# Patient Record
Sex: MALE | Race: WHITE | Employment: OTHER | URBAN - METROPOLITAN AREA
[De-identification: names, ages, dates, MRNs, and addresses within clinical notes are randomized per-mention and may not be internally consistent; named-entity substitution may affect disease eponyms.]

---

## 2017-03-21 ENCOUNTER — APPOINTMENT (OUTPATIENT)
Dept: GENERAL RADIOLOGY | Age: 72
End: 2017-03-21
Attending: EMERGENCY MEDICINE
Payer: MEDICARE

## 2017-03-21 ENCOUNTER — HOSPITAL ENCOUNTER (EMERGENCY)
Age: 72
Discharge: HOME | End: 2017-03-21
Attending: EMERGENCY MEDICINE
Payer: MEDICARE

## 2017-03-21 VITALS
WEIGHT: 248.9 LBS | TEMPERATURE: 98.6 F | RESPIRATION RATE: 18 BRPM | SYSTOLIC BLOOD PRESSURE: 120 MMHG | BODY MASS INDEX: 35.63 KG/M2 | OXYGEN SATURATION: 96 % | HEART RATE: 63 BPM | HEIGHT: 70 IN | DIASTOLIC BLOOD PRESSURE: 44 MMHG

## 2017-03-21 DIAGNOSIS — M79.602 ARM PAIN, DIFFUSE, LEFT: Primary | ICD-10-CM

## 2017-03-21 DIAGNOSIS — Z95.5 HISTORY OF CORONARY ARTERY STENT PLACEMENT: ICD-10-CM

## 2017-03-21 LAB
ALBUMIN SERPL BCP-MCNC: 3.6 G/DL (ref 3.5–5)
ALBUMIN/GLOB SERPL: 1.3 {RATIO} (ref 1.1–2.2)
ALP SERPL-CCNC: 64 U/L (ref 45–117)
ALT SERPL-CCNC: 37 U/L (ref 12–78)
ANION GAP BLD CALC-SCNC: 10 MMOL/L (ref 5–15)
AST SERPL W P-5'-P-CCNC: 26 U/L (ref 15–37)
ATRIAL RATE: 59 BPM
BASOPHILS # BLD AUTO: 0 K/UL (ref 0–0.1)
BASOPHILS # BLD: 0 % (ref 0–1)
BILIRUB SERPL-MCNC: 0.5 MG/DL (ref 0.2–1)
BUN SERPL-MCNC: 16 MG/DL (ref 6–20)
BUN/CREAT SERPL: 19 (ref 12–20)
CALCIUM SERPL-MCNC: 8.6 MG/DL (ref 8.5–10.1)
CALCULATED P AXIS, ECG09: 34 DEGREES
CALCULATED R AXIS, ECG10: -16 DEGREES
CALCULATED T AXIS, ECG11: 10 DEGREES
CHLORIDE SERPL-SCNC: 105 MMOL/L (ref 97–108)
CK MB CFR SERPL CALC: 1.9 % (ref 0–2.5)
CK MB CFR SERPL CALC: 2 % (ref 0–2.5)
CK MB SERPL-MCNC: 5.1 NG/ML (ref 5–25)
CK MB SERPL-MCNC: 6.3 NG/ML (ref 5–25)
CK SERPL-CCNC: 269 U/L (ref 39–308)
CK SERPL-CCNC: 316 U/L (ref 39–308)
CO2 SERPL-SCNC: 26 MMOL/L (ref 21–32)
CREAT SERPL-MCNC: 0.84 MG/DL (ref 0.7–1.3)
DIAGNOSIS, 93000: NORMAL
EOSINOPHIL # BLD: 0.4 K/UL (ref 0–0.4)
EOSINOPHIL NFR BLD: 5 % (ref 0–7)
ERYTHROCYTE [DISTWIDTH] IN BLOOD BY AUTOMATED COUNT: 12.7 % (ref 11.5–14.5)
GLOBULIN SER CALC-MCNC: 2.7 G/DL (ref 2–4)
GLUCOSE SERPL-MCNC: 105 MG/DL (ref 65–100)
HCT VFR BLD AUTO: 41 % (ref 36.6–50.3)
HGB BLD-MCNC: 14.2 G/DL (ref 12.1–17)
LYMPHOCYTES # BLD AUTO: 31 % (ref 12–49)
LYMPHOCYTES # BLD: 2.4 K/UL (ref 0.8–3.5)
MCH RBC QN AUTO: 29.2 PG (ref 26–34)
MCHC RBC AUTO-ENTMCNC: 34.6 G/DL (ref 30–36.5)
MCV RBC AUTO: 84.4 FL (ref 80–99)
MONOCYTES # BLD: 0.7 K/UL (ref 0–1)
MONOCYTES NFR BLD AUTO: 10 % (ref 5–13)
NEUTS SEG # BLD: 4.1 K/UL (ref 1.8–8)
NEUTS SEG NFR BLD AUTO: 54 % (ref 32–75)
P-R INTERVAL, ECG05: 168 MS
PLATELET # BLD AUTO: 240 K/UL (ref 150–400)
POTASSIUM SERPL-SCNC: 3.7 MMOL/L (ref 3.5–5.1)
PROT SERPL-MCNC: 6.3 G/DL (ref 6.4–8.2)
Q-T INTERVAL, ECG07: 420 MS
QRS DURATION, ECG06: 92 MS
QTC CALCULATION (BEZET), ECG08: 415 MS
RBC # BLD AUTO: 4.86 M/UL (ref 4.1–5.7)
SODIUM SERPL-SCNC: 141 MMOL/L (ref 136–145)
TROPONIN I SERPL-MCNC: <0.04 NG/ML
TROPONIN I SERPL-MCNC: <0.04 NG/ML
VENTRICULAR RATE, ECG03: 59 BPM
WBC # BLD AUTO: 7.5 K/UL (ref 4.1–11.1)

## 2017-03-21 PROCEDURE — 93005 ELECTROCARDIOGRAM TRACING: CPT

## 2017-03-21 PROCEDURE — 36415 COLL VENOUS BLD VENIPUNCTURE: CPT | Performed by: EMERGENCY MEDICINE

## 2017-03-21 PROCEDURE — 85025 COMPLETE CBC W/AUTO DIFF WBC: CPT | Performed by: EMERGENCY MEDICINE

## 2017-03-21 PROCEDURE — 80053 COMPREHEN METABOLIC PANEL: CPT | Performed by: EMERGENCY MEDICINE

## 2017-03-21 PROCEDURE — 82553 CREATINE MB FRACTION: CPT | Performed by: EMERGENCY MEDICINE

## 2017-03-21 PROCEDURE — 84484 ASSAY OF TROPONIN QUANT: CPT | Performed by: EMERGENCY MEDICINE

## 2017-03-21 PROCEDURE — 82550 ASSAY OF CK (CPK): CPT | Performed by: EMERGENCY MEDICINE

## 2017-03-21 PROCEDURE — 99283 EMERGENCY DEPT VISIT LOW MDM: CPT

## 2017-03-21 PROCEDURE — 71020 XR CHEST PA LAT: CPT

## 2017-03-21 RX ORDER — SODIUM CHLORIDE 0.9 % (FLUSH) 0.9 %
5-10 SYRINGE (ML) INJECTION AS NEEDED
Status: DISCONTINUED | OUTPATIENT
Start: 2017-03-21 | End: 2017-03-21 | Stop reason: HOSPADM

## 2017-03-21 RX ORDER — SODIUM CHLORIDE 0.9 % (FLUSH) 0.9 %
5-10 SYRINGE (ML) INJECTION EVERY 8 HOURS
Status: DISCONTINUED | OUTPATIENT
Start: 2017-03-21 | End: 2017-03-21 | Stop reason: HOSPADM

## 2017-03-21 RX ORDER — TRAMADOL HYDROCHLORIDE 50 MG/1
50 TABLET ORAL
Qty: 20 TAB | Refills: 0 | Status: SHIPPED | OUTPATIENT
Start: 2017-03-21 | End: 2017-03-21

## 2017-03-21 RX ORDER — TRAMADOL HYDROCHLORIDE 50 MG/1
50 TABLET ORAL
Qty: 20 TAB | Refills: 0 | Status: SHIPPED | OUTPATIENT
Start: 2017-03-21 | End: 2017-09-21

## 2017-03-21 NOTE — DISCHARGE INSTRUCTIONS
Learning About Coronary Artery Disease (CAD)  What is coronary artery disease? Coronary artery disease (CAD) occurs when plaque builds up in the arteries that bring oxygen-rich blood to your heart. Plaque is a fatty substance made of cholesterol, calcium, and other substances in the blood. This process is called hardening of the arteries, or atherosclerosis. What happens when you have coronary artery disease? · Plaque may narrow the coronary arteries. Narrowed arteries cause poor blood flow. This can lead to angina symptoms such as chest pain or discomfort. If blood flow is completely blocked, you could have a heart attack. · You can slow CAD and reduce the risk of future problems by making changes in your lifestyle. These include quitting smoking and eating heart-healthy foods. · Treatments for CAD, along with changes in your lifestyle, can help you live a longer and healthier life. How can you prevent coronary artery disease? · Do not smoke. It may be the best thing you can do to prevent heart disease. If you need help quitting, talk to your doctor about stop-smoking programs and medicines. These can increase your chances of quitting for good. · Be active. Get at least 30 minutes of exercise on most days of the week. Walking is a good choice. You also may want to do other activities, such as running, swimming, cycling, or playing tennis or team sports. · Eat heart-healthy foods. Eat more fruits and vegetables and less foods that contain saturated and trans fats. Limit alcohol, sodium, and sweets. · Stay at a healthy weight. Lose weight if you need to. · Manage other health problems such as diabetes, high blood pressure, and high cholesterol. · Manage stress. Stress can hurt your heart. To keep stress low, talk about your problems and feelings. Don't keep your feelings hidden. · If you have talked about it with your doctor, take a low-dose aspirin every day.  Aspirin can help certain people lower their risk of a heart attack or stroke. But taking aspirin isn't right for everyone, because it can cause serious bleeding. Do not start taking daily aspirin unless your doctor knows about it. How is coronary artery disease treated? · Your doctor will suggest that you make lifestyle changes. For example, your doctor may ask you to eat healthy foods, quit smoking, lose extra weight, and be more active. · You will have to take medicines. · Your doctor may suggest a procedure to open narrowed or blocked arteries. This is called angioplasty. Or your doctor may suggest using healthy blood vessels to create detours around narrowed or blocked arteries. This is called bypass surgery. Follow-up care is a key part of your treatment and safety. Be sure to make and go to all appointments, and call your doctor if you are having problems. It's also a good idea to know your test results and keep a list of the medicines you take. Where can you learn more? Go to http://rosauraMelody Managementjessica.info/. Enter [phone] in the search box to learn more about \"Learning About Coronary Artery Disease (CAD). \"  Current as of: January 27, 2016  Content Version: 11.1  © 9345-5776 [employer]. Care instructions adapted under license by [employer] (which disclaims liability or warranty for this information). If you have questions about a medical condition or this instruction, always ask your healthcare professional. [place] [address] any warranty or liability for your use of this information. Arm Pain: Care Instructions  Your Care Instructions  You can hurt your arm by using it too much or by injuring it. Biking, wrestling, and home repair projects are examples of activities that can lead to arm pain. Everyday wear and tear, especially as you get older, can cause arm pain. Your forearms, wrists, hands, and fingers are the parts of your arm that are most likely to become painful.   A minor arm injury usually will heal on its own with home treatment to relieve swelling and pain. If you have a more serious injury, you may need tests and treatment. Follow-up care is a key part of your treatment and safety. Be sure to make and go to all appointments, and call your doctor if you are having problems. Its also a good idea to know your test results and keep a list of the medicines you take. How can you care for yourself at home? · Take pain medicines exactly as directed. ¨ If the doctor gave you a prescription medicine for pain, take it as prescribed. ¨ If you are not taking a prescription pain medicine, ask your doctor if you can take an over-the-counter medicine. · Rest and protect your arm. Take a break from any activity that may cause pain. · Put ice or a cold pack on your arm for 10 to 20 minutes at a time. Put a thin cloth between the ice and your skin. · Prop up the sore arm on a pillow when you ice it or anytime you sit or lie down during the next 3 days. Try to keep it above the level of your heart. This will help reduce swelling. · If your doctor recommends a sling to support your arm, wear it as directed. When should you call for help? Call 911 anytime you think you may need emergency care. For example, call if:  · Your arm or hand is cool or pale or changes color. Call your doctor now or seek immediate medical care if:  · You cannot use your arm. · You have signs of infection, such as:  ¨ Increased pain, swelling, warmth, or redness. ¨ Red streaks running up or down your arm. ¨ Pus draining from an area of your arm. ¨ A fever. · You have tingling, weakness, or numbness in your arm. Watch closely for changes in your health, and be sure to contact your doctor if:  · You do not get better as expected. Where can you learn more? Go to http://rosaura-jessica.info/. Enter B641 in the search box to learn more about \"Arm Pain: Care Instructions. \"  Current as of:  May 27, 2016  Content Version: 11.1  © 0712-1254 [employer]. Care instructions adapted under license by [employer] (which disclaims liability or warranty for this information). If you have questions about a medical condition or this instruction, always ask your healthcare professional. [address] any warranty or liability for your use of this information.

## 2017-03-21 NOTE — ED NOTES
Assumed care of patient. Bedside shift change report received from Eve Martínez (offgoing nurse) by Pam Molina (oncoming nurse). Given using SBAR, ED summary, MAR and recent results.

## 2017-03-21 NOTE — ED NOTES
Discharge instructions reviewed with pt by MD. Pt able to return/verbalize discharge instructions. Copy of discharge instructions given. Patient condition stable, respiratory status within normal limits, neuro status intact.  Ambulatory out of er

## 2017-03-21 NOTE — ED NOTES
Assumed care of patient. Pt arrived to ED via triage with c/o L sided arm pain and \"some\" SOB. Pt has hx of stents and angina per pt. Pt reports his symptoms are similar to previous event. Patient tachypneic on arrival.      Patient placed on monitor x 3. Patient in no apparent distress at this time. Rails up x 2. Call bell within reach. Plan of care discussed. Wheels locked and bed low.

## 2017-03-21 NOTE — ED NOTES
Bedside and Verbal shift change report given to Treva (oncoming nurse) by Elmira Lai (offgoing nurse). Report included the following information SBAR, Kardex and ED Summary.

## 2017-03-21 NOTE — ED PROVIDER NOTES
HPI Comments: Nito Telles is a 70 y.o. male, pmhx arthritis / DM / cardiac disease, who presents ambulatory to the ED c/o sudden onset non-radiating LUE pain and SOB that woke him from his sleep x 0200 this morning. Pt denies any known modifying factors. He reports taking Ibuprofen and 325mg ASA with improvement in his sxs; pt denies taking his rx'd NTG. Pt denies any hx of MI, but notes a hx of cardiac disease and reports having 4 stents placed in 2012. He states he recently moved from [place], [place] and has yet to establish care with a cardiologist in the area. Pt specifically denies any recent CP, heart palpitations, diaphoresis, nausea, vomiting, lightheadedness, dizziness, abd pain, jaw pain, neck pain, BLE swelling, or long distance travel. Cardiologist: Dr. Jacob Michael [facility])     Allergies: NKDA  PMHx: Significant for fibromyalgia, arthritis, DM, hypercholesterolemia, sleep apnea, cardiac disease  PSHx: Significant for cardiac cath / stent (x4) 2012  Social Hx: -tobacco (former 1808), -EtOH, -Illicit Drugs    There are no other complaints, changes, or physical findings at this time. The history is provided by the patient. No past medical history on file. No past surgical history on file. No family history on file. Social History     Social History    Marital status: SINGLE     Spouse name: N/A    Number of children: N/A    Years of education: N/A     Occupational History    Not on file. Social History Main Topics    Smoking status: Not on file    Smokeless tobacco: Not on file    Alcohol use Not on file    Drug use: Not on file    Sexual activity: Not on file     Other Topics Concern    Not on file     Social History Narrative         ALLERGIES: Review of patient's allergies indicates no known allergies. Review of Systems   Constitutional: Negative for chills, diaphoresis and fever. HENT: Negative. Eyes: Negative. Respiratory: Positive for shortness of breath. Negative for cough and chest tightness. Cardiovascular: Negative for chest pain, palpitations and leg swelling. Gastrointestinal: Negative for abdominal pain, diarrhea, nausea and vomiting. Endocrine: Negative. Genitourinary: Negative for difficulty urinating and dysuria. Musculoskeletal: Positive for myalgias (LUE). Negative for neck pain. Negative jaw pain   Skin: Negative. Neurological: Negative. Negative for dizziness, weakness, light-headedness and headaches. Psychiatric/Behavioral: Negative. All other systems reviewed and are negative. Vitals:    03/21/17 0440 03/21/17 0500   BP: 133/64 133/62   Pulse: 65 (!) 58   Resp: 16 22   Temp: 98.6 °F (37 °C)    SpO2: 96% 95%   Weight: 112.9 kg (248 lb 14.4 oz)    Height: 5' 10\" (1.778 m)             Physical Exam   Nursing note and vitals reviewed.   General appearance - overweight , well appearing, and in no distress  Eyes - pupils equal and reactive, extraocular eye movements intact  ENT - mucous membranes moist, pharynx normal without lesions  Neck - supple, no significant adenopathy; non-tender to palpation  Chest - clear to auscultation, no wheezes, rales or rhonchi; non-tender to palpation  Heart - normal rate and regular rhythm, S1 and S2 normal, no murmurs noted  Abdomen - soft, nontender, nondistended, no masses or organomegaly  Musculoskeletal - no joint tenderness, deformity; normal ROM  Extremities - peripheral pulses normal, 1+ pitting edema to BLE  Skin - normal coloration and turgor, no rashes  Neurological - alert, oriented x3, normal speech, no focal findings or movement disorder noted  Written by KAROL Prado, as dictated by Danii Pacheco MD        MDM  Number of Diagnoses or Management Options  Diagnosis management comments: DDx: ACS, cervical radiculopathy, muscle strain, arthritis       Amount and/or Complexity of Data Reviewed  Clinical lab tests: reviewed and ordered  Tests in the radiology section of CPT®: reviewed and ordered  Tests in the medicine section of CPT®: ordered and reviewed  Review and summarize past medical records: yes  Independent visualization of images, tracings, or specimens: yes    Patient Progress  Patient progress: stable      Procedures      EKG interpretation: (Preliminary) 0435  Rhythm: sinus bradycardia. Rate (approx.): 59bpm; Axis: normal; Normal [place], QRS, QTc intervals; ST/T wave: non-ischemic changes; Other findings: possible ischemia. Written by Chano Nina, ED Scribe, as dictated by Lila Potts MD     PROGRESS NOTE:  5:30 AM  Pt reevaluated. Pt resting comfortably at this time. Pt's 1st set of cardiac enzymes resulted WNL. Pt agrees to wait for second set. Updated on all available lab and imaging findings including negative CXR. Will continue to monitor. Written by Chano Nina, ED Scribe, as dictated by Lila Potts MD    SIGN OUT:  7:00 AM  Patient's presentation, labs/imaging and plan of care was reviewed with Sebastien Oakes MD as part of sign out. They will await pt's second set of cardiac enzymes and dispo as part of the plan discussed with the patient. Sebastien Oakes MD's assistance in completion of this plan is greatly appreciated but it should be noted that I will be the provider of record for this patient. Marisa Hayes MD    This note is prepared by Chano Nina, acting as Scribe for MD Lila Cerrato MD : The scribe's documentation has been prepared under my direction and personally reviewed by me in its entirety. I confirm that the note above accurately reflects all work, treatment, procedures, and medical decision making performed by me.               LABORATORY TESTS:  Recent Results (from the past 12 hour(s))   EKG, 12 LEAD, INITIAL    Collection Time: 03/21/17  4:32 AM   Result Value Ref Range    Ventricular Rate 59 BPM    Atrial Rate 59 BPM    P-R Interval 168 ms    QRS Duration 92 ms    Q-T Interval 420 ms    QTC Calculation (Bezet) 415 ms    Calculated P Axis 34 degrees    Calculated R Axis -16 degrees    Calculated T Axis 10 degrees    Diagnosis       Sinus bradycardia with premature atrial complexes  Otherwise normal ECG  Confirmed by Emily Medrano ([id number]) on 3/21/2017 8:09:54 AM     CBC WITH AUTOMATED DIFF    Collection Time: 03/21/17  4:45 AM   Result Value Ref Range    WBC 7.5 4.1 - 11.1 K/uL    RBC 4.86 4.10 - 5.70 M/uL    HGB 14.2 12.1 - 17.0 g/dL    HCT 41.0 36.6 - 50.3 %    MCV 84.4 80.0 - 99.0 FL    MCH 29.2 26.0 - 34.0 PG    MCHC 34.6 30.0 - 36.5 g/dL    RDW 12.7 11.5 - 14.5 %    PLATELET 943 590 - 234 K/uL    NEUTROPHILS 54 32 - 75 %    LYMPHOCYTES 31 12 - 49 %    MONOCYTES 10 5 - 13 %    EOSINOPHILS 5 0 - 7 %    BASOPHILS 0 0 - 1 %    ABS. NEUTROPHILS 4.1 1.8 - 8.0 K/UL    ABS. LYMPHOCYTES 2.4 0.8 - 3.5 K/UL    ABS. MONOCYTES 0.7 0.0 - 1.0 K/UL    ABS. EOSINOPHILS 0.4 0.0 - 0.4 K/UL    ABS. BASOPHILS 0.0 0.0 - 0.1 K/UL   METABOLIC PANEL, COMPREHENSIVE    Collection Time: 03/21/17  4:45 AM   Result Value Ref Range    Sodium 141 136 - 145 mmol/L    Potassium 3.7 3.5 - 5.1 mmol/L    Chloride 105 97 - 108 mmol/L    CO2 26 21 - 32 mmol/L    Anion gap 10 5 - 15 mmol/L    Glucose 105 (H) 65 - 100 mg/dL    BUN 16 6 - 20 MG/DL    Creatinine 0.84 0.70 - 1.30 MG/DL    BUN/Creatinine ratio 19 12 - 20      GFR est AA >60 >60 ml/min/1.73m2    GFR est non-AA >60 >60 ml/min/1.73m2    Calcium 8.6 8.5 - 10.1 MG/DL    Bilirubin, total 0.5 0.2 - 1.0 MG/DL    ALT (SGPT) 37 12 - 78 U/L    AST (SGOT) 26 15 - 37 U/L    Alk. phosphatase 64 45 - 117 U/L    Protein, total 6.3 (L) 6.4 - 8.2 g/dL    Albumin 3.6 3.5 - 5.0 g/dL    Globulin 2.7 2.0 - 4.0 g/dL    A-G Ratio 1.3 1.1 - 2.2     TROPONIN I    Collection Time: 03/21/17  4:45 AM   Result Value Ref Range    Troponin-I, Qt. <0.04 <0.05 ng/mL   CK W/ REFLX CKMB    Collection Time: 03/21/17  4:45 AM   Result Value Ref Range     (H) 39 - 308 U/L   CK-MB,QUANT. Collection Time: 03/21/17  4:45 AM   Result Value Ref Range    CK - MB 6.3 (H) <3.6 NG/ML    CK-MB Index 2.0 0 - 2.5     CK W/ CKMB & INDEX    Collection Time: 03/21/17  7:21 AM   Result Value Ref Range     39 - 308 U/L    CK - MB 5.1 (H) <3.6 NG/ML    CK-MB Index 1.9 0 - 2.5     TROPONIN I    Collection Time: 03/21/17  7:21 AM   Result Value Ref Range    Troponin-I, Qt. <0.04 <0.05 ng/mL       IMAGING RESULTS:  CXR Results  (Last 48 hours)               03/21/17 0520  XR CHEST PA LAT Final result    Impression:  IMPRESSION: No acute process           Narrative:  INDICATION:  Chest Pain        COMPARISON: 10/2/2008       FINDINGS: PA and lateral views of the chest demonstrate a stable   cardiomediastinal silhouette and clear lungs bilaterally. The visualized osseous   structures are unremarkable. MEDICATIONS GIVEN:  Medications   sodium chloride (NS) flush 5-10 mL (not administered)   sodium chloride (NS) flush 5-10 mL (not administered)       IMPRESSION:  1. Arm pain, diffuse, left    2. History of coronary artery stent placement        PLAN:  Current Discharge Medication List      START taking these medications    Details   traMADol (ULTRAM) 50 mg tablet Take 1 Tab by mouth every six (6) hours as needed for Pain. Max Daily Amount: 200 mg. Qty: 20 Tab, Refills: 0           Follow-up Information     Follow up With Details Comments Contact Info    [facility] EMERGENCY DEPT  If symptoms worsen [address] [facility] [address] [facility] [address]   [address] [phone]    Chalo Garza MD Schedule an appointment as soon as possible for a visit You may need an outpatient cardiac stress. [address]  [id number]  [facility] [address]  [phone]          Return to ED if worse     DISCHARGE NOTE:  8:56 AM  Pt has been reexamined. Pt has no new complaints, changes, or physical findings. Care plan outlined and precautions discussed. All available results reviewed with pt. All medications reviewed with pt.  All of pts questions and concerns addressed. Pt agrees to f/u as instructed and agrees to return to ED upon further deterioration. Pt is ready to go home. ATTESTATION:  This note is prepared by Abraham Stallworth, acting as Scribe for Edwardo Locke MD.    Edwardo Locke MD and personally reviewed by me in its entirety. I confirm that the note above accurately reflects all work, treatment, procedures, and medical decision making performed by me. This note will not be viewable in [address]

## 2017-04-08 ENCOUNTER — HOSPITAL ENCOUNTER (OUTPATIENT)
Dept: MRI IMAGING | Age: 72
Discharge: HOME | End: 2017-04-08
Attending: FAMILY MEDICINE
Payer: MEDICARE

## 2017-04-08 DIAGNOSIS — M54.41 ACUTE BACK PAIN WITH SCIATICA, RIGHT: ICD-10-CM

## 2017-04-08 PROCEDURE — 72148 MRI LUMBAR SPINE W/O DYE: CPT

## 2017-05-10 ENCOUNTER — HOSPITAL ENCOUNTER (OUTPATIENT)
Dept: PHYSICAL THERAPY | Age: 72
Discharge: HOME | End: 2017-05-10
Payer: MEDICARE

## 2017-05-10 PROCEDURE — 97162 PT EVAL MOD COMPLEX 30 MIN: CPT

## 2017-05-10 PROCEDURE — G8978 MOBILITY CURRENT STATUS: HCPCS

## 2017-05-10 PROCEDURE — G8979 MOBILITY GOAL STATUS: HCPCS

## 2017-05-10 PROCEDURE — 97110 THERAPEUTIC EXERCISES: CPT

## 2017-05-10 NOTE — PROGRESS NOTES
[address] [place] [address] (MOB IV), [address] [facility]  [place],  [address]  Phone: [phone] Fax: [phone]     Plan of Care/Statement of Necessity for Physical Therapy Services  2-15    Patient name: Leodan Abdi  : 1945  Provider#: [id number]  Referral source: Ezra Rock MD      Medical/Treatment Diagnosis: Other intervertebral disc degeneration, lumbar region [M51.36]     Prior Hospitalization: see medical history     Comorbidities: Angina, arthritis, back pain, BMI over 30, diabetes type I or II, gastrointestinal disease, HTN, osteoporosis, sleep dysfunction, fibromyalgia, left JAY  and right JAY   Prior Level of Function: The patient completed 20 minutes of exercise at least 1-2 times a week. Medications: Verified on Patient Summary List  Start of Care: 5/10/17      Onset Date: Chronic   The Plan of Care and following information is based on the information from the initial evaluation. Assessment/ key information: The Pt is a pleasant 70year old male who presents to therapy with midline lower back pain with occasional right-sided radiculopathy. The Pt currently displays decreased hip abduction and extension strength and stability, decreased gluteal activation, decreased core strength and stability, decreased balance and neuromuscular control, severe limitations in his lower back and hip musculature flexibility, hypomobility in his thoracic and lumbar spinal mobility, and decreased activity tolerance and endurance. The patient would benefit from skilled physical therapy to help improve the above listed impairments to allow the patient to safely return to their prior level of function with less overall pain or risk of further injury. The patient has a fair prognosis with skilled physical therapy.     Evaluation Complexity History HIGH Complexity :3+ comorbidities / personal factors will impact the outcome/ POC ; Examination HIGH Complexity : 4+ Standardized tests and measures addressing body structure, function, activity limitation and / or participation in recreation  ;Presentation MEDIUM Complexity : Evolving with changing characteristics  ; Clinical Decision Making MEDIUM Complexity : FOTO score of 26-74  Overall Complexity Rating: MEDIUM    Problem List: pain affecting function, decrease ROM, decrease strength, impaired gait/ balance, decrease ADL/ functional abilitiies, decrease activity tolerance, decrease flexibility/ joint mobility and decrease transfer abilities   Treatment Plan may include any combination of the following: Therapeutic exercise, Therapeutic activities, Neuromuscular re-education, Physical agent/modality, Gait/balance training, Manual therapy, Patient education, Self Care training, Functional mobility training, Home safety training and Stair training  Patient / Family readiness to learn indicated by: asking questions and interest  Persons(s) to be included in education: patient (P)  Barriers to Learning/Limitations: None  Patient Goal (s): to be active, lose weight, and feel better  Patient Self Reported Health Status: fair  Rehabilitation Potential: fair    Short Term Goals: To be accomplished in 6 treatments:  1. The Pt will be independent and compliant with his HEP. Long Term Goals: To be accomplished in 12 treatments:  1. The Pt will score the MCII on his FOTO survey demonstrating improved overall function (35 to 44 points). 2. The Pt will be able to walk >/=30 minutes independently with 0-2/10 pain to allow the Pt to return to walking for recreation. 3. The Pt will be able sit >/=60 minutes with 0-2/10 pain to allow the Pt to be able to drive will less discomfort. 4. The Pt will not wake in the middle of the night secondary to pain. Frequency / Duration: Patient to be seen 2 times per week for 12 treatments.     Patient/ Caregiver education and instruction: self care, activity modification and exercises    []  Plan of care has been reviewed with PTA    G-Codes (GP)  Mobility   Current  CL= 60-79%   Goal  CK= 40-59%    The severity rating is based on clinical judgment and the FOTO Score score. Certification Period:  5/10/17-8/10/17    Daphne May, PT 5/10/2017 3:16 PM    ________________________________________________________________________    I certify that the above Therapy Services are being furnished while the patient is under my care. I agree with the treatment plan and certify that this therapy is necessary.     [de-identified] Signature:____________________  Date:____________Time: _________

## 2017-05-10 NOTE — PROGRESS NOTES
PT INITIAL EVALUATION NOTE - [facility] 2-15    Patient Name: Leodan Abdi  Date:5/10/2017  : 1945  [x]  Patient  Verified  Payor: Jose Mancuso / Plan: [place] / Product Type: Medicare /    In time: 2:09 PM  Out time: 3:06 PM  Total Treatment Time (min): 57 minutes  Total Timed Codes (min): 15 minutes  1:1 Treatment Time ( [address] only):  57 minutes   Visit #: 1     Treatment Area: Other intervertebral disc degeneration, lumbar region [M51.36]    SUBJECTIVE  Pain Level (0-10 scale): 2/10  Any medication changes, allergies to medications, adverse drug reactions, diagnosis change, or new procedure performed?: [] No    [x] Yes (see summary sheet for update)  Subjective: The pt reports chronic lower back pain that has gotten progressively worse over the last few years. He reports arthritis all throughout his spine and because he is so stiff it has thrown his balance \"off\". He states that he is also having pain in the right leg that is new in the last few years- he has pain primarily in the hip and knee, but will occasionally radiate from the hip down into the feet. He had a left JAY in  and a right JAY in  (both anterior approaches), but never received any therapy after the operations. He has tried PT in the past for his lower back pain, but only did 3-4 sessions with no relief. His walking tolerance is 10-15 minutes and then he has pain, standing for long periods, bending forward, transferring from sit to stand, in and out of the car, and sitting > 30 minutes all aggravate his back pain. When he drives for long periods he gets a pain in the right buttock that feels like he is sitting on a knife. He is not sleeping well at night secondary to pain and first thing in the morning he is incredibly stiff; he is a side sleeper. He is able to do his ADLs independently, but he is having more pain and discomfort.  He lives in a two story home with the bedroom on the 2nd floor- he reports discomfort going up and down the stairs. He is concerned with falling going up and down the stairs because his balance feels off. PMH: fibromyalgia, osteopenia, DDD, Cardiac stents, neuropathy, sleep apnea. He is taking ibuprofen PRN for pain. PLOF: The patient completed 20 minutes of exercise at least 1-2 times a week. Mechanism of Injury: Insidious  Previous Treatment/Compliance: PT for 3-4 sessions, not compliant  PMHx/Surgical Hx: Angina, arthritis, back pain, BMI over 30, diabetes type I or II, gastrointestinal disease, HTN, osteoporosis, sleep dysfunction, fibromyalgia, left JAY 2012 and right JAY 2015  Work Hx: Retired  Living Situation: Lives in 2 story home with bedroom on the 2nd floor  Pt Goals: \"to be active, lose weight, and feel better\"  Barriers: None  Motivation: WFL  Substance use: Alcohol use  FABQ Score: 47/100  Cognition: A & O x 4        OBJECTIVE/EXAMINATION  Posture:  Mild thoracic kyphosis in standing  Other Observations:  N/A  Gait and Functional Mobility:  Decreased transverse rotation, decreased right stance time    Lumbar ROM:   Flexion: Severe   Extension: Severe   Side Bending: Right: Moderate   Left: Moderate   Rotation: Right: Severe, p! Left: Moderate, p!     Balance Assessment: Moderate deficits in balance and neuromuscular control in single limb stance bilaterally (left > right)    Squat Assessment:   Double Leg Forward trunk lean, quadriceps dominant, heels up   Single Leg NT    Neurological: Sensations: Decreased light touch sensation along right anterior/lateral thigh (residual from right JAY)    Flexibility: Severe restrictions in hamstrings, hip internal and external rotators, quadriceps, iliopsoas, and gastrocnemius/soleus complex bilaterally     Right Knee:  AROM WFL   Left  Knee:  AROM WFL     LOWER QUARTER   MUSCLE STRENGTH  KEY       R  L  0 - No Contraction  Hip flex  4-  4+  1 - Trace          er    4-  4  2 - Poor          ir   4-  4  3 - Fair           abd  4-  4  4 - Good          ext  3+  3+  5 - Normal   Knee flex  4+  4+               Ext  4+  4+      Ankle DF  4+  4+                PF  4+  4+        Gluteal activation: The Pt has improper gluteal activation with hip extension bilaterally     Joint Mobility Assessment: Hypomobility of thoracic and lumbar spine  Palpation: TTP along right piriformis    Special Tests:Varus: NT     SLR: Neg    Valgus: NT     Slump: Neg    Trista's: NT    Silvano: Positive    Anterior Drawer: NT    [place]: Positive    Posterior Drawer: NT    Clonus: Neg    Lachman's: NT    15 min Therapeutic Exercise:  [x] See flow sheet :   Rationale: increase ROM, increase strength, improve coordination, improve balance and increase proprioception to improve the patients ability to perform ADLs with less pain or discomfort          With   [x] TE   [] TA   [] neuro   [x] other: Patient Education: [x] Review HEP    [] Progressed/Changed HEP based on:   [] positioning   [] body mechanics   [] transfers   [] heat/ice application    [x] other: Pt educated on diagnosis and prognosis with therapy     Other Objective/Functional Measures: Lumbar FOTO 35/100    Pain Level (0-10 scale) post treatment: 3/10    ASSESSMENT/Changes in Function:     [x]  See Plan of Courtney 139, PT 5/10/2017  2:09 PM

## 2017-05-23 ENCOUNTER — HOSPITAL ENCOUNTER (OUTPATIENT)
Dept: PHYSICAL THERAPY | Age: 72
End: 2017-05-23
Payer: MEDICARE

## 2017-05-26 ENCOUNTER — HOSPITAL ENCOUNTER (OUTPATIENT)
Dept: PHYSICAL THERAPY | Age: 72
Discharge: HOME | End: 2017-05-26
Payer: MEDICARE

## 2017-05-26 PROCEDURE — 97110 THERAPEUTIC EXERCISES: CPT

## 2017-05-26 PROCEDURE — 97140 MANUAL THERAPY 1/> REGIONS: CPT

## 2017-05-26 NOTE — PROGRESS NOTES
PT DAILY TREATMENT NOTE - [facility] 2-15    Patient Name: Kenn Pitt  Date:2017  : 1945  [x]  Patient  Verified  Payor: Waldemar Rader / Plan: [place] / Product Type: Medicare /    In time: 12:05 PM  Out time: 1:01 PM  Total Treatment Time (min): 56 minutes  Total Timed Codes (min): 46 minutes  1:1 Treatment Time ( [address] only): 46 minutes   Visit #: 2     Treatment Area: Other intervertebral disc degeneration, lumbar region [M51.36]    SUBJECTIVE  Pain Level (0-10 scale): 2/10  Any medication changes, allergies to medications, adverse drug reactions, diagnosis change, or new procedure performed?: [x] No    [] Yes (see summary sheet for update)  Subjective functional status/changes:   [] No changes reported  The Pt reports that he was sore after the initial evaluation, but there was no increased pain. He has been able to do his exercises as prescribed, but if he is feeling bad then he has only been doing them 1x a day. Over the weekend, he was doing extensive yard work and it caused a significant in his lower back pain, but today his back is feeling much better. He is having the majority of his pain in his right hip at this time. OBJECTIVE    Modality rationale: decrease edema, decrease inflammation and decrease pain to improve the patients ability to ambulate and perform ADLs with less pain or discomfort.    Min Type Additional Details    [] Estim: []Att   []Unatt        []TENS instruct                  []IFC  []Premod   []NMES                     []Other:  []w/US   []w/ice   []w/heat  Position:  Location:    []  Traction: [] Cervical       []Lumbar                       [] Prone          []Supine                       []Intermittent   []Continuous Lbs:  [] before manual  [] after manual  []w/heat    []  Ultrasound: []Continuous   [] Pulsed at:                           []1MHz   []3MHz Location:  W/cm2:    [] Paraffin         Location:   []w/heat   10 [x]  Ice     []  Heat  []  Ice massage Position: Left sidelying  Location: Right hip    []  Laser  []  Other: Position:  Location:      []  Vasopneumatic Device Pressure:       [] lo [] med [] hi   Temperature:      [x] Skin assessment post-treatment:  [x]intact []redness- no adverse reaction    []redness  adverse reaction:     36 min Therapeutic Exercise:  [x] See flow sheet :   Rationale: increase ROM, increase strength, improve coordination, improve balance and increase proprioception to improve the patients ability to ambulate and perform ADLs with less pain or discomfort. 10 min Manual Therapy: PA glides to thoracic and lumbar spine    Rationale: decrease pain, increase ROM and increase tissue extensibility to improve the patients ability to ambulate and perform ADLs with less pain or discomfort. With   [x] TE   [] TA   [] neuro   [] other: Patient Education: [x] Review HEP    [] Progressed/Changed HEP based on:   [] positioning   [] body mechanics   [] transfers   [] heat/ice application    [] other:      Other Objective/Functional Measures: None noted     Pain Level (0-10 scale) post treatment: 2/10    ASSESSMENT/Changes in Function:   The Pt tolerated the additions to his therapy program well without any increased pain or discomfort. With the PA glides, he was found to have severe hypomobility in his thoracic spine and there was very little accessory motion. Patient will continue to benefit from skilled PT services to modify and progress therapeutic interventions, address functional mobility deficits, address ROM deficits, address strength deficits, analyze and address soft tissue restrictions, analyze and cue movement patterns, analyze and modify body mechanics/ergonomics, assess and modify postural abnormalities and instruct in home and community integration to attain remaining goals.      []  See Plan of Care  []  See progress note/recertification  []  See Discharge Summary         Progress towards goals / Updated goals:  Short Term Goals: To be accomplished in 6 treatments:  1. The Pt will be independent and compliant with his HEP- progressing  Long Term Goals: To be accomplished in 12 treatments:  1. The Pt will score the MCII on his FOTO survey demonstrating improved overall function (35 to 44 points)- progressing  2. The Pt will be able to walk >/=30 minutes independently with 0-2/10 pain to allow the Pt to return to walking for recreation progressing  3. The Pt will be able sit >/=60 minutes with 0-2/10 pain to allow the Pt to be able to drive will less discomfort- progressing  4.  The Pt will not wake in the middle of the night secondary to pain- progressing    PLAN  [x]  Upgrade activities as tolerated     [x]  Continue plan of care  [x]  Update interventions per flow sheet       []  Discharge due to:_  []  Other:_      Tyler Torres, PT 5/26/2017  12:12 PM

## 2017-05-30 ENCOUNTER — APPOINTMENT (OUTPATIENT)
Dept: PHYSICAL THERAPY | Age: 72
End: 2017-05-30
Payer: MEDICARE

## 2017-06-01 ENCOUNTER — APPOINTMENT (OUTPATIENT)
Dept: PHYSICAL THERAPY | Age: 72
End: 2017-06-01

## 2017-07-14 NOTE — ANCILLARY DISCHARGE INSTRUCTIONS
[facility]  [address] (MOB IV), [address] [place]  Phone: [phone] Fax: [phone]    Discharge Summary 2-15    Patient name: Dhara Larose  : 1945  Provider#: [id number]  Referral source: Wily Caal MD      Medical/Treatment Diagnosis: Other intervertebral disc degeneration, lumbar region [M51.36]     Prior Hospitalization: see medical history     Comorbidities: See Plan of Care  Prior Level of Function: See Plan of Care  Medications: Verified on Patient Summary List    Start of Care: 5/10/17      Onset Date: Chronic   Visits from Start of Care: 2     Missed Visits: Cancelled 3  Reporting Period : 5/10/17 to 17    Assessment/Summary of care: Pt is discharged today, 2017, as they have stopped attending therapy. Final objective data and outcomes were unable to be obtained.     RECOMMENDATIONS:  [x]Discontinue therapy: []Patient has reached or is progressing toward set goals     [x]Patient is non-compliant or has abdicated     []Due to lack of appreciable progress towards set goals     []Other  Alem Maldonado PT 2017 8:41 AM

## 2017-09-21 ENCOUNTER — APPOINTMENT (OUTPATIENT)
Dept: CT IMAGING | Age: 72
End: 2017-09-21
Attending: PHYSICIAN ASSISTANT
Payer: MEDICARE

## 2017-09-21 ENCOUNTER — HOSPITAL ENCOUNTER (OUTPATIENT)
Age: 72
Setting detail: OBSERVATION
Discharge: HOME | End: 2017-09-23
Attending: EMERGENCY MEDICINE | Admitting: INTERNAL MEDICINE
Payer: MEDICARE

## 2017-09-21 DIAGNOSIS — R79.89 ELEVATED LACTIC ACID LEVEL: ICD-10-CM

## 2017-09-21 DIAGNOSIS — K59.00 CONSTIPATION, UNSPECIFIED CONSTIPATION TYPE: Primary | ICD-10-CM

## 2017-09-21 PROBLEM — E87.20 LACTIC ACIDOSIS: Status: ACTIVE | Noted: 2017-09-21

## 2017-09-21 LAB
ALBUMIN SERPL-MCNC: 3.9 G/DL (ref 3.5–5)
ALBUMIN/GLOB SERPL: 1.1 {RATIO} (ref 1.1–2.2)
ALP SERPL-CCNC: 50 U/L (ref 45–117)
ALT SERPL-CCNC: 61 U/L (ref 12–78)
ANION GAP SERPL CALC-SCNC: 7 MMOL/L (ref 5–15)
APPEARANCE UR: CLEAR
AST SERPL-CCNC: 32 U/L (ref 15–37)
BACTERIA URNS QL MICRO: NEGATIVE /HPF
BASOPHILS # BLD: 0 K/UL (ref 0–0.1)
BASOPHILS NFR BLD: 0 % (ref 0–1)
BILIRUB SERPL-MCNC: 0.5 MG/DL (ref 0.2–1)
BILIRUB UR QL: NEGATIVE
BUN SERPL-MCNC: 15 MG/DL (ref 6–20)
BUN/CREAT SERPL: 16 (ref 12–20)
CALCIUM SERPL-MCNC: 8.8 MG/DL (ref 8.5–10.1)
CHLORIDE SERPL-SCNC: 103 MMOL/L (ref 97–108)
CO2 SERPL-SCNC: 28 MMOL/L (ref 21–32)
COLOR UR: NORMAL
CREAT SERPL-MCNC: 0.96 MG/DL (ref 0.7–1.3)
EOSINOPHIL # BLD: 0.3 K/UL (ref 0–0.4)
EOSINOPHIL NFR BLD: 3 % (ref 0–7)
EPITH CASTS URNS QL MICRO: NORMAL /LPF
ERYTHROCYTE [DISTWIDTH] IN BLOOD BY AUTOMATED COUNT: 13.2 % (ref 11.5–14.5)
GLOBULIN SER CALC-MCNC: 3.4 G/DL (ref 2–4)
GLUCOSE SERPL-MCNC: 104 MG/DL (ref 65–100)
GLUCOSE UR STRIP.AUTO-MCNC: NEGATIVE MG/DL
HCT VFR BLD AUTO: 43.2 % (ref 36.6–50.3)
HGB BLD-MCNC: 14.6 G/DL (ref 12.1–17)
HGB UR QL STRIP: NEGATIVE
HYALINE CASTS URNS QL MICRO: NORMAL /LPF (ref 0–5)
KETONES UR QL STRIP.AUTO: NEGATIVE MG/DL
LACTATE SERPL-SCNC: 2.6 MMOL/L (ref 0.4–2)
LACTATE SERPL-SCNC: 3.4 MMOL/L (ref 0.4–2)
LEUKOCYTE ESTERASE UR QL STRIP.AUTO: NEGATIVE
LYMPHOCYTES # BLD: 1.9 K/UL (ref 0.8–3.5)
LYMPHOCYTES NFR BLD: 17 % (ref 12–49)
MCH RBC QN AUTO: 28.6 PG (ref 26–34)
MCHC RBC AUTO-ENTMCNC: 33.8 G/DL (ref 30–36.5)
MCV RBC AUTO: 84.5 FL (ref 80–99)
MONOCYTES # BLD: 1.1 K/UL (ref 0–1)
MONOCYTES NFR BLD: 10 % (ref 5–13)
NEUTS SEG # BLD: 7.8 K/UL (ref 1.8–8)
NEUTS SEG NFR BLD: 70 % (ref 32–75)
NITRITE UR QL STRIP.AUTO: NEGATIVE
PH UR STRIP: 5.5 [PH] (ref 5–8)
PLATELET # BLD AUTO: 242 K/UL (ref 150–400)
POTASSIUM SERPL-SCNC: 4.4 MMOL/L (ref 3.5–5.1)
PROT SERPL-MCNC: 7.3 G/DL (ref 6.4–8.2)
PROT UR STRIP-MCNC: NEGATIVE MG/DL
RBC # BLD AUTO: 5.11 M/UL (ref 4.1–5.7)
RBC #/AREA URNS HPF: NORMAL /HPF (ref 0–5)
SODIUM SERPL-SCNC: 138 MMOL/L (ref 136–145)
SP GR UR REFRACTOMETRY: 1.02 (ref 1–1.03)
UA: UC IF INDICATED,UAUC: NORMAL
UROBILINOGEN UR QL STRIP.AUTO: 0.2 EU/DL (ref 0.2–1)
WBC # BLD AUTO: 11.2 K/UL (ref 4.1–11.1)
WBC URNS QL MICRO: NORMAL /HPF (ref 0–4)

## 2017-09-21 PROCEDURE — 74011636320 HC RX REV CODE- 636/320: Performed by: EMERGENCY MEDICINE

## 2017-09-21 PROCEDURE — 81001 URINALYSIS AUTO W/SCOPE: CPT

## 2017-09-21 PROCEDURE — 99218 HC RM OBSERVATION: CPT

## 2017-09-21 PROCEDURE — 99283 EMERGENCY DEPT VISIT LOW MDM: CPT

## 2017-09-21 PROCEDURE — 74011250636 HC RX REV CODE- 250/636: Performed by: EMERGENCY MEDICINE

## 2017-09-21 PROCEDURE — 85025 COMPLETE CBC W/AUTO DIFF WBC: CPT

## 2017-09-21 PROCEDURE — 74011250636 HC RX REV CODE- 250/636: Performed by: PHYSICIAN ASSISTANT

## 2017-09-21 PROCEDURE — 80053 COMPREHEN METABOLIC PANEL: CPT

## 2017-09-21 PROCEDURE — 96374 THER/PROPH/DIAG INJ IV PUSH: CPT

## 2017-09-21 PROCEDURE — 74011000258 HC RX REV CODE- 258: Performed by: INTERNAL MEDICINE

## 2017-09-21 PROCEDURE — 74177 CT ABD & PELVIS W/CONTRAST: CPT

## 2017-09-21 PROCEDURE — 74011636320 HC RX REV CODE- 636/320: Performed by: PHYSICIAN ASSISTANT

## 2017-09-21 PROCEDURE — 83605 ASSAY OF LACTIC ACID: CPT

## 2017-09-21 PROCEDURE — 96361 HYDRATE IV INFUSION ADD-ON: CPT

## 2017-09-21 PROCEDURE — 74011250637 HC RX REV CODE- 250/637: Performed by: PHYSICIAN ASSISTANT

## 2017-09-21 PROCEDURE — 36415 COLL VENOUS BLD VENIPUNCTURE: CPT

## 2017-09-21 RX ORDER — AMLODIPINE BESYLATE 5 MG/1
5 TABLET ORAL DAILY
COMMUNITY
End: 2019-01-14

## 2017-09-21 RX ORDER — SODIUM CHLORIDE 9 MG/ML
50 INJECTION, SOLUTION INTRAVENOUS
Status: COMPLETED | OUTPATIENT
Start: 2017-09-21 | End: 2017-09-21

## 2017-09-21 RX ORDER — DOCUSATE SODIUM 100 MG/1
100 CAPSULE, LIQUID FILLED ORAL 2 TIMES DAILY
Status: DISCONTINUED | OUTPATIENT
Start: 2017-09-22 | End: 2017-09-23 | Stop reason: HOSPADM

## 2017-09-21 RX ORDER — GABAPENTIN 100 MG/1
CAPSULE ORAL 3 TIMES DAILY
COMMUNITY
End: 2017-09-21

## 2017-09-21 RX ORDER — SODIUM CHLORIDE 0.9 % (FLUSH) 0.9 %
10 SYRINGE (ML) INJECTION
Status: COMPLETED | OUTPATIENT
Start: 2017-09-21 | End: 2017-09-21

## 2017-09-21 RX ORDER — ONDANSETRON 2 MG/ML
4 INJECTION INTRAMUSCULAR; INTRAVENOUS
Status: DISCONTINUED | OUTPATIENT
Start: 2017-09-21 | End: 2017-09-23 | Stop reason: HOSPADM

## 2017-09-21 RX ORDER — SODIUM CHLORIDE 450 MG/100ML
75 INJECTION, SOLUTION INTRAVENOUS CONTINUOUS
Status: DISCONTINUED | OUTPATIENT
Start: 2017-09-21 | End: 2017-09-22

## 2017-09-21 RX ORDER — SORBITOL SOLUTION 70 %
30 SOLUTION, ORAL MISCELLANEOUS
Status: DISCONTINUED | OUTPATIENT
Start: 2017-09-21 | End: 2017-09-21

## 2017-09-21 RX ORDER — METFORMIN HYDROCHLORIDE 1000 MG/1
1000 TABLET ORAL 2 TIMES DAILY WITH MEALS
Status: ON HOLD | COMMUNITY
End: 2017-09-22

## 2017-09-21 RX ORDER — MORPHINE SULFATE 2 MG/ML
4 INJECTION, SOLUTION INTRAMUSCULAR; INTRAVENOUS ONCE
Status: COMPLETED | OUTPATIENT
Start: 2017-09-21 | End: 2017-09-21

## 2017-09-21 RX ORDER — SODIUM CHLORIDE 0.9 % (FLUSH) 0.9 %
5-10 SYRINGE (ML) INJECTION AS NEEDED
Status: DISCONTINUED | OUTPATIENT
Start: 2017-09-21 | End: 2017-09-23 | Stop reason: HOSPADM

## 2017-09-21 RX ORDER — AMLODIPINE BESYLATE 5 MG/1
5 TABLET ORAL DAILY
Status: DISCONTINUED | OUTPATIENT
Start: 2017-09-22 | End: 2017-09-23 | Stop reason: HOSPADM

## 2017-09-21 RX ORDER — LEVOTHYROXINE SODIUM 200 UG/1
200 TABLET ORAL
COMMUNITY

## 2017-09-21 RX ORDER — HYDROCHLOROTHIAZIDE 25 MG/1
25 TABLET ORAL DAILY
COMMUNITY

## 2017-09-21 RX ORDER — DULOXETIN HYDROCHLORIDE 30 MG/1
60 CAPSULE, DELAYED RELEASE ORAL DAILY
Status: DISCONTINUED | OUTPATIENT
Start: 2017-09-22 | End: 2017-09-23 | Stop reason: HOSPADM

## 2017-09-21 RX ORDER — ATORVASTATIN CALCIUM 80 MG/1
80 TABLET, FILM COATED ORAL
COMMUNITY

## 2017-09-21 RX ORDER — ATORVASTATIN CALCIUM 40 MG/1
80 TABLET, FILM COATED ORAL DAILY
Status: DISCONTINUED | OUTPATIENT
Start: 2017-09-22 | End: 2017-09-23 | Stop reason: HOSPADM

## 2017-09-21 RX ORDER — ASPIRIN 81 MG/1
81 TABLET ORAL DAILY
Status: DISCONTINUED | OUTPATIENT
Start: 2017-09-22 | End: 2017-09-23 | Stop reason: HOSPADM

## 2017-09-21 RX ORDER — ENOXAPARIN SODIUM 100 MG/ML
40 INJECTION SUBCUTANEOUS DAILY
Status: DISCONTINUED | OUTPATIENT
Start: 2017-09-22 | End: 2017-09-23 | Stop reason: HOSPADM

## 2017-09-21 RX ORDER — OMEGA-3S/DHA/EPA/FISH OIL/D3 300MG-1000
2000 CAPSULE ORAL DAILY
COMMUNITY

## 2017-09-21 RX ORDER — HYDROCHLOROTHIAZIDE 25 MG/1
25 TABLET ORAL DAILY
Status: DISCONTINUED | OUTPATIENT
Start: 2017-09-22 | End: 2017-09-23 | Stop reason: HOSPADM

## 2017-09-21 RX ORDER — LEVOTHYROXINE SODIUM 100 UG/1
200 TABLET ORAL
Status: DISCONTINUED | OUTPATIENT
Start: 2017-09-22 | End: 2017-09-23 | Stop reason: HOSPADM

## 2017-09-21 RX ORDER — ASPIRIN 81 MG/1
81 TABLET ORAL DAILY
Status: ON HOLD | COMMUNITY
End: 2020-11-30 | Stop reason: SDUPTHER

## 2017-09-21 RX ORDER — POLYETHYLENE GLYCOL 3350 17 G/17G
17 POWDER, FOR SOLUTION ORAL DAILY
Status: DISCONTINUED | OUTPATIENT
Start: 2017-09-22 | End: 2017-09-23 | Stop reason: HOSPADM

## 2017-09-21 RX ORDER — SODIUM CHLORIDE 0.9 % (FLUSH) 0.9 %
5-10 SYRINGE (ML) INJECTION EVERY 8 HOURS
Status: DISCONTINUED | OUTPATIENT
Start: 2017-09-21 | End: 2017-09-23 | Stop reason: HOSPADM

## 2017-09-21 RX ORDER — ACETAMINOPHEN 325 MG/1
650 TABLET ORAL
Status: DISCONTINUED | OUTPATIENT
Start: 2017-09-21 | End: 2017-09-23 | Stop reason: HOSPADM

## 2017-09-21 RX ORDER — DULOXETIN HYDROCHLORIDE 60 MG/1
60 CAPSULE, DELAYED RELEASE ORAL 2 TIMES DAILY
COMMUNITY
End: 2022-08-22

## 2017-09-21 RX ORDER — LACTULOSE 10 G/15ML
20 SOLUTION ORAL; RECTAL 2 TIMES DAILY
Qty: 420 ML | Refills: 0 | Status: SHIPPED | OUTPATIENT
Start: 2017-09-21 | End: 2017-09-22

## 2017-09-21 RX ADMIN — Medication 5 ML: at 23:50

## 2017-09-21 RX ADMIN — DIATRIZOATE MEGLUMINE AND DIATRIZOATE SODIUM 30 ML: 600; 100 SOLUTION ORAL; RECTAL at 16:41

## 2017-09-21 RX ADMIN — SODIUM CHLORIDE 50 ML/HR: 900 INJECTION, SOLUTION INTRAVENOUS at 18:33

## 2017-09-21 RX ADMIN — MORPHINE SULFATE 4 MG: 2 INJECTION, SOLUTION INTRAMUSCULAR; INTRAVENOUS at 17:11

## 2017-09-21 RX ADMIN — LACTULOSE 10 G: 20 SOLUTION ORAL at 19:12

## 2017-09-21 RX ADMIN — SODIUM CHLORIDE 1000 ML: 900 INJECTION, SOLUTION INTRAVENOUS at 17:12

## 2017-09-21 RX ADMIN — SODIUM CHLORIDE 75 ML/HR: 450 INJECTION, SOLUTION INTRAVENOUS at 23:50

## 2017-09-21 RX ADMIN — Medication 10 ML: at 18:33

## 2017-09-21 RX ADMIN — IOPAMIDOL 100 ML: 755 INJECTION, SOLUTION INTRAVENOUS at 18:33

## 2017-09-21 NOTE — IP AVS SNAPSHOT
[address] 
[phone] Patient: Ananda Bergeron MRN: [id number] XFW:6/5/1703 You are allergic to the following No active allergies Immunizations Administered for This Admission Name Date Influenza Vaccine (Quad) PF 9/23/2017 Recent Documentation Height Weight BMI Smoking Status 1.778 m 114.5 kg 36.22 kg/m2 Former Smoker Emergency Contacts Name Discharge Info Relation Home Work Mobile Tato Batista DISCHARGE CAREGIVER [3] Spouse [3] [phone] About your hospitalization You were admitted on:  September 21, 2017 You last received care in the:  [facility] 3 MED TELE You were discharged on:  September 23, 2017 Unit phone number:  [phone] Why you were hospitalized Your primary diagnosis was:  Not on File Your diagnoses also included:  Lactic Acidosis Providers Seen During Your Hospitalizations Provider Role Specialty Primary office phone Hayde Youssef MD Attending Provider Emergency Medicine [phone] Elder Espinoza MD Attending Provider Internal Medicine [phone] Your Primary Care Physician (PCP) Primary Care Physician Office Phone Office Fax Adria Nair [phone] Follow-up Information Follow up With Details Comments Contact Info Miriam Honeycutt NP On 9/26/2017 10;30 hospital follow-up [address] [place] [address] [address]   
[address] [address] [phone] Lyndon Beltran MD On 10/27/2017 1;00pm  Gastroenterology follow-up [facility] [address] 
[phone] MD Gary Becker [address] 
[phone] Current Discharge Medication List  
  
START taking these medications Dose & Instructions Dispensing Information Comments Morning Noon Evening Bedtime  
 docusate sodium 100 mg capsule Commonly known as:  Lokesh Sharp Your last dose was: Your next dose is:    
   
   
 Dose:  100 mg Take 1 Cap by mouth two (2) times a day for 90 days. Quantity:  60 Cap Refills:  0  
     
   
   
   
  
 polyethylene glycol 17 gram packet Commonly known as:  Flaco Hoskins Your last dose was: Your next dose is:    
   
   
 Dose:  17 g Take 1 Packet by mouth daily. Quantity:  30 Packet Refills:  0 CONTINUE these medications which have CHANGED Dose & Instructions Dispensing Information Comments Morning Noon Evening Bedtime  
 metFORMIN 1,000 mg tablet Commonly known as:  GLUCOPHAGE What changed:  additional instructions Your last dose was: Your next dose is:    
   
   
 Dose:  1000 mg Take 1 Tab by mouth two (2) times daily (with meals). START TAKING 9/23 WITH DINNER Quantity:  30 Tab Refills:  0 CONTINUE these medications which have NOT CHANGED Dose & Instructions Dispensing Information Comments Morning Noon Evening Bedtime  
 amLODIPine 5 mg tablet Commonly known as:  Papo Winters Your last dose was: Your next dose is:    
   
   
 Dose:  5 mg Take 5 mg by mouth daily. Refills:  0  
     
   
   
   
  
 aspirin delayed-release 81 mg tablet Your last dose was: Your next dose is: Take  by mouth daily. Refills:  0  
     
   
   
   
  
 atorvastatin 80 mg tablet Commonly known as:  LIPITOR Your last dose was: Your next dose is:    
   
   
 Dose:  80 mg Take 80 mg by mouth daily. Refills:  0 DULoxetine 60 mg capsule Commonly known as:  CYMBALTA Your last dose was: Your next dose is:    
   
   
 Dose:  60 mg Take 60 mg by mouth daily. Refills:  0  
     
   
   
   
  
 hydroCHLOROthiazide 25 mg tablet Commonly known as:  HYDRODIURIL Your last dose was: Your next dose is:    
   
   
 Dose:  25 mg Take 25 mg by mouth daily. Refills:  0  
     
   
   
   
  
 levothyroxine 200 mcg tablet Commonly known as:  SYNTHROID Your last dose was: Your next dose is: Take  by mouth Daily (before breakfast). Refills:  0  
     
   
   
   
  
 VITAMIN D3 2,000 unit Tab Generic drug:  cholecalciferol (vitamin D3) Your last dose was: Your next dose is: Take  by mouth. Refills:  0 ASK your doctor about these medications Dose & Instructions Dispensing Information Comments Morning Noon Evening Bedtime PEG 3350-Electrolytes Solr Commonly known as:  COLYTE;GOLYTELY Ask about: Should I take this medication? Your last dose was: Your next dose is:    
   
   
 Dose:  1 L Take 1,000 mL by mouth once for 1 dose. If no bowel movement within 1 hour, drink another liter of solution. Quantity:  1 Bottle Refills:  0 Where to Get Your Medications Information on where to get these meds will be given to you by the nurse or doctor. ! Ask your nurse or doctor about these medications  
  docusate sodium 100 mg capsule  
 metFORMIN 1,000 mg tablet PEG 3350-Electrolytes Solr  
 polyethylene glycol 17 gram packet Discharge Instructions Patient Discharge Instructions Sean Andres / [id number] : 1945 Admitted 2017 Discharged: 2017 DISCHARGE DIAGNOSIS:  
 
Constipation - follow with GI to address issues of chronic constipation and consider colonoscopy 
                     - eat high fiber diet and drink plenty of fluids  
                     - consider stopping contrave. It has 19% chance of causing constipation. You can discuss with GI its further use. Take Home Medications Hold Metformin for now due to IV dye used for your cat scan. Start taking it back on 9/23 with dinner. General drug facts If you have a very bad allergy, wear an allergy ID at all times. It is important that you take the medication exactly as they are prescribed. Keep your medication in the bottles provided by the pharmacist. 
Keep a list of all your drugs (prescription, natural products, vitamins, OTC) with you. Give this list to your doctor. Do not take other medications without consulting your doctor. Do not share your drugs with others and do not take anyone else's drugs. Keep all drugs out of the reach of children and pets. Most drugs may be thrown away in household trash after mixing with coffee grounds or gatito litter and sealing in a plastic bag. Keep a list Call your doctor for help with any side effects. If in the [place] you may also call the FDA at 5-093-VWZ-9311 Talk with the doctor before starting any new drug, including OTC, natural products, or vitamins. What to do at [facility] 1. Recommended diet:high fiber, plenty of fluids 2. Recommended activity: Activity as tolerated 3. If you experience any of the following symptoms then please call your primary care physician or return to the emergency room if you cannot get hold of your doctor: fever/chills/abdominal pain 4. .Bring these papers with you to your follow up appointments. The papers will help your doctors be sure to continue the care plan from the hospital. 
 
 
Follow-up with:  
PCP: Asher David MD 
Follow-up Information Follow up With Details Comments Contact Info Asher David MD Schedule an appointment as soon as possible for a visit in 3 days  [address] [place] [address] 83. [phone] Melanie Holland MD In 2 weeks 2-3 weeks ( GI)  [address] [facility] [address] [phone] Please call for your own appointment Information obtained by : 
I understand that if any problems occur once I am at home I am to contact my physician. I understand and acknowledge receipt of the instructions indicated above. Physician's or R.N.'s Signature                                                                  Date/Time Patient or Representative Signature                                                          Date/Time Constipation: Care Instructions Your Care Instructions Constipation means that you have a hard time passing stools (bowel movements). People pass stools from 3 times a day to once every 3 days. What is normal for you may be different. Constipation may occur with pain in the rectum and cramping. The pain may get worse when you try to pass stools. Sometimes there are small amounts of bright red blood on toilet paper or the surface of stools. This is because of enlarged veins near the rectum (hemorrhoids). A few changes in your diet and lifestyle may help you avoid ongoing constipation. Your doctor may also prescribe medicine to help loosen your stool. Some medicines can cause constipation. These include pain medicines and antidepressants. Tell your doctor about all the medicines you take. Your doctor may want to make a medicine change to ease your symptoms. Follow-up care is a key part of your treatment and safety. Be sure to make and go to all appointments, and call your doctor if you are having problems. It's also a good idea to know your test results and keep a list of the medicines you take. How can you care for yourself at home?  
· Drink plenty of fluids, enough so that your urine is light yellow or clear like water. If you have kidney, heart, or liver disease and have to limit fluids, talk with your doctor before you increase the amount of fluids you drink. · Include high-fiber foods in your diet each day. These include fruits, vegetables, beans, and whole grains. · Get at least 30 minutes of exercise on most days of the week. Walking is a good choice. You also may want to do other activities, such as running, swimming, cycling, or playing tennis or team sports. · Take a fiber supplement, such as Citrucel or Metamucil, every day. Read and follow all instructions on the label. · Schedule time each day for a bowel movement. A daily routine may help. Take your time having your bowel movement. · Support your feet with a small step stool when you sit on the toilet. This helps flex your hips and places your pelvis in a squatting position. · Your doctor may recommend an over-the-counter laxative to relieve your constipation. Examples are Milk of Magnesia and MiraLax. Read and follow all instructions on the label. Do not use laxatives on a long-term basis. When should you call for help? Call your doctor now or seek immediate medical care if: 
· You have new or worse belly pain. · You have new or worse nausea or vomiting. · You have blood in your stools. Watch closely for changes in your health, and be sure to contact your doctor if: 
· Your constipation is getting worse. · You do not get better as expected. Where can you learn more? Go to http://rosaura-jessica.info/. Enter 21 [phone] in the search box to learn more about \"Constipation: Care Instructions. \" Current as of: March 20, 2017 Content Version: 11.3 © 4194-4258 [employer]. Care instructions adapted under license by [employer] (which disclaims liability or warranty for this information).  If you have questions about a medical condition or this instruction, always ask your healthcare professional. [employer] disclaims any warranty or liability for your use of this information. Discharge Orders None Highlighter Announcement We are excited to announce that we are making your provider's discharge notes available to you in Highlighter. You will see these notes when they are completed and signed by the physician that discharged you from your recent hospital stay. If you have any questions or concerns about any information you see in Highlighter, please call the Health Information Department where you were seen or reach out to your Primary Care Provider for more information about your plan of care. Introducing [facility]! Milo Mejia introduces Highlighter patient portal. Now you can access parts of your medical record, email your doctor's office, and request medication refills online. 1. In your internet browser, go to [email or web address] 2. Click on the First Time User? Click Here link in the Sign In box. You will see the New Member Sign Up page. 3. Enter your Highlighter Access Code exactly as it appears below. You will not need to use this code after youve completed the sign-up process. If you do not sign up before the expiration date, you must request a new code. · Highlighter Access Code: [email or web address] Expires: 10/4/2017  2:16 PM 
 
4. Enter the last four digits of your Social Security Number (xxxx) and Date of Birth (mm/dd/yyyy) as indicated and click Submit. You will be taken to the next sign-up page. 5. Create a Highlighter ID. This will be your Highlighter login ID and cannot be changed, so think of one that is secure and easy to remember. 6. Create a Highlighter password. You can change your password at any time. 7. Enter your Password Reset Question and Answer. This can be used at a later time if you forget your password. 8. Enter your e-mail address. You will receive e-mail notification when new information is available in [address] 9. Click Sign Up. You can now view and download portions of your medical record. 10. Click the Download Summary menu link to download a portable copy of your medical information. If you have questions, please visit the Frequently Asked Questions section of the [employer]alo website. Remember, Webalo is NOT to be used for urgent needs. For medical emergencies, dial 911. Now available from your iPhone and Android! General Information Please provide this summary of care documentation to your next provider. Patient Signature:  ____________________________________________________________ Date:  ____________________________________________________________  
  
[facility] Provider Signature:  ____________________________________________________________ Date:  ____________________________________________________________

## 2017-09-21 NOTE — IP AVS SNAPSHOT
[address] [facility] 
[phone] Patient: Kelly Moore MRN: [id number] PYA:[birth year] Current Discharge Medication List  
  
START taking these medications Dose & Instructions Dispensing Information Comments Morning Noon Evening Bedtime  
 docusate sodium 100 mg capsule Commonly known as:  Hermina  Your last dose was: Your next dose is:    
   
   
 Dose:  100 mg Take 1 Cap by mouth two (2) times a day for 90 days. Quantity:  60 Cap Refills:  0  
     
   
   
   
  
 polyethylene glycol 17 gram packet Commonly known as:  Marcellina Dull Your last dose was: Your next dose is:    
   
   
 Dose:  17 g Take 1 Packet by mouth daily. Quantity:  30 Packet Refills:  0 CONTINUE these medications which have CHANGED Dose & Instructions Dispensing Information Comments Morning Noon Evening Bedtime  
 metFORMIN 1,000 mg tablet Commonly known as:  GLUCOPHAGE What changed:  additional instructions Your last dose was: Your next dose is:    
   
   
 Dose:  1000 mg Take 1 Tab by mouth two (2) times daily (with meals). START TAKING 9/23 WITH DINNER Quantity:  30 Tab Refills:  0 CONTINUE these medications which have NOT CHANGED Dose & Instructions Dispensing Information Comments Morning Noon Evening Bedtime  
 amLODIPine 5 mg tablet Commonly known as:  Blancokrystal Argueta Your last dose was: Your next dose is:    
   
   
 Dose:  5 mg Take 5 mg by mouth daily. Refills:  0  
     
   
   
   
  
 aspirin delayed-release 81 mg tablet Your last dose was: Your next dose is: Take  by mouth daily. Refills:  0  
     
   
   
   
  
 atorvastatin 80 mg tablet Commonly known as:  LIPITOR Your last dose was:     
   
Your next dose is:    
   
   
 Dose:  80 mg  
 Take 80 mg by mouth daily. Refills:  0 DULoxetine 60 mg capsule Commonly known as:  CYMBALTA Your last dose was: Your next dose is:    
   
   
 Dose:  60 mg Take 60 mg by mouth daily. Refills:  0  
     
   
   
   
  
 hydroCHLOROthiazide 25 mg tablet Commonly known as:  HYDRODIURIL Your last dose was: Your next dose is:    
   
   
 Dose:  25 mg Take 25 mg by mouth daily. Refills:  0  
     
   
   
   
  
 levothyroxine 200 mcg tablet Commonly known as:  SYNTHROID Your last dose was: Your next dose is: Take  by mouth Daily (before breakfast). Refills:  0  
     
   
   
   
  
 VITAMIN D3 2,000 unit Tab Generic drug:  cholecalciferol (vitamin D3) Your last dose was: Your next dose is: Take  by mouth. Refills:  0 ASK your doctor about these medications Dose & Instructions Dispensing Information Comments Morning Noon Evening Bedtime PEG 3350-Electrolytes Solr Commonly known as:  COLYTE;GOLYTELY Ask about: Should I take this medication? Your last dose was: Your next dose is:    
   
   
 Dose:  1 L Take 1,000 mL by mouth once for 1 dose. If no bowel movement within 1 hour, drink another liter of solution. Quantity:  1 Bottle Refills:  0 Where to Get Your Medications Information on where to get these meds will be given to you by the nurse or doctor. ! Ask your nurse or doctor about these medications  
  docusate sodium 100 mg capsule  
 metFORMIN 1,000 mg tablet PEG 3350-Electrolytes Solr  
 polyethylene glycol 17 gram packet

## 2017-09-21 NOTE — ED PROVIDER NOTES
HPI Comments: Vidya Jasso is a 67 y.o. male with a PMH of CAD, APPY, abd adhesions, cardiac stent x 3, neuropathy, DM and sleep apnea presenting ambulatory to the ED from home C/O 5 days of gradual onset of moderately severe constipation and one day of lower abd pain. Patient states that for the last month, he has gone as long as one week without moving his stools. Patient reports no relief this morning with use of glycerin suppositories or an enema. He also c/o dysuria and abd distension today. He specifically denies any nausea, vomiting, blood in stools, measured fevers, hematuria, or other sx. He also denies any recent changes in medication. Patient notes that he has been taking Contrave for the last month for weight loss. He does not see a GI specialist, and cannot recall the name of his local cardiologist.     Patient denies any other symptoms or complaints. PCP: Marygrace Goodpasture, MD    There are no other complaints, changes or physical findings at this time. The history is provided by the patient. No  was used. Past Medical History:   Diagnosis Date    Abdominal adhesions     CAD (coronary artery disease)     Diabetic neuropathy (HCC)     Fibromyalgia     HLD (hyperlipidemia)     Sleep apnea        Past Surgical History:   Procedure Laterality Date    HX APPENDECTOMY      HX CORONARY STENT PLACEMENT           History reviewed. No pertinent family history. Social History     Social History    Marital status:      Spouse name: N/A    Number of children: N/A    Years of education: N/A     Occupational History    Not on file.      Social History Main Topics    Smoking status: Former Smoker    Smokeless tobacco: Never Used    Alcohol use Yes    Drug use: No    Sexual activity: Not on file     Other Topics Concern    Not on file     Social History Narrative    No narrative on file         ALLERGIES: Review of patient's allergies indicates no known allergies. Review of Systems   Constitutional: Negative for chills, diaphoresis and fever. HENT: Negative for rhinorrhea and sore throat. Eyes: Negative for pain. Respiratory: Negative for shortness of breath, wheezing and stridor. Cardiovascular: Negative for chest pain and leg swelling. Gastrointestinal: Positive for abdominal distention, abdominal pain and constipation. Negative for blood in stool, diarrhea, nausea and vomiting. Genitourinary: Positive for dysuria. Negative for difficulty urinating, flank pain and hematuria. Musculoskeletal: Negative for back pain and neck stiffness. Skin: Negative for rash. Neurological: Negative for dizziness, seizures, syncope, weakness, light-headedness and headaches. Psychiatric/Behavioral: Negative for behavioral problems and confusion. Vitals:    09/21/17 1810 09/21/17 1815 09/21/17 2000 09/21/17 2100   BP:  153/82 155/73 155/69   Pulse:       Resp:       Temp:       SpO2: 95% 94% 96% 95%   Weight:       Height:                Physical Exam   Constitutional: He is oriented to person, place, and time. He appears well-developed and well-nourished. No distress. HENT:   Head: Normocephalic and atraumatic. Right Ear: External ear normal.   Left Ear: External ear normal.   Nose: Nose normal.   Eyes: Conjunctivae and EOM are normal. Right eye exhibits no discharge. Left eye exhibits no discharge. No scleral icterus. Neck: Normal range of motion. Neck supple. Cardiovascular: Normal rate, regular rhythm and normal heart sounds. No murmur heard. Pulmonary/Chest: Effort normal and breath sounds normal. No stridor. No respiratory distress. He has no decreased breath sounds. He has no wheezes. Abdominal: Soft. He exhibits distension (mildly). There is no tenderness. There is no rebound and no CVA tenderness (b/l). No focal tenderness. Slightly diminished bowel sounds throughout. Well healed midline and laparoscopic surgical scars. Musculoskeletal: Normal range of motion. He exhibits no edema or tenderness. Neurological: He is alert and oriented to person, place, and time. Skin: Skin is warm and dry. No rash noted. He is not diaphoretic. Psychiatric: He has a normal mood and affect. Judgment normal.   Nursing note and vitals reviewed. MDM  Number of Diagnoses or Management Options  Diagnosis management comments:   Patient presents with decreased BM. DDx: constipation, SBO, volvulus, hemorrhoids, ileus, UTI, cystitis. Will obtain AXR series to r/o surgical pathology. For the constipation, patient counseled to push fluids, use Dulcolax oral and/or suppository until bowel movement occurs, then Colace or Surfak bid-tid to maintain soft bowel movement until normal pattern ensues. Maintain a high fiber diet with plenty of roughage, and 6-8 large glasses of water daily to avoid constipation in the future. Timing elimination to occur after meals, or after a hot drink, can also improve the situation long term. 1 or 2 Fleet enemas may be necessary. Patient will call PCP symptoms persist or worsen.          Amount and/or Complexity of Data Reviewed  Clinical lab tests: ordered and reviewed  Tests in the radiology section of CPT®: ordered and reviewed  Review and summarize past medical records: yes  Independent visualization of images, tracings, or specimens: yes    Patient Progress  Patient progress: stable    ED Course       Procedures    Chief Complaint   Patient presents with    Constipation     pt reports constipation, last BM was 5 days ago, no relief from suppository or enema       MEDICATIONS GIVEN:  Medications   diatrizoate meglumine-d.sodium (MD-GASTROVIEW,GASTROGRAFIN) 66-10 % contrast solution 30 mL (30 mL Oral Given 9/21/17 1641)   sodium chloride 0.9 % bolus infusion 1,000 mL (1,000 mL IntraVENous New Bag 9/21/17 1712)   morphine injection 4 mg (4 mg IntraVENous Given 9/21/17 1711)   0.9% sodium chloride infusion (0 mL/hr IntraVENous IV Completed 9/21/17 1847)   iopamidol (ISOVUE-370) 76 % injection 100 mL (100 mL IntraVENous Given 9/21/17 1833)   sodium chloride (NS) flush 10 mL (10 mL IntraVENous Given 9/21/17 1833)   lactulose (CHRONULAC) solution 10 g (10 g Oral Given 9/21/17 1912)       LABS REVIEWED:  Recent Results (from the past 24 hour(s))   CBC WITH AUTOMATED DIFF    Collection Time: 09/21/17  5:08 PM   Result Value Ref Range    WBC 11.2 (H) 4.1 - 11.1 K/uL    RBC 5.11 4.10 - 5.70 M/uL    HGB 14.6 12.1 - 17.0 g/dL    HCT 43.2 36.6 - 50.3 %    MCV 84.5 80.0 - 99.0 FL    MCH 28.6 26.0 - 34.0 PG    MCHC 33.8 30.0 - 36.5 g/dL    RDW 13.2 11.5 - 14.5 %    PLATELET 458 121 - 098 K/uL    NEUTROPHILS 70 32 - 75 %    LYMPHOCYTES 17 12 - 49 %    MONOCYTES 10 5 - 13 %    EOSINOPHILS 3 0 - 7 %    BASOPHILS 0 0 - 1 %    ABS. NEUTROPHILS 7.8 1.8 - 8.0 K/UL    ABS. LYMPHOCYTES 1.9 0.8 - 3.5 K/UL    ABS. MONOCYTES 1.1 (H) 0.0 - 1.0 K/UL    ABS. EOSINOPHILS 0.3 0.0 - 0.4 K/UL    ABS. BASOPHILS 0.0 0.0 - 0.1 K/UL   METABOLIC PANEL, COMPREHENSIVE    Collection Time: 09/21/17  5:08 PM   Result Value Ref Range    Sodium 138 136 - 145 mmol/L    Potassium 4.4 3.5 - 5.1 mmol/L    Chloride 103 97 - 108 mmol/L    CO2 28 21 - 32 mmol/L    Anion gap 7 5 - 15 mmol/L    Glucose 104 (H) 65 - 100 mg/dL    BUN 15 6 - 20 MG/DL    Creatinine 0.96 0.70 - 1.30 MG/DL    BUN/Creatinine ratio 16 12 - 20      GFR est AA >60 >60 ml/min/1.73m2    GFR est non-AA >60 >60 ml/min/1.73m2    Calcium 8.8 8.5 - 10.1 MG/DL    Bilirubin, total 0.5 0.2 - 1.0 MG/DL    ALT (SGPT) 61 12 - 78 U/L    AST (SGOT) 32 15 - 37 U/L    Alk.  phosphatase 50 45 - 117 U/L    Protein, total 7.3 6.4 - 8.2 g/dL    Albumin 3.9 3.5 - 5.0 g/dL    Globulin 3.4 2.0 - 4.0 g/dL    A-G Ratio 1.1 1.1 - 2.2     LACTIC ACID    Collection Time: 09/21/17  5:08 PM   Result Value Ref Range    Lactic acid 2.6 (HH) 0.4 - 2.0 MMOL/L   URINALYSIS W/ REFLEX CULTURE    Collection Time: 09/21/17  5:37 PM   Result Value Ref Range    Color YELLOW/STRAW      Appearance CLEAR CLEAR      Specific gravity 1.019 1.003 - 1.030      pH (UA) 5.5 5.0 - 8.0      Protein NEGATIVE  NEG mg/dL    Glucose NEGATIVE  NEG mg/dL    Ketone NEGATIVE  NEG mg/dL    Bilirubin NEGATIVE  NEG      Blood NEGATIVE  NEG      Urobilinogen 0.2 0.2 - 1.0 EU/dL    Nitrites NEGATIVE  NEG      Leukocyte Esterase NEGATIVE  NEG      WBC 0-4 0 - 4 /hpf    RBC 0-5 0 - 5 /hpf    Epithelial cells FEW FEW /lpf    Bacteria NEGATIVE  NEG /hpf    UA:UC IF INDICATED CULTURE NOT INDICATED BY UA RESULT CNI      Hyaline cast 0-2 0 - 5 /lpf   LACTIC ACID    Collection Time: 09/21/17  8:50 PM   Result Value Ref Range    Lactic acid 3.4 (HH) 0.4 - 2.0 MMOL/L       VITAL SIGNS:  Patient Vitals for the past 12 hrs:   Temp Pulse Resp BP SpO2   09/21/17 2100 - - - 155/69 95 %   09/21/17 2000 - - - 155/73 96 %   09/21/17 1815 - - - 153/82 94 %   09/21/17 1810 - - - - 95 %   09/21/17 1809 - - - 148/88 -   09/21/17 1536 98.2 °F (36.8 °C) 73 16 156/77 98 %       RADIOLOGY RESULTS:  The following have been ordered and reviewed:  CT Results  (Last 48 hours)               09/21/17 1833  CT ABD PELV W CONT Final result    Impression:  IMPRESSION:       1. No acute abdominal or pelvic pathology. 2. Evidence of moderate constipation. Narrative:  EXAM:  CT ABD PELV W CONT       INDICATION: abdominal pain, h/o several abd surgeries, r/o SBO       COMPARISON: CT 10/18/2009       CONTRAST:  100 mL of Isovue-370. TECHNIQUE:    Following the uneventful intravenous administration of contrast, thin axial   images were obtained through the abdomen and pelvis. Coronal and sagittal   reconstructions were generated. Oral contrast was administered. CT dose   reduction was achieved through use of a standardized protocol tailored for this   examination and automatic exposure control for dose modulation.        FINDINGS:    LUNG BASES: There is mild bibasilar atelectasis   INCIDENTALLY IMAGED HEART AND MEDIASTINUM: Visualized heart is enlarged. LIVER: No mass or biliary dilatation. GALLBLADDER: Unremarkable. SPLEEN: No mass. PANCREAS: No mass or ductal dilatation. ADRENALS: Unremarkable. KIDNEYS: There is left renal cortical scarring. There is no evidence of   hydronephrosis or renal mass. STOMACH: Unremarkable. SMALL BOWEL: No dilatation or wall thickening. There is a small duodenal   diverticulum arising from the proximal third portion. There is a small bowel   anastomosis in the right mid abdomen. COLON: No dilatation or wall thickening. Moderate constipation is noted. APPENDIX: Not identified. PERITONEUM: No ascites or pneumoperitoneum. There is a fat-containing ventral   hernia. RETROPERITONEUM: No lymphadenopathy or aortic aneurysm. REPRODUCTIVE ORGANS: The prostate is obscured by metallic artifact from   bilateral hip replacements. URINARY BLADDER: No mass or calculus. BONES: There is moderate multilevel degenerative spondylosis throughout the   spine. ADDITIONAL COMMENTS: N/A               CONSULTATIONS:  CONSULT NOTE:   9:39 PM  Rosemarie Kaiser PA-C spoke with Dr. Tootie Yun,   Specialty: Hospitalist  Discussed pt's hx, disposition, and available diagnostic and imaging results. Reviewed care plans. Consultant agrees with plans as outlined. PROGRESS NOTES:  4:22 PM  Initial assessment of patient complete, and patient was given the opportunity to ask questions. Plan of care reviewed with patient and will update when results are available. 4:22 PM  Patient was offered a dose of pain medication and antiemetic. Patient declines medications at this time. Written by Gabbie Noble ED Scribe, as dictated by Larry Muro PA-C.      4:43 PM   Patient now requesting pain medication. 6:01 PM   RN reports critical lab value, lactate of 2.6. IVF infusing. Will repeat lactate. 6:13 PM  I have just reevaluated the patient.  I have reviewed his vital signs and determined there is currently no worsening in their condition or physical exam. Results have been reviewed with them and their questions have been answered. We will continue to review further results as they come available. 7:12 PM  I have just reevaluated the patient. I have reviewed his vital signs and determined there is currently no worsening in their condition or physical exam. Results have been reviewed with them and their questions have been answered. Patient reports feeling better. Lactulose ordered. 7:54 PM  Patient reports success with moving his bowels. Awaiting repeat lactate due at 21:00. If trending downwards, pt is safe to be discharged home. 8:07 PM   Patient tolerating PO intake. Awaiting repeat lactate. 9:34 PM  Alerted by nursing that patient's second lactic acid was 3.4, elevated from initial at 2.6 after 1 L bolus. Discussed with Dr. Josh Cheney regarding results. Dr. Josh Cheney advises to admit patient for observation due to rise lactic. ADMIT NOTE:  9:42 PM  The patient is being admitted to the hospital by Dr. Levon Orourke. The results of their tests and reasons for their admission have been discussed with the patient and/or available family. They convey agreement and understanding for the need to be admitted and for their admission diagnosis. DIAGNOSIS:    1. Constipation, unspecified constipation type    2. Elevated lactic acid level        PLAN:  1. Admit to Hospitalist    Follow-up Information     Follow up With Details Comments Contact Cecilia Nicholson MD Schedule an appointment as soon as possible for a visit in 3 days  [facility]  [phone]      hospitals EMERGENCY DEPT  If symptoms worsen, As needed [address] [place] [address]  [phone]        Current Discharge Medication List      START taking these medications    Details   PEG 3350-Electrolytes (COLYTE;GOLYTELY) solr Take 1,000 mL by mouth once for 1 dose.  If no bowel movement within 1 hour, drink another liter of solution. Qty: 1 Bottle, Refills: 0      lactulose (CHRONULAC) 10 gram/15 mL solution Take 30 mL by mouth two (2) times a day for 7 days. Qty: 420 mL, Refills: 0               ED COURSE: The patients hospital course has been uncomplicated. This note is prepared by Neeta Shin, acting as a Scribe for Anamaria Callahan PA-C. Anamaria Callahan PA-C: The scribe's documentation has been prepared under my direction and personally reviewed by me in its entirety. I confirm that the notes above accurately reflects all work, treatment, procedures, and medical decision making performed by me.          This note will not be viewable in [employer]

## 2017-09-21 NOTE — ED NOTES
Patient arrived via triage with reports of constipation. Patient reports last BM was 5 days ago with no relief from suppository or enema.

## 2017-09-22 LAB
ANION GAP SERPL CALC-SCNC: 9 MMOL/L (ref 5–15)
APPEARANCE UR: CLEAR
BACTERIA URNS QL MICRO: NEGATIVE /HPF
BILIRUB UR QL: NEGATIVE
BUN SERPL-MCNC: 11 MG/DL (ref 6–20)
BUN/CREAT SERPL: 13 (ref 12–20)
CALCIUM SERPL-MCNC: 8.6 MG/DL (ref 8.5–10.1)
CHLORIDE SERPL-SCNC: 104 MMOL/L (ref 97–108)
CO2 SERPL-SCNC: 25 MMOL/L (ref 21–32)
COLOR UR: NORMAL
CREAT SERPL-MCNC: 0.85 MG/DL (ref 0.7–1.3)
EPITH CASTS URNS QL MICRO: NORMAL /LPF
ERYTHROCYTE [DISTWIDTH] IN BLOOD BY AUTOMATED COUNT: 13.2 % (ref 11.5–14.5)
GLUCOSE SERPL-MCNC: 111 MG/DL (ref 65–100)
GLUCOSE UR STRIP.AUTO-MCNC: NEGATIVE MG/DL
HCT VFR BLD AUTO: 41.4 % (ref 36.6–50.3)
HGB BLD-MCNC: 14.3 G/DL (ref 12.1–17)
HGB UR QL STRIP: NEGATIVE
HYALINE CASTS URNS QL MICRO: NORMAL /LPF (ref 0–5)
KETONES UR QL STRIP.AUTO: NEGATIVE MG/DL
LACTATE SERPL-SCNC: 2.4 MMOL/L (ref 0.4–2)
LACTATE SERPL-SCNC: 2.7 MMOL/L (ref 0.4–2)
LEUKOCYTE ESTERASE UR QL STRIP.AUTO: NEGATIVE
MCH RBC QN AUTO: 29.1 PG (ref 26–34)
MCHC RBC AUTO-ENTMCNC: 34.5 G/DL (ref 30–36.5)
MCV RBC AUTO: 84.1 FL (ref 80–99)
NITRITE UR QL STRIP.AUTO: NEGATIVE
PH UR STRIP: 7 [PH] (ref 5–8)
PLATELET # BLD AUTO: 240 K/UL (ref 150–400)
POTASSIUM SERPL-SCNC: 3.8 MMOL/L (ref 3.5–5.1)
PROT UR STRIP-MCNC: NEGATIVE MG/DL
RBC # BLD AUTO: 4.92 M/UL (ref 4.1–5.7)
RBC #/AREA URNS HPF: NORMAL /HPF (ref 0–5)
SODIUM SERPL-SCNC: 138 MMOL/L (ref 136–145)
SP GR UR REFRACTOMETRY: 1.01 (ref 1–1.03)
UA: UC IF INDICATED,UAUC: NORMAL
UROBILINOGEN UR QL STRIP.AUTO: 1 EU/DL (ref 0.2–1)
WBC # BLD AUTO: 7.1 K/UL (ref 4.1–11.1)
WBC URNS QL MICRO: NORMAL /HPF (ref 0–4)

## 2017-09-22 PROCEDURE — 96361 HYDRATE IV INFUSION ADD-ON: CPT

## 2017-09-22 PROCEDURE — 81001 URINALYSIS AUTO W/SCOPE: CPT | Performed by: INTERNAL MEDICINE

## 2017-09-22 PROCEDURE — 99218 HC RM OBSERVATION: CPT

## 2017-09-22 PROCEDURE — 83605 ASSAY OF LACTIC ACID: CPT | Performed by: INTERNAL MEDICINE

## 2017-09-22 PROCEDURE — 96372 THER/PROPH/DIAG INJ SC/IM: CPT

## 2017-09-22 PROCEDURE — 74011250636 HC RX REV CODE- 250/636: Performed by: INTERNAL MEDICINE

## 2017-09-22 PROCEDURE — 74011250636 HC RX REV CODE- 250/636: Performed by: HOSPITALIST

## 2017-09-22 PROCEDURE — 85027 COMPLETE CBC AUTOMATED: CPT | Performed by: INTERNAL MEDICINE

## 2017-09-22 PROCEDURE — 74011250637 HC RX REV CODE- 250/637: Performed by: INTERNAL MEDICINE

## 2017-09-22 PROCEDURE — 83605 ASSAY OF LACTIC ACID: CPT | Performed by: HOSPITALIST

## 2017-09-22 PROCEDURE — 36415 COLL VENOUS BLD VENIPUNCTURE: CPT | Performed by: INTERNAL MEDICINE

## 2017-09-22 PROCEDURE — 80048 BASIC METABOLIC PNL TOTAL CA: CPT | Performed by: INTERNAL MEDICINE

## 2017-09-22 RX ORDER — SODIUM CHLORIDE 9 MG/ML
150 INJECTION, SOLUTION INTRAVENOUS CONTINUOUS
Status: DISCONTINUED | OUTPATIENT
Start: 2017-09-22 | End: 2017-09-23 | Stop reason: HOSPADM

## 2017-09-22 RX ORDER — METFORMIN HYDROCHLORIDE 1000 MG/1
1000 TABLET ORAL 2 TIMES DAILY WITH MEALS
Qty: 30 TAB | Refills: 0 | Status: SHIPPED
Start: 2017-09-22

## 2017-09-22 RX ORDER — DOCUSATE SODIUM 100 MG/1
100 CAPSULE, LIQUID FILLED ORAL 2 TIMES DAILY
Qty: 60 CAP | Refills: 0 | Status: SHIPPED | OUTPATIENT
Start: 2017-09-22 | End: 2017-12-12

## 2017-09-22 RX ORDER — SODIUM CHLORIDE 9 MG/ML
1000 INJECTION, SOLUTION INTRAVENOUS ONCE
Status: COMPLETED | OUTPATIENT
Start: 2017-09-22 | End: 2017-09-22

## 2017-09-22 RX ORDER — POLYETHYLENE GLYCOL 3350 17 G/17G
17 POWDER, FOR SOLUTION ORAL DAILY
Qty: 30 PACKET | Refills: 0 | Status: SHIPPED | OUTPATIENT
Start: 2017-09-22 | End: 2017-12-12

## 2017-09-22 RX ADMIN — SODIUM CHLORIDE 150 ML/HR: 900 INJECTION, SOLUTION INTRAVENOUS at 12:10

## 2017-09-22 RX ADMIN — DOCUSATE SODIUM 100 MG: 100 CAPSULE, LIQUID FILLED ORAL at 17:30

## 2017-09-22 RX ADMIN — Medication 5 ML: at 05:20

## 2017-09-22 RX ADMIN — ATORVASTATIN CALCIUM 80 MG: 40 TABLET, FILM COATED ORAL at 08:16

## 2017-09-22 RX ADMIN — HYDROCHLOROTHIAZIDE 25 MG: 25 TABLET ORAL at 08:15

## 2017-09-22 RX ADMIN — Medication 10 ML: at 23:35

## 2017-09-22 RX ADMIN — DULOXETINE 60 MG: 30 CAPSULE, DELAYED RELEASE ORAL at 08:16

## 2017-09-22 RX ADMIN — DOCUSATE SODIUM 100 MG: 100 CAPSULE, LIQUID FILLED ORAL at 08:15

## 2017-09-22 RX ADMIN — SODIUM CHLORIDE 1000 ML: 900 INJECTION, SOLUTION INTRAVENOUS at 11:04

## 2017-09-22 RX ADMIN — POLYETHYLENE GLYCOL 3350 17 G: 17 POWDER, FOR SOLUTION ORAL at 08:16

## 2017-09-22 RX ADMIN — ENOXAPARIN SODIUM 40 MG: 40 INJECTION SUBCUTANEOUS at 09:33

## 2017-09-22 RX ADMIN — LEVOTHYROXINE SODIUM 200 MCG: 100 TABLET ORAL at 08:15

## 2017-09-22 RX ADMIN — Medication 10 ML: at 15:38

## 2017-09-22 RX ADMIN — SODIUM CHLORIDE 150 ML/HR: 900 INJECTION, SOLUTION INTRAVENOUS at 19:26

## 2017-09-22 RX ADMIN — ASPIRIN 81 MG: 81 TABLET, COATED ORAL at 08:16

## 2017-09-22 RX ADMIN — AMLODIPINE BESYLATE 5 MG: 5 TABLET ORAL at 08:16

## 2017-09-22 RX ADMIN — ACETAMINOPHEN 650 MG: 325 TABLET ORAL at 08:16

## 2017-09-22 NOTE — PROGRESS NOTES
Problem: Falls - Risk of  Goal: *Absence of Falls  Document Sheldon Fall Risk and appropriate interventions in the flowsheet.    Outcome: Progressing Towards Goal  Fall Risk Interventions:

## 2017-09-22 NOTE — DISCHARGE INSTRUCTIONS
Patient Discharge Instructions    Kyle Cha / [id number] : 1945    Admitted 2017 Discharged: 2017         DISCHARGE DIAGNOSIS:     Constipation - follow with GI to address issues of chronic constipation and consider colonoscopy                       - eat high fiber diet and drink plenty of fluids                        - consider stopping contrave. It has 19% chance of causing constipation. You can discuss with GI its further use. Take Home Medications     Hold Metformin for now due to IV dye used for your cat scan. Start taking it back on  with dinner. General drug facts     If you have a very bad allergy, wear an allergy ID at all times. It is important that you take the medication exactly as they are prescribed. Keep your medication in the bottles provided by the pharmacist.  Keep a list of all your drugs (prescription, natural products, vitamins, OTC) with you. Give this list to your doctor. Do not take other medications without consulting your doctor. Do not share your drugs with others and do not take anyone else's drugs. Keep all drugs out of the reach of children and pets. Most drugs may be thrown away in household trash after mixing with coffee grounds or gatito litter and sealing in a plastic bag. Keep a list Call your doctor for help with any side effects. If in the [place] you may also call the FDA at 5-289-INC-2655    Talk with the doctor before starting any new drug, including OTC, natural products, or vitamins. What to do at Home    1. Recommended diet:high fiber, plenty of fluids     2. Recommended activity: Activity as tolerated    3. If you experience any of the following symptoms then please call your primary care physician or return to the emergency room if you cannot get hold of your doctor: fever/chills/abdominal pain     4. .Bring these papers with you to your follow up appointments.  The papers will help your doctors be sure to continue the care plan from the hospital.      Follow-up with:   PCP: Audie Bianchi MD  Follow-up Information     Follow up With Details Comments Contact Info    Audie Bianchi MD Schedule an appointment as soon as possible for a visit in 3 days  [address] [place] [address]      Sandip Rehman MD In 2 weeks 2-3 weeks ( GI)  [address] [phone]             Please call for your own appointment        Information obtained by :  I understand that if any problems occur once I am at home I am to contact my physician. I understand and acknowledge receipt of the instructions indicated above. Physician's or R.N.'s Signature                                                                  Date/Time                                                                                                                                              Patient or Representative Signature                                                          Date/Time               Constipation: Care Instructions  Your Care Instructions  Constipation means that you have a hard time passing stools (bowel movements). People pass stools from 3 times a day to once every 3 days. What is normal for you may be different. Constipation may occur with pain in the rectum and cramping. The pain may get worse when you try to pass stools. Sometimes there are small amounts of bright red blood on toilet paper or the surface of stools. This is because of enlarged veins near the rectum (hemorrhoids). A few changes in your diet and lifestyle may help you avoid ongoing constipation. Your doctor may also prescribe medicine to help loosen your stool. Some medicines can cause constipation. These include pain medicines and antidepressants.  Tell your doctor about all the medicines you take. Your doctor may want to make a medicine change to ease your symptoms. Follow-up care is a key part of your treatment and safety. Be sure to make and go to all appointments, and call your doctor if you are having problems. It's also a good idea to know your test results and keep a list of the medicines you take. How can you care for yourself at home? · Drink plenty of fluids, enough so that your urine is light yellow or clear like water. If you have kidney, heart, or liver disease and have to limit fluids, talk with your doctor before you increase the amount of fluids you drink. · Include high-fiber foods in your diet each day. These include fruits, vegetables, beans, and whole grains. · Get at least 30 minutes of exercise on most days of the week. Walking is a good choice. You also may want to do other activities, such as running, swimming, cycling, or playing tennis or team sports. · Take a fiber supplement, such as Citrucel or Metamucil, every day. Read and follow all instructions on the label. · Schedule time each day for a bowel movement. A daily routine may help. Take your time having your bowel movement. · Support your feet with a small step stool when you sit on the toilet. This helps flex your hips and places your pelvis in a squatting position. · Your doctor may recommend an over-the-counter laxative to relieve your constipation. Examples are Milk of Magnesia and MiraLax. Read and follow all instructions on the label. Do not use laxatives on a long-term basis. When should you call for help? Call your doctor now or seek immediate medical care if:  · You have new or worse belly pain. · You have new or worse nausea or vomiting. · You have blood in your stools. Watch closely for changes in your health, and be sure to contact your doctor if:  · Your constipation is getting worse. · You do not get better as expected. Where can you learn more?   Go to http://shanta.info/. Enter 21  in the search box to learn more about \"Constipation: Care Instructions. \"  Current as of: March 20, 2017  Content Version: 11.3  © 9786-1141 [employer]. Care instructions adapted under license by [employer] (which disclaims liability or warranty for this information). If you have questions about a medical condition or this instruction, always ask your healthcare professional. [address] any warranty or liability for your use of this information.

## 2017-09-22 NOTE — PROGRESS NOTES
Primary Nurse Misael Massey RN and MILADIS Milner performed a dual skin assessment on this patient No impairment noted  Aamir score is 23

## 2017-09-22 NOTE — PROGRESS NOTES
PCP f/u is scheduled with Susan Duenas NP for Sept 26 at 10;30am    [facility] f/u is scheduled with Dr. Molly Chan for Oct 27 at 1;00pm  This was the first new patient appointment

## 2017-09-22 NOTE — PROGRESS NOTES
Bedside shift change report given to Veor Covington RN (oncoming nurse) by Aaron Chaudhari RN (offgoing nurse). Report included the following information SBAR, Kardex and Cardiac Rhythm . [facility]   [phone]    Shift Summary: Patient had an uneventful shift. LDAs               Peripheral IV 09/21/17 Right Antecubital (Active)   Site Assessment Clean, dry, & intact 9/22/2017  3:00 PM   Phlebitis Assessment 0 9/22/2017  3:00 PM   Infiltration Assessment 0 9/22/2017  3:00 PM   Dressing Status Clean, dry, & intact 9/22/2017  3:00 PM   Dressing Type Transparent 9/22/2017  3:00 PM   Hub Color/Line Status Pink; Infusing 9/22/2017  3:00 PM   Action Taken Blood drawn 9/21/2017  6:47 PM   Alcohol Cap Used Yes 9/22/2017  2:22 AM                        Intake & Output   Date 09/21/17 1900 - 09/22/17 0659 09/22/17 0700 - 09/23/17 0659   Shift 2203-4003 24 Hour Total 5440-7191 3629-2725 24 Hour Total   I  N  T  A  K  E   I.V.  (mL/kg/hr) 485 485         Volume (0.45% sodium chloride infusion) 485 485       Shift Total  (mL/kg) 485  (4.2) 485  (4.2)      O  U  T  P  U  T   Shift Total  (mL/kg)         485      Weight (kg) 114.5 114.5 114.5 114.5 114.5      Last Bowel Movement Last Bowel Movement Date: 09/22/17   Glucose Checks [x] N/A  [] AC/HS  [] Q6  Concerns:   Nutrition Active Orders   Diet    DIET DIABETIC CONSISTENT CARB Regular       Consults [x]PT  [x]OT  []Speech  []Case Management   Cardiac Monitoring [x]N/A [x]Yes Expires:

## 2017-09-22 NOTE — ED NOTES
TRANSFER - OUT REPORT:    Verbal report given to [address] [place], [place] [address]ulevard RN (name) on Christian Head  being transferred to Room 3260 Renal (unit) for routine progression of care       Report consisted of patients Situation, Background, Assessment and   Recommendations(SBAR). Information from the following report(s) SBAR, ED Summary, [facility] and Recent Results was reviewed with the receiving nurse. Lines:   Peripheral IV 09/21/17 Right Antecubital (Active)   Site Assessment Clean, dry, & intact 9/21/2017  6:47 PM   Phlebitis Assessment 0 9/21/2017  6:47 PM   Infiltration Assessment 0 9/21/2017  6:47 PM   Dressing Status Clean, dry, & intact 9/21/2017  6:47 PM   Dressing Type Tape 9/21/2017  6:47 PM   Hub Color/Line Status Pink;Flushed 9/21/2017  6:47 PM   Action Taken Blood drawn 9/21/2017  6:47 PM        Opportunity for questions and clarification was provided.

## 2017-09-22 NOTE — PROGRESS NOTES
Hospitalist Progress Note    NAME: Mayank Morris   :  1945   MRN:  [id number]       Assessment / Plan:  Lactic acidosis:  No e/o infection or acute intraabdominal process, presumed due to severe constipation  - cont IVF pending lactic acid   - rechecked lactate is pending this am     Acute on chronic constipation in setting of multiple abdominal surgeries (h/o acute appendicitis, lysis of adhesions, intestinal resection due to adhesions to abdominal wall seen during hernia repair, hernia mesh removal):  -clinically: constipation resolved, he had multiple BMs  - pt was extensively educated on diet/hydration and meds for constipation   - DC on colace BID + miralax daily   -refer to GI OP   - CT A/P with IV contrast with No acute abdominal or pelvic pathology. Evidence of moderate constipation.  - Pt received lactulose in ER with multiple bowel movements  - start scheduled colace BID and miralax - recommend he be discharged with prescriptions for these    Hypertension, benign/essential:  -BP stable   - con't HCTZ, norvasc  - prn nitrobid    Non insulin dependent DM2 controlled:  - BS controlled   - hold metformin until dinner Saturday due to IV contrast   - diabetic diet     Hyperlipidemia: con't lipitor  Obesity:  On contrave outpt - would suggest that he hold and discuss this in f/u with GI as had 19% SE of constipation r/t this medication         Code Status: Full  Surrogate Decision Maker: long-term partner Lynn De Luna [phone]  DVT Prophylaxis: lovenox      Body mass index is 36.22 kg/(m^2). Recommended Disposition: Home w/Family     Subjective:     Chief Complaint / Reason for Physician Visit: following constipation   Pt had multiple BMs  Feeling well       Discussed with RN events overnight.      Review of Systems:  Symptom Y/N Comments  Symptom Y/N Comments   Fever/Chills n   Chest Pain n    Poor Appetite    Edema     Cough    Abdominal Pain n    Sputum    Joint Pain     SOB/LEROY n   Pruritis/Rash Nausea/vomit n   Tolerating PT/OT  Independent    Diarrhea n   Tolerating Diet y    Constipation n Resolved   Other       Could NOT obtain due to:      Objective:     VITALS:   Last 24hrs VS reviewed since prior progress note. Most recent are:  Patient Vitals for the past 24 hrs:   Temp Pulse Resp BP SpO2   09/22/17 0807 98.5 °F (36.9 °C) (!) 57 18 127/68 94 %   09/21/17 2338 98.4 °F (36.9 °C) 62 18 137/76 95 %   09/21/17 2300 - - - 142/70 95 %   09/21/17 2200 - - - 149/69 96 %   09/21/17 2100 - - - 155/69 95 %   09/21/17 2000 - - - 155/73 96 %   09/21/17 1815 - - - 153/82 94 %   09/21/17 1810 - - - - 95 %   09/21/17 1809 - - - 148/88 -   09/21/17 1536 98.2 °F (36.8 °C) 73 16 156/77 98 %       Intake/Output Summary (Last 24 hours) at 09/22/17 0852  Last data filed at 09/22/17 0618   Gross per 24 hour   Intake              485 ml   Output                0 ml   Net              485 ml        PHYSICAL EXAM:  General: WD, WN. Alert, cooperative, no acute distress    EENT:  EOMI. Anicteric sclerae. MMM  Resp:  CTA bilaterally, no wheezing or rales. No accessory muscle use  CV:  Regular  rhythm,  No edema  GI:  Soft, Non distended, Non tender.  +Bowel sounds  Neurologic:  Alert and oriented X 3, normal speech,   Psych:   Good insight. Not anxious nor agitated  Skin:  No rashes. No jaundice    Reviewed most current lab test results and cultures  YES  Reviewed most current radiology test results   YES  Review and summation of old records today    NO  Reviewed patient's current orders and MAR    YES  PMH/SH reviewed - no change compared to H&P  ________________________________________________________________________  Care Plan discussed with:    Comments   Patient y    Family      RN y    Care Manager     Consultant                        Multidiciplinary team rounds were held today with , nursing, pharmacist and clinical coordinator.   Patient's plan of care was discussed; medications were reviewed and discharge planning was addressed. ________________________________________________________________________  Total NON critical care TIME:  35  Minutes    Total CRITICAL CARE TIME Spent:   Minutes non procedure based      Comments   >50% of visit spent in counseling and coordination of care y Dc coordination and counseling    ________________________________________________________________________  Marc Patel MD     Procedures: see electronic medical records for all procedures/Xrays and details which were not copied into this note but were reviewed prior to creation of Plan. LABS:  I reviewed today's most current labs and imaging studies.   Pertinent labs include:  Recent Labs      09/22/17   0549  09/21/17   1708   WBC  7.1  11.2*   HGB  14.3  14.6   HCT  41.4  43.2   PLT  240  242     Recent Labs      09/22/17   0549  09/21/17   1708   NA  138  138   K  3.8  4.4   CL  104  103   CO2  25  28   GLU  111*  104*   BUN  11  15   CREA  0.85  0.96   CA  8.6  8.8   ALB   --   3.9   TBILI   --   0.5   SGOT   --   32   ALT   --   61       Signed: Marc Patel MD

## 2017-09-22 NOTE — H&P
Hospitalist Admission Note    NAME: Kyle Night   :  1945   MRN:  [id number]     Date/Time:  2017 9:48 PM    Patient PCP: Elena Stevenson MD  ________________________________________________________________________    My assessment of this patient's clinical condition and my plan of care is as follows. Assessment / Plan:  Lactic acidosis:  No e/o infection or acute intraabdominal process, presumed due to severe constipation  - IV fluids overnight  - recheck lactate in AM  Acute on chronic constipation in setting of multiple abdominal surgeries (h/o acute appendicitis, lysis of adhesions, intestinal resection due to adhesions to abdominal wall seen during hernia repair, hernia mesh removal):  - CT A/P with IV contrast with No acute abdominal or pelvic pathology. Evidence of moderate constipation.  - Pt received lactulose in ER with multiple bowel movements  - start scheduled colace BID and miralax - recommend he be discharged with prescriptions for these  - CM consulted to assist with GI f/u - has never seen GI for this chronic issue and would benefit from f/u and additional testing if he does not respond to bowel regimen  Hypertension, benign/essential:  - con't HCTZ, norvasc  - prn nitrobid  Non insulin dependent DM2 controlled:  - hold metformin until dinner Saturday due to IV contrast - discussed with Pt in ER  - diabetic diet  - do not feel he will need sliding scale  Hyperlipidemia: con't lipitor  Obesity:  On contrave outpt - would suggest that he hold and discuss this in f/u with GI as had 19% SE of constipation r/t this medication    Code Status: Full  Surrogate Decision Maker: long-term partner Rio Gonzalez [phone]  DVT Prophylaxis: lovenox        Subjective:   CHIEF COMPLAINT: constipation    HISTORY OF PRESENT ILLNESS:     Shay Virgen is a 67 y.o.  male who presents with above. Pt with baseline constipation for the last 10 years.   He has a h/o multiple abdominal surgeries and attributes constipation to this. He has to strain and sometimes manually attempts disimpaction for all bowel movements. Most recently, he has been constipated for the last 5 days. He has tried suppositories and then enema at home in addition to manual disimpaction prior to presentation. In the ER, he responded well to lactulose and has had multiple bowel movements but was incidentally found to have an elevated lactulose so we were asked to observe overnight. Pt complains of lower abdominal pain which has significantly improved since having the bowel movements. No CP or SOB. No fever. We were asked to admit for work up and evaluation of the above problems. Past Medical History:   Diagnosis Date    Abdominal adhesions     CAD (coronary artery disease)     Chronic constipation     Diabetic neuropathy (HCC)     Fibromyalgia     HLD (hyperlipidemia)     Sleep apnea         Past Surgical History:   Procedure Laterality Date    HX APPENDECTOMY      HX CORONARY STENT PLACEMENT         Social History   Substance Use Topics    Smoking status: Former Smoker    Smokeless tobacco: Never Used    Alcohol use Yes        Family History   Problem Relation Age of Onset    Hypertension Brother      No Known Allergies     Prior to Admission medications    Medication Sig Start Date End Date Taking? Authorizing Provider   atorvastatin (LIPITOR) 80 mg tablet Take 80 mg by mouth daily. Yes Deon aPrmar MD   hydroCHLOROthiazide (HYDRODIURIL) 25 mg tablet Take 25 mg by mouth daily. Yes Deon Parmar MD   DULoxetine (CYMBALTA) 60 mg capsule Take 60 mg by mouth daily. Yes Deon Parmar MD   aspirin delayed-release 81 mg tablet Take  by mouth daily. Yes Deon Parmar MD   levothyroxine (SYNTHROID) 200 mcg tablet Take  by mouth Daily (before breakfast). Yes Deon Parmar MD   metFORMIN (GLUCOPHAGE) 1,000 mg tablet Take 1,000 mg by mouth two (2) times daily (with meals).    Yes Deon Parmar MD   amLODIPine (NORVASC) 5 mg tablet Take 5 mg by mouth daily. Yes Deon Parmar MD   cholecalciferol, vitamin D3, (VITAMIN D3) 2,000 unit tab Take  by mouth. Yes Deon Parmar MD   PEG 3350-Electrolytes (COLYTE;GOLYTELY) solr Take 1,000 mL by mouth once for 1 dose. If no bowel movement within 1 hour, drink another liter of solution. 9/21/17 9/21/17 Yes Cathy Schwab PA-C   lactulose (CHRONULAC) 10 gram/15 mL solution Take 30 mL by mouth two (2) times a day for 7 days. 9/21/17 9/28/17 Yes Cathy Schwab PA-C       REVIEW OF SYSTEMS:     I am not able to complete the review of systems because:    The patient is intubated and sedated    The patient has altered mental status due to his acute medical problems    The patient has baseline aphasia from prior stroke(s)    The patient has baseline dementia and is not reliable historian    The patient is in acute medical distress and unable to provide information           Total of 12 systems reviewed as follows:       POSITIVE= underlined text  Negative = text not underlined  General:  fever, chills, sweats, generalized weakness, weight loss/gain,      loss of appetite   Eyes:    blurred vision, eye pain, loss of vision, double vision  ENT:    rhinorrhea, pharyngitis   Respiratory:   cough, sputum production, SOB, LEROY, wheezing, pleuritic pain   Cardiology:   chest pain, palpitations, orthopnea, PND, edema, syncope   Gastrointestinal:  abdominal pain , N/V, diarrhea, dysphagia, constipation, bleeding   Genitourinary:  frequency, urgency, dysuria, hematuria, incontinence   Muskuloskeletal :  arthralgia, myalgia, back pain  Hematology:  easy bruising, nose or gum bleeding, lymphadenopathy   Dermatological: rash, ulceration, pruritis, color change / jaundice  Endocrine:   hot flashes or polydipsia   Neurological:  headache, dizziness, confusion, focal weakness, paresthesia,     Speech difficulties, memory loss, gait difficulty  Psychological: Feelings of anxiety, depression, agitation    Objective:   VITALS:    Visit Vitals    /69    Pulse 73    Temp 98.2 °F (36.8 °C)    Resp 16    Ht 5' 10\" (1.778 m)    Wt 114.5 kg (252 lb 6.8 oz)    SpO2 95%    BMI 36.22 kg/m2       PHYSICAL EXAM:    General:    Obese, alert, cooperative, no distress, appears stated age. HEENT: Atraumatic, anicteric sclerae, pink conjunctivae     No oral ulcers, mucosa moist, throat clear, dentition fair  Neck:  Supple, symmetrical,  thyroid: non tender  Lungs:   Clear to auscultation bilaterally. No Wheezing or Rhonchi. No rales. Chest wall:  No tenderness  No accessory muscle use. Heart:   Regular  rhythm,  No  murmur   No edema  Abdomen:   Soft, mildl lower quadrant tenderness. Moderately distended. Bowel sounds normal.  Extremities: No cyanosis. No clubbing,      Skin turgor normal, Capillary refill normal, Radial dial pulse 2+  Skin:     Not pale. Not Jaundiced  No rashes   Psych:  Good insight. Not depressed. Not anxious or agitated. Neurologic: EOMs intact. No facial asymmetry. No aphasia or slurred speech. Symmetrical strength, Sensation grossly intact.  Alert and oriented X 4.     _______________________________________________________________________  Care Plan discussed with:    Comments   Patient x    Family      RN x    Care Manager                    Consultant:      _______________________________________________________________________  Expected  Disposition:   Home with Family x   HH/PT/OT/RN    SNF/LTC    VICKY    ________________________________________________________________________  TOTAL TIME:  39 Minutes    Critical Care Provided     Minutes non procedure based      Comments    x Reviewed previous records   >50% of visit spent in counseling and coordination of care x Discussion with patient and/or family and questions answered       ________________________________________________________________________  Signed: Subhash Escobar MD    Procedures: see electronic medical records for all procedures/Xrays and details which were not copied into this note but were reviewed prior to creation of Plan. LAB DATA REVIEWED:    Recent Results (from the past 24 hour(s))   CBC WITH AUTOMATED DIFF    Collection Time: 09/21/17  5:08 PM   Result Value Ref Range    WBC 11.2 (H) 4.1 - 11.1 K/uL    RBC 5.11 4.10 - 5.70 M/uL    HGB 14.6 12.1 - 17.0 g/dL    HCT 43.2 36.6 - 50.3 %    MCV 84.5 80.0 - 99.0 FL    MCH 28.6 26.0 - 34.0 PG    MCHC 33.8 30.0 - 36.5 g/dL    RDW 13.2 11.5 - 14.5 %    PLATELET 363 951 - 516 K/uL    NEUTROPHILS 70 32 - 75 %    LYMPHOCYTES 17 12 - 49 %    MONOCYTES 10 5 - 13 %    EOSINOPHILS 3 0 - 7 %    BASOPHILS 0 0 - 1 %    ABS. NEUTROPHILS 7.8 1.8 - 8.0 K/UL    ABS. LYMPHOCYTES 1.9 0.8 - 3.5 K/UL    ABS. MONOCYTES 1.1 (H) 0.0 - 1.0 K/UL    ABS. EOSINOPHILS 0.3 0.0 - 0.4 K/UL    ABS. BASOPHILS 0.0 0.0 - 0.1 K/UL   METABOLIC PANEL, COMPREHENSIVE    Collection Time: 09/21/17  5:08 PM   Result Value Ref Range    Sodium 138 136 - 145 mmol/L    Potassium 4.4 3.5 - 5.1 mmol/L    Chloride 103 97 - 108 mmol/L    CO2 28 21 - 32 mmol/L    Anion gap 7 5 - 15 mmol/L    Glucose 104 (H) 65 - 100 mg/dL    BUN 15 6 - 20 MG/DL    Creatinine 0.96 0.70 - 1.30 MG/DL    BUN/Creatinine ratio 16 12 - 20      GFR est AA >60 >60 ml/min/1.73m2    GFR est non-AA >60 >60 ml/min/1.73m2    Calcium 8.8 8.5 - 10.1 MG/DL    Bilirubin, total 0.5 0.2 - 1.0 MG/DL    ALT (SGPT) 61 12 - 78 U/L    AST (SGOT) 32 15 - 37 U/L    Alk.  phosphatase 50 45 - 117 U/L    Protein, total 7.3 6.4 - 8.2 g/dL    Albumin 3.9 3.5 - 5.0 g/dL    Globulin 3.4 2.0 - 4.0 g/dL    A-G Ratio 1.1 1.1 - 2.2     LACTIC ACID    Collection Time: 09/21/17  5:08 PM   Result Value Ref Range    Lactic acid 2.6 (HH) 0.4 - 2.0 MMOL/L   URINALYSIS W/ REFLEX CULTURE    Collection Time: 09/21/17  5:37 PM   Result Value Ref Range    Color YELLOW/STRAW      Appearance CLEAR CLEAR      Specific gravity 1.019 1.003 - 1.030      pH (UA) 5.5 5.0 - 8.0 Protein NEGATIVE  NEG mg/dL    Glucose NEGATIVE  NEG mg/dL    Ketone NEGATIVE  NEG mg/dL    Bilirubin NEGATIVE  NEG      Blood NEGATIVE  NEG      Urobilinogen 0.2 0.2 - 1.0 EU/dL    Nitrites NEGATIVE  NEG      Leukocyte Esterase NEGATIVE  NEG      WBC 0-4 0 - 4 /hpf    RBC 0-5 0 - 5 /hpf    Epithelial cells FEW FEW /lpf    Bacteria NEGATIVE  NEG /hpf    UA:UC IF INDICATED CULTURE NOT INDICATED BY UA RESULT CNI      Hyaline cast 0-2 0 - 5 /lpf   LACTIC ACID    Collection Time: 09/21/17  8:50 PM   Result Value Ref Range    Lactic acid 3.4 (HH) 0.4 - 2.0 MMOL/L

## 2017-09-22 NOTE — PROGRESS NOTES
Bedside and Verbal shift change report given to Maria Luisa Rivera (oncoming nurse) by Jovana Kearney RN (offgoing nurse). Report included the following information Kardex, MAR and Recent Results. Zone Phone for oncoming shift:   [phone]    Shift Summary: Resting quietly    LDAs               Peripheral IV 09/21/17 Right Antecubital (Active)   Site Assessment Clean, dry, & intact 9/21/2017 11:52 PM   Phlebitis Assessment 0 9/21/2017 11:52 PM   Infiltration Assessment 0 9/21/2017 11:52 PM   Dressing Status Clean, dry, & intact 9/21/2017 11:52 PM   Dressing Type Transparent 9/21/2017 11:52 PM   Hub Color/Line Status Pink; Infusing 9/21/2017 11:52 PM   Action Taken Blood drawn 9/21/2017  6:47 PM   Alcohol Cap Used Yes 9/21/2017 11:52 PM                        Intake & Output     Last Bowel Movement     Glucose Checks [] N/A  [] AC/HS  [] Q6  Concerns:   Nutrition Active Orders   Diet    DIET DIABETIC CONSISTENT CARB Regular       Consults []PT  []OT  []Speech  []Case Management   Cardiac Monitoring [x]N/A []Yes Expires:

## 2017-09-22 NOTE — DISCHARGE SUMMARY
Hospitalist Discharge Summary     Patient ID:  Hayden Thompson  [id number]  87 y.o.  1945    PCP on record: Betty Solo MD    Admit date: 9/21/2017  Discharge date and time: 9/23/2017      DISCHARGE DIAGNOSIS:    Lactic acidosis POA resolved   Acute on chronic constipation in setting of multiple abdominal surgeries   Hypertension, benign/essential:  Non insulin dependent DM2 controlled  Hyperlipidemia  Obesity   Code Status: Full      CONSULTATIONS:  None    Excerpted HPI from H&P of Montse Riddle MD:    Courtney Saravia is a 67 y.o.  male who presents with above. Pt with baseline constipation for the last 10 years. He has a h/o multiple abdominal surgeries and attributes constipation to this. He has to strain and sometimes manually attempts disimpaction for all bowel movements. Most recently, he has been constipated for the last 5 days. He has tried suppositories and then enema at home in addition to manual disimpaction prior to presentation. In the ER, he responded well to lactulose and has had multiple bowel movements but was incidentally found to have an elevated lactulose so we were asked to observe overnight. Pt complains of lower abdominal pain which has significantly improved since having the bowel movements. No CP or SOB. No fever.     We were asked to admit for work up and evaluation of the above problems. ______________________________________________________________________  DISCHARGE SUMMARY/HOSPITAL COURSE:  for full details see H&P, daily progress notes, labs, consult notes.        Lactic acidosis:  No e/o infection or acute intraabdominal process, presumed due to severe constipation  Resolved with aggressive constipation      Acute on chronic constipation in setting of multiple abdominal surgeries (h/o acute appendicitis, lysis of adhesions, intestinal resection due to adhesions to abdominal wall seen during hernia repair, hernia mesh removal):  -clinically: constipation resolved, he had multiple BMs  - pt was extensively educated on diet/hydration and meds for constipation   - DC on colace BID + miralax daily   -refer to GI OP,last colon with polyp 3 years ago with rec to repeat in 3 years   - CT A/P with IV contrast with No acute abdominal or pelvic pathology. Evidence of moderate constipation.     Hypertension, benign/essential:  -BP stable   - con't HCTZ, norvasc  - prn nitrobid     Non insulin dependent DM2 controlled:  - BS controlled   - hold metformin until dinner Saturday due to IV contrast   - diabetic diet      Hyperlipidemia: con't lipitor  Obesity:  On contrave outpt - would suggest that he hold and discuss this in f/u with GI as had 19% SE of constipation r/t this medication            Code Status: Full  Surrogate Decision Maker: long-term partner Milla Carlin [phone]  DVT Prophylaxis: lovenox        Body mass index is 36.22 kg/(m^2). Recommended Disposition: Home w/Family      _______________________________________________________________________  Patient seen and examined by me on discharge day. PHYSICAL EXAM:  General:                    WD, WN. Alert, cooperative, no acute distress    EENT:                                  EOMI. Anicteric sclerae. MMM  Resp:                                   CTA bilaterally, no wheezing or rales. No accessory muscle use  CV:                                      Regular  rhythm,  No edema  GI:                                       Soft, Non distended, Non tender.  +Bowel sounds  Neurologic:                Alert and oriented X 3, normal speech,   Psych:                       Good insight. Not anxious nor agitated  Skin:                                    No rashes.   No jaundice  _______________________________________________________________________  DISCHARGE MEDICATIONS:   Current Discharge Medication List      START taking these medications    Details   docusate sodium (COLACE) 100 mg capsule Take 1 Cap by mouth two (2) times a day for 90 days. Qty: 60 Cap, Refills: 0      polyethylene glycol (MIRALAX) 17 gram packet Take 1 Packet by mouth daily. Qty: 30 Packet, Refills: 0         CONTINUE these medications which have CHANGED    Details   metFORMIN (GLUCOPHAGE) 1,000 mg tablet Take 1 Tab by mouth two (2) times daily (with meals). START TAKING 9/23 WITH DINNER  Qty: 30 Tab, Refills: 0         CONTINUE these medications which have NOT CHANGED    Details   atorvastatin (LIPITOR) 80 mg tablet Take 80 mg by mouth daily. hydroCHLOROthiazide (HYDRODIURIL) 25 mg tablet Take 25 mg by mouth daily. DULoxetine (CYMBALTA) 60 mg capsule Take 60 mg by mouth daily. aspirin delayed-release 81 mg tablet Take  by mouth daily. levothyroxine (SYNTHROID) 200 mcg tablet Take  by mouth Daily (before breakfast). amLODIPine (NORVASC) 5 mg tablet Take 5 mg by mouth daily. cholecalciferol, vitamin D3, (VITAMIN D3) 2,000 unit tab Take  by mouth. STOP taking these medications       PEG 3350-Electrolytes (COLYTE;GOLYTELY) solr Comments:   Reason for Stopping:               My Recommended Diet, Activity, Wound Care, and follow-up labs are listed in the patient's Discharge Insturctions which I have personally completed and reviewed.     _______________________________________________________________________  DISPOSITION:    Home with Family: y   Home with HH/PT/OT/RN:    SNF/LTC:    VICKY:    OTHER:        Condition at Discharge:  Stable  _______________________________________________________________________  Follow up with:   PCP : Karina Suazo MD  Follow-up Information     Follow up With Details Comments Contact Info    Karina Suazo MD Schedule an appointment as soon as possible for a visit in 3 days  [facility]  [address] Khoa Schultz MD In 2 weeks 2-3 weeks ( GI)  [facility] [address]  [phone]                Total time in minutes spent coordinating this discharge (includes going over instructions, follow-up, prescriptions, and preparing report for sign off to her PCP) :  > 30 minutes    Signed:  Isaiah Paredes MD

## 2017-09-22 NOTE — PROGRESS NOTES
Problem: Falls - Risk of  Goal: *Absence of Falls  Document Sheldon Fall Risk and appropriate interventions in the flowsheet.    Outcome: Progressing Towards Goal  Fall Risk Interventions:              Medication Interventions: Patient to call before getting OOB

## 2017-09-22 NOTE — ED NOTES
Attempted to call report. Receiving nurse unable to take report at this time. Name and extension left for callback.

## 2017-09-22 NOTE — PROGRESS NOTES
TRANSFER - IN REPORT:    Verbal report received from Micaela(name) on Jeanette Zavala  being received from ED (unit) for routine progression of care      Report consisted of patients Situation, Background, Assessment and   Recommendations(SBAR). Information from the following report(s) ED Summary was reviewed with the receiving nurse. Opportunity for questions and clarification was provided. Assessment completed upon patients arrival to unit and care assumed.

## 2017-09-23 VITALS
WEIGHT: 252.43 LBS | SYSTOLIC BLOOD PRESSURE: 125 MMHG | DIASTOLIC BLOOD PRESSURE: 61 MMHG | TEMPERATURE: 97.8 F | HEIGHT: 70 IN | BODY MASS INDEX: 36.14 KG/M2 | OXYGEN SATURATION: 95 % | HEART RATE: 60 BPM | RESPIRATION RATE: 18 BRPM

## 2017-09-23 LAB
ANION GAP SERPL CALC-SCNC: 10 MMOL/L (ref 5–15)
BUN SERPL-MCNC: 10 MG/DL (ref 6–20)
BUN/CREAT SERPL: 14 (ref 12–20)
CALCIUM SERPL-MCNC: 8.1 MG/DL (ref 8.5–10.1)
CHLORIDE SERPL-SCNC: 107 MMOL/L (ref 97–108)
CO2 SERPL-SCNC: 23 MMOL/L (ref 21–32)
CREAT SERPL-MCNC: 0.72 MG/DL (ref 0.7–1.3)
GLUCOSE SERPL-MCNC: 119 MG/DL (ref 65–100)
LACTATE SERPL-SCNC: 2 MMOL/L (ref 0.4–2)
MAGNESIUM SERPL-MCNC: 2 MG/DL (ref 1.6–2.4)
PHOSPHATE SERPL-MCNC: 3.4 MG/DL (ref 2.6–4.7)
POTASSIUM SERPL-SCNC: 3.9 MMOL/L (ref 3.5–5.1)
SODIUM SERPL-SCNC: 140 MMOL/L (ref 136–145)

## 2017-09-23 PROCEDURE — 74011250636 HC RX REV CODE- 250/636: Performed by: HOSPITALIST

## 2017-09-23 PROCEDURE — 96361 HYDRATE IV INFUSION ADD-ON: CPT

## 2017-09-23 PROCEDURE — 84100 ASSAY OF PHOSPHORUS: CPT | Performed by: HOSPITALIST

## 2017-09-23 PROCEDURE — 83735 ASSAY OF MAGNESIUM: CPT | Performed by: HOSPITALIST

## 2017-09-23 PROCEDURE — 36415 COLL VENOUS BLD VENIPUNCTURE: CPT | Performed by: HOSPITALIST

## 2017-09-23 PROCEDURE — 90686 IIV4 VACC NO PRSV 0.5 ML IM: CPT | Performed by: HOSPITALIST

## 2017-09-23 PROCEDURE — 90471 IMMUNIZATION ADMIN: CPT

## 2017-09-23 PROCEDURE — 80048 BASIC METABOLIC PNL TOTAL CA: CPT | Performed by: HOSPITALIST

## 2017-09-23 PROCEDURE — 74011250637 HC RX REV CODE- 250/637: Performed by: INTERNAL MEDICINE

## 2017-09-23 PROCEDURE — 83605 ASSAY OF LACTIC ACID: CPT | Performed by: HOSPITALIST

## 2017-09-23 PROCEDURE — 99218 HC RM OBSERVATION: CPT

## 2017-09-23 RX ADMIN — DOCUSATE SODIUM 100 MG: 100 CAPSULE, LIQUID FILLED ORAL at 10:04

## 2017-09-23 RX ADMIN — ASPIRIN 81 MG: 81 TABLET, COATED ORAL at 10:05

## 2017-09-23 RX ADMIN — DULOXETINE 60 MG: 30 CAPSULE, DELAYED RELEASE ORAL at 10:04

## 2017-09-23 RX ADMIN — ATORVASTATIN CALCIUM 80 MG: 40 TABLET, FILM COATED ORAL at 10:04

## 2017-09-23 RX ADMIN — SODIUM CHLORIDE 150 ML/HR: 900 INJECTION, SOLUTION INTRAVENOUS at 01:57

## 2017-09-23 RX ADMIN — HYDROCHLOROTHIAZIDE 25 MG: 25 TABLET ORAL at 10:04

## 2017-09-23 RX ADMIN — INFLUENZA VIRUS VACCINE 0.5 ML: 15; 15; 15; 15 SUSPENSION INTRAMUSCULAR at 10:37

## 2017-09-23 RX ADMIN — LEVOTHYROXINE SODIUM 200 MCG: 100 TABLET ORAL at 10:04

## 2017-09-23 RX ADMIN — AMLODIPINE BESYLATE 5 MG: 5 TABLET ORAL at 10:05

## 2017-09-23 RX ADMIN — POLYETHYLENE GLYCOL 3350 17 G: 17 POWDER, FOR SOLUTION ORAL at 10:05

## 2017-09-23 NOTE — PROGRESS NOTES
During change of shift patient complained of burning sensation when urinating. Paged Dr. Jerris Sever and a UA was ordered.

## 2017-09-23 NOTE — PROGRESS NOTES
Bedside and Verbal shift change report given to Brian Rosas (oncoming nurse) by Isaac Ling RN (offgoing nurse). Report included the following information SBAR, Kardex, Intake/Output and MAR. Zone Phone for oncoming shift:       Shift Summary: Complaints of pain when urinating    LDAs               Peripheral IV 09/21/17 Right Antecubital (Active)   Site Assessment Clean, dry, & intact 9/23/2017  3:00 AM   Phlebitis Assessment 0 9/23/2017  3:00 AM   Infiltration Assessment 0 9/23/2017  3:00 AM   Dressing Status Clean, dry, & intact 9/23/2017  3:00 AM   Dressing Type Transparent 9/23/2017  3:00 AM   Hub Color/Line Status Pink; Infusing 9/23/2017  3:00 AM   Action Taken Blood drawn 9/21/2017  6:47 PM   Alcohol Cap Used Yes 9/22/2017  2:22 AM                        Intake & Output   Date 09/22/17 0700 - 09/23/17 0659 09/23/17 0700 - 09/24/17 0659   Shift 1941-7829 7959-3398 24 Hour Total 7746-5165 7204-4276 24 Hour Total   I  N  T  A  K  E   I.V.  (mL/kg/hr)  2225 2225         Volume (0.9% sodium chloride infusion)  2225 2225       Shift Total  (mL/kg)  2225  (19.4) 2225  (19.4)      O  U  T  P  U  T   Urine  (mL/kg/hr)  475 475         Urine Voided  475 475       Shift Total  (mL/kg)  475  (4.1) 475  (4.1)      NET  1750 1750      Weight (kg) 114.5 114.5 114.5 114.5 114.5 114.5      Last Bowel Movement Last Bowel Movement Date: 09/22/17   Glucose Checks [x] N/A  [] AC/HS  [] Q6  Concerns:   Nutrition Active Orders   Diet    DIET DIABETIC CONSISTENT CARB Regular       Consults []PT  []OT  []Speech  []Case Management   Cardiac Monitoring [x]N/A []Yes Expires:

## 2017-09-23 NOTE — PROGRESS NOTES
Hospitalist Progress Note    NAME: Kiera Deras   :  1945   MRN:  [id number]       Assessment / Plan:  Lactic acidosis:  No e/o infection or acute intraabdominal process, presumed due to severe constipation  - resolved now with aggressive IVF     Acute on chronic constipation in setting of multiple abdominal surgeries (h/o acute appendicitis, lysis of adhesions, intestinal resection due to adhesions to abdominal wall seen during hernia repair, hernia mesh removal):  -clinically: constipation resolved, he had multiple BMs  - pt was extensively educated on diet/hydration and meds for constipation   - DC on colace BID + miralax daily   -refer to GI OP, last colon with polyp 3 years ago with rec to repeat in 3 years   - CT A/P with IV contrast with No acute abdominal or pelvic pathology. Evidence of moderate constipation. Hypertension, benign/essential:  -BP stable   - con't HCTZ, norvasc  - prn nitrobid    Non insulin dependent DM2 controlled:  - BS controlled   - hold metformin until dinner Saturday due to IV contrast   - diabetic diet     Hyperlipidemia: con't lipitor  Obesity:  On contrave outpt - would suggest that he hold and discuss this in f/u with GI as had 19% SE of constipation r/t this medication         Code Status: Full  Surrogate Decision Maker: long-term partner Gladystine Or [phone]  DVT Prophylaxis: lovenox      Body mass index is 36.22 kg/(m^2). Recommended Disposition: Home w/Family     Subjective:     Chief Complaint / Reason for Physician Visit: following constipation   Feeling much better       Discussed with RN events overnight.      Review of Systems:  Symptom Y/N Comments  Symptom Y/N Comments   Fever/Chills n   Chest Pain n    Poor Appetite    Edema     Cough    Abdominal Pain n    Sputum    Joint Pain     SOB/LEROY n   Pruritis/Rash     Nausea/vomit n   Tolerating PT/OT  Independent    Diarrhea n   Tolerating Diet y    Constipation n Resolved   Other       Could NOT obtain due to: Objective:     VITALS:   Last 24hrs VS reviewed since prior progress note. Most recent are:  Patient Vitals for the past 24 hrs:   Temp Pulse Resp BP SpO2   09/23/17 0732 97.8 °F (36.6 °C) 60 18 125/61 95 %   09/22/17 2335 98.3 °F (36.8 °C) 67 18 168/78 95 %   09/22/17 1536 97.5 °F (36.4 °C) 63 18 142/84 95 %       Intake/Output Summary (Last 24 hours) at 09/23/17 1000  Last data filed at 09/23/17 0300   Gross per 24 hour   Intake             2225 ml   Output              475 ml   Net             1750 ml        PHYSICAL EXAM:  General: WD, WN. Alert, cooperative, no acute distress    EENT:  EOMI. Anicteric sclerae. MMM  Resp:  CTA bilaterally, no wheezing or rales. No accessory muscle use  CV:  Regular  rhythm,  No edema  GI:  Soft, Non distended, Non tender.  +Bowel sounds  Neurologic:  Alert and oriented X 3, normal speech,   Psych:   Good insight. Not anxious nor agitated  Skin:  No rashes. No jaundice    Reviewed most current lab test results and cultures  YES  Reviewed most current radiology test results   YES  Review and summation of old records today    NO  Reviewed patient's current orders and MAR    YES  PMH/SH reviewed - no change compared to H&P  ________________________________________________________________________  Care Plan discussed with:    Comments   Patient y    Family      RN y    Care Manager     Consultant                        Multidiciplinary team rounds were held today with , nursing, pharmacist and clinical coordinator. Patient's plan of care was discussed; medications were reviewed and discharge planning was addressed.      ________________________________________________________________________  Total NON critical care TIME:  35  Minutes    Total CRITICAL CARE TIME Spent:   Minutes non procedure based      Comments   >50% of visit spent in counseling and coordination of care y Dc coordination and counseling ________________________________________________________________________  Chiquis Watkins MD     Procedures: see electronic medical records for all procedures/Xrays and details which were not copied into this note but were reviewed prior to creation of Plan. LABS:  I reviewed today's most current labs and imaging studies.   Pertinent labs include:  Recent Labs      09/22/17   0549  09/21/17   1708   WBC  7.1  11.2*   HGB  14.3  14.6   HCT  41.4  43.2   PLT  240  242     Recent Labs      09/23/17   0448  09/22/17 0549  09/21/17   1708   NA  140  138  138   K  3.9  3.8  4.4   CL  107  104  103   CO2  23  25  28   GLU  119*  111*  104*   BUN  10  11  15   CREA  0.72  0.85  0.96   CA  8.1*  8.6  8.8   MG  2.0   --    --    PHOS  3.4   --    --    ALB   --    --   3.9   TBILI   --    --   0.5   SGOT   --    --   32   ALT   --    --   61       Signed: Chiquis Watkins MD

## 2017-12-12 RX ORDER — TRAMADOL HYDROCHLORIDE 50 MG/1
100 TABLET ORAL DAILY
COMMUNITY
End: 2019-01-14

## 2017-12-12 NOTE — PERIOP NOTES
Called Dr. Sullivan Second: Brenda Mayen, assistant, was told patient has undocumented history of MRSA

## 2017-12-12 NOTE — PERIOP NOTES
[facility]  Ambulatory Surgery Unit  Pre-operative Instructions for Endo Procedures    Procedure Date  12/18/17            Tentative Arrival Time 6:45am      1. On the day of your procedure, please report to the Ambulatory Surgery Unit Registration Desk and sign in at your designated time. The Ambulatory Surgery Unit is located in [facility] on the [facility] side of the [facility] across from the [address] [place] [address]. Please have all of your health insurance cards and a photo ID. 2. You must have someone with you to drive you home, as you should not drive a car for 24 hours following anesthesia. Please make arrangements for a responsible adult friend or family member to stay with you for at least the first 24 hours after your procedure. 3. Do not have anything to eat or drink (including water, gum, mints, coffee, juice) after midnight   12/17/17. This may not apply to medications prescribed by your physician. (Please note below the special instructions with medications to take the morning of your procedure.)    4. If applicable, follow the clear liquid diet and bowel prep instructions provided by your physician's office. If you do not have this information, or have any questions, please contact your physician's office. 5. We recommend you do not drink any alcoholic beverages for 24 hours before and after your procedure. 6. Contact your surgeons office for instructions on the following medications: non-steroidal anti-inflammatory drugs (i.e. Advil, Aleve), vitamins, and supplements. (Some surgeons will want you to stop these medications prior to surgery and others may allow you to take them)   **If you are currently taking Plavix, Coumadin, Aspirin and/or other blood-thinning agents, contact your surgeon for instructions. ** Your surgeon will partner with the physician prescribing these medications to determine if it is safe to stop or if you need to continue taking.  Please do not stop taking these medications without instructions from your surgeon. 7. In an effort to help prevent surgical site infection, we ask that you shower with an anti-bacterial soap (i.e. Dial or Safeguard) on the morning of your procedure. Do not apply any lotions, powders, or deodorants after showering. 8. Wear comfortable clothes. Wear glasses instead of contacts. Do not bring any jewelry or money (other than copays or fees as instructed). Do not wear make-up, particularly mascara, the morning of your procedure. Wear your hair loose or down, no ponytails, buns, kristi pins or clips. All body piercings must be removed. 9. You should understand that if you do not follow these instructions your procedure may be cancelled. If your physical condition changes (i.e. fever, cold or flu) please contact your surgeon as soon as possible. 10. It is important that you be on time. If a situation occurs where you may be late, or if you have any questions or problems, please call [phone]. 11. Your procedure time may be subject to change. You will receive a phone call the day prior to confirm your arrival time. Special Instructions:patient will call Dr. Pat Crocker for instructions concerning aspirin, vitamin D3    Take all medications and inhalers, as prescribed, on the morning of surgery with a sip of water EXCEPT: Metformin, aspirin      I understand a pre-operative phone call will be made to verify my procedure time. In the event that I am not available, I give permission for a message to be left on my answering service and/or with another person?       yes         ___________________      ___________________      ___________________review with patient at PAT appointment on 12/14/17  (Signature of Patient)          (Witness)                   (Date and Time)

## 2017-12-14 ENCOUNTER — HOSPITAL ENCOUNTER (OUTPATIENT)
Dept: SURGERY | Age: 72
Setting detail: OUTPATIENT SURGERY
Discharge: HOME | End: 2017-12-14
Payer: MEDICARE

## 2017-12-14 VITALS
RESPIRATION RATE: 16 BRPM | SYSTOLIC BLOOD PRESSURE: 148 MMHG | DIASTOLIC BLOOD PRESSURE: 70 MMHG | OXYGEN SATURATION: 94 % | HEART RATE: 63 BPM | TEMPERATURE: 98.7 F

## 2017-12-14 LAB
ANION GAP SERPL CALC-SCNC: 7 MMOL/L (ref 5–15)
BUN SERPL-MCNC: 17 MG/DL (ref 6–20)
BUN/CREAT SERPL: 18 (ref 12–20)
CALCIUM SERPL-MCNC: 9.2 MG/DL (ref 8.5–10.1)
CHLORIDE SERPL-SCNC: 102 MMOL/L (ref 97–108)
CO2 SERPL-SCNC: 28 MMOL/L (ref 21–32)
CREAT SERPL-MCNC: 0.94 MG/DL (ref 0.7–1.3)
GLUCOSE SERPL-MCNC: 139 MG/DL (ref 65–100)
POTASSIUM SERPL-SCNC: 4 MMOL/L (ref 3.5–5.1)
SODIUM SERPL-SCNC: 137 MMOL/L (ref 136–145)

## 2017-12-14 PROCEDURE — 36415 COLL VENOUS BLD VENIPUNCTURE: CPT | Performed by: ANESTHESIOLOGY

## 2017-12-14 PROCEDURE — 80048 BASIC METABOLIC PNL TOTAL CA: CPT | Performed by: ANESTHESIOLOGY

## 2017-12-15 ENCOUNTER — ANESTHESIA EVENT (OUTPATIENT)
Dept: SURGERY | Age: 72
End: 2017-12-15
Payer: MEDICARE

## 2017-12-18 ENCOUNTER — ANESTHESIA (OUTPATIENT)
Dept: SURGERY | Age: 72
End: 2017-12-18
Payer: MEDICARE

## 2017-12-18 ENCOUNTER — HOSPITAL ENCOUNTER (OUTPATIENT)
Age: 72
Setting detail: OUTPATIENT SURGERY
Discharge: HOME | End: 2017-12-18
Attending: INTERNAL MEDICINE | Admitting: INTERNAL MEDICINE
Payer: MEDICARE

## 2017-12-18 VITALS
TEMPERATURE: 98.5 F | SYSTOLIC BLOOD PRESSURE: 128 MMHG | HEART RATE: 66 BPM | HEIGHT: 70 IN | DIASTOLIC BLOOD PRESSURE: 77 MMHG | BODY MASS INDEX: 36.79 KG/M2 | OXYGEN SATURATION: 95 % | RESPIRATION RATE: 17 BRPM | WEIGHT: 257 LBS

## 2017-12-18 LAB
GLUCOSE BLD STRIP.AUTO-MCNC: 141 MG/DL (ref 65–100)
SERVICE CMNT-IMP: ABNORMAL

## 2017-12-18 PROCEDURE — 76210000040 HC AMBSU PH I REC FIRST 0.5 HR: Performed by: INTERNAL MEDICINE

## 2017-12-18 PROCEDURE — 74011250636 HC RX REV CODE- 250/636: Performed by: ANESTHESIOLOGY

## 2017-12-18 PROCEDURE — 74011250636 HC RX REV CODE- 250/636

## 2017-12-18 PROCEDURE — 74011250637 HC RX REV CODE- 250/637: Performed by: INTERNAL MEDICINE

## 2017-12-18 PROCEDURE — 82962 GLUCOSE BLOOD TEST: CPT

## 2017-12-18 PROCEDURE — 77030013992 HC SNR POLYP ENDOSC BSC -B: Performed by: INTERNAL MEDICINE

## 2017-12-18 PROCEDURE — 76060000061 HC AMB SURG ANES 0.5 TO 1 HR: Performed by: INTERNAL MEDICINE

## 2017-12-18 PROCEDURE — 76030000000 HC AMB SURG OR TIME 0.5 TO 1: Performed by: INTERNAL MEDICINE

## 2017-12-18 PROCEDURE — 76210000046 HC AMBSU PH II REC FIRST 0.5 HR: Performed by: INTERNAL MEDICINE

## 2017-12-18 PROCEDURE — 77030020255 HC SOL INJ LR 1000ML BG: Performed by: INTERNAL MEDICINE

## 2017-12-18 PROCEDURE — 88305 TISSUE EXAM BY PATHOLOGIST: CPT | Performed by: INTERNAL MEDICINE

## 2017-12-18 RX ORDER — ONDANSETRON 2 MG/ML
4 INJECTION INTRAMUSCULAR; INTRAVENOUS AS NEEDED
Status: DISCONTINUED | OUTPATIENT
Start: 2017-12-18 | End: 2017-12-18 | Stop reason: HOSPADM

## 2017-12-18 RX ORDER — SODIUM CHLORIDE 0.9 % (FLUSH) 0.9 %
5-10 SYRINGE (ML) INJECTION EVERY 8 HOURS
Status: DISCONTINUED | OUTPATIENT
Start: 2017-12-18 | End: 2017-12-18 | Stop reason: HOSPADM

## 2017-12-18 RX ORDER — LIDOCAINE HYDROCHLORIDE 10 MG/ML
0.1 INJECTION, SOLUTION EPIDURAL; INFILTRATION; INTRACAUDAL; PERINEURAL AS NEEDED
Status: DISCONTINUED | OUTPATIENT
Start: 2017-12-18 | End: 2017-12-18 | Stop reason: HOSPADM

## 2017-12-18 RX ORDER — DEXTROMETHORPHAN/PSEUDOEPHED 2.5-7.5/.8
DROPS ORAL AS NEEDED
Status: DISCONTINUED | OUTPATIENT
Start: 2017-12-18 | End: 2017-12-18 | Stop reason: HOSPADM

## 2017-12-18 RX ORDER — PROPOFOL 10 MG/ML
INJECTION, EMULSION INTRAVENOUS AS NEEDED
Status: DISCONTINUED | OUTPATIENT
Start: 2017-12-18 | End: 2017-12-18 | Stop reason: HOSPADM

## 2017-12-18 RX ORDER — SODIUM CHLORIDE 0.9 % (FLUSH) 0.9 %
5-10 SYRINGE (ML) INJECTION AS NEEDED
Status: DISCONTINUED | OUTPATIENT
Start: 2017-12-18 | End: 2017-12-18 | Stop reason: HOSPADM

## 2017-12-18 RX ORDER — SODIUM CHLORIDE, SODIUM LACTATE, POTASSIUM CHLORIDE, CALCIUM CHLORIDE 600; 310; 30; 20 MG/100ML; MG/100ML; MG/100ML; MG/100ML
25 INJECTION, SOLUTION INTRAVENOUS CONTINUOUS
Status: DISCONTINUED | OUTPATIENT
Start: 2017-12-18 | End: 2017-12-18 | Stop reason: HOSPADM

## 2017-12-18 RX ORDER — DEXTROMETHORPHAN/PSEUDOEPHED 2.5-7.5/.8
DROPS ORAL
Status: DISCONTINUED
Start: 2017-12-18 | End: 2017-12-18 | Stop reason: HOSPADM

## 2017-12-18 RX ORDER — FENTANYL CITRATE 50 UG/ML
25 INJECTION, SOLUTION INTRAMUSCULAR; INTRAVENOUS
Status: DISCONTINUED | OUTPATIENT
Start: 2017-12-18 | End: 2017-12-18 | Stop reason: HOSPADM

## 2017-12-18 RX ORDER — DIPHENHYDRAMINE HYDROCHLORIDE 50 MG/ML
12.5 INJECTION, SOLUTION INTRAMUSCULAR; INTRAVENOUS AS NEEDED
Status: DISCONTINUED | OUTPATIENT
Start: 2017-12-18 | End: 2017-12-18 | Stop reason: HOSPADM

## 2017-12-18 RX ADMIN — PROPOFOL 50 MG: 10 INJECTION, EMULSION INTRAVENOUS at 08:31

## 2017-12-18 RX ADMIN — PROPOFOL 70 MG: 10 INJECTION, EMULSION INTRAVENOUS at 08:02

## 2017-12-18 RX ADMIN — PROPOFOL 130 MG: 10 INJECTION, EMULSION INTRAVENOUS at 08:05

## 2017-12-18 RX ADMIN — PROPOFOL 100 MG: 10 INJECTION, EMULSION INTRAVENOUS at 08:16

## 2017-12-18 RX ADMIN — PROPOFOL 50 MG: 10 INJECTION, EMULSION INTRAVENOUS at 08:24

## 2017-12-18 RX ADMIN — SODIUM CHLORIDE, SODIUM LACTATE, POTASSIUM CHLORIDE, AND CALCIUM CHLORIDE 25 ML/HR: 600; 310; 30; 20 INJECTION, SOLUTION INTRAVENOUS at 07:03

## 2017-12-18 RX ADMIN — PROPOFOL 50 MG: 10 INJECTION, EMULSION INTRAVENOUS at 08:35

## 2017-12-18 RX ADMIN — PROPOFOL 100 MG: 10 INJECTION, EMULSION INTRAVENOUS at 08:13

## 2017-12-18 NOTE — DISCHARGE INSTRUCTIONS
[facility] Office: [phone]    Shirley Garcia  [id number]  1945    EGD/COLONOSCOPY DISCHARGE INSTRUCTIONS  Discomfort:  Sore throat- throat lozenges or warm salt water gargle  redness at IV site- apply warm compress to area; if redness or soreness persist- contact your physician  Gaseous discomfort- walking, belching will help relieve any discomfort  You may not operate a vehicle for 24 hours  You may not engage in an occupation involving machinery or appliances for rest of today. You may not drink alcoholic beverages for at least 24 hours  Avoid making any critical decisions for at least 24 hour  DIET  You may resume your regular diet - however -  remember your colon is empty and a heavy meal will produce gas. Avoid these foods:  fried / greasy foods, excessive carbonated drinks or too much caffeine  MEDICATIONS   Regarding Aspirin or Nonsteroidal medications specifically, please see below. ACTIVITY  You may resume your normal daily activities. Spend the remainder of the day resting -  avoid any strenuous activity. CALL M.D. ANY SIGN OF   Increasing pain, nausea, vomiting  Abdominal distension (swelling)  New increased bleeding (oral or rectal)  Fever (chills)  Pain in chest area  Bloody discharge from nose or mouth  Shortness of breath    You may not take any Advil, Aspirin, Ibuprofen, Motrin, Aleve, or Goodys for 7 days, ONLY  Tylenol as needed for pain. Follow-up Instructions:   Call  Joel Reeder MD for any questions or concerns  Results of procedure / biopsy in 7 days   Telephone # [phone]      Follow-up Information     None               DO NOT TAKE SLEEPING MEDICATIONS OR ANTIANXIETY MEDICATIONS WHILE TAKING NARCOTIC PAIN MEDICATIONS,  ESPECIALLY THE NIGHT OF ANESTHESIA. CPAP PATIENTS BE SURE TO WEAR MACHINE WHENEVER NAPPING OR SLEEPING.     DISCHARGE SUMMARY from Nurse    The following personal items collected during your admission are returned to you:   Dental Appliance: Dental Appliances: None  Vision: Visual Aid: Glasses  Hearing Aid:    Jewelry:    Clothing:    Other Valuables:    Valuables sent to safe:        PATIENT INSTRUCTIONS:    After General Anesthesia or Intravenous Sedation, for 24 hours or while taking prescription Narcotics:        Someone should be with you for the next 24 hours. For your own safety, a responsible adult must drive you home. · Limit your activities  · Recommended activity: Rest today, up with assistance today. Do not climb stairs or shower unattended for the next 24 hours. · Please start with a soft bland diet and advance as tolerated (no nausea) to regular diet. · If you have a sore throat you should try the following: fluids, warm salt water gargles, or throat lozenges. If it does not improve after several days please follow up with your primary physician. · Do not drive and operate hazardous machinery  · Do not make important personal or business decisions  · Do  not drink alcoholic beverages  · If you have not urinated within 8 hours after discharge, please contact your surgeon on call. Report the following to your surgeon:  · Excessive pain, swelling, redness or odor of or around the surgical area  · Temperature over 100.5  · Nausea and vomiting lasting longer than 4 hours or if unable to take medications  · Any signs of decreased circulation or nerve impairment to extremity: change in color, persistent  numbness, tingling, coldness or increase pain      · You will receive a Post Operative Call from one of the Recovery Room Nurses on the day after your surgery to check on you. It is very important for us to know how you are recovering after your surgery. If you have an issue or need to speak with someone, please call your surgeon, do not wait for the post operative call. · You may receive an e-mail or letter in the mail from [employer] regarding your experience with us in the Ambulatory Surgery Unit.  Your feedback is valuable to us and we appreciate your participation in the survey. · If the above instructions are not adequate, please contact Miguelina Klein RN, [facility] anesthesia Nurse Manager or our Anesthesiologist, at [phone]. If you are having problems after your surgery, call the physician at his office number. · We wish you a speedy recovery ? What to do at Home:      *  Please give a list of your current medications to your Primary Care Provider. *  Please update this list whenever your medications are discontinued, doses are      changed, or new medications (including over-the-counter products) are added. *  Please carry medication information at all times in case of emergency situations. These are general instructions for a healthy lifestyle:    No smoking/ No tobacco products/ Avoid exposure to second hand smoke    Surgeon General's Warning:  Quitting smoking now greatly reduces serious risk to your health. Obesity, smoking, and sedentary lifestyle greatly increases your risk for illness    A healthy diet, regular physical exercise & weight monitoring are important for maintaining a healthy lifestyle    You may be retaining fluid if you have a history of heart failure or if you experience any of the following symptoms:  Weight gain of 3 pounds or more overnight or 5 pounds in a week, increased swelling in our hands or feet or shortness of breath while lying flat in bed. Please call your doctor as soon as you notice any of these symptoms; do not wait until your next office visit. Recognize signs and symptoms of STROKE:    B - Balance  E - Eyes    F-  Face looks uneven    A-  Arms unable to move or move even    S-  Speech slurred or non-existent    T-  Time-call 911 as soon as signs and symptoms begin-DO NOT go       Back to bed or wait to see if you get better-TIME IS BRAIN.       If you have not received your influenza and/or pneumococcal vaccine, please follow up with your primary care physician. The discharge information has been reviewed with the patient and caregiver. The patient and caregiver verbalized understanding.

## 2017-12-18 NOTE — PERIOP NOTES
Carol Ann Tajik  1945  [id number]    Situation:  Verbal report given from: Moises Garcia CRNA, Alfredo Rosen RN  Procedure: Procedure(s):  COLONOSCOPY  ENDOSCOPIC POLYPECTOMY    Background:    Preoperative diagnosis: PERSONAL HX OF COLONIC POLYPS    Postoperative diagnosis: Redundant colon, recto sigmoid polyps    :  Dr. Benoit Melissa    Assistant(s): Circ-1: Walker Arenas RN  Scrub Tech-1: Lina Corporal    Specimens:   ID Type Source Tests Collected by Time Destination   1 : recto sigmoid polyps Preservative Colon, Recto-sigmoid  Tomasa Mora MD 12/18/2017 0803 Pathology       Assessment:  Intra-procedure medications         Anesthesia gave intra-procedure sedation and medications, see anesthesia flow sheet     Intravenous fluids: LR@ KVO     Vital signs stable       Recommendation:    Permission to share finding with family or friend yes

## 2017-12-18 NOTE — ANESTHESIA PREPROCEDURE EVALUATION
Anesthetic History   No history of anesthetic complications            Review of Systems / Medical History  Patient summary reviewed, nursing notes reviewed and pertinent labs reviewed    Pulmonary        Sleep apnea: CPAP           Neuro/Psych             Comments: neuropathy Cardiovascular    Hypertension          CAD (s/p stents x5) and hyperlipidemia         GI/Hepatic/Renal     GERD: well controlled           Endo/Other    Diabetes    Arthritis     Other Findings   Comments: Fibromyalgia         Physical Exam    Airway  Mallampati: IV  TM Distance: 4 - 6 cm  Neck ROM: normal range of motion   Mouth opening: Normal     Cardiovascular    Rhythm: regular  Rate: normal         Dental    Dentition: Caps/crowns and Bridges     Pulmonary  Breath sounds clear to auscultation               Abdominal  GI exam deferred       Other Findings            Anesthetic Plan    ASA: 3  Anesthesia type: general and total IV anesthesia          Induction: Intravenous  Anesthetic plan and risks discussed with: Patient      preop glucose 141

## 2017-12-18 NOTE — PROCEDURES
Colonoscopy Procedure Note    Raj Parsons  1945  [id number]      Pre-operative Diagnosis: PERSONAL HX OF COLONIC POLYPS    Post-operative Diagnosis: Redundant colon, recto sigmoid polyps,internal hemorrhoids      : Joel Ramos MD    Referring Provider: Brea Hicks MD    Sedation:  MAC anesthesia Propofol        Procedure Details:    After detailed informed consent was obtained with all risks and benefits of procedure explained and preoperative exam completed, the patient was taken to the endoscopy suite and placed in the left lateral decubitus position. Upon sequential sedation as per above, a digital rectal exam was performed  And was normal.  The Olympus videocolonoscope  was inserted in the rectum and carefully advanced to the cecum, which was identified by the ileocecal valve. The quality of preparation was good. The colonoscope was slowly withdrawn with careful evaluation between folds. Retroflexion in the rectum was performed. Findings:     · Over 25 sessile, sub centimeter polyps are seen in the sigmoid and rectosigmoid colon. NBI was used. · All were removed with a cold snare. · The procedure was challenging (redundant colon,heavy abdominal breather) needing counter pressure to reach the cecum. .  · There are internal hemorhoids        Therapies:  >25  complete polypectomy were performed using cold snare  and the polyps were  retrieved    Specimen:   Specimens were collected as described above and sent to pathology. Complications: None were encountered during the procedure. EBL:  None. Recommendations:     -Await pathology. -Repeat colonoscopy in 1 year    -Naturally, for new bleeding, unexplained weight loss,change in bowel habits and anemia, an earlier colonoscopy should be considered. Joel Ramos MD  12/18/2017  8:39 AM

## 2017-12-18 NOTE — IP AVS SNAPSHOT
[address] [facility] 
[phone] Patient: Quita Aguirre MRN: [id number] PTI:6/2/3029 About your hospitalization You were admitted on:  December 18, 2017 You last received care in the:  [facility] PACU You were discharged on:  December 18, 2017 Why you were hospitalized Your primary diagnosis was:  Not on File Discharge Orders None A check kisha indicates which time of day the medication should be taken. My Medications TAKE these medications as instructed Instructions Each Dose to Equal  
 Morning Noon Evening Bedtime  
 amLODIPine 5 mg tablet Commonly known as:  Azael Alstrom Your last dose was: Your next dose is: Take 5 mg by mouth daily. 5 mg  
    
   
   
   
  
 aspirin delayed-release 81 mg tablet Your last dose was: Your next dose is: Take  by mouth daily. atorvastatin 80 mg tablet Commonly known as:  LIPITOR Your last dose was: Your next dose is: Take 80 mg by mouth daily. 80 mg DULoxetine 60 mg capsule Commonly known as:  CYMBALTA Your last dose was: Your next dose is: Take 120 mg by mouth daily. 120 mg  
    
   
   
   
  
 hydroCHLOROthiazide 25 mg tablet Commonly known as:  HYDRODIURIL Your last dose was: Your next dose is: Take 25 mg by mouth daily. 25 mg  
    
   
   
   
  
 levothyroxine 200 mcg tablet Commonly known as:  SYNTHROID Your last dose was: Your next dose is: Take  by mouth Daily (before breakfast). LINZESS 145 mcg Cap capsule Generic drug:  linaclotide Your last dose was: Your next dose is: Take  by mouth daily. metFORMIN 1,000 mg tablet Commonly known as:  GLUCOPHAGE Your last dose was: Your next dose is: Take 1 Tab by mouth two (2) times daily (with meals). START TAKING 9/23 WITH DINNER  
 1000 mg  
    
   
   
   
  
 traMADol 50 mg tablet Commonly known as:  ULTRAM  
   
Your last dose was: Your next dose is: Take 100 mg by mouth daily. 100 mg  
    
   
   
   
  
 VITAMIN D3 2,000 unit Tab Generic drug:  cholecalciferol (vitamin D3) Your last dose was: Your next dose is: Take  by mouth daily. Discharge Instructions [place] Office: [phone] Kathy [place] [phone] 
1945 EGD/COLONOSCOPY DISCHARGE INSTRUCTIONS Discomfort: 
Sore throat- throat lozenges or warm salt water gargle 
redness at IV site- apply warm compress to area; if redness or soreness persist- contact your physician Gaseous discomfort- walking, belching will help relieve any discomfort You may not operate a vehicle for 24 hours You may not engage in an occupation involving machinery or appliances for rest of today. You may not drink alcoholic beverages for at least 24 hours Avoid making any critical decisions for at least 24 hour DIET You may resume your regular diet  however -  remember your colon is empty and a heavy meal will produce gas. Avoid these foods:  fried / greasy foods, excessive carbonated drinks or too much caffeine MEDICATIONS Regarding Aspirin or Nonsteroidal medications specifically, please see below. ACTIVITY You may resume your normal daily activities. Spend the remainder of the day resting -  avoid any strenuous activity. CALL M.D. ANY SIGN OF Increasing pain, nausea, vomiting Abdominal distension (swelling) New increased bleeding (oral or rectal) Fever (chills) Pain in chest area Bloody discharge from nose or mouth Shortness of breath You may not take any Advil, Aspirin, Ibuprofen, Motrin, Aleve, or Goodys for 7 days, ONLY  Tylenol as needed for pain. Follow-up Instructions: 
 Call  Joel Reeder MD for any questions or concerns Results of procedure / biopsy in 7 days Telephone # [phone] Follow-up Information None DO NOT TAKE SLEEPING MEDICATIONS OR ANTIANXIETY MEDICATIONS WHILE TAKING NARCOTIC PAIN MEDICATIONS,  ESPECIALLY THE NIGHT OF ANESTHESIA. CPAP PATIENTS BE SURE TO WEAR MACHINE WHENEVER NAPPING OR SLEEPING. DISCHARGE SUMMARY from Nurse The following personal items collected during your admission are returned to you:  
Dental Appliance: Dental Appliances: None Vision: Visual Aid: Glasses Hearing Aid:   
Jewelry:   
Clothing:   
Other Valuables:   
Valuables sent to safe:   
 
 
PATIENT INSTRUCTIONS: 
 
 
B - Balance E - Eyes F-  Face looks uneven A-  Arms unable to move or move even S-  Speech slurred or non-existent T-  Time-call 911 as soon as signs and symptoms begin-DO NOT go Back to bed or wait to see if you get better-TIME IS BRAIN. If you have not received your influenza and/or pneumococcal vaccine, please follow up with your primary care physician. The discharge information has been reviewed with the patient and caregiver. The patient and caregiver verbalized understanding. Introducing [facility]! Jose L Larose introduces [employer] patient portal. Now you can access parts of your medical record, email your doctor's office, and request medication refills online. 1. In your internet browser, go to [email or web address] 2. Click on the First Time User? Click Here link in the Sign In box. You will see the New Member Sign Up page. 3. Enter your [employer] Access Code exactly as it appears below. You will not need to use this code after youve completed the sign-up process. If you do not sign up before the expiration date, you must request a new code. · [employer] Access Code: [email or web address] Expires: 2/28/2018  7:07 AM 
 
4. Enter the last four digits of your Social Security Number (xxxx) and Date of Birth (mm/dd/yyyy) as indicated and click Submit. You will be taken to the next sign-up page. 5. Create a [employer] ID. This will be your [employer] login ID and cannot be changed, so think of one that is secure and easy to remember. 6. Create a Brand[employer] password. You can change your password at any time. 7. Enter your Password Reset Question and Answer. This can be used at a later time if you forget your password. 8. Enter your e-mail address. You will receive e-mail notification when new information is available in [address] 9. Click Sign Up. You can now view and download portions of your medical record. 10. Click the Download Summary menu link to download a portable copy of your medical information. If you have questions, please visit the Frequently Asked Questions section of the Brand[employer] website. Remember, [employer] is NOT to be used for urgent needs. For medical emergencies, dial 911. Now available from your iPhone and Android! Providers Seen During Your Hospitalization Provider Specialty Primary office phone Berto Cartagena MD Gastroenterology [phone] Your Primary Care Physician (PCP) Primary Care Physician Office Phone Office Fax Jailene Rashmi [phone] You are allergic to the following No active allergies Recent Documentation Height Weight BMI Smoking Status 1.778 m 116.6 kg 36.88 kg/m2 Former Smoker Emergency Contacts Name Discharge Info Relation Home Work Mobile Tato Batista DISCHARGE CAREGIVER [3] Spouse [3] [phone] Patient Belongings The following personal items are in your possession at time of discharge: 
  Dental Appliances: None  Visual Aid: Glasses Please provide this summary of care documentation to your next provider. Signatures-by signing, you are acknowledging that this After Visit Summary has been reviewed with you and you have received a copy. Patient Signature:  ____________________________________________________________ Date:  ____________________________________________________________  
  
Tom Sam  Provider Signature: ____________________________________________________________ Date:  ____________________________________________________________

## 2017-12-18 NOTE — ANESTHESIA POSTPROCEDURE EVALUATION
Post-Anesthesia Evaluation and Assessment    Patient: Diamond Lainez MRN: [id number]  SSN: [id number]    YOB: 1945  Age: 67 y.o. Sex: male       Cardiovascular Function/Vital Signs  Visit Vitals    /77 (BP 1 Location: Left arm, BP Patient Position: At rest)    Pulse 66    Temp 36.9 °C (98.5 °F)    Resp 17    Ht 5' 10\" (1.778 m)    Wt 116.6 kg (257 lb)    SpO2 95%    BMI 36.88 kg/m2       Patient is status post general, total IV anesthesia anesthesia for Procedure(s):  COLONOSCOPY  ENDOSCOPIC POLYPECTOMY. Nausea/Vomiting: None    Postoperative hydration reviewed and adequate. Pain:  Pain Scale 1: Numeric (0 - 10) (12/18/17 0857)  Pain Intensity 1: 0 (12/18/17 0857)   Managed    Neurological Status:   Neuro (WDL): Exceptions to [facility] (12/18/17 0857)  Neuro  Neurologic State: Drowsy; Eyes open spontaneously (12/18/17 0857)   At baseline    Mental Status and Level of Consciousness: Arousable    Pulmonary Status:   O2 Device: Room air (12/18/17 0857)   Adequate oxygenation and airway patent    Complications related to anesthesia: None    Post-anesthesia assessment completed.  No concerns    Signed By: Radha Min MD     December 18, 2017

## 2017-12-18 NOTE — PERIOP NOTES
Reviewed discharge instructions w/pt. And caregiver. They verbalized understanding. Vss. Denies pain. Abdomen soft nontender. Pt discharged via wheelchair, accompanied by RN. Pt discharged awake and alert, respirations equal and unlabored, skin warm, dry, and intact. Pt and family members' questions and concerns addressed prior to discharge.

## 2017-12-18 NOTE — H&P
Pre-endoscopy H and P    The patient was seen and examined in the room/pre-op holding area. The airway was assessed and documented. The problem list, past medical history, and medications were reviewed. Patient Active Problem List   Diagnosis Code    Lactic acidosis E87.2     Social History     Social History    Marital status:      Spouse name: N/A    Number of children: N/A    Years of education: N/A     Occupational History    Not on file. Social History Main Topics    Smoking status: Former Smoker     Years: 40.00    Smokeless tobacco: Never Used    Alcohol use 1.2 oz/week     2 Shots of liquor per week      Comment: 2-3 times weekly    Drug use: No    Sexual activity: Not on file     Other Topics Concern    Not on file     Social History Narrative     Past Medical History:   Diagnosis Date    Abdominal adhesions 2000    Arthritis     all joints    CAD (coronary artery disease)     Heart: Dr. Caro Snyder Chronic constipation     Chronic pain     all my joints    Diabetic neuropathy (HCC)     Type II    Fibromyalgia     GERD (gastroesophageal reflux disease)     HLD (hyperlipidemia)     Hypertension     Neuropathy     hands/feet    Sleep apnea     uses Cpap         Prior to Admission Medications   Prescriptions Last Dose Informant Patient Reported? Taking? DULoxetine (CYMBALTA) 60 mg capsule 12/17/2017 at Unknown time  Yes Yes   Sig: Take 120 mg by mouth daily. amLODIPine (NORVASC) 5 mg tablet 12/17/2017 at Unknown time  Yes Yes   Sig: Take 5 mg by mouth daily. aspirin delayed-release 81 mg tablet 12/17/2017 at Unknown time  Yes Yes   Sig: Take  by mouth daily. atorvastatin (LIPITOR) 80 mg tablet 12/17/2017 at Unknown time  Yes Yes   Sig: Take 80 mg by mouth daily. cholecalciferol, vitamin D3, (VITAMIN D3) 2,000 unit tab 12/11/2017 at Unknown time  Yes Yes   Sig: Take  by mouth daily.    hydroCHLOROthiazide (HYDRODIURIL) 25 mg tablet 12/17/2017 at Unknown time  Yes Yes   Sig: Take 25 mg by mouth daily. levothyroxine (SYNTHROID) 200 mcg tablet 12/17/2017 at Unknown time  Yes Yes   Sig: Take  by mouth Daily (before breakfast). linaclotide (LINZESS) 145 mcg cap capsule 12/17/2017 at Unknown time  Yes Yes   Sig: Take  by mouth daily. metFORMIN (GLUCOPHAGE) 1,000 mg tablet 12/17/2017 at Unknown time  No Yes   Sig: Take 1 Tab by mouth two (2) times daily (with meals). START TAKING 9/23 WITH DINNER   traMADol (ULTRAM) 50 mg tablet 12/17/2017 at Unknown time  Yes Yes   Sig: Take 100 mg by mouth daily. Facility-Administered Medications: None           The review of systems is:  Negative  for shortness of breath or chest pain      The heart, lungs, and mental status were satisfactory for the administration of deep sedation and for the procedure. I discussed with the patient the objectives, risks, consequences and alternatives to the procedure.       Rajan Elliott MD  12/18/2017  7:32 AM

## 2018-01-11 ENCOUNTER — HOSPITAL ENCOUNTER (EMERGENCY)
Age: 73
Discharge: HOME | End: 2018-01-11
Attending: EMERGENCY MEDICINE
Payer: MEDICARE

## 2018-01-11 ENCOUNTER — APPOINTMENT (OUTPATIENT)
Dept: GENERAL RADIOLOGY | Age: 73
End: 2018-01-11
Attending: PHYSICIAN ASSISTANT
Payer: MEDICARE

## 2018-01-11 ENCOUNTER — APPOINTMENT (OUTPATIENT)
Dept: CT IMAGING | Age: 73
End: 2018-01-11
Attending: EMERGENCY MEDICINE
Payer: MEDICARE

## 2018-01-11 VITALS
DIASTOLIC BLOOD PRESSURE: 74 MMHG | BODY MASS INDEX: 35.79 KG/M2 | SYSTOLIC BLOOD PRESSURE: 133 MMHG | HEART RATE: 63 BPM | RESPIRATION RATE: 18 BRPM | TEMPERATURE: 98.9 F | WEIGHT: 250 LBS | HEIGHT: 70 IN | OXYGEN SATURATION: 97 %

## 2018-01-11 DIAGNOSIS — M51.35 DDD (DEGENERATIVE DISC DISEASE), THORACOLUMBAR: ICD-10-CM

## 2018-01-11 DIAGNOSIS — S39.92XA INJURY OF BACK, INITIAL ENCOUNTER: Primary | ICD-10-CM

## 2018-01-11 PROCEDURE — 72100 X-RAY EXAM L-S SPINE 2/3 VWS: CPT

## 2018-01-11 PROCEDURE — 99283 EMERGENCY DEPT VISIT LOW MDM: CPT

## 2018-01-11 PROCEDURE — 99284 EMERGENCY DEPT VISIT MOD MDM: CPT

## 2018-01-11 PROCEDURE — 72128 CT CHEST SPINE W/O DYE: CPT

## 2018-01-11 PROCEDURE — 74011250637 HC RX REV CODE- 250/637: Performed by: EMERGENCY MEDICINE

## 2018-01-11 PROCEDURE — 77030029684 HC NEB SM VOL KT MONA -A

## 2018-01-11 PROCEDURE — 72131 CT LUMBAR SPINE W/O DYE: CPT

## 2018-01-11 RX ORDER — CYCLOBENZAPRINE HCL 10 MG
10 TABLET ORAL
Qty: 15 TAB | Refills: 0 | Status: SHIPPED | OUTPATIENT
Start: 2018-01-11 | End: 2019-01-14

## 2018-01-11 RX ORDER — OXYCODONE HYDROCHLORIDE 5 MG/1
5 TABLET ORAL
Status: COMPLETED | OUTPATIENT
Start: 2018-01-11 | End: 2018-01-11

## 2018-01-11 RX ORDER — OXYCODONE HYDROCHLORIDE 5 MG/1
5 TABLET ORAL
Qty: 20 TAB | Refills: 0 | Status: SHIPPED | OUTPATIENT
Start: 2018-01-11 | End: 2018-03-06

## 2018-01-11 RX ORDER — CYCLOBENZAPRINE HCL 10 MG
10 TABLET ORAL
Status: COMPLETED | OUTPATIENT
Start: 2018-01-11 | End: 2018-01-11

## 2018-01-11 RX ADMIN — OXYCODONE HYDROCHLORIDE 5 MG: 5 TABLET ORAL at 16:36

## 2018-01-11 RX ADMIN — OXYCODONE HYDROCHLORIDE 5 MG: 5 TABLET ORAL at 18:13

## 2018-01-11 RX ADMIN — CYCLOBENZAPRINE HYDROCHLORIDE 10 MG: 10 TABLET, FILM COATED ORAL at 16:36

## 2018-01-11 NOTE — ED PROVIDER NOTES
EMERGENCY DEPARTMENT HISTORY AND PHYSICAL EXAM      Date: 1/11/2018  Patient Name: Victor Manuel Merritt    History of Presenting Illness     Chief Complaint   Patient presents with    Fall     pt reports GLF PTA, pt state he slipped on a muddy bank and fell on his back, pt denies hitting his head and denies LOC, pt c/o lower back pain       History Provided By: Patient    HPI: Victor Manuel Merritt, 67 y.o. male with PMHx significant for osteopenia, cardiac stent x 4, DM, fibromyalgia, and chronic back pain, presents to the ED with cc of acute on chronic mid to low back pain s/p GLF today PTA. Pt states he walked outside, slipped on a muddy bank, and fell on his back. Pt denies hitting his head or LOC. Pt states he takes tramadol for his chronic back pain. He denies any pain in his hips or shooting pain down his legs. Pt denies any weakness or numbness in his lower extremities. PCP: Zohra Mccall MD    There are no other complaints, changes, or physical findings at this time. Current Outpatient Prescriptions   Medication Sig Dispense Refill    cyclobenzaprine (FLEXERIL) 10 mg tablet Take 1 Tab by mouth three (3) times daily as needed for Muscle Spasm(s). 15 Tab 0    oxyCODONE IR (ROXICODONE) 5 mg immediate release tablet Take 1 Tab by mouth every four (4) hours as needed for Pain. Max Daily Amount: 30 mg. 20 Tab 0    linaclotide (LINZESS) 145 mcg cap capsule Take  by mouth daily.  traMADol (ULTRAM) 50 mg tablet Take 100 mg by mouth daily.  metFORMIN (GLUCOPHAGE) 1,000 mg tablet Take 1 Tab by mouth two (2) times daily (with meals). START TAKING 9/23 WITH DINNER 30 Tab 0    atorvastatin (LIPITOR) 80 mg tablet Take 80 mg by mouth daily.  hydroCHLOROthiazide (HYDRODIURIL) 25 mg tablet Take 25 mg by mouth daily.  DULoxetine (CYMBALTA) 60 mg capsule Take 120 mg by mouth daily.  aspirin delayed-release 81 mg tablet Take  by mouth daily.       levothyroxine (SYNTHROID) 200 mcg tablet Take  by mouth Daily (before breakfast).  amLODIPine (NORVASC) 5 mg tablet Take 5 mg by mouth daily.  cholecalciferol, vitamin D3, (VITAMIN D3) 2,000 unit tab Take  by mouth daily. Past History     Past Medical History:  Past Medical History:   Diagnosis Date    Abdominal adhesions 2000    Arthritis     all joints    CAD (coronary artery disease)     Heart: Dr. Bart Kirkpatrick    Chronic constipation     Chronic pain     all my joints    Diabetic neuropathy (HCC)     Type II    Fibromyalgia     GERD (gastroesophageal reflux disease)     HLD (hyperlipidemia)     Hypertension     Neuropathy     hands/feet    Sleep apnea     uses Cpap       Past Surgical History:  Past Surgical History:   Procedure Laterality Date    ABDOMEN SURGERY PROC UNLISTED  early 2000's    abdominal hernia repair    ABDOMEN SURGERY PROC UNLISTED  2000's    adhesions from appendix sx.  ABDOMEN SURGERY PROC UNLISTED  2000's    colectomy: when removing adhesions, nicked intestines, removed 8\" of intestine    COLONOSCOPY N/A 12/18/2017    COLONOSCOPY performed by Maria Isabel Mcmullen MD at [facility]  2014    HX ORTHOPAEDIC  2000,2007    bialteral hip Replacement       Family History:  Family History   Problem Relation Age of Onset    Hypertension Brother        Social History:  Social History   Substance Use Topics    Smoking status: Former Smoker     Years: 40.00    Smokeless tobacco: Never Used    Alcohol use 1.2 oz/week     2 Shots of liquor per week      Comment: 2-3 times weekly       Allergies:  No Known Allergies      Review of Systems   Review of Systems   Constitutional: Negative. Negative for chills and fever. HENT: Negative. Negative for congestion, rhinorrhea and sinus pressure. Eyes: Negative. Negative for discharge and redness. Respiratory: Negative. Negative for chest tightness and shortness of breath. Cardiovascular: Negative. Negative for chest pain. Gastrointestinal: Negative. Negative for abdominal pain and blood in stool. Endocrine: Negative. Genitourinary: Negative. Negative for flank pain and hematuria. Musculoskeletal: Positive for back pain. Skin: Negative. Negative for rash. Neurological: Negative. Negative for dizziness, seizures, weakness, numbness and headaches. Hematological: Negative. All other systems reviewed and are negative. Physical Exam   Physical Exam   Constitutional: He is oriented to person, place, and time. He appears well-developed and well-nourished. No distress. HENT:   Head: Normocephalic and atraumatic. Head is without raccoon's eyes, without Garcia's sign and without contusion. Right Ear: No hemotympanum. Left Ear: No hemotympanum. Nose: Nose normal.   Mouth/Throat: No oropharyngeal exudate. Eyes: Conjunctivae and EOM are normal. Pupils are equal, round, and reactive to light. No scleral icterus. Neck: Normal range of motion. Neck supple. No JVD present. No spinous process tenderness and no muscular tenderness present. Normal range of motion present. No thyromegaly present. Cardiovascular: Normal rate, regular rhythm, normal heart sounds, intact distal pulses and normal pulses. PMI is not displaced. Exam reveals no gallop and no friction rub. No murmur heard. Pulmonary/Chest: Effort normal and breath sounds normal. No stridor. No respiratory distress. He has no decreased breath sounds. He has no wheezes. He has no rhonchi. He has no rales. He exhibits no tenderness. Abdominal: Soft. Normal aorta and bowel sounds are normal. He exhibits no distension, no abdominal bruit and no mass. There is no hepatosplenomegaly. There is no tenderness. There is no rebound, no guarding and no CVA tenderness. No hernia. Musculoskeletal: He exhibits tenderness. He exhibits no deformity.         Right hip: Normal.        Left hip: Normal.        Thoracic back: He exhibits decreased range of motion, tenderness and pain. He exhibits no bony tenderness, no deformity and no laceration. Lumbar back: He exhibits decreased range of motion, tenderness, pain and spasm. Neurological: He is alert and oriented to person, place, and time. He has normal reflexes. He displays no atrophy and no tremor. No cranial nerve deficit or sensory deficit. He exhibits normal muscle tone. He displays no seizure activity. Coordination normal. GCS eye subscore is 4. GCS verbal subscore is 5. GCS motor subscore is 6. Reflex Scores:       Patellar reflexes are 2+ on the right side and 2+ on the left side. Skin: Skin is warm. No rash noted. He is not diaphoretic. No erythema. Nursing note and vitals reviewed. Diagnostic Study Results     Labs -   No results found for this or any previous visit (from the past 12 hour(s)). Radiologic Studies -   CT Results  (Last 48 hours)               01/11/18 1911  CT SPINE LUMB WO CONT Final result    Impression:  IMPRESSION: Degenerative findings. No acute fracture or dislocation   demonstrated. Narrative:  INDICATION: Status post fall. Back pain. COMPARISON: Earlier radiographs; MRI 4/8/2017       EXAM: Axial CT imaging of the lumbar spine with thin section axial   reconstructions as well as coronal and sagittal reformations. CT dose reduction was achieved through use of a standardized protocol tailored   for this examination and automatic exposure control for dose modulation. FINDINGS: Bone mineralization is normal. Vertebral body heights are normally   maintained. A prominent left posterolateral superior endplate Schmorl's node is   noted at L4, unchanged. There is minimal retrolisthesis measuring 1 to 2 mm at   L2-3. Vacuum disc phenomenon is present at L2-3 and L3-4. Moderate disc space   narrowing is present at T12-L1, L1-2, L2-3 and L3-4 and mild disc space   narrowing at L4-5.  Endplate marginal osteophytes are shown diffusely. The   posterior processes are intact. Mild bilateral facet osteoarthrosis is present   at L2-3, L3-4 and L4-5. Moderate bilateral facet osteoarthrosis is present at   L5-S1. There is moderate canal stenosis present at L1-2 and mild canal stenosis at L4-5   and L5-S1. Substantial bilateral foraminal narrowing is present at L2-3 through   L5-S1. No paraspinal soft tissue mass or swelling is shown. Mild bilateral SI joint   osteoarthrosis is noted. 01/11/18 1911  CT SPINE THORAC WO CONT Final result    Impression:  IMPRESSION: Nondisplaced age indeterminate fracture of anterior bridging   osteophyte at T10-T11. No acute findings otherwise. Incidental findings as above   including coronary artery disease. Narrative:  EXAM:  CT SPINE THORAC WO CONT    INDICATION: Ground-level fall prior to arrival. Patient slipped on a Monday   benign and fell on his back with subsequent lower back pain. COMPARISON: None. TECHNIQUE:    Multislice helical CT of the thoracic spine was performed. Sagittal and coronal   reformations were generated. CT dose reduction was achieved through use of a   standardized protocol tailored for this examination and automatic exposure   control for dose modulation. FINDINGS:   There are anterior bridging osteophytes compatible with DISH. There is a lucency   through the bridging osteophyte at the T10-11 level anteriorly with no evident   associated soft tissue edema or hemorrhage. There is normal alignment and   preserved vertebral body height. There is no other evidence of fracture. There   is no spinal canal stenosis. There is a small to moderate sized sliding hiatal   hernia. There is minimal apical scarring calcination the aorta. Coronary artery   calcifications are noted. The surrounding soft tissue structures otherwise   appear unremarkable.  There is left lateral renal cortical scarring with   associated coarse calcification with an otherwise unremarkable appearance to the   visualized abdominal structures. Study Result      EXAM:  XR SPINE LUMB 2 OR 3 V     INDICATION:   low back pain following GLF     COMPARISON: None.     FINDINGS: AP, lateral and spot lateral views of the lumbar spine demonstrate  intact vertebral body height and alignment. There is multilevel degenerative  disc change with diffuse marginal osteophytes and disc space narrowing. Facet  arthropathy is seen most severely at the L4-5 and L5-S1 levels. Bilateral hip  prostheses are partially visualized and appear as expected. There is no  fracture, subluxation or other abnormality.      IMPRESSION  IMPRESSION:  Diffuse degenerative disc disease with minimally lower lumbar facet  arthropathy. No identified fracture or acute finding otherwise. Medical Decision Making   I am the first provider for this patient. I reviewed the vital signs, available nursing notes, past medical history, past surgical history, family history and social history. Vital Signs-Reviewed the patient's vital signs. Patient Vitals for the past 12 hrs:   Temp Pulse Resp BP SpO2   01/11/18 1400 98.9 °F (37.2 °C) 63 18 133/74 97 %       Records Reviewed: Old Medical Records    Provider Notes (Medical Decision Making):   DDx: muscle strain, spinal fx, spinal cord injury, closed head injury, chronic back pain exacerbated. Impression/plan: hx of fibromyalgia, chronic back pain to ER after mechanical fall landing on his lower back. Denies any head injury or LOC. Denies any spinal cord sx including any bowel or bladder sx. Initial plain film of lumbar spine revealed degenerative changes but due to severity of pain, will get CT of pts thoracic and lumbar spine. ED Course:   Initial assessment performed. The patients presenting problems have been discussed, and they are in agreement with the care plan formulated and outlined with them.   I have encouraged them to ask questions as they arise throughout their visit. 8:31 PM  Pt improved but still reporting pain with weight bearing. Nurse states he needs help getting up but is able to walk without assistance. Pt has no neurological deficits so there is no admission criteria at this time. Pt states he would prefer to go home at this time. Disposition:  Discharge Note:  8:40 PM  The pt is ready for discharge. The pt's signs, symptoms, diagnosis, and discharge instructions have been discussed and pt has conveyed their understanding. The pt is to follow up as recommended or return to ER should their symptoms worsen. Plan has been discussed and pt is in agreement. PLAN:  1. Current Discharge Medication List      START taking these medications    Details   cyclobenzaprine (FLEXERIL) 10 mg tablet Take 1 Tab by mouth three (3) times daily as needed for Muscle Spasm(s). Qty: 15 Tab, Refills: 0    Associated Diagnoses: Injury of back, initial encounter      oxyCODONE IR (ROXICODONE) 5 mg immediate release tablet Take 1 Tab by mouth every four (4) hours as needed for Pain. Max Daily Amount: 30 mg.  Qty: 20 Tab, Refills: 0    Associated Diagnoses: Injury of back, initial encounter           2. Follow-up Information     Follow up With Details Comments Contact Info    Dawood Mcfadden MD Schedule an appointment as soon as possible for a visit in 1 day  [address] [place] [address]      Kj Yates MD Schedule an appointment as soon as possible for a visit in 3 days If symptoms worsen [address] [place] [address]  [phone]      [facility] EMERGENCY DEPT  If symptoms worsen [address] [place] [address]  [phone]        Return to ED if worse     Diagnosis     Clinical Impression:   1. Injury of back, initial encounter    2. DDD (degenerative disc disease), thoracolumbar        Attestations:     This note is prepared by [employer], acting as a Scribe for [employer] MD Maribell Multani MD: The scribe's documentation has been prepared under my direction and personally reviewed by me in its entirety. I confirm that the notes above accurately reflects all work, treatment, procedures, and medical decision making performed by me.

## 2018-01-11 NOTE — ED NOTES
Bedside and Verbal shift change report received from Jeanne Knowles RN (offgoing nurse) given to Homero Cook Rn (oncoming nurse). Report included the following information SBAR, Kardex, ED Summary, MAR, Recent Results and Med Rec Status.

## 2018-01-12 ENCOUNTER — HOSPITAL ENCOUNTER (EMERGENCY)
Age: 73
Discharge: HOME | End: 2018-01-12
Attending: EMERGENCY MEDICINE
Payer: MEDICARE

## 2018-01-12 VITALS
DIASTOLIC BLOOD PRESSURE: 85 MMHG | SYSTOLIC BLOOD PRESSURE: 135 MMHG | BODY MASS INDEX: 36.51 KG/M2 | RESPIRATION RATE: 16 BRPM | TEMPERATURE: 98.6 F | HEIGHT: 70 IN | OXYGEN SATURATION: 94 % | WEIGHT: 255 LBS | HEART RATE: 72 BPM

## 2018-01-12 DIAGNOSIS — M54.50 CHRONIC LOW BACK PAIN WITHOUT SCIATICA, UNSPECIFIED BACK PAIN LATERALITY: Primary | ICD-10-CM

## 2018-01-12 DIAGNOSIS — M51.36 DEGENERATIVE DISC DISEASE, LUMBAR: ICD-10-CM

## 2018-01-12 DIAGNOSIS — G89.29 CHRONIC LOW BACK PAIN WITHOUT SCIATICA, UNSPECIFIED BACK PAIN LATERALITY: Primary | ICD-10-CM

## 2018-01-12 PROCEDURE — 99283 EMERGENCY DEPT VISIT LOW MDM: CPT

## 2018-01-12 NOTE — ED PROVIDER NOTES
EMERGENCY DEPARTMENT HISTORY AND PHYSICAL EXAM      Date: (Not on file)  Patient Name: eLw Flores    History of Presenting Illness     Chief Complaint   Patient presents with    Back Pain     Brought in by EMS. Reporting having slipped and had a GLF yesterday. Was in at [facility] ER was precribed medications which are providing no relief. History Provided By: Patient    HPI: Lew Flores, 67 y.o. male with PMHx significant for HTN, DM, and arthritis, presents via EMS to the ED with cc of gradually worsening back pain. Per pt, he has a history of chronic back pain and DDD with recent exacerbation of the pain following a mechanical fall yesterday. Pt reports he slipped on some mud outside and fell to the ground resulting in the pain. Pt reports his pain is a moderate-high 10/10 intensity and an associated sharp, aching sensation increased towards the bilateral lower back with decreased pain towards the mid-upper back. Pt reports his pain is increased with movements and ambulance. Pt reports he was evaluated in the ED yesterday with a CT negative for acute changes with prescriptions for Flexeril and Roxicodone. Pt informs he has visualized minimal relief with the medications and was unable to be seen by his PCP immediately for an MRI. Pt expresses concern for symptomatic alleviation. Pt specifically denies any nausea, vomiting, fevers, chills, abdominal pain, urinary complications, urinary or bowel incontinence, saddle paresthesia, chest pain, or SOB. PMHx: GERD, CAD  PSHx: appendectomy  Social Hx: + EtOH; Former Smoker; - Illicit Drugs    PCP: Dionte Rajput MD    There are no other complaints, changes, or physical findings at this time. Current Outpatient Prescriptions   Medication Sig Dispense Refill    cyclobenzaprine (FLEXERIL) 10 mg tablet Take 1 Tab by mouth three (3) times daily as needed for Muscle Spasm(s).  15 Tab 0    oxyCODONE IR (ROXICODONE) 5 mg immediate release tablet Take 1 Tab by mouth every four (4) hours as needed for Pain. Max Daily Amount: 30 mg. 20 Tab 0    linaclotide (LINZESS) 145 mcg cap capsule Take  by mouth daily.  traMADol (ULTRAM) 50 mg tablet Take 100 mg by mouth daily.  metFORMIN (GLUCOPHAGE) 1,000 mg tablet Take 1 Tab by mouth two (2) times daily (with meals). START TAKING 9/23 WITH DINNER 30 Tab 0    atorvastatin (LIPITOR) 80 mg tablet Take 80 mg by mouth daily.  hydroCHLOROthiazide (HYDRODIURIL) 25 mg tablet Take 25 mg by mouth daily.  DULoxetine (CYMBALTA) 60 mg capsule Take 120 mg by mouth daily.  aspirin delayed-release 81 mg tablet Take  by mouth daily.  levothyroxine (SYNTHROID) 200 mcg tablet Take  by mouth Daily (before breakfast).  amLODIPine (NORVASC) 5 mg tablet Take 5 mg by mouth daily.  cholecalciferol, vitamin D3, (VITAMIN D3) 2,000 unit tab Take  by mouth daily. Past History     Past Medical History:  Past Medical History:   Diagnosis Date    Abdominal adhesions 2000    Arthritis     all joints    CAD (coronary artery disease)     Heart: Dr. Sanford Avalos    Chronic constipation     Chronic pain     all my joints    Diabetic neuropathy (HCC)     Type II    Fibromyalgia     GERD (gastroesophageal reflux disease)     HLD (hyperlipidemia)     Hypertension     Neuropathy     hands/feet    Sleep apnea     uses Cpap     Past Surgical History:  Past Surgical History:   Procedure Laterality Date    ABDOMEN SURGERY PROC UNLISTED  early 2000's    abdominal hernia repair    ABDOMEN SURGERY PROC UNLISTED  2000's    adhesions from appendix sx.     ABDOMEN SURGERY PROC UNLISTED  2000's    colectomy: when removing adhesions, nicked intestines, removed 8\" of intestine    COLONOSCOPY N/A 12/18/2017    [facility]  2014    HX ORTHOPAEDIC  2000,2007    bialteral hip Replacement     Family History:  Family History   Problem Relation Age of Onset    Hypertension Brother      Social History:  Social History   Substance Use Topics    Smoking status: Former Smoker     Years: 40.00    Smokeless tobacco: Never Used    Alcohol use 1.2 oz/week     2 Shots of liquor per week      Comment: 2-3 times weekly     Allergies:  No Known Allergies    Review of Systems   Review of Systems   Constitutional: Negative. Negative for chills and fever. HENT: Negative. Negative for rhinorrhea and sore throat. Eyes: Negative. Negative for visual disturbance. Respiratory: Negative. Negative for cough and shortness of breath. Cardiovascular: Negative. Negative for chest pain. Gastrointestinal: Negative. Negative for abdominal pain, constipation, diarrhea, nausea and vomiting.        - bowel incontinene   Endocrine: Negative. Genitourinary: Negative. Negative for dysuria, frequency, hematuria and urgency. - urinary incontinence    Musculoskeletal: Positive for back pain. - saddle paresthesia    Skin: Negative. Negative for wound. Allergic/Immunologic: Negative. Neurological: Negative. Negative for syncope, numbness and headaches. Hematological: Negative. Psychiatric/Behavioral: Negative. All other systems reviewed and are negative. Physical Exam   Physical Exam   Constitutional: He is oriented to person, place, and time. He appears well-developed and well-nourished. No distress. HENT:   Head: Normocephalic and atraumatic. Eyes: EOM are normal. Pupils are equal, round, and reactive to light. Neck: Normal range of motion. No muscular tenderness present. Normal range of motion present. Cardiovascular: Normal rate, regular rhythm and normal heart sounds. Exam reveals no gallop and no friction rub. No murmur heard. Pulmonary/Chest: Effort normal and breath sounds normal. No respiratory distress. He has no wheezes. He has no rales. He exhibits no tenderness. Abdominal: Soft.  Bowel sounds are normal. He exhibits no distension and no mass. There is no tenderness. There is no rebound and no guarding. Musculoskeletal: Normal range of motion. He exhibits no edema, tenderness or deformity. No cervical, thoracic, or lumbar tenderness. Limited ROM secondary to pain   Neurological: He is alert and oriented to person, place, and time. No cranial nerve deficit. Skin: Skin is warm. No rash noted. He is not diaphoretic. No erythema. Psychiatric: His behavior is normal.   Nursing note and vitals reviewed. Diagnostic Study Results     Radiologic Studies -   No orders to display     CT Results  (Last 48 hours)               01/11/18 1911  CT SPINE LUMB WO CONT Final result    Impression:  IMPRESSION: Degenerative findings. No acute fracture or dislocation   demonstrated. Narrative:  INDICATION: Status post fall. Back pain. COMPARISON: Earlier radiographs; MRI 4/8/2017       EXAM: Axial CT imaging of the lumbar spine with thin section axial   reconstructions as well as coronal and sagittal reformations. CT dose reduction was achieved through use of a standardized protocol tailored   for this examination and automatic exposure control for dose modulation. FINDINGS: Bone mineralization is normal. Vertebral body heights are normally   maintained. A prominent left posterolateral superior endplate Schmorl's node is   noted at L4, unchanged. There is minimal retrolisthesis measuring 1 to 2 mm at   L2-3. Vacuum disc phenomenon is present at L2-3 and L3-4. Moderate disc space   narrowing is present at T12-L1, L1-2, L2-3 and L3-4 and mild disc space   narrowing at L4-5. Endplate marginal osteophytes are shown diffusely. The   posterior processes are intact. Mild bilateral facet osteoarthrosis is present   at L2-3, L3-4 and L4-5. Moderate bilateral facet osteoarthrosis is present at   L5-S1.        There is moderate canal stenosis present at L1-2 and mild canal stenosis at L4-5 and L5-S1. Substantial bilateral foraminal narrowing is present at L2-3 through   L5-S1. No paraspinal soft tissue mass or swelling is shown. Mild bilateral SI joint   osteoarthrosis is noted. 01/11/18 1911  CT SPINE THORAC WO CONT Final result    Impression:  IMPRESSION: Nondisplaced age indeterminate fracture of anterior bridging   osteophyte at T10-T11. No acute findings otherwise. Incidental findings as above   including coronary artery disease. Narrative:  EXAM:  CT SPINE THORAC WO CONT    INDICATION: Ground-level fall prior to arrival. Patient slipped on a Monday   benign and fell on his back with subsequent lower back pain. COMPARISON: None. TECHNIQUE:    Multislice helical CT of the thoracic spine was performed. Sagittal and coronal   reformations were generated. CT dose reduction was achieved through use of a   standardized protocol tailored for this examination and automatic exposure   control for dose modulation. FINDINGS:   There are anterior bridging osteophytes compatible with DISH. There is a lucency   through the bridging osteophyte at the T10-11 level anteriorly with no evident   associated soft tissue edema or hemorrhage. There is normal alignment and   preserved vertebral body height. There is no other evidence of fracture. There   is no spinal canal stenosis. There is a small to moderate sized sliding hiatal   hernia. There is minimal apical scarring calcination the aorta. Coronary artery   calcifications are noted. The surrounding soft tissue structures otherwise   appear unremarkable. There is left lateral renal cortical scarring with   associated coarse calcification with an otherwise unremarkable appearance to the   visualized abdominal structures. CXR Results  (Last 48 hours)    None        Medical Decision Making   I am the first provider for this patient.     I reviewed the vital signs, available nursing notes, past medical history, past surgical history, family history and social history. Vital Signs-Reviewed the patient's vital signs. Patient Vitals for the past 12 hrs:   Temp Pulse Resp BP SpO2   01/12/18 1658 98.6 °F (37 °C) 72 16 135/85 94 %     Pulse Oximetry Analysis - 94% on RA    Cardiac Monitor:   Rate: 72 bpm  Rhythm: Normal Sinus Rhythm      Records Reviewed: Nursing Notes, Old Medical Records and Previous Laboratory Studies    Provider Notes (Medical Decision Making):   DDx: sprain, strain, DDD, DJD, sciatica, herniated disc     Pt denying any new symptoms concerning for acute process. Continued pain. No CT/xr imaging or labs indicated at this time. ED Course:   Initial assessment performed. The patients presenting problems have been discussed, and they are in agreement with the care plan formulated and outlined with them. I have encouraged them to ask questions as they arise throughout their visit. Progress Note:   5:47 PM  Provider spoke extensively to pt and partner about the importance of following up with orthopedics for pending MRI. Advised no stronger narcotic pain medications would be prescribed in the ED today. Written by Britany Sifuentes ED Scribe, as dictated by Romario Kwan PA-C. Disposition:  DISCHARGE NOTE:    5:49 PM  The patient is ready for discharge. The patient signs, symptoms, diagnosis, and discharge instructions have been discussed and the patient has conveyed their understanding. The patient is to follow-up as reccommended or returned to the ER should their symptoms worsen. Plan has been discussed and patient is in agreement. PLAN:  1. Current Discharge Medication List      CONTINUE these medications which have NOT CHANGED    Details   cyclobenzaprine (FLEXERIL) 10 mg tablet Take 1 Tab by mouth three (3) times daily as needed for Muscle Spasm(s).   Qty: 15 Tab, Refills: 0    Associated Diagnoses: Injury of back, initial encounter      oxyCODONE IR (ROXICODONE) 5 mg immediate release tablet Take 1 Tab by mouth every four (4) hours as needed for Pain. Max Daily Amount: 30 mg.  Qty: 20 Tab, Refills: 0    Associated Diagnoses: Injury of back, initial encounter      linaclotide (LINZESS) 145 mcg cap capsule Take  by mouth daily. traMADol (ULTRAM) 50 mg tablet Take 100 mg by mouth daily. metFORMIN (GLUCOPHAGE) 1,000 mg tablet Take 1 Tab by mouth two (2) times daily (with meals). START TAKING 9/23 WITH DINNER  Qty: 30 Tab, Refills: 0      atorvastatin (LIPITOR) 80 mg tablet Take 80 mg by mouth daily. hydroCHLOROthiazide (HYDRODIURIL) 25 mg tablet Take 25 mg by mouth daily. DULoxetine (CYMBALTA) 60 mg capsule Take 120 mg by mouth daily. aspirin delayed-release 81 mg tablet Take  by mouth daily. levothyroxine (SYNTHROID) 200 mcg tablet Take  by mouth Daily (before breakfast). amLODIPine (NORVASC) 5 mg tablet Take 5 mg by mouth daily. cholecalciferol, vitamin D3, (VITAMIN D3) 2,000 unit tab Take  by mouth daily. 2.   Follow-up Information     Follow up With Details Comments Contact Info    Jyoti Horton MD Schedule an appointment as soon as possible for a visit in 1 week As needed, If symptoms worsen [address] [facility] Schedule an appointment as soon as possible for a visit in 1 day As needed [address]   [address] [facility] Schedule an appointment as soon as possible for a visit in 1 day As needed [address] [place] [address]  [phone]        Return to ED if worse     Diagnosis     Clinical Impression:   1. Chronic low back pain without sciatica, unspecified back pain laterality    2. Degenerative disc disease, lumbar      Attestations: This note is prepared by Abhi Madison, acting as Scribe for Gerardo Arora PA-C.     Gerardo Arora PA-C: The scribe's documentation has been prepared under my direction and personally reviewed by me in its entirety. I confirm that the note above accurately reflects all work, treatment, procedures, and medical decision making performed by me.

## 2018-01-12 NOTE — ED NOTES
Discharge instructions given to patient by MD Idris Perez. Verbalized understanding of instructions. Patient discharged without difficulty. Patient discharged in stable condition via wheelchair accompanied by staff.

## 2018-01-12 NOTE — DISCHARGE INSTRUCTIONS
Learning About Degenerative Disc Disease  What is degenerative disc disease? Degenerative disc disease is not really a disease. It's a term used to describe the normal changes in your spinal discs as you age. Spinal discs are small, spongy discs that separate the bones (vertebrae) that make up the spine. The discs act as shock absorbers for the spine, so it can flex, bend, and twist.  Degenerative disc disease can take place in one or more places along the spine. It most often occurs in the discs in the lower back and the neck. What causes it? As we age, our spinal discs break down, or degenerate. This breakdown causes the symptoms of degenerative disc disease in some people. When the discs break down, they can lose fluid and dry out, and their outer layers can have tiny cracks or tears. This leads to less padding and less space between the bones in the spine. The body reacts to this by making bony growths on the spine called bone spurs. These spurs can press on the spinal nerve roots or spinal cord. This can cause pain and can affect how well the nerves work. What are the symptoms? Many people with degenerative disc disease have no pain. But others have severe pain or other symptoms that limit their activities. Some of the most common symptoms are:  · Pain in the back or neck. Where the pain occurs depends on which discs are affected. · Pain that gets worse when you move, such as bending over, reaching up, or twisting. · Pain that may occur in the rear end (buttocks), arm, or leg if a nerve is pinched. · Numbness or tingling in your arm or leg. How is it diagnosed? A doctor can often diagnose degenerative disc disease while doing a physical exam. If your exam shows no signs of a serious condition, imaging tests (such as an X-ray) aren't likely to help your doctor find the cause of your symptoms.   Sometimes degenerative disc disease is found when an X-ray is taken for another reason, such as an injury or other health problem. But even if the doctor finds degenerative disc disease, that doesn't always mean that you will have symptoms. How is it treated? There are several things you can do at home to manage pain from this problem. · To relieve pain, use ice or heat (whichever feels better) on the affected area. ¨ Put ice or a cold pack on the area for 10 to 20 minutes at a time. Put a thin cloth between the ice and your skin. ¨ Put a warm water bottle, a heating pad set on low, or a warm cloth on your back. Put a thin cloth between the heating pad and your skin. Do not go to sleep with a heating pad on your skin. · Ask your doctor if you can take acetaminophen (such as Tylenol) or nonsteroidal anti-inflammatory drugs, such as ibuprofen or naproxen. Your doctor can prescribe stronger medicines if needed. Be safe with medicines. Read and follow all instructions on the label. · Get some exercise every day. Exercise is one of the best ways to help your back feel better and stay better. It's best to start each exercise slowly. You may notice a little soreness, and that's okay. But if an exercise makes your pain worse, stop doing it. Here are things you can try:  ¨ Walking. It's the simplest and maybe the best activity for your back. It gets your blood moving and helps your muscles stay strong. ¨ Exercises that gently stretch and strengthen your stomach, back, and leg muscles. The stronger those muscles are, the better they're able to protect your back. If you have constant or severe pain in your back or spine, you may need other treatments, such as physical therapy. In some cases, your doctor may suggest surgery. Follow-up care is a key part of your treatment and safety. Be sure to make and go to all appointments, and call your doctor if you are having problems. It's also a good idea to know your test results and keep a list of the medicines you take. Where can you learn more?   Go to http://rosaura-jessica.info/. Enter E556 in the search box to learn more about \"Learning About Degenerative Disc Disease. \"  Current as of: March 21, 2017  Content Version: 11.4  © 1749-3332 [employer]. Care instructions adapted under license by [employer] (which disclaims liability or warranty for this information). If you have questions about a medical condition or this instruction, always ask your healthcare professional. [place] [address] any warranty or liability for your use of this information.

## 2018-01-17 ENCOUNTER — APPOINTMENT (OUTPATIENT)
Dept: GENERAL RADIOLOGY | Age: 73
End: 2018-01-17
Attending: EMERGENCY MEDICINE
Payer: MEDICARE

## 2018-01-17 ENCOUNTER — HOSPITAL ENCOUNTER (EMERGENCY)
Age: 73
Discharge: HOME | End: 2018-01-18
Attending: EMERGENCY MEDICINE
Payer: MEDICARE

## 2018-01-17 DIAGNOSIS — M54.50 CHRONIC LOW BACK PAIN WITHOUT SCIATICA, UNSPECIFIED BACK PAIN LATERALITY: ICD-10-CM

## 2018-01-17 DIAGNOSIS — G89.29 CHRONIC LOW BACK PAIN WITHOUT SCIATICA, UNSPECIFIED BACK PAIN LATERALITY: ICD-10-CM

## 2018-01-17 DIAGNOSIS — K59.09 OTHER CONSTIPATION: Primary | ICD-10-CM

## 2018-01-17 DIAGNOSIS — R10.84 ABDOMINAL PAIN, GENERALIZED: ICD-10-CM

## 2018-01-17 DIAGNOSIS — I10 ELEVATED BLOOD PRESSURE READING WITH DIAGNOSIS OF HYPERTENSION: ICD-10-CM

## 2018-01-17 LAB
BASOPHILS # BLD: 0 K/UL (ref 0–0.1)
BASOPHILS NFR BLD: 0 % (ref 0–1)
EOSINOPHIL # BLD: 0.2 K/UL (ref 0–0.4)
EOSINOPHIL NFR BLD: 2 % (ref 0–7)
ERYTHROCYTE [DISTWIDTH] IN BLOOD BY AUTOMATED COUNT: 12.7 % (ref 11.5–14.5)
HCT VFR BLD AUTO: 40.6 % (ref 36.6–50.3)
HGB BLD-MCNC: 13.7 G/DL (ref 12.1–17)
LYMPHOCYTES # BLD: 1.2 K/UL (ref 0.8–3.5)
LYMPHOCYTES NFR BLD: 10 % (ref 12–49)
MCH RBC QN AUTO: 28 PG (ref 26–34)
MCHC RBC AUTO-ENTMCNC: 33.7 G/DL (ref 30–36.5)
MCV RBC AUTO: 82.9 FL (ref 80–99)
MONOCYTES # BLD: 0.6 K/UL (ref 0–1)
MONOCYTES NFR BLD: 5 % (ref 5–13)
NEUTS SEG # BLD: 9.4 K/UL (ref 1.8–8)
NEUTS SEG NFR BLD: 83 % (ref 32–75)
PLATELET # BLD AUTO: 291 K/UL (ref 150–400)
RBC # BLD AUTO: 4.9 M/UL (ref 4.1–5.7)
WBC # BLD AUTO: 11.4 K/UL (ref 4.1–11.1)

## 2018-01-17 PROCEDURE — 36415 COLL VENOUS BLD VENIPUNCTURE: CPT | Performed by: EMERGENCY MEDICINE

## 2018-01-17 PROCEDURE — 80053 COMPREHEN METABOLIC PANEL: CPT | Performed by: EMERGENCY MEDICINE

## 2018-01-17 PROCEDURE — 74011250636 HC RX REV CODE- 250/636: Performed by: EMERGENCY MEDICINE

## 2018-01-17 PROCEDURE — 74022 RADEX COMPL AQT ABD SERIES: CPT

## 2018-01-17 PROCEDURE — 85025 COMPLETE CBC W/AUTO DIFF WBC: CPT | Performed by: EMERGENCY MEDICINE

## 2018-01-17 PROCEDURE — 99283 EMERGENCY DEPT VISIT LOW MDM: CPT

## 2018-01-17 PROCEDURE — 96361 HYDRATE IV INFUSION ADD-ON: CPT

## 2018-01-17 PROCEDURE — 99282 EMERGENCY DEPT VISIT SF MDM: CPT

## 2018-01-17 PROCEDURE — 83605 ASSAY OF LACTIC ACID: CPT | Performed by: EMERGENCY MEDICINE

## 2018-01-17 RX ADMIN — SODIUM CHLORIDE 1000 ML: 900 INJECTION, SOLUTION INTRAVENOUS at 23:38

## 2018-01-18 ENCOUNTER — APPOINTMENT (OUTPATIENT)
Dept: CT IMAGING | Age: 73
End: 2018-01-18
Attending: EMERGENCY MEDICINE
Payer: MEDICARE

## 2018-01-18 VITALS
HEART RATE: 84 BPM | DIASTOLIC BLOOD PRESSURE: 74 MMHG | WEIGHT: 263.23 LBS | OXYGEN SATURATION: 95 % | HEIGHT: 70 IN | TEMPERATURE: 97.5 F | SYSTOLIC BLOOD PRESSURE: 133 MMHG | RESPIRATION RATE: 12 BRPM | BODY MASS INDEX: 37.68 KG/M2

## 2018-01-18 LAB
ALBUMIN SERPL-MCNC: 3.8 G/DL (ref 3.5–5)
ALBUMIN/GLOB SERPL: 1 {RATIO} (ref 1.1–2.2)
ALP SERPL-CCNC: 52 U/L (ref 45–117)
ALT SERPL-CCNC: 55 U/L (ref 12–78)
ANION GAP SERPL CALC-SCNC: 11 MMOL/L (ref 5–15)
APPEARANCE UR: CLEAR
AST SERPL-CCNC: 41 U/L (ref 15–37)
BACTERIA URNS QL MICRO: NEGATIVE /HPF
BILIRUB SERPL-MCNC: 0.5 MG/DL (ref 0.2–1)
BILIRUB UR QL: NEGATIVE
BUN SERPL-MCNC: 15 MG/DL (ref 6–20)
BUN/CREAT SERPL: 16 (ref 12–20)
CALCIUM SERPL-MCNC: 9.1 MG/DL (ref 8.5–10.1)
CHLORIDE SERPL-SCNC: 103 MMOL/L (ref 97–108)
CO2 SERPL-SCNC: 23 MMOL/L (ref 21–32)
COLOR UR: NORMAL
CREAT SERPL-MCNC: 0.96 MG/DL (ref 0.7–1.3)
EPITH CASTS URNS QL MICRO: NORMAL /LPF
GLOBULIN SER CALC-MCNC: 4 G/DL (ref 2–4)
GLUCOSE SERPL-MCNC: 207 MG/DL (ref 65–100)
GLUCOSE UR STRIP.AUTO-MCNC: NEGATIVE MG/DL
HGB UR QL STRIP: NEGATIVE
HYALINE CASTS URNS QL MICRO: NORMAL /LPF (ref 0–5)
KETONES UR QL STRIP.AUTO: NEGATIVE MG/DL
LACTATE BLD-SCNC: 2.6 MMOL/L (ref 0.4–2)
LACTATE SERPL-SCNC: 2.6 MMOL/L (ref 0.4–2)
LEUKOCYTE ESTERASE UR QL STRIP.AUTO: NEGATIVE
NITRITE UR QL STRIP.AUTO: NEGATIVE
PH UR STRIP: 5 [PH] (ref 5–8)
POTASSIUM SERPL-SCNC: 3.7 MMOL/L (ref 3.5–5.1)
PROT SERPL-MCNC: 7.8 G/DL (ref 6.4–8.2)
PROT UR STRIP-MCNC: NEGATIVE MG/DL
RBC #/AREA URNS HPF: NORMAL /HPF (ref 0–5)
SODIUM SERPL-SCNC: 137 MMOL/L (ref 136–145)
SP GR UR REFRACTOMETRY: 1.01 (ref 1–1.03)
UA: UC IF INDICATED,UAUC: NORMAL
UROBILINOGEN UR QL STRIP.AUTO: 1 EU/DL (ref 0.2–1)
WBC URNS QL MICRO: NORMAL /HPF (ref 0–4)

## 2018-01-18 PROCEDURE — 96374 THER/PROPH/DIAG INJ IV PUSH: CPT

## 2018-01-18 PROCEDURE — 81001 URINALYSIS AUTO W/SCOPE: CPT | Performed by: EMERGENCY MEDICINE

## 2018-01-18 PROCEDURE — 74011636320 HC RX REV CODE- 636/320: Performed by: EMERGENCY MEDICINE

## 2018-01-18 PROCEDURE — 83605 ASSAY OF LACTIC ACID: CPT

## 2018-01-18 PROCEDURE — 74011250636 HC RX REV CODE- 250/636: Performed by: EMERGENCY MEDICINE

## 2018-01-18 PROCEDURE — 74011250637 HC RX REV CODE- 250/637: Performed by: EMERGENCY MEDICINE

## 2018-01-18 PROCEDURE — 74177 CT ABD & PELVIS W/CONTRAST: CPT

## 2018-01-18 RX ORDER — SODIUM CHLORIDE 9 MG/ML
50 INJECTION, SOLUTION INTRAVENOUS
Status: COMPLETED | OUTPATIENT
Start: 2018-01-18 | End: 2018-01-18

## 2018-01-18 RX ORDER — MORPHINE SULFATE 4 MG/ML
4 INJECTION, SOLUTION INTRAMUSCULAR; INTRAVENOUS
Status: COMPLETED | OUTPATIENT
Start: 2018-01-18 | End: 2018-01-18

## 2018-01-18 RX ORDER — MAGNESIUM CITRATE
296 SOLUTION, ORAL ORAL
Status: COMPLETED | OUTPATIENT
Start: 2018-01-18 | End: 2018-01-18

## 2018-01-18 RX ORDER — SODIUM CHLORIDE 0.9 % (FLUSH) 0.9 %
10 SYRINGE (ML) INJECTION
Status: COMPLETED | OUTPATIENT
Start: 2018-01-18 | End: 2018-01-18

## 2018-01-18 RX ADMIN — Medication 10 ML: at 01:19

## 2018-01-18 RX ADMIN — IOPAMIDOL 100 ML: 755 INJECTION, SOLUTION INTRAVENOUS at 01:19

## 2018-01-18 RX ADMIN — MAGESIUM CITRATE 296 ML: 1.75 LIQUID ORAL at 02:34

## 2018-01-18 RX ADMIN — SODIUM CHLORIDE 50 ML/HR: 900 INJECTION, SOLUTION INTRAVENOUS at 01:19

## 2018-01-18 RX ADMIN — MORPHINE SULFATE 4 MG: 4 INJECTION, SOLUTION INTRAMUSCULAR; INTRAVENOUS at 00:34

## 2018-01-18 NOTE — ED NOTES
Patient discharged by Dr. Armando Churchill. Given Mag Citrate po to take when arrives home. Pain 2/10 on discharge.

## 2018-01-18 NOTE — DISCHARGE INSTRUCTIONS
Back Pain: Care Instructions  Your Care Instructions    Back pain has many possible causes. It is often related to problems with muscles and ligaments of the back. It may also be related to problems with the nerves, discs, or bones of the back. Moving, lifting, standing, sitting, or sleeping in an awkward way can strain the back. Sometimes you don't notice the injury until later. Arthritis is another common cause of back pain. Although it may hurt a lot, back pain usually improves on its own within several weeks. Most people recover in 12 weeks or less. Using good home treatment and being careful not to stress your back can help you feel better sooner. Follow-up care is a key part of your treatment and safety. Be sure to make and go to all appointments, and call your doctor if you are having problems. It's also a good idea to know your test results and keep a list of the medicines you take. How can you care for yourself at home? · Sit or lie in positions that are most comfortable and reduce your pain. Try one of these positions when you lie down:  ¨ Lie on your back with your knees bent and supported by large pillows. ¨ Lie on the floor with your legs on the seat of a sofa or chair. Donnel Seller on your side with your knees and hips bent and a pillow between your legs. ¨ Lie on your stomach if it does not make pain worse. · Do not sit up in bed, and avoid soft couches and twisted positions. Bed rest can help relieve pain at first, but it delays healing. Avoid bed rest after the first day of back pain. · Change positions every 30 minutes. If you must sit for long periods of time, take breaks from sitting. Get up and walk around, or lie in a comfortable position. · Try using a heating pad on a low or medium setting for 15 to 20 minutes every 2 or 3 hours. Try a warm shower in place of one session with the heating pad. · You can also try an ice pack for 10 to 15 minutes every 2 to 3 hours.  Put a thin cloth between the ice pack and your skin. · Take pain medicines exactly as directed. ¨ If the doctor gave you a prescription medicine for pain, take it as prescribed. ¨ If you are not taking a prescription pain medicine, ask your doctor if you can take an over-the-counter medicine. · Take short walks several times a day. You can start with 5 to 10 minutes, 3 or 4 times a day, and work up to longer walks. Walk on level surfaces and avoid hills and stairs until your back is better. · Return to work and other activities as soon as you can. Continued rest without activity is usually not good for your back. · To prevent future back pain, do exercises to stretch and strengthen your back and stomach. Learn how to use good posture, safe lifting techniques, and proper body mechanics. When should you call for help? Call your doctor now or seek immediate medical care if:  ? · You have new or worsening numbness in your legs. ? · You have new or worsening weakness in your legs. (This could make it hard to stand up.)   ? · You lose control of your bladder or bowels. ? Watch closely for changes in your health, and be sure to contact your doctor if:  ? · Your pain gets worse. ? · You are not getting better after 2 weeks. Where can you learn more? Go to http://rosaura-jessica.info/. Enter O238 in the search box to learn more about \"Back Pain: Care Instructions. \"  Current as of: March 21, 2017  Content Version: 11.4  © 9803-5758 [employer]. Care instructions adapted under license by [employer] (which disclaims liability or warranty for this information). If you have questions about a medical condition or this instruction, always ask your healthcare professional. [place] [address] any warranty or liability for your use of this information.        Constipation: Care Instructions  Your Care Instructions    Constipation means that you have a hard time passing stools (bowel movements). People pass stools from 3 times a day to once every 3 days. What is normal for you may be different. Constipation may occur with pain in the rectum and cramping. The pain may get worse when you try to pass stools. Sometimes there are small amounts of bright red blood on toilet paper or the surface of stools. This is because of enlarged veins near the rectum (hemorrhoids). A few changes in your diet and lifestyle may help you avoid ongoing constipation. Your doctor may also prescribe medicine to help loosen your stool. Some medicines can cause constipation. These include pain medicines and antidepressants. Tell your doctor about all the medicines you take. Your doctor may want to make a medicine change to ease your symptoms. Follow-up care is a key part of your treatment and safety. Be sure to make and go to all appointments, and call your doctor if you are having problems. It's also a good idea to know your test results and keep a list of the medicines you take. How can you care for yourself at home? · Drink plenty of fluids, enough so that your urine is light yellow or clear like water. If you have kidney, heart, or liver disease and have to limit fluids, talk with your doctor before you increase the amount of fluids you drink. · Include high-fiber foods in your diet each day. These include fruits, vegetables, beans, and whole grains. · Get at least 30 minutes of exercise on most days of the week. Walking is a good choice. You also may want to do other activities, such as running, swimming, cycling, or playing tennis or team sports. · Take a fiber supplement, such as Citrucel or Metamucil, every day. Read and follow all instructions on the label. · Schedule time each day for a bowel movement. A daily routine may help. Take your time having your bowel movement. · Support your feet with a small step stool when you sit on the toilet.  This helps flex your hips and places your pelvis in a squatting position. · Your doctor may recommend an over-the-counter laxative to relieve your constipation. Examples are Milk of Magnesia and MiraLax. Read and follow all instructions on the label. Do not use laxatives on a long-term basis. When should you call for help? Call your doctor now or seek immediate medical care if:  ? · You have new or worse belly pain. ? · You have new or worse nausea or vomiting. ? · You have blood in your stools. ? Watch closely for changes in your health, and be sure to contact your doctor if:  ? · Your constipation is getting worse. ? · You do not get better as expected. Where can you learn more? Go to http://rosaura-jessica.info/. Enter 21 [phone] in the search box to learn more about \"Constipation: Care Instructions. \"  Current as of: March 20, 2017  Content Version: 11.4  © 5128-6553 [employer]. Care instructions adapted under license by [employer] (which disclaims liability or warranty for this information). If you have questions about a medical condition or this instruction, always ask your healthcare professional. [place] [address] any warranty or liability for your use of this information.

## 2018-01-18 NOTE — ED NOTES
Was constipated from being on oxycodone so he gave himself an enema this evening and 3 laxatives and now is having diarrhea.

## 2018-01-18 NOTE — ED PROVIDER NOTES
EMERGENCY DEPARTMENT HISTORY AND PHYSICAL EXAM      Date: 1/17/2018  Patient Name: Izaiah Jorgensen    History of Presenting Illness     Chief Complaint   Patient presents with    Abdominal Pain     Mid abdominal pain onset this afternoon    Diarrhea     onset PTA, pt reports he has been taking oxycodone for back pain and pt states he took a laxative today, pt last reported BM before that was 4 days ago       History Provided By: Patient    HPI: Izaiah Jorgensen, 67 y.o. male with PMHx significant for HTN, HLD, CAD, GERD, hernia & repair, adhesions removed from appendix, colectomy, and colonoscopy presents ambulatory to the ED with cc of a persistent severe mid abdominal cramping pain, abdominal distension/tightness, and constipation x four days. Pt reports he has been taking oxycodone for five days prescribed for acute back pain s/p fall. He states he has chronic constipation and was instructed to take linzess; pt notes he has one BM daily at baseline. He reports he has had acute constipation since taking the oxycodone, has been distended and unable to pass flatus, and has been unable to find relief with medications. Pt notes taking an enema at approx. 16:00 and after several hours he took 3 stool softeners and 2 laxatives. He notes having an episode of diarrhea PTA. He denies having any nausea or vomiting. Pt reports having a colonoscopy several months ago. He denies any hx of obstruction. Pt specifically denies fever, chills, sore throat, rhinorrhea, cough, SOB, CP, HA, dizziness, and lightheadedness. Social hx: -(former) Tobacco, +EtOH (1.2oz/EtOH), -Drugs    PCP: Le Najera MD   GI: Lauren Martinez MD    There are no other complaints, changes, or physical findings at this time. Current Outpatient Prescriptions   Medication Sig Dispense Refill    cyclobenzaprine (FLEXERIL) 10 mg tablet Take 1 Tab by mouth three (3) times daily as needed for Muscle Spasm(s).  15 Tab 0    oxyCODONE IR (ROXICODONE) 5 mg immediate release tablet Take 1 Tab by mouth every four (4) hours as needed for Pain. Max Daily Amount: 30 mg. 20 Tab 0    linaclotide (LINZESS) 145 mcg cap capsule Take  by mouth daily.  traMADol (ULTRAM) 50 mg tablet Take 100 mg by mouth daily.  metFORMIN (GLUCOPHAGE) 1,000 mg tablet Take 1 Tab by mouth two (2) times daily (with meals). START TAKING 9/23 WITH DINNER 30 Tab 0    atorvastatin (LIPITOR) 80 mg tablet Take 80 mg by mouth daily.  hydroCHLOROthiazide (HYDRODIURIL) 25 mg tablet Take 25 mg by mouth daily.  DULoxetine (CYMBALTA) 60 mg capsule Take 120 mg by mouth daily.  aspirin delayed-release 81 mg tablet Take  by mouth daily.  levothyroxine (SYNTHROID) 200 mcg tablet Take  by mouth Daily (before breakfast).  amLODIPine (NORVASC) 5 mg tablet Take 5 mg by mouth daily.  cholecalciferol, vitamin D3, (VITAMIN D3) 2,000 unit tab Take  by mouth daily. Past History     Past Medical History:  Past Medical History:   Diagnosis Date    Abdominal adhesions 2000    Arthritis     all joints    CAD (coronary artery disease)     Heart: Dr. King Melena    Chronic constipation     Chronic pain     all my joints    Diabetic neuropathy (HCC)     Type II    Fibromyalgia     GERD (gastroesophageal reflux disease)     HLD (hyperlipidemia)     Hypertension     Neuropathy     hands/feet    Sleep apnea     uses Cpap       Past Surgical History:  Past Surgical History:   Procedure Laterality Date    ABDOMEN SURGERY PROC UNLISTED  early 2000's    abdominal hernia repair    ABDOMEN SURGERY PROC UNLISTED  2000's    adhesions from appendix sx.     ABDOMEN SURGERY PROC UNLISTED  2000's    colectomy: when removing adhesions, nicked intestines, removed 8\" of intestine    COLONOSCOPY N/A 12/18/2017    COL[facility] performed by [facility] M [facility]  2014    HX ORTHOPAEDIC  2000,2007    bialteral hip Replacement       Family History:  Family History   Problem Relation Age of Onset    Hypertension Brother        Social History:  Social History   Substance Use Topics    Smoking status: Former Smoker     Years: 40.00    Smokeless tobacco: Never Used    Alcohol use 1.2 oz/week     2 Shots of liquor per week      Comment: 2-3 times weekly       Allergies:  No Known Allergies      Review of Systems   Review of Systems   Constitutional: Negative for chills and fever. HENT: Positive for dental problem. Negative for congestion, rhinorrhea, sneezing and sore throat. Eyes: Negative. Negative for redness and visual disturbance. Respiratory: Negative. Negative for cough, shortness of breath and wheezing. Cardiovascular: Negative. Negative for chest pain and leg swelling. Gastrointestinal: Positive for abdominal distention, abdominal pain (mid), constipation and diarrhea. Negative for nausea and vomiting. Genitourinary: Negative. Negative for difficulty urinating, discharge and frequency. Musculoskeletal: Negative. Negative for arthralgias, back pain, myalgias and neck stiffness. Skin: Negative. Negative for color change and rash. Neurological: Negative. Negative for dizziness, syncope, weakness, numbness and headaches. Hematological: Negative for adenopathy. Psychiatric/Behavioral: Negative. All other systems reviewed and are negative. Physical Exam   Physical Exam   Constitutional: He is oriented to person, place, and time. Uncomfortably appearing. Bordering on tremulous. HENT:   Head: Atraumatic. Eyes: EOM are normal.   Cardiovascular: Normal rate, regular rhythm, normal heart sounds and intact distal pulses. Exam reveals no gallop and no friction rub. No murmur heard. Pulmonary/Chest: Effort normal and breath sounds normal. No respiratory distress. He has no wheezes. He has no rales. He exhibits no tenderness. Abdominal: Soft.  Bowel sounds are normal. He exhibits no mass. There is no rebound and no guarding. Abdomen moderately distended. Firm. Diffuse TTP w/o focality. Non peritoneal but bordering on tympanic. Musculoskeletal: Normal range of motion. He exhibits no edema or tenderness. Neurological: He is alert and oriented to person, place, and time. Psychiatric: He has a normal mood and affect. Nursing note and vitals reviewed. Diagnostic Study Results     Labs -     Recent Results (from the past 12 hour(s))   CBC WITH AUTOMATED DIFF    Collection Time: 01/17/18 11:34 PM   Result Value Ref Range    WBC 11.4 (H) 4.1 - 11.1 K/uL    RBC 4.90 4. 10 - 5.70 M/uL    HGB 13.7 12.1 - 17.0 g/dL    HCT 40.6 36.6 - 50.3 %    MCV 82.9 80.0 - 99.0 FL    MCH 28.0 26.0 - 34.0 PG    MCHC 33.7 30.0 - 36.5 g/dL    RDW 12.7 11.5 - 14.5 %    PLATELET 657 860 - 440 K/uL    NEUTROPHILS 83 (H) 32 - 75 %    LYMPHOCYTES 10 (L) 12 - 49 %    MONOCYTES 5 5 - 13 %    EOSINOPHILS 2 0 - 7 %    BASOPHILS 0 0 - 1 %    ABS. NEUTROPHILS 9.4 (H) 1.8 - 8.0 K/UL    ABS. LYMPHOCYTES 1.2 0.8 - 3.5 K/UL    ABS. MONOCYTES 0.6 0.0 - 1.0 K/UL    ABS. EOSINOPHILS 0.2 0.0 - 0.4 K/UL    ABS. BASOPHILS 0.0 0.0 - 0.1 K/UL   METABOLIC PANEL, COMPREHENSIVE    Collection Time: 01/17/18 11:34 PM   Result Value Ref Range    Sodium 137 136 - 145 mmol/L    Potassium 3.7 3.5 - 5.1 mmol/L    Chloride 103 97 - 108 mmol/L    CO2 23 21 - 32 mmol/L    Anion gap 11 5 - 15 mmol/L    Glucose 207 (H) 65 - 100 mg/dL    BUN 15 6 - 20 MG/DL    Creatinine 0.96 0.70 - 1.30 MG/DL    BUN/Creatinine ratio 16 12 - 20      GFR est AA >60 >60 ml/min/1.73m2    GFR est non-AA >60 >60 ml/min/1.73m2    Calcium 9.1 8.5 - 10.1 MG/DL    Bilirubin, total 0.5 0.2 - 1.0 MG/DL    ALT (SGPT) 55 12 - 78 U/L    AST (SGOT) 41 (H) 15 - 37 U/L    Alk.  phosphatase 52 45 - 117 U/L    Protein, total 7.8 6.4 - 8.2 g/dL    Albumin 3.8 3.5 - 5.0 g/dL    Globulin 4.0 2.0 - 4.0 g/dL    A-G Ratio 1.0 (L) 1.1 - 2.2     LACTIC ACID    Collection Time: 01/17/18 11:34 PM   Result Value Ref Range    Lactic acid 2.6 (HH) 0.4 - 2.0 MMOL/L   URINALYSIS W/ REFLEX CULTURE    Collection Time: 01/18/18  2:15 AM   Result Value Ref Range    Color YELLOW/STRAW      Appearance CLEAR CLEAR      Specific gravity 1.010 1.003 - 1.030      pH (UA) 5.0 5.0 - 8.0      Protein NEGATIVE  NEG mg/dL    Glucose NEGATIVE  NEG mg/dL    Ketone NEGATIVE  NEG mg/dL    Bilirubin NEGATIVE  NEG      Blood NEGATIVE  NEG      Urobilinogen 1.0 0.2 - 1.0 EU/dL    Nitrites NEGATIVE  NEG      Leukocyte Esterase NEGATIVE  NEG      UA:UC IF INDICATED CULTURE NOT INDICATED BY UA RESULT CNI      WBC 0-4 0 - 4 /hpf    RBC 0-5 0 - 5 /hpf    Epithelial cells FEW FEW /lpf    Bacteria NEGATIVE  NEG /hpf    Hyaline cast 2-5 0 - 5 /lpf   POC LACTIC ACID    Collection Time: 01/18/18  2:16 AM   Result Value Ref Range    Lactic Acid (POC) 2.6 (HH) 0.4 - 2.0 mmol/L       Radiologic Studies -   CT ABD PELV W CONT   Final Result      XR ABD ACUTE W 1 V CHEST    (Results Pending)     CT Results  (Last 48 hours)               01/18/18 0119  CT ABD PELV W CONT Final result    Impression:  IMPRESSION:   Moderate amount of colonic stool with no bowel obstruction or no acute process   or significant interval change. Narrative:  INDICATION: SBO high-grade suspect       COMPARISON: September 21, 2017       TECHNIQUE:    Following the uneventful intravenous administration of 100 cc Isovue-370, thin   axial images were obtained through the abdomen and pelvis. Coronal and sagittal   reconstructions were generated. Oral contrast was not administered. CT dose   reduction was achieved through use of a standardized protocol tailored for this   examination and automatic exposure control for dose modulation. FINDINGS:    LUNG BASES: Trace pleural effusions. LIVER: Hepatic steatosis. GALLBLADDER: Contracted. SPLEEN: No enlargement or lesion. PANCREAS: No mass or ductal dilatation. ADRENALS: No mass.    KIDNEYS: No mass, calculus, or hydronephrosis. GI TRACT:  Moderate amount of stool in the colon. No bowel obstruction   PERITONEUM: No free air or free fluid. APPENDIX: Surgically absent. RETROPERITONEUM: No lymphadenopathy or aortic aneurysm. ADDITIONAL COMMENTS: N/A.       URINARY BLADDER: Unremarkable. REPRODUCTIVE ORGANS: Unremarkable. LYMPH NODES:  None enlarged. FREE FLUID:  None. BONES: Bilateral hip arthroplasties with associated streak artifact. ADDITIONAL COMMENTS: N/A. Medical Decision Making   I am the first provider for this patient. I reviewed the vital signs, available nursing notes, past medical history, past surgical history, family history and social history. Vital Signs-Reviewed the patient's vital signs. Patient Vitals for the past 12 hrs:   Temp Pulse Resp BP SpO2   01/18/18 0234 - 84 12 133/74 95 %   01/18/18 0130 - - - (!) 196/93 97 %   01/18/18 0117 - - - - 97 %   01/18/18 0115 - - - (!) 182/97 -   01/18/18 0030 - - - 166/89 96 %   01/18/18 0001 - - - 150/75 96 %   01/17/18 2319 - - - - 94 %   01/17/18 2317 - - - (!) 178/98 -   01/17/18 2240 97.5 °F (36.4 °C) 100 18 (!) 190/91 97 %       Records Reviewed: Old Medical Records    Provider Notes (Medical Decision Making):   DDx: Pt's exam is concerning for ileus vs. SBO given hx of multiple significant abdominal surgeries, with the hope that sxs are due to an acute on chronic constipation secondary to oxycodone. ED Course:   Initial assessment performed. The patients presenting problems have been discussed, and they are in agreement with the care plan formulated and outlined with them. I have encouraged them to ask questions as they arise throughout their visit. 1:30 AM  Reassessed pt. Reviewed labs with pt. He reports his lower abdomen is still moderately painful, but states it has improved some. Informed pt he will be updated after CT results. 2:05 AM  Reevaluated pt.  He reports having a large BM and reports feeling significantly better. Pt declines any further treatment. He is no longer bordering on tympanic, is distensible and less tender. Pt able to void. Critical Care Time:   0    Disposition:  DISCHARGE NOTE  2:49 AM  The patient has been re-evaluated and is ready for discharge. Reviewed available results with patient. Counseled pt on diagnosis and care plan. Pt has expressed understanding, and all questions have been answered. Pt agrees with plan and agrees to F/U as recommended, or return to the ED if their sxs worsen. Discharge instructions have been provided and explained to the pt, along with reasons to return to the ED. PLAN:  1. Discharge Medication List as of 1/18/2018  2:23 AM        2. Follow-up Information     Follow up With Details Comments Contact Info    Daisha Villasenor MD In 2 days  [address] [facility]  [phone]      Teola Apley, MD In 1 week to address chronic constipation especially if you have an ongoing need for narcotics to control your back pain [address] [facility] [address]  [facility]  [phone]      [facility] EMERGENCY DEPT  As needed, If symptoms worsen [address] [place] [address]  [phone]        Return to ED if worse     Diagnosis     Clinical Impression:   1. Other constipation    2. Abdominal pain, generalized    3. Chronic low back pain without sciatica, unspecified back pain laterality    4. Elevated blood pressure reading with diagnosis of hypertension        Attestations: This note is prepared by Silvestre Goode, acting as Scribe for Leana Rodas MD.    The scribe's documentation has been prepared under my direction and personally reviewed by me in its entirety. I confirm that the note above accurately reflects all work, treatment, procedures, and medical decision making performed by me.   Leana Rodas MD

## 2018-03-05 PROCEDURE — 96374 THER/PROPH/DIAG INJ IV PUSH: CPT

## 2018-03-05 PROCEDURE — 96376 TX/PRO/DX INJ SAME DRUG ADON: CPT

## 2018-03-05 PROCEDURE — 99283 EMERGENCY DEPT VISIT LOW MDM: CPT

## 2018-03-06 ENCOUNTER — APPOINTMENT (OUTPATIENT)
Dept: CT IMAGING | Age: 73
End: 2018-03-06
Attending: EMERGENCY MEDICINE
Payer: MEDICARE

## 2018-03-06 ENCOUNTER — APPOINTMENT (OUTPATIENT)
Dept: GENERAL RADIOLOGY | Age: 73
End: 2018-03-06
Attending: EMERGENCY MEDICINE
Payer: MEDICARE

## 2018-03-06 ENCOUNTER — HOSPITAL ENCOUNTER (EMERGENCY)
Age: 73
Discharge: HOME | End: 2018-03-06
Attending: EMERGENCY MEDICINE
Payer: MEDICARE

## 2018-03-06 VITALS
OXYGEN SATURATION: 93 % | DIASTOLIC BLOOD PRESSURE: 64 MMHG | HEIGHT: 70 IN | SYSTOLIC BLOOD PRESSURE: 164 MMHG | TEMPERATURE: 98 F | HEART RATE: 64 BPM | WEIGHT: 260.36 LBS | RESPIRATION RATE: 16 BRPM | BODY MASS INDEX: 37.27 KG/M2

## 2018-03-06 DIAGNOSIS — K59.00 CONSTIPATION, UNSPECIFIED CONSTIPATION TYPE: ICD-10-CM

## 2018-03-06 DIAGNOSIS — R10.84 ABDOMINAL PAIN, GENERALIZED: Primary | ICD-10-CM

## 2018-03-06 DIAGNOSIS — E87.20 LACTIC ACID ACIDOSIS: ICD-10-CM

## 2018-03-06 LAB
ALBUMIN SERPL-MCNC: 3.9 G/DL (ref 3.5–5)
ALBUMIN/GLOB SERPL: 1.1 {RATIO} (ref 1.1–2.2)
ALP SERPL-CCNC: 54 U/L (ref 45–117)
ALT SERPL-CCNC: 67 U/L (ref 12–78)
ANION GAP SERPL CALC-SCNC: 11 MMOL/L (ref 5–15)
APPEARANCE UR: CLEAR
AST SERPL-CCNC: 50 U/L (ref 15–37)
BACTERIA URNS QL MICRO: NEGATIVE /HPF
BASOPHILS # BLD: 0 K/UL (ref 0–0.1)
BASOPHILS NFR BLD: 0 % (ref 0–1)
BILIRUB SERPL-MCNC: 0.5 MG/DL (ref 0.2–1)
BILIRUB UR QL: NEGATIVE
BUN SERPL-MCNC: 15 MG/DL (ref 6–20)
BUN/CREAT SERPL: 15 (ref 12–20)
CALCIUM SERPL-MCNC: 9.3 MG/DL (ref 8.5–10.1)
CHLORIDE SERPL-SCNC: 104 MMOL/L (ref 97–108)
CO2 SERPL-SCNC: 25 MMOL/L (ref 21–32)
COLOR UR: ABNORMAL
CREAT SERPL-MCNC: 0.98 MG/DL (ref 0.7–1.3)
DIFFERENTIAL METHOD BLD: ABNORMAL
EOSINOPHIL # BLD: 0.1 K/UL (ref 0–0.4)
EOSINOPHIL NFR BLD: 1 % (ref 0–7)
EPITH CASTS URNS QL MICRO: ABNORMAL /LPF
ERYTHROCYTE [DISTWIDTH] IN BLOOD BY AUTOMATED COUNT: 13.4 % (ref 11.5–14.5)
ETHANOL SERPL-MCNC: <10 MG/DL
GLOBULIN SER CALC-MCNC: 3.6 G/DL (ref 2–4)
GLUCOSE SERPL-MCNC: 182 MG/DL (ref 65–100)
GLUCOSE UR STRIP.AUTO-MCNC: NEGATIVE MG/DL
HCT VFR BLD AUTO: 42.5 % (ref 36.6–50.3)
HGB BLD-MCNC: 14.4 G/DL (ref 12.1–17)
HGB UR QL STRIP: NEGATIVE
HYALINE CASTS URNS QL MICRO: ABNORMAL /LPF (ref 0–5)
IMM GRANULOCYTES # BLD: 0.1 K/UL (ref 0–0.04)
IMM GRANULOCYTES NFR BLD AUTO: 1 % (ref 0–0.5)
KETONES UR QL STRIP.AUTO: NEGATIVE MG/DL
LACTATE SERPL-SCNC: 3 MMOL/L (ref 0.4–2)
LACTATE SERPL-SCNC: 3.4 MMOL/L (ref 0.4–2)
LEUKOCYTE ESTERASE UR QL STRIP.AUTO: NEGATIVE
LIPASE SERPL-CCNC: 122 U/L (ref 73–393)
LYMPHOCYTES # BLD: 1.3 K/UL (ref 0.8–3.5)
LYMPHOCYTES NFR BLD: 15 % (ref 12–49)
MCH RBC QN AUTO: 28.5 PG (ref 26–34)
MCHC RBC AUTO-ENTMCNC: 33.9 G/DL (ref 30–36.5)
MCV RBC AUTO: 84.2 FL (ref 80–99)
MONOCYTES # BLD: 0.8 K/UL (ref 0–1)
MONOCYTES NFR BLD: 8 % (ref 5–13)
NEUTS SEG # BLD: 6.9 K/UL (ref 1.8–8)
NEUTS SEG NFR BLD: 76 % (ref 32–75)
NITRITE UR QL STRIP.AUTO: NEGATIVE
NRBC # BLD: 0 K/UL (ref 0–0.01)
NRBC BLD-RTO: 0 PER 100 WBC
PH UR STRIP: 6.5 [PH] (ref 5–8)
PLATELET # BLD AUTO: 250 K/UL (ref 150–400)
PMV BLD AUTO: 10.2 FL (ref 8.9–12.9)
POTASSIUM SERPL-SCNC: 4 MMOL/L (ref 3.5–5.1)
PROT SERPL-MCNC: 7.5 G/DL (ref 6.4–8.2)
PROT UR STRIP-MCNC: ABNORMAL MG/DL
RBC # BLD AUTO: 5.05 M/UL (ref 4.1–5.7)
RBC #/AREA URNS HPF: ABNORMAL /HPF (ref 0–5)
SODIUM SERPL-SCNC: 140 MMOL/L (ref 136–145)
SP GR UR REFRACTOMETRY: 1.02 (ref 1–1.03)
UA: UC IF INDICATED,UAUC: ABNORMAL
UROBILINOGEN UR QL STRIP.AUTO: 0.2 EU/DL (ref 0.2–1)
WBC # BLD AUTO: 9.2 K/UL (ref 4.1–11.1)
WBC URNS QL MICRO: ABNORMAL /HPF (ref 0–4)

## 2018-03-06 PROCEDURE — 36415 COLL VENOUS BLD VENIPUNCTURE: CPT | Performed by: EMERGENCY MEDICINE

## 2018-03-06 PROCEDURE — 85025 COMPLETE CBC W/AUTO DIFF WBC: CPT | Performed by: EMERGENCY MEDICINE

## 2018-03-06 PROCEDURE — 74011250636 HC RX REV CODE- 250/636: Performed by: EMERGENCY MEDICINE

## 2018-03-06 PROCEDURE — 96361 HYDRATE IV INFUSION ADD-ON: CPT

## 2018-03-06 PROCEDURE — 74022 RADEX COMPL AQT ABD SERIES: CPT

## 2018-03-06 PROCEDURE — 83605 ASSAY OF LACTIC ACID: CPT | Performed by: EMERGENCY MEDICINE

## 2018-03-06 PROCEDURE — 80053 COMPREHEN METABOLIC PANEL: CPT | Performed by: EMERGENCY MEDICINE

## 2018-03-06 PROCEDURE — 74177 CT ABD & PELVIS W/CONTRAST: CPT

## 2018-03-06 PROCEDURE — 74011636320 HC RX REV CODE- 636/320: Performed by: EMERGENCY MEDICINE

## 2018-03-06 PROCEDURE — 74011000250 HC RX REV CODE- 250: Performed by: EMERGENCY MEDICINE

## 2018-03-06 PROCEDURE — 80307 DRUG TEST PRSMV CHEM ANLYZR: CPT | Performed by: EMERGENCY MEDICINE

## 2018-03-06 PROCEDURE — 81001 URINALYSIS AUTO W/SCOPE: CPT | Performed by: EMERGENCY MEDICINE

## 2018-03-06 PROCEDURE — 83690 ASSAY OF LIPASE: CPT | Performed by: EMERGENCY MEDICINE

## 2018-03-06 PROCEDURE — 74011250637 HC RX REV CODE- 250/637: Performed by: EMERGENCY MEDICINE

## 2018-03-06 PROCEDURE — 96375 TX/PRO/DX INJ NEW DRUG ADDON: CPT

## 2018-03-06 RX ORDER — FENTANYL CITRATE 50 UG/ML
50 INJECTION, SOLUTION INTRAMUSCULAR; INTRAVENOUS
Status: COMPLETED | OUTPATIENT
Start: 2018-03-06 | End: 2018-03-06

## 2018-03-06 RX ORDER — KETOROLAC TROMETHAMINE 30 MG/ML
15 INJECTION, SOLUTION INTRAMUSCULAR; INTRAVENOUS
Status: COMPLETED | OUTPATIENT
Start: 2018-03-06 | End: 2018-03-06

## 2018-03-06 RX ORDER — ONDANSETRON 2 MG/ML
4 INJECTION INTRAMUSCULAR; INTRAVENOUS
Status: COMPLETED | OUTPATIENT
Start: 2018-03-06 | End: 2018-03-06

## 2018-03-06 RX ORDER — FENTANYL CITRATE 50 UG/ML
25 INJECTION, SOLUTION INTRAMUSCULAR; INTRAVENOUS
Status: COMPLETED | OUTPATIENT
Start: 2018-03-06 | End: 2018-03-06

## 2018-03-06 RX ORDER — SODIUM CHLORIDE 9 MG/ML
50 INJECTION, SOLUTION INTRAVENOUS
Status: COMPLETED | OUTPATIENT
Start: 2018-03-06 | End: 2018-03-06

## 2018-03-06 RX ORDER — SODIUM CHLORIDE 0.9 % (FLUSH) 0.9 %
5-10 SYRINGE (ML) INJECTION AS NEEDED
Status: DISCONTINUED | OUTPATIENT
Start: 2018-03-06 | End: 2018-03-06 | Stop reason: HOSPADM

## 2018-03-06 RX ORDER — SODIUM CHLORIDE 0.9 % (FLUSH) 0.9 %
10 SYRINGE (ML) INJECTION
Status: COMPLETED | OUTPATIENT
Start: 2018-03-06 | End: 2018-03-06

## 2018-03-06 RX ORDER — MAGNESIUM CITRATE
296 SOLUTION, ORAL ORAL
Status: COMPLETED | OUTPATIENT
Start: 2018-03-06 | End: 2018-03-06

## 2018-03-06 RX ORDER — FAMOTIDINE 10 MG/ML
20 INJECTION INTRAVENOUS
Status: COMPLETED | OUTPATIENT
Start: 2018-03-06 | End: 2018-03-06

## 2018-03-06 RX ORDER — SODIUM CHLORIDE 0.9 % (FLUSH) 0.9 %
5-10 SYRINGE (ML) INJECTION EVERY 8 HOURS
Status: DISCONTINUED | OUTPATIENT
Start: 2018-03-06 | End: 2018-03-06 | Stop reason: HOSPADM

## 2018-03-06 RX ORDER — POLYETHYLENE GLYCOL 3350 17 G/17G
17 POWDER, FOR SOLUTION ORAL DAILY
Qty: 289 G | Refills: 0 | Status: SHIPPED | OUTPATIENT
Start: 2018-03-06 | End: 2020-11-23

## 2018-03-06 RX ADMIN — FENTANYL CITRATE 25 MCG: 50 INJECTION, SOLUTION INTRAMUSCULAR; INTRAVENOUS at 03:23

## 2018-03-06 RX ADMIN — ONDANSETRON 4 MG: 2 INJECTION INTRAMUSCULAR; INTRAVENOUS at 02:28

## 2018-03-06 RX ADMIN — IOPAMIDOL 100 ML: 755 INJECTION, SOLUTION INTRAVENOUS at 03:10

## 2018-03-06 RX ADMIN — KETOROLAC TROMETHAMINE 15 MG: 30 INJECTION, SOLUTION INTRAMUSCULAR; INTRAVENOUS at 04:38

## 2018-03-06 RX ADMIN — SODIUM CHLORIDE 1000 ML: 900 INJECTION, SOLUTION INTRAVENOUS at 03:31

## 2018-03-06 RX ADMIN — FENTANYL CITRATE 50 MCG: 50 INJECTION, SOLUTION INTRAMUSCULAR; INTRAVENOUS at 02:28

## 2018-03-06 RX ADMIN — FAMOTIDINE 20 MG: 10 INJECTION INTRAVENOUS at 02:28

## 2018-03-06 RX ADMIN — SODIUM CHLORIDE 50 ML/HR: 900 INJECTION, SOLUTION INTRAVENOUS at 03:09

## 2018-03-06 RX ADMIN — CITROMA MAGNESIUM CITRATE 296 ML: 1.75 LIQUID ORAL at 05:05

## 2018-03-06 RX ADMIN — SODIUM CHLORIDE 1000 ML: 900 INJECTION, SOLUTION INTRAVENOUS at 02:28

## 2018-03-06 RX ADMIN — Medication 10 ML: at 03:09

## 2018-03-06 NOTE — ED NOTES
Fannie Silvestre MD   has done a bedside review of the discharge instructions. The patient is in understanding. The patients line(s) are removed. The patient is dressed, and belongings together for discharge.

## 2018-03-06 NOTE — ED PROVIDER NOTES
EMERGENCY DEPARTMENT HISTORY AND PHYSICAL EXAM          Date: 3/6/2018  Patient Name: Mitchell Morales    History of Presenting Illness     Chief Complaint   Patient presents with    Vomiting     Pt ambulatory to triage, states vomiting x 2100 this evening after dinner, \"at first I was throwin up food, now it's this thin looking bile stuff\"    Abdominal Pain     Pt with mid to L abdominal pain x vomiting this evening; has hernia to this area       History Provided By: Patient    HPI: Mitchell Morales is a 67 y.o. male, pmhx chronic constipation / abd adhesions / HTN, who presents ambulatory to the ED c/o gradual onset diffuse sharp and cramping abd pain with associated nausea and vomiting (x10) that began after dinner x 2100 this evening. Pt states his last BM was yesterday and reportedly normal per his baseline. He notes a hx of appendectomy and hernia repair. Pt states he was subsequently admitted for MRSA and infection of the hernia graft, but notes \"that was years ago\". He denies any hx of diverticulitis. Pt denies any modifying factors. He denies any recent mediations for his current sxs. He otherwise specifically denies any recent fever, chills, diarrhea, CP, SOB, lightheadedness, dizziness, numbness, weakness, tingling, BLE swelling, HA, heart palpitations, urinary sxs, changes in BM, changes in PO intake, melena, hematochezia, cough, or congestion. PCP: Deandre Lomax MD   General Surgery: Denzel Neal    Allergies: NKDA  PMHx: Significant for constipation, appendicitis, abdominal adhesions, fibromyalgia, HLD, diabetic neuropathy, HTN, arthritis, GERD  PSHx: Significant for colonoscopy, appendectomy, orthopedic, cardiac cath / stent, colectomy  Social Hx: -tobacco (former), +EtOH (daily), -Illicit Drugs    There are no other complaints, changes, or physical findings at this time.      Current Facility-Administered Medications   Medication Dose Route Frequency Provider Last Rate Last Dose    sodium chloride (NS) flush 5-10 mL  5-10 mL IntraVENous Q8H Lila Potts MD        sodium chloride (NS) flush 5-10 mL  5-10 mL IntraVENous PRN Lila oPtts MD         Current Outpatient Prescriptions   Medication Sig Dispense Refill    polyethylene glycol (MIRALAX) 17 gram/dose powder Take 17 g by mouth daily. 289 g 0    cyclobenzaprine (FLEXERIL) 10 mg tablet Take 1 Tab by mouth three (3) times daily as needed for Muscle Spasm(s). 15 Tab 0    linaclotide (LINZESS) 145 mcg cap capsule Take  by mouth daily.  traMADol (ULTRAM) 50 mg tablet Take 100 mg by mouth daily.  metFORMIN (GLUCOPHAGE) 1,000 mg tablet Take 1 Tab by mouth two (2) times daily (with meals). START TAKING 9/23 WITH DINNER 30 Tab 0    atorvastatin (LIPITOR) 80 mg tablet Take 80 mg by mouth daily.  hydroCHLOROthiazide (HYDRODIURIL) 25 mg tablet Take 25 mg by mouth daily.  DULoxetine (CYMBALTA) 60 mg capsule Take 120 mg by mouth daily.  aspirin delayed-release 81 mg tablet Take  by mouth daily.  levothyroxine (SYNTHROID) 200 mcg tablet Take  by mouth Daily (before breakfast).  amLODIPine (NORVASC) 5 mg tablet Take 5 mg by mouth daily.  cholecalciferol, vitamin D3, (VITAMIN D3) 2,000 unit tab Take  by mouth daily. Past History     Past Medical History:  Past Medical History:   Diagnosis Date    Abdominal adhesions 2000    Arthritis     all joints    CAD (coronary artery disease)     Heart: Dr. Yared Garrido    Chronic constipation     Chronic pain     all my joints    Diabetic neuropathy (HCC)     Type II    Fibromyalgia     GERD (gastroesophageal reflux disease)     HLD (hyperlipidemia)     Hypertension     Neuropathy     hands/feet    Sleep apnea     uses Cpap       Past Surgical History:  Past Surgical History:   Procedure Laterality Date    ABDOMEN SURGERY PROC UNLISTED  early 2000's    abdominal hernia repair    ABDOMEN SURGERY PROC UNLISTED  2000's    adhesions from appendix sx.     ABDOMEN SURGERY PROC UNLISTED  2000's    colectomy: when removing adhesions, nicked intestines, removed 8\" of intestine    COLONOSCOPY N/A 12/18/2017    COLONOSCOPY performed by Miguelangel Jarrell MD at [facility]  2014    HX ORTHOPAEDIC  2000,2007    bialteral hip Replacement       Family History:  Family History   Problem Relation Age of Onset    Hypertension Brother        Social History:  Social History   Substance Use Topics    Smoking status: Former Smoker     Years: 40.00    Smokeless tobacco: Never Used    Alcohol use 1.2 oz/week     2 Shots of liquor per week      Comment: 2-3 times weekly       Allergies:  No Known Allergies      Review of Systems   Review of Systems   Constitutional: Negative for chills and fever. HENT: Negative for congestion, ear pain, rhinorrhea and sore throat. Respiratory: Negative for cough and shortness of breath. Cardiovascular: Negative for chest pain, palpitations and leg swelling. Gastrointestinal: Positive for abdominal pain, nausea and vomiting. Negative for constipation and diarrhea. No melena  No hematochezia   Endocrine: Negative for polyuria. Genitourinary: Negative for dysuria, frequency and hematuria. Neurological: Negative for dizziness, weakness, light-headedness, numbness and headaches. No tingling   All other systems reviewed and are negative. Physical Exam   Physical Exam   Nursing note and vitals reviewed.     General appearance - obese, and in no distress  Eyes - pupils equal and reactive, extraocular eye movements intact  ENT - mucous membranes moist, pharynx normal without lesions  Neck - supple, no significant adenopathy; non-tender to palpation  Chest - no wheezes, rales or rhonchi; non-tender to palpation, decreased breath sounds throughout  Heart - normal rate and regular rhythm, S1 and S2 normal, no murmurs noted  Abdomen - soft, generalized abd tenderness to palpation, nondistended, no masses or organomegaly  Musculoskeletal - no joint tenderness, deformity or swelling; normal ROM  Extremities - peripheral pulses normal, no pedal edema  Skin - normal coloration and turgor, no rashes  Neurological - alert, oriented x3, normal speech, no focal findings or movement disorder noted  Written by Leanna Spear ED Scribbakari, as dictated by Deirdre Lilly MD      Diagnostic Study Results     Labs -     Recent Results (from the past 12 hour(s))   CBC WITH AUTOMATED DIFF    Collection Time: 03/06/18  2:03 AM   Result Value Ref Range    WBC 9.2 4.1 - 11.1 K/uL    RBC 5.05 4. 10 - 5.70 M/uL    HGB 14.4 12.1 - 17.0 g/dL    HCT 42.5 36.6 - 50.3 %    MCV 84.2 80.0 - 99.0 FL    MCH 28.5 26.0 - 34.0 PG    MCHC 33.9 30.0 - 36.5 g/dL    RDW 13.4 11.5 - 14.5 %    PLATELET 016 248 - 946 K/uL    MPV 10.2 8.9 - 12.9 FL    NRBC 0.0 0  WBC    ABSOLUTE NRBC 0.00 0.00 - 0.01 K/uL    NEUTROPHILS 76 (H) 32 - 75 %    LYMPHOCYTES 15 12 - 49 %    MONOCYTES 8 5 - 13 %    EOSINOPHILS 1 0 - 7 %    BASOPHILS 0 0 - 1 %    IMMATURE GRANULOCYTES 1 (H) 0.0 - 0.5 %    ABS. NEUTROPHILS 6.9 1.8 - 8.0 K/UL    ABS. LYMPHOCYTES 1.3 0.8 - 3.5 K/UL    ABS. MONOCYTES 0.8 0.0 - 1.0 K/UL    ABS. EOSINOPHILS 0.1 0.0 - 0.4 K/UL    ABS. BASOPHILS 0.0 0.0 - 0.1 K/UL    ABS. IMM. GRANS. 0.1 (H) 0.00 - 0.04 K/UL    DF AUTOMATED     METABOLIC PANEL, COMPREHENSIVE    Collection Time: 03/06/18  2:03 AM   Result Value Ref Range    Sodium 140 136 - 145 mmol/L    Potassium 4.0 3.5 - 5.1 mmol/L    Chloride 104 97 - 108 mmol/L    CO2 25 21 - 32 mmol/L    Anion gap 11 5 - 15 mmol/L    Glucose 182 (H) 65 - 100 mg/dL    BUN 15 6 - 20 MG/DL    Creatinine 0.98 0.70 - 1.30 MG/DL    BUN/Creatinine ratio 15 12 - 20      GFR est AA >60 >60 ml/min/1.73m2    GFR est non-AA >60 >60 ml/min/1.73m2    Calcium 9.3 8.5 - 10.1 MG/DL    Bilirubin, total 0.5 0.2 - 1.0 MG/DL    ALT (SGPT) 67 12 - 78 U/L    AST (SGOT) 50 (H) 15 - 37 U/L    Alk. phosphatase 54 45 - 117 U/L    Protein, total 7.5 6.4 - 8.2 g/dL    Albumin 3.9 3.5 - 5.0 g/dL    Globulin 3.6 2.0 - 4.0 g/dL    A-G Ratio 1.1 1.1 - 2.2     LIPASE    Collection Time: 03/06/18  2:03 AM   Result Value Ref Range    Lipase 122 73 - 393 U/L   LACTIC ACID    Collection Time: 03/06/18  2:03 AM   Result Value Ref Range    Lactic acid 3.4 (HH) 0.4 - 2.0 MMOL/L   ETHYL ALCOHOL    Collection Time: 03/06/18  2:03 AM   Result Value Ref Range    ALCOHOL(ETHYL),SERUM <10 <10 MG/DL   URINALYSIS W/ REFLEX CULTURE    Collection Time: 03/06/18  2:07 AM   Result Value Ref Range    Color YELLOW/STRAW      Appearance CLEAR CLEAR      Specific gravity 1.021 1.003 - 1.030      pH (UA) 6.5 5.0 - 8.0      Protein TRACE (A) NEG mg/dL    Glucose NEGATIVE  NEG mg/dL    Ketone NEGATIVE  NEG mg/dL    Bilirubin NEGATIVE  NEG      Blood NEGATIVE  NEG      Urobilinogen 0.2 0.2 - 1.0 EU/dL    Nitrites NEGATIVE  NEG      Leukocyte Esterase NEGATIVE  NEG      WBC 0-4 0 - 4 /hpf    RBC 0-5 0 - 5 /hpf    Epithelial cells FEW FEW /lpf    Bacteria NEGATIVE  NEG /hpf    UA:UC IF INDICATED CULTURE NOT INDICATED BY UA RESULT CNI      Hyaline cast 0-2 0 - 5 /lpf   LACTIC ACID    Collection Time: 03/06/18  5:28 AM   Result Value Ref Range    Lactic acid 3.0 (HH) 0.4 - 2.0 MMOL/L       Radiologic Studies -       CT Results  (Last 48 hours)               03/06/18 0319  CT ABD PELV W CONT Final result    Impression:  IMPRESSION:    There is no acute abnormality in the abdomen or pelvis. Narrative:  EXAM:  CT abdomen pelvis with contrast       INDICATION: Abdominal pain and vomiting. COMPARISON: Radiographs 3/6/2018. CT 1/18/2018. TECHNIQUE: Helical CT of the abdomen  and pelvis  following the uneventful   intravenous administration of nonionic contrast.  Coronal and sagittal reformats   are performed.  CT dose reduction was achieved through use of a standardized   protocol tailored for this examination and automatic exposure control for dose   modulation. FINDINGS:    The visualized lung bases demonstrate no mass or consolidation. The heart size   is normal. There is no pericardial or pleural effusion. Is a small hiatal   hernia. The liver, spleen, pancreas, and adrenal glands are normal. There is stable   focal cortical thinning in the mid left kidney. Kidneys otherwise enhance   symmetrically without hydronephrosis. There is a small left kidney cyst. The   gall bladder is present  without intra- or extra-hepatic biliary dilatation. Small bowel surgical changes are noted. There are no dilated bowel loops. The   appendix is surgically absent. There are no enlarged lymph nodes. There is no   free fluid or free air. The aorta tapers without aneurysm. The urinary bladder is grossly normal.  There is no pelvic mass. There is a stable fat-containing midline abdominal wall hernia. There is streak   artifact in the pelvis from bilateral hip prostheses. There are multilevel   degenerative changes. There is no aggressive bony lesion. CXR Results  (Last 48 hours)               03/06/18 0216  XR ABD ACUTE W 1 V CHEST Final result    Impression:  IMPRESSION: No acute abnormality in the chest or abdomen. Narrative:  EXAM:  XR ABD ACUTE W 1 V CHEST       INDICATION:  Abdominal pain and vomiting. COMPARISON: Chest radiographs 3/21/2017. CT abdomen/pelvis 1/18/2018. TECHNIQUE: Frontal upright chest view and frontal supine and upright views of   the abdomen. FINDINGS: The cardiomediastinal contours are stable. The lungs and pleural   spaces are clear. There is no pneumothorax. There are no dilated bowel loops, air-fluid levels, or intraperitoneal free air. Bilateral hip prostheses are partially visualized. There are stable degenerative   changes in the spine. Medical Decision Making   I am the first provider for this patient.     I reviewed the vital signs, available nursing notes, past medical history, past surgical history, family history and social history. Vital Signs-Reviewed the patient's vital signs. Patient Vitals for the past 12 hrs:   Temp Pulse Resp BP SpO2   03/06/18 0400 - - - 164/64 93 %   03/06/18 0345 - - - 167/75 93 %   03/06/18 0008 98 °F (36.7 °C) 64 16 180/81 98 %     Records Reviewed: Nursing Notes and Old Medical Records    Provider Notes (Medical Decision Making):     DDx: bowel obstruction, pancreatitis, diverticulitis, UTI    ED Course:   Initial assessment performed. The patients presenting problems have been discussed, and they are in agreement with the care plan formulated and outlined with them. I have encouraged them to ask questions as they arise throughout their visit. EKG interpretation: (Preliminary)   Rhythm: sinus bradycardia. Rate (approx.): 59,   bpm; Axis: normal; Normal VT, QRS, QTc intervals; ST/T wave: normal; in    Written by KAROL Kinsey, as dictated by Reina Regalado MD    PROGRESS NOTE:  5:14 AM  Pt reevaluated. Pt resting comfortably at this time; he states his pain is improved, but not resolved. Updated on all final lab and imaging findings. Pt would like Mag Citrate in the ED. Will treat and continue to monitor. Written by KAROL Kinsey, as dictated by Reina Regalado MD    Progress note:  6:20 AM  Pt noted to be feeling better following large BM, ready for discharge. Updated pt and/or family on all final lab and imaging findings. Will follow up as instructed. All questions have been answered, pt voiced understanding and agreement with plan. Specific return precautions provided as well as instructions to return to the ED should sx worsen at any time. Vital signs stable for discharge. Written by KAROL Kinsey, as dictated by Lila Potts MD      Diagnosis     Clinical Impression:   1. Abdominal pain, generalized    2.  Constipation, unspecified constipation type    3. Lactic acid acidosis        PLAN:  1. Discharge Medication List as of 3/6/2018  6:22 AM      START taking these medications    Details   polyethylene glycol (MIRALAX) 17 gram/dose powder Take 17 g by mouth daily. , Normal, Disp-289 g, R-0         CONTINUE these medications which have NOT CHANGED    Details   cyclobenzaprine (FLEXERIL) 10 mg tablet Take 1 Tab by mouth three (3) times daily as needed for Muscle Spasm(s). , Print, Disp-15 Tab, R-0      linaclotide (LINZESS) 145 mcg cap capsule Take  by mouth daily. , Historical Med      traMADol (ULTRAM) 50 mg tablet Take 100 mg by mouth daily. , Historical Med      metFORMIN (GLUCOPHAGE) 1,000 mg tablet Take 1 Tab by mouth two (2) times daily (with meals). START TAKING  WITH DINNER, No Print, Disp-30 Tab, R-0      atorvastatin (LIPITOR) 80 mg tablet Take 80 mg by mouth daily. , Historical Med      hydroCHLOROthiazide (HYDRODIURIL) 25 mg tablet Take 25 mg by mouth daily. , Historical Med      DULoxetine (CYMBALTA) 60 mg capsule Take 120 mg by mouth daily. , Historical Med      aspirin delayed-release 81 mg tablet Take  by mouth daily. , Historical Med      levothyroxine (SYNTHROID) 200 mcg tablet Take  by mouth Daily (before breakfast). , Historical Med      amLODIPine (NORVASC) 5 mg tablet Take 5 mg by mouth daily. , Historical Med      cholecalciferol, vitamin D3, (VITAMIN D3) 2,000 unit tab Take  by mouth daily. , Historical Med         STOP taking these medications       oxyCODONE IR (ROXICODONE) 5 mg immediate release tablet Comments:   Reason for Stoppin.   Follow-up Information     Follow up With Details Comments Contact Info    Daisha Villasenor MD Schedule an appointment as soon as possible for a visit  [facility]  [address] [facility]      Teola Apley, MD Schedule an appointment as soon as possible for a visit  [place] [address]  [address] [facility]  [phone]      [facility] EMERGENCY DEPT If symptoms worsen [address] [place] [address]  [phone]        Return to ED if worse     Disposition:    DISCHARGE NOTE:  6:22 AM  The patient's results have been reviewed with family and/or caregiver. They verbally convey their understanding and agreement of the patient's signs, symptoms, diagnosis, treatment, and prognosis. They additionally agree to follow up as recommended in the discharge instructions or to return to the Emergency Room should the patient's condition change prior to their follow-up appointment. The family and/or caregiver verbally agrees with the care-plan and all of their questions have been answered. The discharge instructions have also been provided to the them along with educational information regarding the patient's diagnosis and a list of reasons why the patient would want to return to the ER prior to their follow-up appointment should their condition change. Written by Mayco Palacios, ED Scribe, as dictated by Fannie Silvestre MD.            Attestations: This note is prepared by Mayco Palacios, acting as Scribe for MD Lila Chan MD : The scribe's documentation has been prepared under my direction and personally reviewed by me in its entirety. I confirm that the note above accurately reflects all work, treatment, procedures, and medical decision making performed by me. This note will not be viewable in [address]

## 2018-03-06 NOTE — DISCHARGE INSTRUCTIONS
Constipation: Care Instructions  Your Care Instructions    Constipation means that you have a hard time passing stools (bowel movements). People pass stools from 3 times a day to once every 3 days. What is normal for you may be different. Constipation may occur with pain in the rectum and cramping. The pain may get worse when you try to pass stools. Sometimes there are small amounts of bright red blood on toilet paper or the surface of stools. This is because of enlarged veins near the rectum (hemorrhoids). A few changes in your diet and lifestyle may help you avoid ongoing constipation. Your doctor may also prescribe medicine to help loosen your stool. Some medicines can cause constipation. These include pain medicines and antidepressants. Tell your doctor about all the medicines you take. Your doctor may want to make a medicine change to ease your symptoms. Follow-up care is a key part of your treatment and safety. Be sure to make and go to all appointments, and call your doctor if you are having problems. It's also a good idea to know your test results and keep a list of the medicines you take. How can you care for yourself at home? · Drink plenty of fluids, enough so that your urine is light yellow or clear like water. If you have kidney, heart, or liver disease and have to limit fluids, talk with your doctor before you increase the amount of fluids you drink. · Include high-fiber foods in your diet each day. These include fruits, vegetables, beans, and whole grains. · Get at least 30 minutes of exercise on most days of the week. Walking is a good choice. You also may want to do other activities, such as running, swimming, cycling, or playing tennis or team sports. · Take a fiber supplement, such as Citrucel or Metamucil, every day. Read and follow all instructions on the label. · Schedule time each day for a bowel movement. A daily routine may help.  Take your time having your bowel movement. · Support your feet with a small step stool when you sit on the toilet. This helps flex your hips and places your pelvis in a squatting position. · Your doctor may recommend an over-the-counter laxative to relieve your constipation. Examples are Milk of Magnesia and MiraLax. Read and follow all instructions on the label. Do not use laxatives on a long-term basis. When should you call for help? Call your doctor now or seek immediate medical care if:  ? · You have new or worse belly pain. ? · You have new or worse nausea or vomiting. ? · You have blood in your stools. ? Watch closely for changes in your health, and be sure to contact your doctor if:  ? · Your constipation is getting worse. ? · You do not get better as expected. Where can you learn more? Go to http://rosaura-jessica.info/. Enter 21 [phone] in the search box to learn more about \"Constipation: Care Instructions. \"  Current as of: March 20, 2017  Content Version: 11.4  © 3336-1076 [employer]. Care instructions adapted under license by [employer] (which disclaims liability or warranty for this information). If you have questions about a medical condition or this instruction, always ask your healthcare professional. [address] any warranty or liability for your use of this information. Abdominal Pain: Care Instructions  Your Care Instructions    Abdominal pain has many possible causes. Some aren't serious and get better on their own in a few days. Others need more testing and treatment. If your pain continues or gets worse, you need to be rechecked and may need more tests to find out what is wrong. You may need surgery to correct the problem. Don't ignore new symptoms, such as fever, nausea and vomiting, urination problems, pain that gets worse, and dizziness. These may be signs of a more serious problem.   Your doctor may have recommended a follow-up visit in the next 8 to 12 hours. If you are not getting better, you may need more tests or treatment. The doctor has checked you carefully, but problems can develop later. If you notice any problems or new symptoms, get medical treatment right away. Follow-up care is a key part of your treatment and safety. Be sure to make and go to all appointments, and call your doctor if you are having problems. It's also a good idea to know your test results and keep a list of the medicines you take. How can you care for yourself at home? · Rest until you feel better. · To prevent dehydration, drink plenty of fluids, enough so that your urine is light yellow or clear like water. Choose water and other caffeine-free clear liquids until you feel better. If you have kidney, heart, or liver disease and have to limit fluids, talk with your doctor before you increase the amount of fluids you drink. · If your stomach is upset, eat mild foods, such as rice, dry toast or crackers, bananas, and applesauce. Try eating several small meals instead of two or three large ones. · Wait until 48 hours after all symptoms have gone away before you have spicy foods, alcohol, and drinks that contain caffeine. · Do not eat foods that are high in fat. · Avoid anti-inflammatory medicines such as aspirin, ibuprofen (Advil, Motrin), and naproxen (Aleve). These can cause stomach upset. Talk to your doctor if you take daily aspirin for another health problem. When should you call for help? Call 911 anytime you think you may need emergency care. For example, call if:  ? · You passed out (lost consciousness). ? · You pass maroon or very bloody stools. ? · You vomit blood or what looks like coffee grounds. ? · You have new, severe belly pain. ?Call your doctor now or seek immediate medical care if:  ? · Your pain gets worse, especially if it becomes focused in one area of your belly. ? · You have a new or higher fever.    ? · Your stools are black and look like tar, or they have streaks of blood. ? · You have unexpected vaginal bleeding. ? · You have symptoms of a urinary tract infection. These may include:  ¨ Pain when you urinate. ¨ Urinating more often than usual.  ¨ Blood in your urine. ? · You are dizzy or lightheaded, or you feel like you may faint. ? Watch closely for changes in your health, and be sure to contact your doctor if:  ? · You are not getting better after 1 day (24 hours). Where can you learn more? Go to http://rosaura-jessica.info/. Enter Q027 in the search box to learn more about \"Abdominal Pain: Care Instructions. \"  Current as of: March 20, 2017  Content Version: 11.4  © 6269-1522 [email or web address]. Care instructions adapted under license by [employer] (which disclaims liability or warranty for this information). If you have questions about a medical condition or this instruction, always ask your healthcare professional. [place] [address] any warranty or liability for your use of this information.

## 2019-01-14 RX ORDER — LOSARTAN POTASSIUM 25 MG/1
25 TABLET ORAL DAILY
COMMUNITY

## 2019-01-14 RX ORDER — PRIMIDONE 50 MG/1
50 TABLET ORAL EVERY EVENING
COMMUNITY

## 2019-01-14 RX ORDER — HYDROCODONE BITARTRATE AND ACETAMINOPHEN 7.5; 325 MG/1; MG/1
1 TABLET ORAL
COMMUNITY
End: 2020-11-23

## 2019-01-15 ENCOUNTER — HOSPITAL ENCOUNTER (INPATIENT)
Age: 74
LOS: 2 days | Discharge: HOME | DRG: 872 | End: 2019-01-17
Attending: EMERGENCY MEDICINE | Admitting: INTERNAL MEDICINE
Payer: MEDICARE

## 2019-01-15 ENCOUNTER — APPOINTMENT (OUTPATIENT)
Dept: CT IMAGING | Age: 74
DRG: 872 | End: 2019-01-15
Attending: EMERGENCY MEDICINE
Payer: MEDICARE

## 2019-01-15 ENCOUNTER — HOSPITAL ENCOUNTER (OUTPATIENT)
Age: 74
Setting detail: OUTPATIENT SURGERY
Discharge: HOME | DRG: 872 | End: 2019-01-15
Attending: INTERNAL MEDICINE | Admitting: INTERNAL MEDICINE
Payer: MEDICARE

## 2019-01-15 ENCOUNTER — ANESTHESIA EVENT (OUTPATIENT)
Dept: ENDOSCOPY | Age: 74
DRG: 872 | End: 2019-01-15
Payer: MEDICARE

## 2019-01-15 ENCOUNTER — ANESTHESIA (OUTPATIENT)
Dept: ENDOSCOPY | Age: 74
DRG: 872 | End: 2019-01-15
Payer: MEDICARE

## 2019-01-15 ENCOUNTER — APPOINTMENT (OUTPATIENT)
Dept: GENERAL RADIOLOGY | Age: 74
DRG: 872 | End: 2019-01-15
Attending: EMERGENCY MEDICINE
Payer: MEDICARE

## 2019-01-15 VITALS
RESPIRATION RATE: 11 BRPM | OXYGEN SATURATION: 98 % | SYSTOLIC BLOOD PRESSURE: 129 MMHG | HEART RATE: 62 BPM | HEIGHT: 70 IN | DIASTOLIC BLOOD PRESSURE: 74 MMHG | TEMPERATURE: 97.8 F | BODY MASS INDEX: 37.28 KG/M2 | WEIGHT: 260.38 LBS

## 2019-01-15 DIAGNOSIS — R65.10 SIRS (SYSTEMIC INFLAMMATORY RESPONSE SYNDROME) (HCC): ICD-10-CM

## 2019-01-15 DIAGNOSIS — E87.20 LACTIC ACIDOSIS: ICD-10-CM

## 2019-01-15 DIAGNOSIS — R10.84 ABDOMINAL PAIN, GENERALIZED: Primary | ICD-10-CM

## 2019-01-15 DIAGNOSIS — G44.209 TENSION HEADACHE: ICD-10-CM

## 2019-01-15 DIAGNOSIS — J18.9 COMMUNITY ACQUIRED PNEUMONIA OF LEFT LOWER LOBE OF LUNG: ICD-10-CM

## 2019-01-15 LAB
ALBUMIN SERPL-MCNC: 3.7 G/DL (ref 3.5–5)
ALBUMIN/GLOB SERPL: 1 {RATIO} (ref 1.1–2.2)
ALP SERPL-CCNC: 46 U/L (ref 45–117)
ALT SERPL-CCNC: 75 U/L (ref 12–78)
ANION GAP SERPL CALC-SCNC: 9 MMOL/L (ref 5–15)
APPEARANCE UR: CLEAR
AST SERPL-CCNC: 59 U/L (ref 15–37)
BASOPHILS # BLD: 0 K/UL (ref 0–0.1)
BASOPHILS NFR BLD: 0 % (ref 0–1)
BILIRUB SERPL-MCNC: 0.6 MG/DL (ref 0.2–1)
BILIRUB UR QL: NEGATIVE
BUN SERPL-MCNC: 12 MG/DL (ref 6–20)
BUN/CREAT SERPL: 13 (ref 12–20)
CALCIUM SERPL-MCNC: 8.6 MG/DL (ref 8.5–10.1)
CHLORIDE SERPL-SCNC: 103 MMOL/L (ref 97–108)
CO2 SERPL-SCNC: 26 MMOL/L (ref 21–32)
COLOR UR: NORMAL
COMMENT, HOLDF: NORMAL
CREAT SERPL-MCNC: 0.96 MG/DL (ref 0.7–1.3)
DIFFERENTIAL METHOD BLD: ABNORMAL
EOSINOPHIL # BLD: 0.1 K/UL (ref 0–0.4)
EOSINOPHIL NFR BLD: 1 % (ref 0–7)
ERYTHROCYTE [DISTWIDTH] IN BLOOD BY AUTOMATED COUNT: 13.2 % (ref 11.5–14.5)
GLOBULIN SER CALC-MCNC: 3.6 G/DL (ref 2–4)
GLUCOSE SERPL-MCNC: 170 MG/DL (ref 65–100)
GLUCOSE UR STRIP.AUTO-MCNC: NEGATIVE MG/DL
HCT VFR BLD AUTO: 40.3 % (ref 36.6–50.3)
HGB BLD-MCNC: 13.3 G/DL (ref 12.1–17)
HGB UR QL STRIP: NEGATIVE
IMM GRANULOCYTES # BLD AUTO: 0 K/UL (ref 0–0.04)
IMM GRANULOCYTES NFR BLD AUTO: 0 % (ref 0–0.5)
INR PPP: 1 (ref 0.9–1.1)
KETONES UR QL STRIP.AUTO: NEGATIVE MG/DL
LACTATE BLD-SCNC: 2.91 MMOL/L (ref 0.4–2)
LACTATE SERPL-SCNC: 2.9 MMOL/L (ref 0.4–2)
LEUKOCYTE ESTERASE UR QL STRIP.AUTO: NEGATIVE
LIPASE SERPL-CCNC: 113 U/L (ref 73–393)
LYMPHOCYTES # BLD: 0.8 K/UL (ref 0.8–3.5)
LYMPHOCYTES NFR BLD: 7 % (ref 12–49)
MCH RBC QN AUTO: 27.9 PG (ref 26–34)
MCHC RBC AUTO-ENTMCNC: 33 G/DL (ref 30–36.5)
MCV RBC AUTO: 84.7 FL (ref 80–99)
MONOCYTES # BLD: 0.6 K/UL (ref 0–1)
MONOCYTES NFR BLD: 5 % (ref 5–13)
NEUTS SEG # BLD: 9.6 K/UL (ref 1.8–8)
NEUTS SEG NFR BLD: 87 % (ref 32–75)
NITRITE UR QL STRIP.AUTO: NEGATIVE
NRBC # BLD: 0 K/UL (ref 0–0.01)
NRBC BLD-RTO: 0 PER 100 WBC
PH UR STRIP: 7.5 [PH] (ref 5–8)
PLATELET # BLD AUTO: 238 K/UL (ref 150–400)
PMV BLD AUTO: 10 FL (ref 8.9–12.9)
POTASSIUM SERPL-SCNC: 3.7 MMOL/L (ref 3.5–5.1)
PROT SERPL-MCNC: 7.3 G/DL (ref 6.4–8.2)
PROT UR STRIP-MCNC: NEGATIVE MG/DL
PROTHROMBIN TIME: 10.7 SEC (ref 9–11.1)
RBC # BLD AUTO: 4.76 M/UL (ref 4.1–5.7)
SAMPLES BEING HELD,HOLD: NORMAL
SODIUM SERPL-SCNC: 138 MMOL/L (ref 136–145)
SP GR UR REFRACTOMETRY: 1.02 (ref 1–1.03)
UROBILINOGEN UR QL STRIP.AUTO: 1 EU/DL (ref 0.2–1)
WBC # BLD AUTO: 11.1 K/UL (ref 4.1–11.1)

## 2019-01-15 PROCEDURE — 83690 ASSAY OF LIPASE: CPT

## 2019-01-15 PROCEDURE — 74011250637 HC RX REV CODE- 250/637: Performed by: EMERGENCY MEDICINE

## 2019-01-15 PROCEDURE — 94761 N-INVAS EAR/PLS OXIMETRY MLT: CPT

## 2019-01-15 PROCEDURE — 74011250636 HC RX REV CODE- 250/636

## 2019-01-15 PROCEDURE — 85610 PROTHROMBIN TIME: CPT

## 2019-01-15 PROCEDURE — 99285 EMERGENCY DEPT VISIT HI MDM: CPT

## 2019-01-15 PROCEDURE — 74011250636 HC RX REV CODE- 250/636: Performed by: INTERNAL MEDICINE

## 2019-01-15 PROCEDURE — 74011000258 HC RX REV CODE- 258: Performed by: EMERGENCY MEDICINE

## 2019-01-15 PROCEDURE — 88305 TISSUE EXAM BY PATHOLOGIST: CPT

## 2019-01-15 PROCEDURE — 65660000000 HC RM CCU STEPDOWN

## 2019-01-15 PROCEDURE — 77030013992 HC SNR POLYP ENDOSC BSC -B: Performed by: INTERNAL MEDICINE

## 2019-01-15 PROCEDURE — 83605 ASSAY OF LACTIC ACID: CPT

## 2019-01-15 PROCEDURE — 96361 HYDRATE IV INFUSION ADD-ON: CPT

## 2019-01-15 PROCEDURE — 76060000032 HC ANESTHESIA 0.5 TO 1 HR: Performed by: INTERNAL MEDICINE

## 2019-01-15 PROCEDURE — 87040 BLOOD CULTURE FOR BACTERIA: CPT

## 2019-01-15 PROCEDURE — 0DBP8ZX EXCISION OF RECTUM, VIA NATURAL OR ARTIFICIAL OPENING ENDOSCOPIC, DIAGNOSTIC: ICD-10-PCS | Performed by: INTERNAL MEDICINE

## 2019-01-15 PROCEDURE — 0DBN8ZX EXCISION OF SIGMOID COLON, VIA NATURAL OR ARTIFICIAL OPENING ENDOSCOPIC, DIAGNOSTIC: ICD-10-PCS | Performed by: INTERNAL MEDICINE

## 2019-01-15 PROCEDURE — 80053 COMPREHEN METABOLIC PANEL: CPT

## 2019-01-15 PROCEDURE — 96375 TX/PRO/DX INJ NEW DRUG ADDON: CPT

## 2019-01-15 PROCEDURE — 85025 COMPLETE CBC W/AUTO DIFF WBC: CPT

## 2019-01-15 PROCEDURE — 96367 TX/PROPH/DG ADDL SEQ IV INF: CPT

## 2019-01-15 PROCEDURE — 76040000007: Performed by: INTERNAL MEDICINE

## 2019-01-15 PROCEDURE — 36415 COLL VENOUS BLD VENIPUNCTURE: CPT

## 2019-01-15 PROCEDURE — 81003 URINALYSIS AUTO W/O SCOPE: CPT

## 2019-01-15 PROCEDURE — 74011636320 HC RX REV CODE- 636/320: Performed by: EMERGENCY MEDICINE

## 2019-01-15 PROCEDURE — 74011250636 HC RX REV CODE- 250/636: Performed by: EMERGENCY MEDICINE

## 2019-01-15 PROCEDURE — 74177 CT ABD & PELVIS W/CONTRAST: CPT

## 2019-01-15 PROCEDURE — 96365 THER/PROPH/DIAG IV INF INIT: CPT

## 2019-01-15 PROCEDURE — 74011250637 HC RX REV CODE- 250/637: Performed by: INTERNAL MEDICINE

## 2019-01-15 RX ORDER — NALOXONE HYDROCHLORIDE 0.4 MG/ML
0.4 INJECTION, SOLUTION INTRAMUSCULAR; INTRAVENOUS; SUBCUTANEOUS
Status: DISCONTINUED | OUTPATIENT
Start: 2019-01-15 | End: 2019-01-15 | Stop reason: HOSPADM

## 2019-01-15 RX ORDER — FENTANYL CITRATE 50 UG/ML
50 INJECTION, SOLUTION INTRAMUSCULAR; INTRAVENOUS
Status: COMPLETED | OUTPATIENT
Start: 2019-01-15 | End: 2019-01-15

## 2019-01-15 RX ORDER — ACETAMINOPHEN 500 MG
1000 TABLET ORAL
Status: COMPLETED | OUTPATIENT
Start: 2019-01-15 | End: 2019-01-15

## 2019-01-15 RX ORDER — LOSARTAN POTASSIUM 25 MG/1
25 TABLET ORAL DAILY
Status: DISCONTINUED | OUTPATIENT
Start: 2019-01-16 | End: 2019-01-17 | Stop reason: HOSPADM

## 2019-01-15 RX ORDER — MAGNESIUM SULFATE 100 %
4 CRYSTALS MISCELLANEOUS AS NEEDED
Status: DISCONTINUED | OUTPATIENT
Start: 2019-01-15 | End: 2019-01-17 | Stop reason: HOSPADM

## 2019-01-15 RX ORDER — SODIUM CHLORIDE 9 MG/ML
75 INJECTION, SOLUTION INTRAVENOUS CONTINUOUS
Status: DISCONTINUED | OUTPATIENT
Start: 2019-01-15 | End: 2019-01-16

## 2019-01-15 RX ORDER — FENTANYL CITRATE 50 UG/ML
50 INJECTION, SOLUTION INTRAMUSCULAR; INTRAVENOUS
Status: DISCONTINUED | OUTPATIENT
Start: 2019-01-15 | End: 2019-01-15 | Stop reason: HOSPADM

## 2019-01-15 RX ORDER — MIDAZOLAM HYDROCHLORIDE 1 MG/ML
.25-5 INJECTION, SOLUTION INTRAMUSCULAR; INTRAVENOUS
Status: DISCONTINUED | OUTPATIENT
Start: 2019-01-15 | End: 2019-01-15 | Stop reason: HOSPADM

## 2019-01-15 RX ORDER — ONDANSETRON 2 MG/ML
4 INJECTION INTRAMUSCULAR; INTRAVENOUS
Status: COMPLETED | OUTPATIENT
Start: 2019-01-15 | End: 2019-01-15

## 2019-01-15 RX ORDER — ENOXAPARIN SODIUM 100 MG/ML
40 INJECTION SUBCUTANEOUS EVERY 24 HOURS
Status: DISCONTINUED | OUTPATIENT
Start: 2019-01-16 | End: 2019-01-17 | Stop reason: HOSPADM

## 2019-01-15 RX ORDER — SODIUM CHLORIDE 9 MG/ML
75 INJECTION, SOLUTION INTRAVENOUS CONTINUOUS
Status: DISCONTINUED | OUTPATIENT
Start: 2019-01-15 | End: 2019-01-15 | Stop reason: HOSPADM

## 2019-01-15 RX ORDER — PRIMIDONE 50 MG/1
25 TABLET ORAL DAILY
Status: DISCONTINUED | OUTPATIENT
Start: 2019-01-16 | End: 2019-01-17 | Stop reason: HOSPADM

## 2019-01-15 RX ORDER — SODIUM CHLORIDE 0.9 % (FLUSH) 0.9 %
5-40 SYRINGE (ML) INJECTION AS NEEDED
Status: DISCONTINUED | OUTPATIENT
Start: 2019-01-15 | End: 2019-01-15 | Stop reason: HOSPADM

## 2019-01-15 RX ORDER — PROPOFOL 10 MG/ML
INJECTION, EMULSION INTRAVENOUS AS NEEDED
Status: DISCONTINUED | OUTPATIENT
Start: 2019-01-15 | End: 2019-01-15 | Stop reason: HOSPADM

## 2019-01-15 RX ORDER — ATORVASTATIN CALCIUM 40 MG/1
80 TABLET, FILM COATED ORAL
Status: DISCONTINUED | OUTPATIENT
Start: 2019-01-15 | End: 2019-01-17 | Stop reason: HOSPADM

## 2019-01-15 RX ORDER — HYDROCODONE BITARTRATE AND ACETAMINOPHEN 5; 325 MG/1; MG/1
1 TABLET ORAL
Status: COMPLETED | OUTPATIENT
Start: 2019-01-15 | End: 2019-01-15

## 2019-01-15 RX ORDER — DEXTROSE 50 % IN WATER (D50W) INTRAVENOUS SYRINGE
12.5-25 AS NEEDED
Status: DISCONTINUED | OUTPATIENT
Start: 2019-01-15 | End: 2019-01-17 | Stop reason: HOSPADM

## 2019-01-15 RX ORDER — DEXTROMETHORPHAN/PSEUDOEPHED 2.5-7.5/.8
1.2 DROPS ORAL
Status: DISCONTINUED | OUTPATIENT
Start: 2019-01-15 | End: 2019-01-15 | Stop reason: HOSPADM

## 2019-01-15 RX ORDER — ASPIRIN 81 MG/1
81 TABLET ORAL DAILY
Status: DISCONTINUED | OUTPATIENT
Start: 2019-01-16 | End: 2019-01-17 | Stop reason: HOSPADM

## 2019-01-15 RX ORDER — SODIUM CHLORIDE 0.9 % (FLUSH) 0.9 %
5-40 SYRINGE (ML) INJECTION AS NEEDED
Status: DISCONTINUED | OUTPATIENT
Start: 2019-01-15 | End: 2019-01-17 | Stop reason: HOSPADM

## 2019-01-15 RX ORDER — SODIUM CHLORIDE 0.9 % (FLUSH) 0.9 %
10 SYRINGE (ML) INJECTION
Status: COMPLETED | OUTPATIENT
Start: 2019-01-15 | End: 2019-01-15

## 2019-01-15 RX ORDER — SODIUM CHLORIDE 0.9 % (FLUSH) 0.9 %
5-40 SYRINGE (ML) INJECTION EVERY 8 HOURS
Status: DISCONTINUED | OUTPATIENT
Start: 2019-01-15 | End: 2019-01-15 | Stop reason: HOSPADM

## 2019-01-15 RX ORDER — SODIUM CHLORIDE 0.9 % (FLUSH) 0.9 %
5-40 SYRINGE (ML) INJECTION EVERY 8 HOURS
Status: DISCONTINUED | OUTPATIENT
Start: 2019-01-15 | End: 2019-01-17 | Stop reason: HOSPADM

## 2019-01-15 RX ORDER — SODIUM CHLORIDE 9 MG/ML
1000 INJECTION, SOLUTION INTRAVENOUS ONCE
Status: COMPLETED | OUTPATIENT
Start: 2019-01-15 | End: 2019-01-16

## 2019-01-15 RX ORDER — FLUMAZENIL 0.1 MG/ML
0.2 INJECTION INTRAVENOUS
Status: DISCONTINUED | OUTPATIENT
Start: 2019-01-15 | End: 2019-01-15 | Stop reason: HOSPADM

## 2019-01-15 RX ORDER — INSULIN LISPRO 100 [IU]/ML
INJECTION, SOLUTION INTRAVENOUS; SUBCUTANEOUS
Status: DISCONTINUED | OUTPATIENT
Start: 2019-01-16 | End: 2019-01-17 | Stop reason: HOSPADM

## 2019-01-15 RX ORDER — DULOXETIN HYDROCHLORIDE 30 MG/1
120 CAPSULE, DELAYED RELEASE ORAL DAILY
Status: DISCONTINUED | OUTPATIENT
Start: 2019-01-16 | End: 2019-01-17 | Stop reason: HOSPADM

## 2019-01-15 RX ORDER — HYDROCODONE BITARTRATE AND ACETAMINOPHEN 7.5; 325 MG/1; MG/1
1 TABLET ORAL
Status: DISCONTINUED | OUTPATIENT
Start: 2019-01-15 | End: 2019-01-16

## 2019-01-15 RX ORDER — LEVOTHYROXINE SODIUM 100 UG/1
200 TABLET ORAL
Status: DISCONTINUED | OUTPATIENT
Start: 2019-01-16 | End: 2019-01-17 | Stop reason: HOSPADM

## 2019-01-15 RX ORDER — ATROPINE SULFATE 0.1 MG/ML
0.5 INJECTION INTRAVENOUS
Status: DISCONTINUED | OUTPATIENT
Start: 2019-01-15 | End: 2019-01-15 | Stop reason: HOSPADM

## 2019-01-15 RX ORDER — LIDOCAINE HYDROCHLORIDE 20 MG/ML
INJECTION, SOLUTION EPIDURAL; INFILTRATION; INTRACAUDAL; PERINEURAL AS NEEDED
Status: DISCONTINUED | OUTPATIENT
Start: 2019-01-15 | End: 2019-01-15 | Stop reason: HOSPADM

## 2019-01-15 RX ORDER — SODIUM CHLORIDE 9 MG/ML
50 INJECTION, SOLUTION INTRAVENOUS
Status: COMPLETED | OUTPATIENT
Start: 2019-01-15 | End: 2019-01-15

## 2019-01-15 RX ORDER — EPINEPHRINE 0.1 MG/ML
1 INJECTION INTRACARDIAC; INTRAVENOUS
Status: DISCONTINUED | OUTPATIENT
Start: 2019-01-15 | End: 2019-01-15 | Stop reason: HOSPADM

## 2019-01-15 RX ADMIN — AZITHROMYCIN MONOHYDRATE 500 MG: 500 INJECTION, POWDER, LYOPHILIZED, FOR SOLUTION INTRAVENOUS at 21:47

## 2019-01-15 RX ADMIN — PROPOFOL 50 MG: 10 INJECTION, EMULSION INTRAVENOUS at 09:13

## 2019-01-15 RX ADMIN — ONDANSETRON 4 MG: 2 INJECTION INTRAMUSCULAR; INTRAVENOUS at 16:04

## 2019-01-15 RX ADMIN — Medication 10 ML: at 23:25

## 2019-01-15 RX ADMIN — FENTANYL CITRATE 50 MCG: 50 INJECTION, SOLUTION INTRAMUSCULAR; INTRAVENOUS at 16:04

## 2019-01-15 RX ADMIN — IOPAMIDOL 100 ML: 755 INJECTION, SOLUTION INTRAVENOUS at 18:13

## 2019-01-15 RX ADMIN — HYDROCODONE BITARTRATE AND ACETAMINOPHEN 1 TABLET: 5; 325 TABLET ORAL at 20:49

## 2019-01-15 RX ADMIN — ATORVASTATIN CALCIUM 80 MG: 40 TABLET, FILM COATED ORAL at 23:38

## 2019-01-15 RX ADMIN — SODIUM CHLORIDE 75 ML/HR: 900 INJECTION, SOLUTION INTRAVENOUS at 08:43

## 2019-01-15 RX ADMIN — PROPOFOL 100 MG: 10 INJECTION, EMULSION INTRAVENOUS at 09:08

## 2019-01-15 RX ADMIN — ACETAMINOPHEN 1000 MG: 500 TABLET ORAL at 20:49

## 2019-01-15 RX ADMIN — CEFTRIAXONE SODIUM 2 G: 2 INJECTION, POWDER, FOR SOLUTION INTRAMUSCULAR; INTRAVENOUS at 20:49

## 2019-01-15 RX ADMIN — HYDROCODONE BITARTRATE AND ACETAMINOPHEN 1 TABLET: 7.5; 325 TABLET ORAL at 23:38

## 2019-01-15 RX ADMIN — LIDOCAINE HYDROCHLORIDE 50 MG: 20 INJECTION, SOLUTION EPIDURAL; INFILTRATION; INTRACAUDAL; PERINEURAL at 09:08

## 2019-01-15 RX ADMIN — PROPOFOL 50 MG: 10 INJECTION, EMULSION INTRAVENOUS at 09:27

## 2019-01-15 RX ADMIN — PROPOFOL 50 MG: 10 INJECTION, EMULSION INTRAVENOUS at 09:15

## 2019-01-15 RX ADMIN — SODIUM CHLORIDE 1000 ML: 900 INJECTION, SOLUTION INTRAVENOUS at 20:49

## 2019-01-15 RX ADMIN — PROPOFOL 50 MG: 10 INJECTION, EMULSION INTRAVENOUS at 09:23

## 2019-01-15 RX ADMIN — Medication 10 ML: at 18:13

## 2019-01-15 RX ADMIN — SODIUM CHLORIDE 1000 ML: 900 INJECTION, SOLUTION INTRAVENOUS at 17:12

## 2019-01-15 RX ADMIN — PROPOFOL 50 MG: 10 INJECTION, EMULSION INTRAVENOUS at 09:18

## 2019-01-15 RX ADMIN — SODIUM CHLORIDE 75 ML/HR: 900 INJECTION, SOLUTION INTRAVENOUS at 23:24

## 2019-01-15 RX ADMIN — PROPOFOL 50 MG: 10 INJECTION, EMULSION INTRAVENOUS at 09:32

## 2019-01-15 RX ADMIN — PROPOFOL 50 MG: 10 INJECTION, EMULSION INTRAVENOUS at 09:09

## 2019-01-15 RX ADMIN — SODIUM CHLORIDE 50 ML/HR: 900 INJECTION, SOLUTION INTRAVENOUS at 18:13

## 2019-01-15 NOTE — PROCEDURES
Colonoscopy Procedure Note    Fatemeh Wong  1945  [id number]    Indications:  Please see below. Pre-operative Diagnosis: PERSONAL HISTORY COLONIC POLYPS    Post-operative Diagnosis: polyp sigmoid, polyp rectum,redundnat colon, internal hemorrhoids    : Mubashir A. Melody Baumgarten, MD    Referring Provider: Marina Mota MD    Sedation:  MAC anesthesia Propofol        Procedure Details:    After detailed informed consent was obtained with all risks and benefits of procedure explained and preoperative exam completed, the patient was taken to the endoscopy suite and placed in the left lateral decubitus position. Upon sequential sedation as per above, a digital rectal exam was performed  And was normal.  The Olympus videocolonoscope  was inserted in the rectum and carefully advanced to the ascending colon. The quality of preparation was fair. The colonoscope was slowly withdrawn with careful evaluation between folds. Retroflexion in the rectum was performed. Findings:   · The colon has become even more redundant. We initially started with a pediatric scope. Despite effort and pressure, I was able to get to the hepatic flexure. · Then we changed to an adult scope. Scope passes to mid ascending colon only. It keeps looping in the colon. He has a hx of sleep apnea and has a large abdomen. He is an abdominal breather. · A single sigmoid colon polyp and 4 sessile small rectal polyps removed with a cold snare. · Small internal hemorrhoids are noted. Therapies:  5 complete polypectomy were performed using cold snare  and the polyps were  retrieved    Specimen:   Specimens were collected as described above and sent to pathology. Complications: None were encountered during the procedure. EBL:  None. Recommendations:     -Await pathology. -ACBE today.    -High fiber diet. Mubashir A. Melody Baumgarten, MD  1/15/2019  9:36 AM

## 2019-01-15 NOTE — H&P
Pre-endoscopy H and P The patient was seen and examined in the room/pre-op holding area. The airway was assessed and documented. The problem list, past medical history, and medications were reviewed. Patient Active Problem List  
Diagnosis Code  Lactic acidosis E87.2 Social History Socioeconomic History  Marital status:  Spouse name: Not on file  Number of children: Not on file  Years of education: Not on file  Highest education level: Not on file Social Needs  Financial resource strain: Not on file  Food insecurity - worry: Not on file  Food insecurity - inability: Not on file  Transportation needs - medical: Not on file  Transportation needs - non-medical: Not on file Occupational History  Not on file Tobacco Use  Smoking status: Former Smoker Years: 40.00  Smokeless tobacco: Never Used Substance and Sexual Activity  Alcohol use: Yes Alcohol/week: 4.2 oz Types: 7 Shots of liquor per week  Drug use: No  
 Sexual activity: Not on file Other Topics Concern  Not on file Social History Narrative  Not on file Past Medical History:  
Diagnosis Date  Abdominal adhesions 2000  Arthritis   
 all joints; Degenerative disk disease  CAD (coronary artery disease) stents x4, followed by Dr. Sudheer Kang  Chronic constipation  Chronic pain   
 all my joints  Diabetic neuropathy ([facility] [address] Type II  
 Fibromyalgia  GERD (gastroesophageal reflux disease)  HLD (hyperlipidemia)  Hypertension  Ill-defined condition   
 fibromyalgia  Neuropathy   
 hands/feet  Sleep apnea   
 uses Cpap  Thyroid disease Prior to Admission Medications Prescriptions Last Dose Informant Patient Reported? Taking? DULoxetine (CYMBALTA) 60 mg capsule 1/13/2019  Yes Yes Sig: Take 120 mg by mouth daily. HYDROcodone-acetaminophen (NORCO) 7.5-325 mg per tablet 1/13/2019  Yes Yes Sig: Take 1 Tab by mouth every twelve (12) hours as needed for Pain. aspirin delayed-release 81 mg tablet 1/8/2019 at Unknown time  Yes Yes Sig: Take 81 mg by mouth daily. atorvastatin (LIPITOR) 80 mg tablet 1/13/2019  Yes Yes Sig: Take 80 mg by mouth nightly. calcium-cholecalciferol, d3, (CALCIUM 600 + D) 600-125 mg-unit tab 1/13/2019  Yes Yes Sig: Take 2 Tabs by mouth daily. cholecalciferol, vitamin D3, (VITAMIN D3) 2,000 unit tab 1/13/2019  Yes Yes Sig: Take 2,000 Units by mouth daily. hydroCHLOROthiazide (HYDRODIURIL) 25 mg tablet 1/13/2019  Yes Yes Sig: Take 25 mg by mouth daily. levothyroxine (SYNTHROID) 200 mcg tablet 1/13/2019  Yes Yes Sig: Take 200 mcg by mouth Daily (before breakfast). losartan (COZAAR) 25 mg tablet 1/13/2019  Yes Yes Sig: Take 25 mg by mouth daily. metFORMIN (GLUCOPHAGE) 1,000 mg tablet 1/13/2019  No Yes Sig: Take 1 Tab by mouth two (2) times daily (with meals). START TAKING 9/23 WITH DINNER  
polyethylene glycol (MIRALAX) 17 gram/dose powder 1/13/2019  No Yes Sig: Take 17 g by mouth daily. primidone (MYSOLINE) 50 mg tablet 1/13/2019  Yes Yes Sig: Take 25 mg by mouth daily. Facility-Administered Medications: None The review of systems is:  Negative  for shortness of breath or chest pain The heart, lungs, and mental status were satisfactory for the administration of deep sedation and for the procedure. I discussed with the patient the objectives, risks, consequences and alternatives to the procedure.    
 
Guicho Renee MD 
1/15/2019 
9:02 AM

## 2019-01-15 NOTE — PERIOP NOTES
Scope changed due to inability to complete procedure with pediatric scope. Adult scope [id number] was used.

## 2019-01-15 NOTE — ED NOTES
Pt shivering profusely in triage, unable to get accurate BP. Pt and partner state that they attempted to contact Dr Townsend [place] office with no answer

## 2019-01-15 NOTE — ED PROVIDER NOTES
EMERGENCY DEPARTMENT HISTORY AND PHYSICAL EXAM 
 
 
Date: 1/15/2019 Patient Name: Janine Henriquez History of Presenting Illness Chief Complaint Patient presents with  Chills  
  had outpatient colonoscopy done today by Dr Ananya Hook, went home and after waking up he started with chills, abd pain, headache History Provided By: Patient HPI: Janine Henriquez, 68 y.o. male with PMHx significant for DM, HTN, cardiac stents x 4, appendectomy, presents from home to the ED with cc of moderate chills and diffuse abd pain that onset this morning after getting home s/p colonoscopy. Pt states pain onset after the anesthesia worse off and rates it as 6/10. He reports a dry cough that onset after the colonoscopy. Pt reports chronic back pain. He states that during the colonoscopy \"Dr. Ananya Hook had to move my colon around. \" Pt states he attempted to call Dr. Ananya Hook after his sxs onset but could not reach him. He denies receiving the flu shot this year. Pt is unsure what his temperature was at home. He reports he is a former smoker and drinks EtOH. Pt denies associated nausea, vomiting, or difficulty urinating. He denies PMHx of cholecystectomy. He reports FMHx of DM and blood clots. There are no other complaints, changes, or physical findings at this time. PCP: Lyn Ramirez NP Current Facility-Administered Medications on File Prior to Encounter Medication Dose Route Frequency Provider Last Rate Last Dose  [DISCONTINUED] 0.9% sodium chloride infusion  75 mL/hr IntraVENous CONTINUOUS Alexsander Reid MD   Stopped at 01/15/19 1028  [DISCONTINUED] sodium chloride (NS) flush 5-40 mL  5-40 mL IntraVENous Q8H Joel Angeles MD      
 [DISCONTINUED] sodium chloride (NS) flush 5-40 mL  5-40 mL IntraVENous PRN Alexsander Reid MD      
 [DISCONTINUED] midazolam (VERSED) injection 0.25-5 mg  0.25-5 mg IntraVENous Multiple Alexsander Reid MD      
  [DISCONTINUED] naloxone (NARCAN) injection 0.4 mg  0.4 mg IntraVENous Multiple Joel Johnson MD      
 [DISCONTINUED] flumazenil (ROMAZICON) 0.1 mg/mL injection 0.2 mg  0.2 mg IntraVENous Joel Segundo MD      
 [DISCONTINUED] simethicone (MYLICON) 47UM/1.8DV oral drops 80 mg  1.2 mL Oral Joel Segundo MD      
 [DISCONTINUED] atropine injection 0.5 mg  0.5 mg IntraVENous ONCE PRN Taurus Aguayo MD      
 [DISCONTINUED] EPINEPHrine (ADRENALIN) 0.1 mg/mL syringe 1 mg  1 mg Endoscopically ONCE PRN Taurus Aguayo MD      
 [DISCONTINUED] sodium chloride (NS) flush 5-40 mL  5-40 mL IntraVENous Q8H Vaishali Torres MD      
 [DISCONTINUED] sodium chloride (NS) flush 5-40 mL  5-40 mL IntraVENous PRN Vaishali Torres MD      
 [DISCONTINUED] midazolam (VERSED) injection 0.25-5 mg  0.25-5 mg IntraVENous TITRATE Vaishali Torres MD      
 [DISCONTINUED] fentaNYL citrate (PF) injection 50 mcg  50 mcg IntraVENous Leigh Torres MD      
 [DISCONTINUED] naloxone [facility]) injection 0.4 mg  0.4 mg IntraVENous Leigh Torres MD      
 [DISCONTINUED] flumazenil (ROMAZICON) 0.1 mg/mL injection 0.2 mg  0.2 mg IntraVENous Leigh Torres MD      
 [DISCONTINUED] propofol (DIPRIVAN) 10 mg/mL injection   IntraVENous PRN Yves Lemus, CRNA   50 mg at 01/15/19 3083  [DISCONTINUED] lidocaine (PF) (XYLOCAINE) 20 mg/mL (2 %) injection   IntraVENous PRN Yves Lemus, CRNA   50 mg at 01/15/19 66 Current Outpatient Medications on File Prior to Encounter Medication Sig Dispense Refill  losartan (COZAAR) 25 mg tablet Take 25 mg by mouth daily.  primidone (MYSOLINE) 50 mg tablet Take 25 mg by mouth daily.  calcium-cholecalciferol, d3, (CALCIUM 600 + D) 600-125 mg-unit tab Take 2 Tabs by mouth daily.  HYDROcodone-acetaminophen (NORCO) 7.5-325 mg per tablet Take 1 Tab by mouth every twelve (12) hours as needed for Pain.  polyethylene glycol (MIRALAX) 17 gram/dose powder Take 17 g by mouth daily. 289 g 0  
 metFORMIN (GLUCOPHAGE) 1,000 mg tablet Take 1 Tab by mouth two (2) times daily (with meals). START TAKING 9/23 WITH DINNER 30 Tab 0  
 atorvastatin (LIPITOR) 80 mg tablet Take 80 mg by mouth nightly.  hydroCHLOROthiazide (HYDRODIURIL) 25 mg tablet Take 25 mg by mouth daily.  DULoxetine (CYMBALTA) 60 mg capsule Take 120 mg by mouth daily.  aspirin delayed-release 81 mg tablet Take 81 mg by mouth daily.  levothyroxine (SYNTHROID) 200 mcg tablet Take 200 mcg by mouth Daily (before breakfast).  cholecalciferol, vitamin D3, (VITAMIN D3) 2,000 unit tab Take 2,000 Units by mouth daily. Past History Past Medical History: 
Past Medical History:  
Diagnosis Date  Abdominal adhesions 2000  Arthritis   
 all joints; Degenerative disk disease  CAD (coronary artery disease) stents x4, followed by Dr. Svetlana Diallo  Chronic constipation  Chronic pain   
 all my joints  Diabetic neuropathy ([facility] [address] Type II  
 Fibromyalgia  GERD (gastroesophageal reflux disease)  HLD (hyperlipidemia)  Hypertension  Ill-defined condition   
 fibromyalgia  Neuropathy   
 hands/feet  Sleep apnea   
 uses Cpap  Thyroid disease Past Surgical History: 
Past Surgical History:  
Procedure Laterality Date  ABDOMEN SURGERY PROC UNLISTED  early 2000's  
 abdominal hernia repair  ABDOMEN SURGERY PROC UNLISTED  2000's  
 adhesions from appendix sx.  ABDOMEN SURGERY PROC UNLISTED  2000's  
 colectomy: when removing adhesions, nicked intestines, removed 8\" of intestine  COLONOSCOPY N/A 12/18/2017 COLONOSCOPY performed by Mitchel Gomes MD at [address] COLONOSCOPY N/A 1/15/2019 COLONOSCOPY performed by Dodie Fulton MD at [address] [place] [address],[address]  HX CORONARY STENT PLACEMENT  2014  HX ORTHOPAEDIC Bilateral 2000, 2007 hip replacement Family History: 
Family History Problem Relation Age of Onset  Hypertension Brother  Other Mother   
     hiatal hernia  Arthritis Mother   
     back, spine  No Known Problems Father Social History: 
Social History Tobacco Use  Smoking status: Former Smoker Years: 40.00  Smokeless tobacco: Never Used Substance Use Topics  Alcohol use: Yes Alcohol/week: 4.2 oz Types: 7 Shots of liquor per week  Drug use: No  
 
 
Allergies: 
No Known Allergies Review of Systems Review of Systems Constitutional: Positive for chills. HENT: Negative for congestion. Eyes: Negative. Respiratory: Negative for shortness of breath. Cardiovascular: Negative for chest pain. Gastrointestinal: Positive for abdominal pain. Negative for nausea and vomiting. Endocrine: Negative for heat intolerance. Genitourinary: Negative for difficulty urinating. Musculoskeletal: Positive for back pain (chronic). Allergic/Immunologic: Negative for immunocompromised state. Neurological: Negative. Hematological: Does not bruise/bleed easily. Psychiatric/Behavioral: Negative. All other systems reviewed and are negative. Physical Exam  
Physical Exam  
Constitutional: He is oriented to person, place, and time. He appears well-developed and well-nourished. Mild distress. Elevated BMI. HENT:  
Head: Normocephalic and atraumatic. Eyes: EOM are normal. Pupils are equal, round, and reactive to light. Neck: Normal range of motion. Neck supple. Cardiovascular: Normal rate, regular rhythm and normal heart sounds. Pulmonary/Chest: Effort normal and breath sounds normal. He has no wheezes. Abdominal: Soft. Bowel sounds are normal. There is tenderness in the right upper quadrant, right lower quadrant and left lower quadrant. Musculoskeletal: Normal range of motion. He exhibits no edema or tenderness. Neurological: He is alert and oriented to person, place, and time. No cranial nerve deficit. Skin: Skin is warm and dry. Psychiatric: He has a normal mood and affect. His behavior is normal.  
Nursing note and vitals reviewed. Diagnostic Study Results Labs - Recent Results (from the past 12 hour(s)) CBC WITH AUTOMATED DIFF Collection Time: 01/15/19  3:49 PM  
Result Value Ref Range WBC 11.1 4.1 - 11.1 K/uL  
 RBC 4.76 4.10 - 5.70 M/uL  
 HGB 13.3 12.1 - 17.0 g/dL HCT 40.3 36.6 - 50.3 % MCV 84.7 80.0 - 99.0 FL  
 MCH 27.9 26.0 - 34.0 PG  
 MCHC 33.0 30.0 - 36.5 g/dL  
 RDW 13.2 11.5 - 14.5 % PLATELET 514 635 - 990 K/uL MPV 10.0 8.9 - 12.9 FL  
 NRBC 0.0 0  WBC ABSOLUTE NRBC 0.00 0.00 - 0.01 K/uL NEUTROPHILS 87 (H) 32 - 75 % LYMPHOCYTES 7 (L) 12 - 49 % MONOCYTES 5 5 - 13 % EOSINOPHILS 1 0 - 7 % BASOPHILS 0 0 - 1 % IMMATURE GRANULOCYTES 0 0.0 - 0.5 % ABS. NEUTROPHILS 9.6 (H) 1.8 - 8.0 K/UL  
 ABS. LYMPHOCYTES 0.8 0.8 - 3.5 K/UL  
 ABS. MONOCYTES 0.6 0.0 - 1.0 K/UL  
 ABS. EOSINOPHILS 0.1 0.0 - 0.4 K/UL  
 ABS. BASOPHILS 0.0 0.0 - 0.1 K/UL  
 ABS. IMM. GRANS. 0.0 0.00 - 0.04 K/UL  
 DF AUTOMATED PROTHROMBIN TIME + INR Collection Time: 01/15/19  3:49 PM  
Result Value Ref Range INR 1.0 0.9 - 1.1 Prothrombin time 10.7 9.0 - 11.1 sec METABOLIC PANEL, COMPREHENSIVE Collection Time: 01/15/19  3:49 PM  
Result Value Ref Range Sodium 138 136 - 145 mmol/L Potassium 3.7 3.5 - 5.1 mmol/L Chloride 103 97 - 108 mmol/L  
 CO2 26 21 - 32 mmol/L Anion gap 9 5 - 15 mmol/L Glucose 170 (H) 65 - 100 mg/dL BUN 12 6 - 20 MG/DL Creatinine 0.96 0.70 - 1.30 MG/DL  
 BUN/Creatinine ratio 13 12 - 20 GFR est AA >60 >60 ml/min/1.73m2 GFR est non-AA >60 >60 ml/min/1.73m2 Calcium 8.6 8.5 - 10.1 MG/DL  Bilirubin, total 0.6 0.2 - 1.0 MG/DL  
 ALT (SGPT) 75 12 - 78 U/L  
 AST (SGOT) 59 (H) 15 - 37 U/L  
 Alk. phosphatase 46 45 - 117 U/L Protein, total 7.3 6.4 - 8.2 g/dL Albumin 3.7 3.5 - 5.0 g/dL Globulin 3.6 2.0 - 4.0 g/dL A-G Ratio 1.0 (L) 1.1 - 2.2 LACTIC ACID Collection Time: 01/15/19  3:49 PM  
Result Value Ref Range Lactic acid 2.9 (HH) 0.4 - 2.0 MMOL/L  
URINALYSIS W/ RFLX MICROSCOPIC Collection Time: 01/15/19  3:49 PM  
Result Value Ref Range Color YELLOW/STRAW Appearance CLEAR CLEAR Specific gravity 1.017 1.003 - 1.030    
 pH (UA) 7.5 5.0 - 8.0 Protein NEGATIVE  NEG mg/dL Glucose NEGATIVE  NEG mg/dL Ketone NEGATIVE  NEG mg/dL Bilirubin NEGATIVE  NEG Blood NEGATIVE  NEG Urobilinogen 1.0 0.2 - 1.0 EU/dL Nitrites NEGATIVE  NEG Leukocyte Esterase NEGATIVE  NEG    
LIPASE Collection Time: 01/15/19  3:49 PM  
Result Value Ref Range Lipase 113 73 - 393 U/L  
SAMPLES BEING HELD Collection Time: 01/15/19  3:49 PM  
Result Value Ref Range SAMPLES BEING HELD 1RED COMMENT Add-on orders for these samples will be processed based on acceptable specimen integrity and analyte stability, which may vary by analyte. Radiologic Studies -  
CT Results  (Last 48 hours) 01/15/19 1815  CT ABD PELV W CONT Final result Impression:  IMPRESSION:  
No acute abdominal or pelvic pathology. Left lower lobe and lingular airspace  
disease. Narrative:  EXAM: CT ABD PELV W CONT INDICATION: Abdominal pain; s/p Colonoscopy COMPARISON: CT 3/6/2018 CONTRAST: 100 mL of Isovue-370. TECHNIQUE:   
Following the uneventful intravenous administration of contrast, thin axial  
images were obtained through the abdomen and pelvis. Coronal and sagittal  
reconstructions were generated. Oral contrast was not administered. CT dose  
reduction was achieved through use of a standardized protocol tailored for this  
examination and automatic exposure control for dose modulation.   
   
FINDINGS:   
 LUNG BASES: There is patchy lingular and left lower lobe airspace disease. INCIDENTALLY IMAGED HEART AND MEDIASTINUM: Unremarkable. LIVER: There is diffuse fatty infiltration of the liver. GALLBLADDER: Unremarkable. SPLEEN: No mass. PANCREAS: No mass or ductal dilatation. ADRENALS: Unremarkable. KIDNEYS: There is chronic left renal cortical scarring. There is no  
hydronephrosis. STOMACH: There is a small hiatal hernia. SMALL BOWEL: No dilatation or wall thickening. COLON: No dilatation or wall thickening. APPENDIX: Not visualized PERITONEUM: No ascites or pneumoperitoneum. RETROPERITONEUM: No lymphadenopathy or aortic aneurysm. There is diffuse aortic  
atherosclerosis. REPRODUCTIVE ORGANS: Prostate gland not well visualized due to metallic artifact  
from bilateral hip replacements URINARY BLADDER: No mass or calculus. BONES: There are degenerative changes throughout the spine. ADDITIONAL COMMENTS: There is a fat-containing ventral hernia. Medical Decision Making I am the first provider for this patient. I reviewed the vital signs, available nursing notes, past medical history, past surgical history, family history and social history. Vital Signs-Reviewed the patient's vital signs. Patient Vitals for the past 12 hrs: 
 Temp Pulse Resp BP SpO2  
01/15/19 2003 (!) 103 °F (39.4 °C)      
01/15/19 1755    119/72   
01/15/19 1753  87 26  93 % 01/15/19 1749  87 21  93 % 01/15/19 1630  93 25 168/57 93 % 01/15/19 1615  (!) 118 (!) 32 168/68 95 % 01/15/19 1500  93 23 142/62 92 % 01/15/19 1445  94 25 156/65 94 % 01/15/19 1430  96 19 165/74 96 % 01/15/19 1402 99.1 °F (37.3 °C) (!) 101 18 (!) 187/135 95 % Records Reviewed: Nursing Notes, Old Medical Records, Previous Radiology Studies and Previous Laboratory Studies Provider Notes (Medical Decision Making):  
 DDx: perforation, post procedure pain, pancreatitis, biliary colic, UTI, PNA, bronchitis ED Course:  
Initial assessment performed. The patients presenting problems have been discussed, and they are in agreement with the care plan formulated and outlined with them. I have encouraged them to ask questions as they arise throughout their visit. 5:17 PM 
Patient's presentation, labs/imaging and plan of care was reviewed with Itzel Zelaya MD as part of sign out. They will review imaging results and determine dispo as part of the plan discussed with the patient. Itzel Zelaya MD's assistance in completion of this plan is greatly appreciated but it should be noted that I will be the provider of record for this patient. Gayle John MD 
 
PROGRESS NOTE: 
7:57 PM 
Has re-valuated the pt. Pt is febrile at 103, weak tachycardic. Will give IV antibiotics and admit by hospitalist. 
Written by Amber Kawasaki, ED Scribe, as dictated by Greyson Nguyen. Niles Blizzard, MD. 
 
CONSULT NOTE:  
8:09 PM 
Greyson Nguyen. Niles Blizzard, MD spoke with Dr. Vannesa Fall, Specialty: Hospitalist 
Discussed pt's hx, disposition, and available diagnostic and imaging results. Reviewed care plans. Consultant will evaluate pt for admission. Written by Amber Kawasaki, ED Scribe, as dictated by Greyson Nguyen. Niles Blizzard, MD. Critical Care Time:  
4:44 PM 
 
IMPENDING DETERIORATION -Respiratory, Cardiovascular, CNS and Metabolic ASSOCIATED RISK FACTORS - Hypotension, Hypoxia, Dysrhythmia, Metabolic changes and Dehydration MANAGEMENT- Bedside Assessment, Supervision of Care and Transfer INTERPRETATION -  CT Scan, ECG and Blood Pressure INTERVENTIONS - hemodynamic mngmt and Metobolic interventions CASE REVIEW - Hospitalist, Nursing and Family TREATMENT RESPONSE -Unchanged PERFORMED BY - Self and Physician NOTES   : 
I have spent 70 minutes of critical care time involved in lab review, consultations with specialist, family decision- making, bedside attention and documentation. During this entire length of time I was immediately available to the patient . Maurice Cunningham MD 
 
 
Disposition: 
ADMIT NOTE: 
8:26 PM 
The patient is being admitted to the hospital by Dr. Liz Anderson. The results of their tests and reasons for their admission have been discussed with the patient and/or available family. They convey agreement and understanding for the need to be admitted and for their admission diagnosis. PLAN: 
1. Admit by hospitalist 
 
Diagnosis Clinical Impression: 1. Abdominal pain, generalized 2. Lactic acidosis 3. Tension headache 4. Community acquired pneumonia of left lower lobe of lung ([address] 5. SIRS (systemic inflammatory response syndrome) (HCC) Attestations: This note is prepared by Esteban Brady, acting as a Scribe for MD Maurice Mcmillan MD: The scribe's documentation has been prepared under my direction and personally reviewed by me in its entirety. I confirm that the notes above accurately reflects all work, treatment, procedures, and medical decision making performed by me.

## 2019-01-15 NOTE — DISCHARGE INSTRUCTIONS
[place] Office: [phone]    Marla Barnes  [id number]  1945    EGD/COLONOSCOPY DISCHARGE INSTRUCTIONS  Discomfort:  Sore throat- throat lozenges or warm salt water gargle  redness at IV site- apply warm compress to area; if redness or soreness persist- contact your physician  Gaseous discomfort- walking, belching will help relieve any discomfort  You may not operate a vehicle for 12 hours  You may not engage in an occupation involving machinery or appliances for rest of today. You may not drink alcoholic beverages for at least 12 hours  Avoid making any critical decisions for at least 24 hour  DIET  You may resume your regular diet - however -  remember your colon is empty and a heavy meal will produce gas. Avoid these foods:  fried / greasy foods, excessive carbonated drinks or too much caffeine  MEDICATIONS   Regarding Aspirin or Nonsteroidal medications specifically, please see below. ACTIVITY  You may resume your normal daily activities. Spend the remainder of the day resting -  avoid any strenuous activity. CALL M.D. ANY SIGN OF   Increasing pain, nausea, vomiting  Abdominal distension (swelling)  New increased bleeding (oral or rectal)  Fever (chills)    You may not take any Advil, Aspirin, Ibuprofen, Motrin, Aleve, or Goodys for 7 days, ONLY  Tylenol as needed for pain. Follow-up Instructions:   Call  Joel Goff MD for any questions or concerns  Results of procedure / biopsy in 7 days   Telephone # [phone]      Follow-up Information    None

## 2019-01-15 NOTE — PROGRESS NOTES
Nursing staff checked with Radiologist today and BE will not be done today. Patient aware. Patient is aware that a Barium Enema will be scheduled thru Dr. Mary Crump office as oupatient.

## 2019-01-15 NOTE — PERIOP NOTES
Polyp removed from Rectum:   - Protruding lesions:     -Sessile Polyp(s) size 2-3 mm removed by polypectomy (cold snare) using cold snare technique x2.

## 2019-01-15 NOTE — ED NOTES
Assumed care of pt from Kate Azul RN at bedside with verbal report consisting of Situation, Background, Assessment, and Recommendations (SBAR). Pt is A&O x 4. Pt resting comfortably on the stretcher in a position of comfort. Call bell within reach. Side rails x 2. Cardiac monitor x 3. Stretcher locked in the lowest position. Concerns and questions addressed at this time. Pt in no acute distress at this the time. Will continue to monitor. Pt using urinal at this time for urine sample.

## 2019-01-15 NOTE — ED NOTES
Pt presents ambulatory to the ER c/o chills, shaking and abdominal pain after colonoscopy this morning. Pt states he has had a BM since the procedure but did not notice any bleeding. Pt placed on monitor x 3. Pt also reports he has had increase in SOB since the procedure. Pt dyspneic with exertion

## 2019-01-15 NOTE — ROUTINE PROCESS
Evon Manjarrezclint 1945 
[id number] Situation: 
Verbal report received from: Hayes Crawford Procedure: Procedure(s): 
COLONOSCOPY 
ENDOSCOPIC POLYPECTOMY Background: 
 
Preoperative diagnosis: PERSONAL HISTORY COLONIC POLYPS Postoperative diagnosis: hemorrhoids; colomn polyps; redundant colon :  Dr. Dank Dugan 
Assistant(s): Endoscopy Technician-1: Eli Santana Endoscopy RN-1: Sapna Berg Specimens:  
ID Type Source Tests Collected by Time Destination 1 : rectum polyps for pathology Preservative Rectum  Lanny Cyr MD 1/15/2019 9281 Pathology H. Pylori  no Assessment: 
Intra-procedure medications Anesthesia gave intra-procedure sedation and medications, see anesthesia flow sheet yes Intravenous fluids: NS@ Marietta Alosa Vital signs stable Abdominal assessment: round and soft Recommendation: 
Discharge patient per MD order. Family or Christofer Hou partner Permission to share finding with family or friend yes

## 2019-01-16 LAB
GLUCOSE BLD STRIP.AUTO-MCNC: 126 MG/DL (ref 65–100)
GLUCOSE BLD STRIP.AUTO-MCNC: 138 MG/DL (ref 65–100)
GLUCOSE BLD STRIP.AUTO-MCNC: 173 MG/DL (ref 65–100)
GLUCOSE BLD STRIP.AUTO-MCNC: 187 MG/DL (ref 65–100)
SERVICE CMNT-IMP: ABNORMAL

## 2019-01-16 PROCEDURE — 65270000015 HC RM PRIVATE ONCOLOGY

## 2019-01-16 PROCEDURE — 74011636637 HC RX REV CODE- 636/637: Performed by: INTERNAL MEDICINE

## 2019-01-16 PROCEDURE — 74011250637 HC RX REV CODE- 250/637: Performed by: INTERNAL MEDICINE

## 2019-01-16 PROCEDURE — 74011000250 HC RX REV CODE- 250: Performed by: INTERNAL MEDICINE

## 2019-01-16 PROCEDURE — 94760 N-INVAS EAR/PLS OXIMETRY 1: CPT

## 2019-01-16 PROCEDURE — 82962 GLUCOSE BLOOD TEST: CPT

## 2019-01-16 PROCEDURE — 87449 NOS EACH ORGANISM AG IA: CPT

## 2019-01-16 PROCEDURE — 87899 AGENT NOS ASSAY W/OPTIC: CPT

## 2019-01-16 PROCEDURE — 74011250636 HC RX REV CODE- 250/636: Performed by: INTERNAL MEDICINE

## 2019-01-16 RX ORDER — POLYETHYLENE GLYCOL 3350 17 G/17G
17 POWDER, FOR SOLUTION ORAL DAILY
Status: DISCONTINUED | OUTPATIENT
Start: 2019-01-16 | End: 2019-01-17 | Stop reason: HOSPADM

## 2019-01-16 RX ORDER — HYDROCODONE BITARTRATE AND ACETAMINOPHEN 7.5; 325 MG/1; MG/1
1 TABLET ORAL
Status: DISCONTINUED | OUTPATIENT
Start: 2019-01-16 | End: 2019-01-17 | Stop reason: HOSPADM

## 2019-01-16 RX ORDER — ACETAMINOPHEN 325 MG/1
650 TABLET ORAL
Status: DISCONTINUED | OUTPATIENT
Start: 2019-01-16 | End: 2019-01-17 | Stop reason: HOSPADM

## 2019-01-16 RX ORDER — BUTALBITAL, ACETAMINOPHEN AND CAFFEINE 50; 325; 40 MG/1; MG/1; MG/1
1 TABLET ORAL
Status: DISCONTINUED | OUTPATIENT
Start: 2019-01-16 | End: 2019-01-17 | Stop reason: HOSPADM

## 2019-01-16 RX ORDER — LEVOFLOXACIN 750 MG/1
750 TABLET ORAL EVERY 24 HOURS
Status: DISCONTINUED | OUTPATIENT
Start: 2019-01-16 | End: 2019-01-17 | Stop reason: HOSPADM

## 2019-01-16 RX ORDER — METRONIDAZOLE 250 MG/1
500 TABLET ORAL EVERY 12 HOURS
Status: DISCONTINUED | OUTPATIENT
Start: 2019-01-16 | End: 2019-01-17 | Stop reason: HOSPADM

## 2019-01-16 RX ADMIN — ASPIRIN 81 MG: 81 TABLET, COATED ORAL at 09:05

## 2019-01-16 RX ADMIN — Medication 10 ML: at 05:31

## 2019-01-16 RX ADMIN — METRONIDAZOLE 500 MG: 250 TABLET ORAL at 12:28

## 2019-01-16 RX ADMIN — POLYETHYLENE GLYCOL 3350 17 G: 17 POWDER, FOR SOLUTION ORAL at 12:28

## 2019-01-16 RX ADMIN — HYDROCODONE BITARTRATE AND ACETAMINOPHEN 1 TABLET: 7.5; 325 TABLET ORAL at 21:32

## 2019-01-16 RX ADMIN — Medication 10 ML: at 14:00

## 2019-01-16 RX ADMIN — METRONIDAZOLE 500 MG: 250 TABLET ORAL at 21:32

## 2019-01-16 RX ADMIN — ENOXAPARIN SODIUM 40 MG: 40 INJECTION SUBCUTANEOUS at 09:05

## 2019-01-16 RX ADMIN — DULOXETINE 120 MG: 30 CAPSULE, DELAYED RELEASE ORAL at 09:04

## 2019-01-16 RX ADMIN — LEVOTHYROXINE SODIUM 200 MCG: 100 TABLET ORAL at 05:31

## 2019-01-16 RX ADMIN — LEVOFLOXACIN 750 MG: 750 TABLET, FILM COATED ORAL at 12:28

## 2019-01-16 RX ADMIN — HYDROCODONE BITARTRATE AND ACETAMINOPHEN 1 TABLET: 7.5; 325 TABLET ORAL at 12:28

## 2019-01-16 RX ADMIN — ATORVASTATIN CALCIUM 80 MG: 40 TABLET, FILM COATED ORAL at 21:32

## 2019-01-16 RX ADMIN — Medication 10 ML: at 21:33

## 2019-01-16 RX ADMIN — Medication 1 CAPSULE: at 12:28

## 2019-01-16 RX ADMIN — LOSARTAN POTASSIUM 25 MG: 25 TABLET, FILM COATED ORAL at 09:05

## 2019-01-16 RX ADMIN — PRIMIDONE 25 MG: 50 TABLET ORAL at 09:04

## 2019-01-16 RX ADMIN — INSULIN LISPRO 2 UNITS: 100 INJECTION, SOLUTION INTRAVENOUS; SUBCUTANEOUS at 12:28

## 2019-01-16 NOTE — PROGRESS NOTES
Bedside and Verbal shift change report given to Juliette Deras RN (oncoming nurse) by Jonathon Palmer RN (offgoing nurse). Report included the following information SBAR, Kardex, MAR, Recent Results and Cardiac Rhythm Sinus Tach. Keesha Hanson paged. Patient had a lactic of 2.91 last night in the ED. Another lactic level was not drawn. MD asked if she would like another level drawn this morning. [id number]- Dr. Jess Hnason paged. Patient is requesting pain medication. Pain medication is now ordered for q12h asking for frequency to be changed. Also, radiology called this morning regarding a test that was ordered in the ED for the patient (barium enema) that was not done. Dr. Jess Hanson asked about this procedure. Awaiting a response.

## 2019-01-16 NOTE — ED NOTES
TRANSFER - OUT REPORT: 
 
Verbal report given to [facility], RN on Shay Virgen  [facility], Oncology for routine progression of care Report consisted of patients Situation, Background, Assessment and  
Recommendations(SBAR). Information from the following report(s) SBAR, Kardex, ED Summary, [facility] and Recent Results was reviewed with the receiving nurse. Lines:  
Peripheral IV 01/15/19 Right Antecubital (Active) Site Assessment Clean, dry, & intact 1/15/2019  3:57 PM  
Phlebitis Assessment 0 1/15/2019  3:57 PM  
Infiltration Assessment 0 1/15/2019  3:57 PM  
Dressing Status Clean, dry, & intact 1/15/2019  3:57 PM  
Dressing Type Tape;Transparent 1/15/2019  3:57 PM  
Hub Color/Line Status Pink;Flushed;Patent 1/15/2019  3:57 PM  
Action Taken Blood drawn 1/15/2019  3:57 PM  
  
 
Opportunity for questions and clarification was provided. Patient transported with: 
 [employer]

## 2019-01-16 NOTE — PROGRESS NOTES
Pharmacy Automatic Renal Dosing Protocol - Antimicrobials Indication for Antimicrobials: CAP and Intra abdominal infection Current Regimen of Each Antimicrobial: 
Levofloxacin 750 mg q24h (Start Date ; Day # 1of 7) Metronidazole 500 mg PO q8h (start Date ) Previous Antimicrobial Therapy: 
Ceftriazone 1/15- Azithromycin 1/15- Goal Level:  
 
Date Dose & Interval Measured (mcg/mL) Extrapolated (mcg/mL) Date & time of next level: 
 
Significant Cultures:  
1/15 Bld - P Radiology / Imaging results: (X-ray, CT scan or MRI):  
 
Paralysis, amputations, malnutrition: 
 
Labs: 
Recent Labs  
  01/15/19 
1549 CREA 0.96 BUN 12 WBC 11.1 Temp (24hrs), Av.1 °F (37.8 °C), Min:98.6 °F (37 °C), Max:103 °F (39.4 °C) Creatinine Clearance (mL/min) or Dialysis:70 Impression/Plan:  
Changed Metronidazole to q12h · Continue Levofloxacin 750 mg q24h · Antimicrobial stop date : 7 Days for LQ, To be determined for Metronidazole Pharmacy will follow daily and adjust medications as appropriate for renal function and/or serum levels. Thank you, 
Quentin Mclaughlin, [facility] Recommended duration of therapy 
http://[facility]/[place]/[facility]/Pharmacy/Clinical%20Companion/Duration%20of%20ABX%20therapy. docx Renal Dosing 
http://[facility]/[place]/[facility]/Pharmacy/Clinical%20Companion/Renal%[id number]. pdf

## 2019-01-16 NOTE — CONSULTS
Gastroenterology Consult Note  NAME: Alisha Guerin : 1945 MRN: [id number]   ATTG: [unfilled] PCP: Ari Walter NP  Date/Time:  2019 2:19 PM  Subjective:   REASON FOR CONSULT:      Flora Coughlin is a 68 y.o.  male who I was asked to see for fever after a colonoscopy. He has a hx of hyperplastic polyposis who underwent a colonoscopy yesterday where 5 polyps were removed all with a cold snare. ACBE was suggested for looping but not done for concerns as he had polypectomies done. He went home but came back with fever, chills with headaches. Had  abdominal pain but also a cough and shortness of breath. CT showed no free air but was suggestive of PNA. He was admitted and started on IV Abx. Fevers are lower today. WBC 11 k today. 19 colonoscopy:  · The colon has become even more redundant. We initially started with a pediatric scope. Despite effort and pressure, I was able to get to the hepatic flexure. · Then we changed to an adult scope. Scope passes to mid ascending colon only. It keeps looping in the colon. He has a hx of sleep apnea and has a large abdomen. He is an abdominal breather. · A single sigmoid colon polyp and 4 sessile small rectal polyps removed with a cold snare. · Small internal hemorrhoids are noted.               Past Medical History:   Diagnosis Date    Abdominal adhesions     Arthritis     all joints; Degenerative disk disease    CAD (coronary artery disease)     stents x4, followed by Dr. Zackery Sampson Chronic constipation     Chronic pain     all my joints    Diabetic neuropathy ([facility] [address]     Type II    Fibromyalgia     GERD (gastroesophageal reflux disease)     HLD (hyperlipidemia)     Hypertension     Ill-defined condition     fibromyalgia    Neuropathy     hands/feet    Sleep apnea     uses Cpap    Thyroid disease       Past Surgical History:   Procedure Laterality Date    ABDOMEN SURGERY PROC UNLISTED  early 's    abdominal hernia repair    ABDOMEN SURGERY PROC UNLISTED  2000's    adhesions from appendix sx.  ABDOMEN SURGERY PROC UNLISTED  2000's    colectomy: when removing adhesions, nicked intestines, removed 8\" of intestine    COLONOSCOPY N/A 12/18/2017    COLONOSCOPY performed by Kat Mclain MD at [facility] 57 COLONOSCOPY N/A 1/15/2019    COLONOSCOPY performed by Dawson Cervantes MD at [facility] ENDOSCOPY    HX [address]  2014    HX ORTHOPAEDIC Bilateral 2000, 2007    hip replacement     Social History     Tobacco Use    Smoking status: Former Smoker     Years: 40.00    Smokeless tobacco: Never Used   Substance Use Topics    Alcohol use: Yes     Alcohol/week: 4.2 oz     Types: 7 Shots of liquor per week      Family History   Problem Relation Age of Onset    Hypertension Brother     Other Mother         hiatal hernia    Arthritis Mother         back, spine    No Known Problems Father       No Known Allergies   Home Medications:  Prior to Admission Medications   Prescriptions Last Dose Informant Patient Reported? Taking? DULoxetine (CYMBALTA) 60 mg capsule   Yes No   Sig: Take 120 mg by mouth daily. HYDROcodone-acetaminophen (NORCO) 7.5-325 mg per tablet   Yes No   Sig: Take 1 Tab by mouth every twelve (12) hours as needed for Pain. aspirin delayed-release 81 mg tablet   Yes No   Sig: Take 81 mg by mouth daily. atorvastatin (LIPITOR) 80 mg tablet   Yes No   Sig: Take 80 mg by mouth nightly. calcium-cholecalciferol, d3, (CALCIUM 600 + D) 600-125 mg-unit tab   Yes No   Sig: Take 2 Tabs by mouth daily. cholecalciferol, vitamin D3, (VITAMIN D3) 2,000 unit tab   Yes No   Sig: Take 2,000 Units by mouth daily. hydroCHLOROthiazide (HYDRODIURIL) 25 mg tablet   Yes No   Sig: Take 25 mg by mouth daily. levothyroxine (SYNTHROID) 200 mcg tablet   Yes No   Sig: Take 200 mcg by mouth Daily (before breakfast).    losartan (COZAAR) 25 mg tablet   Yes No   Sig: Take 25 mg by mouth daily.   metFORMIN (GLUCOPHAGE) 1,000 mg tablet   No No   Sig: Take 1 Tab by mouth two (2) times daily (with meals). START TAKING 9/23 WITH DINNER   polyethylene glycol (MIRALAX) 17 gram/dose powder   No No   Sig: Take 17 g by mouth daily. primidone (MYSOLINE) 50 mg tablet   Yes No   Sig: Take 25 mg by mouth daily.       Facility-Administered Medications: None     Hospital medications:  Current Facility-Administered Medications   Medication Dose Route Frequency    levoFLOXacin (LEVAQUIN) tablet 750 mg  750 mg Oral Q24H    acetaminophen (TYLENOL) tablet 650 mg  650 mg Oral Q6H PRN    metroNIDAZOLE (FLAGYL) tablet 500 mg  500 mg Oral Q12H    butalbital-acetaminophen-caffeine (FIORICET, ESGIC) -40 mg per tablet 1 Tab  1 Tab Oral Q6H PRN    HYDROcodone-acetaminophen (NORCO) 7.5-325 mg per tablet 1 Tab  1 Tab Oral Q4H PRN    polyethylene glycol (MIRALAX) packet 17 g  17 g Oral DAILY    lactobac ac& pc-s.therm-b.anim (VIKASH Q/RISAQUAD)  1 Cap Oral DAILY    aspirin delayed-release tablet 81 mg  81 mg Oral DAILY    atorvastatin (LIPITOR) tablet 80 mg  80 mg Oral QHS    DULoxetine (CYMBALTA) capsule 120 mg  120 mg Oral DAILY    levothyroxine (SYNTHROID) tablet 200 mcg  200 mcg Oral ACB    losartan (COZAAR) tablet 25 mg  25 mg Oral DAILY    primidone (MYSOLINE) tablet 25 mg  25 mg Oral DAILY    insulin lispro (HUMALOG) injection   SubCUTAneous AC&HS    glucose chewable tablet 16 g  4 Tab Oral PRN    dextrose (D50W) injection syrg 12.5-25 g  12.5-25 g IntraVENous PRN    glucagon (GLUCAGEN) injection 1 mg  1 mg IntraMUSCular PRN    sodium chloride (NS) flush 5-40 mL  5-40 mL IntraVENous Q8H    sodium chloride (NS) flush 5-40 mL  5-40 mL IntraVENous PRN    enoxaparin (LOVENOX) injection 40 mg  40 mg SubCUTAneous Q24H    influenza vaccine 2018-19 (6 mos+)(PF) (FLUARIX QUAD/FLULAVAL QUAD) injection 0.5 mL  0.5 mL IntraMUSCular PRIOR TO DISCHARGE     REVIEW OF SYSTEMS:     []     Unable to obtain  ROS due to  []    mental status change  []    sedated   []    intubated   [x]    Total of 11 systems reviewed as follows:  Const:  negative fever, negative chills, negative weight loss  Eyes:   negative diplopia or visual changes, negative eye pain  ENT:   negative coryza, negative sore throat  Resp:   + cough, hemoptysis, dyspnea  Cards:  negative for chest pain, palpitations, lower extremity edema  :  negative for frequency, dysuria and hematuria  Skin:   negative for rash and pruritus  Heme:  negative for easy bruising and gum/nose bleeding  MS:  negative for myalgias, arthralgias, back pain and muscle weakness  Neurolo:  negative for headaches, dizziness, vertigo, memory problems   Psych:  negative for feelings of anxiety, depression     Pertinent Positives include :    Objective:   VITALS:    Visit Vitals  /69 (BP 1 Location: Left arm, BP Patient Position: At rest)   Pulse 69   Temp 99.4 °F (37.4 °C)   Resp 20   Ht 5' 10\" (1.778 m)   Wt 118.5 kg (261 lb 3.9 oz)   SpO2 92%   BMI 37.48 kg/m²     Temp (24hrs), Av.3 °F (37.9 °C), Min:98.6 °F (37 °C), Max:103 °F (39.4 °C)    PHYSICAL EXAM:     General: No distress. Seen sleeping on nasal CPAP. Eyes: puffy upper lids. ENMT: Lips, teeth, gums, oropharynx unremarkable. Chest:  breath sounds are normal   Heart: Heart sounds normal, S1,S2  Abdomen:  bowel sounds present. No specific tenderness.   Lymphatic: No anterior cervical, or supraclavicular lymphadenopathy   Neurologic: Alert and oriented   Psyc: Affect is appropriate   Extremities: No edema       LAB DATA REVIEWED:    Recent Results (from the past 48 hour(s))   CBC WITH AUTOMATED DIFF    Collection Time: 01/15/19  3:49 PM   Result Value Ref Range    WBC 11.1 4.1 - 11.1 K/uL    RBC 4.76 4.10 - 5.70 M/uL    HGB 13.3 12.1 - 17.0 g/dL    HCT 40.3 36.6 - 50.3 %    MCV 84.7 80.0 - 99.0 FL    MCH 27.9 26.0 - 34.0 PG    MCHC 33.0 30.0 - 36.5 g/dL    RDW 13.2 11.5 - 14.5 %    PLATELET 831 127 - 713 K/uL MPV 10.0 8.9 - 12.9 FL    NRBC 0.0 0  WBC    ABSOLUTE NRBC 0.00 0.00 - 0.01 K/uL    NEUTROPHILS 87 (H) 32 - 75 %    LYMPHOCYTES 7 (L) 12 - 49 %    MONOCYTES 5 5 - 13 %    EOSINOPHILS 1 0 - 7 %    BASOPHILS 0 0 - 1 %    IMMATURE GRANULOCYTES 0 0.0 - 0.5 %    ABS. NEUTROPHILS 9.6 (H) 1.8 - 8.0 K/UL    ABS. LYMPHOCYTES 0.8 0.8 - 3.5 K/UL    ABS. MONOCYTES 0.6 0.0 - 1.0 K/UL    ABS. EOSINOPHILS 0.1 0.0 - 0.4 K/UL    ABS. BASOPHILS 0.0 0.0 - 0.1 K/UL    ABS. IMM. GRANS. 0.0 0.00 - 0.04 K/UL    DF AUTOMATED     PROTHROMBIN TIME + INR    Collection Time: 01/15/19  3:49 PM   Result Value Ref Range    INR 1.0 0.9 - 1.1      Prothrombin time 10.7 9.0 - 86.6 sec   METABOLIC PANEL, COMPREHENSIVE    Collection Time: 01/15/19  3:49 PM   Result Value Ref Range    Sodium 138 136 - 145 mmol/L    Potassium 3.7 3.5 - 5.1 mmol/L    Chloride 103 97 - 108 mmol/L    CO2 26 21 - 32 mmol/L    Anion gap 9 5 - 15 mmol/L    Glucose 170 (H) 65 - 100 mg/dL    BUN 12 6 - 20 MG/DL    Creatinine 0.96 0.70 - 1.30 MG/DL    BUN/Creatinine ratio 13 12 - 20      GFR est AA >60 >60 ml/min/1.73m2    GFR est non-AA >60 >60 ml/min/1.73m2    Calcium 8.6 8.5 - 10.1 MG/DL    Bilirubin, total 0.6 0.2 - 1.0 MG/DL    ALT (SGPT) 75 12 - 78 U/L    AST (SGOT) 59 (H) 15 - 37 U/L    Alk.  phosphatase 46 45 - 117 U/L    Protein, total 7.3 6.4 - 8.2 g/dL    Albumin 3.7 3.5 - 5.0 g/dL    Globulin 3.6 2.0 - 4.0 g/dL    A-G Ratio 1.0 (L) 1.1 - 2.2     LACTIC ACID    Collection Time: 01/15/19  3:49 PM   Result Value Ref Range    Lactic acid 2.9 (HH) 0.4 - 2.0 MMOL/L   URINALYSIS W/ RFLX MICROSCOPIC    Collection Time: 01/15/19  3:49 PM   Result Value Ref Range    Color YELLOW/STRAW      Appearance CLEAR CLEAR      Specific gravity 1.017 1.003 - 1.030      pH (UA) 7.5 5.0 - 8.0      Protein NEGATIVE  NEG mg/dL    Glucose NEGATIVE  NEG mg/dL    Ketone NEGATIVE  NEG mg/dL    Bilirubin NEGATIVE  NEG      Blood NEGATIVE  NEG      Urobilinogen 1.0 0.2 - 1.0 EU/dL Nitrites NEGATIVE  NEG      Leukocyte Esterase NEGATIVE  NEG     LIPASE    Collection Time: 01/15/19  3:49 PM   Result Value Ref Range    Lipase 113 73 - 393 U/L   SAMPLES BEING HELD    Collection Time: 01/15/19  3:49 PM   Result Value Ref Range    SAMPLES BEING HELD 1RED     COMMENT        Add-on orders for these samples will be processed based on acceptable specimen integrity and analyte stability, which may vary by analyte. CULTURE, BLOOD, PAIRED    Collection Time: 01/15/19  6:44 PM   Result Value Ref Range    Special Requests: NO SPECIAL REQUESTS      Culture result: NO GROWTH AFTER 12 HOURS     POC LACTIC ACID    Collection Time: 01/15/19  8:53 PM   Result Value Ref Range    Lactic Acid (POC) 2.91 (HH) 0.40 - 2.00 mmol/L   GLUCOSE, POC    Collection Time: 01/16/19  8:05 AM   Result Value Ref Range    Glucose (POC) 138 (H) 65 - 100 mg/dL    Performed by Chance Cummings (RN)    GLUCOSE, POC    Collection Time: 01/16/19 11:09 AM   Result Value Ref Range    Glucose (POC) 173 (H) 65 - 100 mg/dL    Performed by Oliva Orta (PCT)      IMAGING RESULTS:   []      I have personally reviewed the actual   []    CXR  [x]    CT  []     US    Recommendations/Plan:    fever   Active Problems:    HCAP (healthcare-associated pneumonia) (1/15/2019)     s/p colonoscopy with polypectomies  Constipation   ___________________________________________________  RECOMMENDATIONS:      He had a colonoscopy yesterday and then came in with a fever. CT suggests PNA. Questions were raised about bacterial translocation during colonoscopy. I spoke with Dr Brenda Yu about him. Serositis or transmural burns are seen, rarely, with hot snare polypectomy but not with cold snare polyp removal.  Hypothetically, speaking however bacterial translocation is possible though but unlikely to cause such fevers.  With his CT findings I would treat this as PNA (?community acquired vs aspiration during colonoscopy; atlthough no documentation of aspiration exists). I do not suggest a barium enema while he is here getting treated for this infection. Treat constipation. Thank you for entrusting me with this patient's care. Please do not hesitate to contact me with any questions or if I can be of assistance with this patient or any of your other patients' GI needs. Discussed Code Status:    [x]    Full Code      []    DNR    ___________________________________________________  Care Plan discussed with:    [x]    Patient   []    Family   []    Nursing   [x]    Attending     ___________________________________________________  GI: Beulah Angeles MD

## 2019-01-16 NOTE — PROGRESS NOTES
PCP MIKE appt scheduled with Dr. Fernie Multani on 1/23/2019 at 3:00pm Appt added to AVS. Gabby Capone CM Specialist

## 2019-01-16 NOTE — H&P
Hospitalist Admission NoteNAME: Bryce Peck :  1945 MRN:  [id number] Date/Time:  1/15/2019 11:25 PM 
 
Patient PCP: Ricardo Pierce, NP 
________________________________________________________________________ Given the patient's current clinical presentation, I have a high level of concern for decompensation if discharged from the emergency department. Complex decision making was performed, which includes reviewing the patient's available past medical records, laboratory results, and x-ray films. My assessment of this patient's clinical condition and my plan of care is as follows. Assessment / Plan: 
Severe sepsis secondary to community-acquired pneumonia SIRS+lactic acidosis >2 We will get blood cultures, sputum culture and stain, urine Legionella antigen and urine pneumococcal antigen 
 will continue with ceftriaxone and azithromycin We will trend lactate until clearance PRN oxygen Diabetes mellitus type 2 Blood sugar controlled, patient takes metformin at home.  We will hold it and continue with sliding scale insulin Hypertension Blood pressure in the high side We will continue with losartan CAD status post stentx 4 Continue with aspirin and statin History of fibromyalgia We will continue Cymbalta History of essential tremor We will continue with primidone Hypothyroidism Continue Synthroid Body mass index is 37.48 kg/m². therefore classifying patient as obese, NOS (BMI of >30 kg/m2) 
-counseled exercise/diet. Patient receptive. I have personally reviewed the radiographs, laboratory data in Epic and decisions and statements above are based partially on this personal interpretation. Code Status: Full Code DVT Prophylaxis: Lovenox GI Prophylaxis: not indicated Subjective: CHIEF COMPLAINT: SOB and fever HISTORY OF PRESENT ILLNESS:    
Ángel Lubin is a 68 y.o.    male with known history as listed below presents to ED with complaint noted above. Available records were reviewed at the time of H&P. Patient  is a 79-year-old male with past medical history of diabetes mellitus 2, hypertension , CADs/p stent  x4, colon polyps who presented to the ED for sudden onset of fever and chills associated with headaches. Patient reporting getting colonoscopy this morning, was found to have polyps. When He went home,  he felt unwell, was complaining of diffuse abdominal pain and chills. He reported productive cough and shortness of breath. He denies any chest pain, diarrhea nausea or vomiting. In the ED, patient was found to be febrile with temperature of 103, tachycardic and hypertensive. Review of his labs showed elevated white blood cell count,  lactic acid of 2.9 . CT abdomen and pelvis was done and was negative for  any acute abdominal pathology but showed left lower lobe and lingula airspace disease concerning for pneumonia. Therefore patient admitted for severe sepsis secondary to community-acquired pneumonia We were asked to see for work up and evaluation of the above problems. Past Medical History:  
Diagnosis Date  Abdominal adhesions 2000  Arthritis   
 all joints; Degenerative disk disease  CAD (coronary artery disease) stents x4, followed by Dr. Noemy Ny  Chronic constipation  Chronic pain   
 all my joints  Diabetic neuropathy ([facility] [address] Type II  
 Fibromyalgia  GERD (gastroesophageal reflux disease)  HLD (hyperlipidemia)  Hypertension  Ill-defined condition   
 fibromyalgia  Neuropathy   
 hands/feet  Sleep apnea   
 uses Cpap  Thyroid disease Past Surgical History:  
Procedure Laterality Date  ABDOMEN SURGERY PROC UNLISTED  early 2000's  
 abdominal hernia repair  ABDOMEN SURGERY PROC UNLISTED  2000's  
 adhesions from appendix sx.   
 ABDOMEN SURGERY PROC UNLISTED  2000's  
 colectomy: when removing adhesions, nicked intestines, removed 8\" of intestine  COLONOSCOPY N/A 12/18/2017 COLONOSCOPY performed by Sammy Mckeon MD at . [facility] COLONOSCOPY N/A 1/15/2019 COLONOSCOPY performed by Frankie Sewell MD at [address] [place] [address],[address]  HX CORONARY STENT PLACEMENT  2014  HX ORTHOPAEDIC Bilateral 2000, 2007  
 hip replacement Social History Tobacco Use  Smoking status: Former Smoker Years: 40.00  Smokeless tobacco: Never Used Substance Use Topics  Alcohol use: Yes Alcohol/week: 4.2 oz Types: 7 Shots of liquor per week Family History Problem Relation Age of Onset  Hypertension Brother  Other Mother   
     hiatal hernia  Arthritis Mother   
     back, spine  No Known Problems Father No Known Allergies Prior to Admission medications Medication Sig Start Date End Date Taking? Authorizing Provider  
losartan (COZAAR) 25 mg tablet Take 25 mg by mouth daily. Provider, Historical  
primidone (MYSOLINE) 50 mg tablet Take 25 mg by mouth daily. Provider, Historical  
calcium-cholecalciferol, d3, (CALCIUM 600 + D) 600-125 mg-unit tab Take 2 Tabs by mouth daily. Provider, Historical  
HYDROcodone-acetaminophen (NORCO) 7.5-325 mg per tablet Take 1 Tab by mouth every twelve (12) hours as needed for Pain. Provider, Historical  
polyethylene glycol (MIRALAX) 17 gram/dose powder Take 17 g by mouth daily. 3/6/18   Lila Potts MD  
metFORMIN (GLUCOPHAGE) 1,000 mg tablet Take 1 Tab by mouth two (2) times daily (with meals). START TAKING 9/23 WITH DINNER 9/22/17   Chyna Montes MD  
atorvastatin (LIPITOR) 80 mg tablet Take 80 mg by mouth nightly. Deon Pramar MD  
hydroCHLOROthiazide (HYDRODIURIL) 25 mg tablet Take 25 mg by mouth daily. Deon Parmar MD  
DULoxetine (CYMBALTA) 60 mg capsule Take 120 mg by mouth daily. Deon Parmar MD  
aspirin delayed-release 81 mg tablet Take 81 mg by mouth daily.     Deon Parmar MD  
 levothyroxine (SYNTHROID) 200 mcg tablet Take 200 mcg by mouth Daily (before breakfast). Deon Parmar MD  
cholecalciferol, vitamin D3, (VITAMIN D3) 2,000 unit tab Take 2,000 Units by mouth daily. Deon Parmar MD  
 
REVIEW OF SYSTEMS:  See HPI for details General: positive  for fever, chills, sweats, weakness, Eyes: negative for blurred vision, eye pain, loss of vision, diplopia Ear Nose and Throat: negative for rhinorrhea, pharyngitis, otalgia, tinnitus, speech or swallowing difficulties Respiratory: positive for pleuritic pain, cough, sputum production,  SOB, LEROY Cardiology:  negative for chest pain, palpitations, orthopnea, PND, edema, syncope Gastrointestinal: positive  for abdominal pain, no  N/V, dysphagia, change in bowel habits, bleeding Genitourinary: negative for frequency, urgency, dysuria, hematuria, incontinence Muskuloskeletal : negative for arthralgia, myalgia Hematology: negative for easy bruising, bleeding, lymphadenopathy Dermatological: negative for rash, ulceration, mole change, new lesion Endocrine: negative for hot flashes or polydipsia Neurological: negative for headache, dizziness, confusion, focal weakness, paresthesia, memory loss, gait disturbance Psychological: negative for anxiety, depression, agitation Objective: VITALS:   
Visit Vitals /55 (BP 1 Location: Right arm, BP Patient Position: At rest) Pulse 89 Temp 100 °F (37.8 °C) Resp 24 Ht 5' 10\" (1.778 m) Wt 118.5 kg (261 lb 3.9 oz) SpO2 94% BMI 37.48 kg/m² PHYSICAL EXAM:  
General:    Alert, cooperative, no distress, appears stated age. HEENT: Atraumatic, anicteric sclerae, pink conjunctivae No oral ulcers, mucosa moist, throat clear Neck:  Supple, symmetrical,  thyroid: non tender Lungs:   Decreased b/l breath sounds  No Wheezing or Rhonchi. No rales. Chest wall:  No tenderness  No Accessory muscle use. Heart:   Regular  rhythm,  No  murmur   No edema Abdomen:   Soft, non-tender. Not distended. Bowel sounds normal 
Extremities: No cyanosis. No clubbing Skin:     Not pale. Not Jaundiced  No rashes Psych:  Good insight. Not depressed. Not anxious or agitated. Neurologic: EOMs intact. No facial asymmetry. No aphasia or slurred speech. Symmetrical strength, Alert and oriented X 4. 
_______________________________________________________________________ Care Plan discussed with: 
  Comments Patient x Discussed with patient in room. POC outlined and Questions answered Family RN x Care Manager Consultant:  hussein ROBERSON MD  
_______________________________________________________________________ Recommended Disposition:  
Home with Family HH/PT/OT/RN   
SNF/LTC   
VICKY   
________________________________________________________________________ TOTAL TIME:  65Minutes Critical Care Provided     Minutes non procedure based Comments >50% of visit spent in counseling and coordination of care x Chart review Discussion with patient and/or family and questions answered  
 
________________________________________________________________________ Signed: Unique Zelaya MD 
 
This note will not be viewable in [address] Procedures: see electronic medical records for all procedures/Xrays and details which were not copied into this note but were reviewed prior to creation of Plan. LAB DATA REVIEWED:   
Recent Results (from the past 24 hour(s)) CBC WITH AUTOMATED DIFF Collection Time: 01/15/19  3:49 PM  
Result Value Ref Range WBC 11.1 4.1 - 11.1 K/uL  
 RBC 4.76 4.10 - 5.70 M/uL  
 HGB 13.3 12.1 - 17.0 g/dL HCT 40.3 36.6 - 50.3 % MCV 84.7 80.0 - 99.0 FL  
 MCH 27.9 26.0 - 34.0 PG  
 MCHC 33.0 30.0 - 36.5 g/dL  
 RDW 13.2 11.5 - 14.5 % PLATELET 374 583 - 871 K/uL MPV 10.0 8.9 - 12.9 FL  
 NRBC 0.0 0  WBC ABSOLUTE NRBC 0.00 0.00 - 0.01 K/uL NEUTROPHILS 87 (H) 32 - 75 % LYMPHOCYTES 7 (L) 12 - 49 % MONOCYTES 5 5 - 13 % EOSINOPHILS 1 0 - 7 % BASOPHILS 0 0 - 1 % IMMATURE GRANULOCYTES 0 0.0 - 0.5 % ABS. NEUTROPHILS 9.6 (H) 1.8 - 8.0 K/UL  
 ABS. LYMPHOCYTES 0.8 0.8 - 3.5 K/UL  
 ABS. MONOCYTES 0.6 0.0 - 1.0 K/UL  
 ABS. EOSINOPHILS 0.1 0.0 - 0.4 K/UL  
 ABS. BASOPHILS 0.0 0.0 - 0.1 K/UL  
 ABS. IMM. GRANS. 0.0 0.00 - 0.04 K/UL  
 DF AUTOMATED PROTHROMBIN TIME + INR Collection Time: 01/15/19  3:49 PM  
Result Value Ref Range INR 1.0 0.9 - 1.1 Prothrombin time 10.7 9.0 - 11.1 sec METABOLIC PANEL, COMPREHENSIVE Collection Time: 01/15/19  3:49 PM  
Result Value Ref Range Sodium 138 136 - 145 mmol/L Potassium 3.7 3.5 - 5.1 mmol/L Chloride 103 97 - 108 mmol/L  
 CO2 26 21 - 32 mmol/L Anion gap 9 5 - 15 mmol/L Glucose 170 (H) 65 - 100 mg/dL BUN 12 6 - 20 MG/DL Creatinine 0.96 0.70 - 1.30 MG/DL  
 BUN/Creatinine ratio 13 12 - 20 GFR est AA >60 >60 ml/min/1.73m2 GFR est non-AA >60 >60 ml/min/1.73m2 Calcium 8.6 8.5 - 10.1 MG/DL Bilirubin, total 0.6 0.2 - 1.0 MG/DL  
 ALT (SGPT) 75 12 - 78 U/L  
 AST (SGOT) 59 (H) 15 - 37 U/L Alk. phosphatase 46 45 - 117 U/L Protein, total 7.3 6.4 - 8.2 g/dL Albumin 3.7 3.5 - 5.0 g/dL Globulin 3.6 2.0 - 4.0 g/dL A-G Ratio 1.0 (L) 1.1 - 2.2 LACTIC ACID Collection Time: 01/15/19  3:49 PM  
Result Value Ref Range Lactic acid 2.9 (HH) 0.4 - 2.0 MMOL/L  
URINALYSIS W/ RFLX MICROSCOPIC Collection Time: 01/15/19  3:49 PM  
Result Value Ref Range Color YELLOW/STRAW Appearance CLEAR CLEAR Specific gravity 1.017 1.003 - 1.030    
 pH (UA) 7.5 5.0 - 8.0 Protein NEGATIVE  NEG mg/dL Glucose NEGATIVE  NEG mg/dL Ketone NEGATIVE  NEG mg/dL Bilirubin NEGATIVE  NEG Blood NEGATIVE  NEG Urobilinogen 1.0 0.2 - 1.0 EU/dL Nitrites NEGATIVE  NEG Leukocyte Esterase NEGATIVE  NEG    
LIPASE  Collection Time: 01/15/19  3:49 PM  
 Result Value Ref Range Lipase 113 73 - 393 U/L  
SAMPLES BEING HELD Collection Time: 01/15/19  3:49 PM  
Result Value Ref Range SAMPLES BEING HELD 1RED COMMENT Add-on orders for these samples will be processed based on acceptable specimen integrity and analyte stability, which may vary by analyte. POC LACTIC ACID Collection Time: 01/15/19  8:53 PM  
Result Value Ref Range  Lactic Acid (POC) 2.91 (HH) 0.40 - 2.00 mmol/L

## 2019-01-16 NOTE — PROGRESS NOTES
Hospitalist Progress Note NAME: Juan Tucker :  1945 MRN:  [id number] Assessment / Plan: 
Sepsis unclear etiology, present on admission: concern for possible aspiration with colonoscopy yesterday. Less likely PNA or colonic bacterial translocation. No hypoxia. Pt denies cough. - CXR/abd xray with no acute abnormality in the chest or abdomen  
- CT A/P with no acute abdominal or pelvic pathology. Possible left lower lobe and lingular airspace disease. 
- blood cultures sent, will follow - d/c IV fluids - Abx changed to levaquin, flagyl to cover possible aspiration Non insulin dependent DM2 controlled without complication: 
- holding metformin (received IV contrast in ER) 
- lispro sliding scale Essential hypertension, CAD s/p stent x4: 
- con't losartan. HR 60s today, will not add bblocker at this time. - con't ASA, lipitor - prn nitrobid Fibromyalgia: 
- con't home cymbalta 
- con't home norco, frequency increased per Pt request 
Chronic constipation:  
- restart home daily miralax and probiotic 
- d/w Dr. Watson Fontaine, not planning barium enema at this time. Should be done outpatient when Pt has recovered. Essential tremor: con't primidone Hypothyroidism: con't synthroid Obesity (Body mass index is 37.48 kg/m²) 
  
Code Status: Full (Partner would be decision maker) DVT Prophylaxis: Lovenox Subjective: Chief Complaint / Reason for Physician Visit C/o abdominal pain. Anxious that he needs barium enema with chronic constipation and \"ribbon stools\". Discussed with RN events overnight. Review of Systems: 
Symptom Y/N Comments  Symptom Y/N Comments Fever/Chills n   Chest Pain n   
Poor Appetite n   Edema n   
Cough n   Abdominal Pain n   
Sputum n   Joint Pain SOB/LEROY n   Pruritis/Rash Nausea/vomit    Tolerating PT/OT Diarrhea    Tolerating Diet Constipation    Other Could NOT obtain due to:   
 
Objective: VITALS:  
 Last 24hrs VS reviewed since prior progress note. Most recent are: 
Patient Vitals for the past 24 hrs: 
 Temp Pulse Resp BP SpO2  
01/16/19 1106 99.4 °F (37.4 °C) 69 20 138/69 92 % 01/16/19 0737 99.5 °F (37.5 °C) 69 20 123/58 93 % 01/16/19 0355 98.6 °F (37 °C) 68 20 117/65 95 % 01/15/19 2314 100 °F (37.8 °C) 89 24 116/55 94 % 01/15/19 2215 (!) 101.2 °F (38.4 °C) 95 26 129/48 91 % 01/15/19 2200  98 27 154/74 93 % 01/15/19 2145  89 21 160/69 94 % 01/15/19 2133  97 28  95 % 01/15/19 2130    148/68   
01/15/19 2115  95 25 169/75 92 % 01/15/19 2100  98 18 159/71 93 % 01/15/19 2045  96 25 150/74 91 % 01/15/19 2030  93 22 157/68 93 % 01/15/19 2003 (!) 103 °F (39.4 °C)      
01/1945  99 26 133/42 91 % 01/15/19 1930  (!) 101 29 134/63 91 % 01/15/19 1915  96 25 149/62 92 % 01/15/19 1900  95 30 150/62 93 % 01/15/19 1845  95 20 139/58 94 % 01/15/19 1836  (!) 101 21  96 % 01/15/19 1827    (!) 164/94   
01/15/19 1755    119/72   
01/15/19 1753  87 26  93 % 01/15/19 1749  87 21  93 % 01/15/19 1742  90 26  96 % 01/15/19 1630  93 25 168/57 93 % 01/15/19 1615  (!) 118 (!) 32 168/68 95 % 01/15/19 1500  93 23 142/62 92 % 01/15/19 1445  94 25 156/65 94 % 01/15/19 1430  96 19 165/74 96 % 01/15/19 1402 99.1 °F (37.3 °C) (!) 101 18 (!) 187/135 95 % No intake or output data in the 24 hours ending 01/16/19 1147 PHYSICAL EXAM: 
General: WD, WN. Alert, cooperative, no acute distress   
EENT:  EOMI. Anicteric sclerae. MMM Resp:  CTA bilaterally, no wheezing or rales. No accessory muscle use CV:  Regular rhythm,  No edema GI:  Soft, moderately distended, Non tender.  +Bowel sounds Neurologic:  Alert and oriented X 3, normal speech, Psych:   Fair insight. Not anxious nor agitated Skin:  No rashes. No jaundice Reviewed most current lab test results and cultures  YES Reviewed most current radiology test results   YES 
 Review and summation of old records today    NO Reviewed patient's current orders and MAR    YES 
PMH/SH reviewed - no change compared to H&P 
________________________________________________________________________ Care Plan discussed with: 
  Comments Patient x Family RN x Care Manager Consultant  x GI Multidiciplinary team rounds were held today with , nursing, pharmacist and clinical coordinator. Patient's plan of care was discussed; medications were reviewed and discharge planning was addressed. ________________________________________________________________________ Total NON critical care TIME:  35 Minutes Total CRITICAL CARE TIME Spent:   Minutes non procedure based Comments >50% of visit spent in counseling and coordination of care x   
________________________________________________________________________ Rosi Conn MD  
 
Procedures: see electronic medical records for all procedures/Xrays and details which were not copied into this note but were reviewed prior to creation of Plan. LABS: 
I reviewed today's most current labs and imaging studies. Pertinent labs include: 
Recent Labs  
  01/15/19 
1549 WBC 11.1 HGB 13.3 HCT 40.3  Recent Labs  
  01/15/19 
1549   
K 3.7  CO2 26 * BUN 12  
CREA 0.96  
CA 8.6 ALB 3.7 TBILI 0.6 SGOT 59* ALT 75 INR 1.0 Signed: Rosi Conn MD

## 2019-01-16 NOTE — PROGRESS NOTES
Reason for Admission:   Pt was admitted on 1/15/19 d/t a Dx of sepsis unclear etiology, possible aspiration colonoscopy. . Less likely PNA. Non insulin D DM2. HTN. Fibromyalgia. RRAT Score:        10 Plan for utilizing home health:      Pt has no experience with HH. Not anticipated DC need Likelihood of Readmission:  LOW Transition of Care Plan:     Pt lives with life partner, Dawson Higginbotham in two story home with 2 MYRIAM. Pt has no DME. Pt has no experience with HH or SNF. Pt I W ADL and drives. Pharmacy is [facility]. Pt is planning on discharging home on 1/17/19. Family will transport Pt home mid morning if DC order is in.   
 
CM Specialist was emailed to make PCP appt in AM. CM will continue to monitor discharge plan. Care Management Interventions PCP Verified by CM: Yes 
Palliative Care Criteria Met (RRAT>21 & CHF Dx)?: No 
Mode of Transport at Discharge: Other (see comment) Transition of Care Consult (CM Consult): Discharge Planning MyChart Signup: No 
Discharge Durable Medical Equipment: No 
Physical Therapy Consult: No 
Occupational Therapy Consult: No 
Speech Therapy Consult: No 
Current Support Network: Lives with Spouse, Own Home Confirm Follow Up Transport: Family Plan discussed with Pt/Family/Caregiver: Yes Freedom of Choice Offered: Yes Discharge Location Discharge Placement: Home with family assistance Goldie Smith CM Ext [phone]

## 2019-01-17 VITALS
HEART RATE: 63 BPM | WEIGHT: 261.25 LBS | TEMPERATURE: 97.5 F | BODY MASS INDEX: 37.4 KG/M2 | SYSTOLIC BLOOD PRESSURE: 127 MMHG | HEIGHT: 70 IN | OXYGEN SATURATION: 95 % | DIASTOLIC BLOOD PRESSURE: 61 MMHG | RESPIRATION RATE: 18 BRPM

## 2019-01-17 LAB
ANION GAP SERPL CALC-SCNC: 5 MMOL/L (ref 5–15)
BUN SERPL-MCNC: 9 MG/DL (ref 6–20)
BUN/CREAT SERPL: 11 (ref 12–20)
CALCIUM SERPL-MCNC: 8.3 MG/DL (ref 8.5–10.1)
CHLORIDE SERPL-SCNC: 107 MMOL/L (ref 97–108)
CO2 SERPL-SCNC: 26 MMOL/L (ref 21–32)
CREAT SERPL-MCNC: 0.83 MG/DL (ref 0.7–1.3)
ERYTHROCYTE [DISTWIDTH] IN BLOOD BY AUTOMATED COUNT: 13.6 % (ref 11.5–14.5)
FLUID CULTURE, SPNG2: NORMAL
GLUCOSE BLD STRIP.AUTO-MCNC: 145 MG/DL (ref 65–100)
GLUCOSE SERPL-MCNC: 145 MG/DL (ref 65–100)
HCT VFR BLD AUTO: 34.6 % (ref 36.6–50.3)
HGB BLD-MCNC: 11 G/DL (ref 12.1–17)
L PNEUMO1 AG UR QL IA: NEGATIVE
MCH RBC QN AUTO: 27.2 PG (ref 26–34)
MCHC RBC AUTO-ENTMCNC: 31.8 G/DL (ref 30–36.5)
MCV RBC AUTO: 85.6 FL (ref 80–99)
NRBC # BLD: 0 K/UL (ref 0–0.01)
NRBC BLD-RTO: 0 PER 100 WBC
ORGANISM ID, SPNG3: NORMAL
PLATELET # BLD AUTO: 200 K/UL (ref 150–400)
PLEASE NOTE, SPNG4: NORMAL
PMV BLD AUTO: 10 FL (ref 8.9–12.9)
POTASSIUM SERPL-SCNC: 3.8 MMOL/L (ref 3.5–5.1)
RBC # BLD AUTO: 4.04 M/UL (ref 4.1–5.7)
S PNEUM AG SPEC QL LA: NEGATIVE
SERVICE CMNT-IMP: ABNORMAL
SODIUM SERPL-SCNC: 138 MMOL/L (ref 136–145)
SPECIMEN SOURCE: NORMAL
SPECIMEN SOURCE: NORMAL
SPECIMEN, SPNG1: NORMAL
WBC # BLD AUTO: 9.2 K/UL (ref 4.1–11.1)

## 2019-01-17 PROCEDURE — 80048 BASIC METABOLIC PNL TOTAL CA: CPT

## 2019-01-17 PROCEDURE — 74011250637 HC RX REV CODE- 250/637: Performed by: INTERNAL MEDICINE

## 2019-01-17 PROCEDURE — 85027 COMPLETE CBC AUTOMATED: CPT

## 2019-01-17 PROCEDURE — 74011250636 HC RX REV CODE- 250/636: Performed by: INTERNAL MEDICINE

## 2019-01-17 PROCEDURE — 82962 GLUCOSE BLOOD TEST: CPT

## 2019-01-17 PROCEDURE — 36415 COLL VENOUS BLD VENIPUNCTURE: CPT

## 2019-01-17 PROCEDURE — 90686 IIV4 VACC NO PRSV 0.5 ML IM: CPT | Performed by: INTERNAL MEDICINE

## 2019-01-17 PROCEDURE — 90471 IMMUNIZATION ADMIN: CPT

## 2019-01-17 PROCEDURE — 74011636637 HC RX REV CODE- 636/637: Performed by: INTERNAL MEDICINE

## 2019-01-17 RX ORDER — METRONIDAZOLE 500 MG/1
500 TABLET ORAL EVERY 12 HOURS
Qty: 12 TAB | Refills: 0 | Status: SHIPPED | OUTPATIENT
Start: 2019-01-17 | End: 2019-01-23

## 2019-01-17 RX ORDER — INSULIN PUMP SYRINGE, 3 ML
EACH MISCELLANEOUS
Qty: 1 KIT | Refills: 0 | Status: SHIPPED | OUTPATIENT
Start: 2019-01-17 | End: 2020-11-23

## 2019-01-17 RX ORDER — LEVOFLOXACIN 750 MG/1
750 TABLET ORAL EVERY 24 HOURS
Qty: 6 TAB | Refills: 0 | Status: SHIPPED | OUTPATIENT
Start: 2019-01-17 | End: 2019-01-23

## 2019-01-17 RX ADMIN — LEVOTHYROXINE SODIUM 200 MCG: 100 TABLET ORAL at 05:35

## 2019-01-17 RX ADMIN — LOSARTAN POTASSIUM 25 MG: 25 TABLET, FILM COATED ORAL at 08:29

## 2019-01-17 RX ADMIN — METRONIDAZOLE 500 MG: 250 TABLET ORAL at 08:30

## 2019-01-17 RX ADMIN — INFLUENZA VIRUS VACCINE 0.5 ML: 15; 15; 15; 15 SUSPENSION INTRAMUSCULAR at 09:34

## 2019-01-17 RX ADMIN — PRIMIDONE 25 MG: 50 TABLET ORAL at 08:29

## 2019-01-17 RX ADMIN — Medication 10 ML: at 05:35

## 2019-01-17 RX ADMIN — Medication 1 CAPSULE: at 08:29

## 2019-01-17 RX ADMIN — ENOXAPARIN SODIUM 40 MG: 40 INJECTION SUBCUTANEOUS at 08:29

## 2019-01-17 RX ADMIN — DULOXETINE 120 MG: 30 CAPSULE, DELAYED RELEASE ORAL at 08:29

## 2019-01-17 RX ADMIN — ASPIRIN 81 MG: 81 TABLET, COATED ORAL at 08:29

## 2019-01-17 RX ADMIN — INSULIN LISPRO 2 UNITS: 100 INJECTION, SOLUTION INTRAVENOUS; SUBCUTANEOUS at 08:28

## 2019-01-17 NOTE — DISCHARGE INSTRUCTIONS
HOSPITALIST DISCHARGE INSTRUCTIONS    NAME: Rajesh Pittman   :  1945   MRN:  [id number]     Date/Time:  2019 9:38 AM    ADMIT DATE: 1/15/2019   DISCHARGE DATE: 2019     Attending Physician: Jena Cummings MD    DISCHARGE DIAGNOSIS:  Possible aspiration event  Non insulin dependent diabetes  Essential hypertension  Fibromyalgia  Chronic constipation  Essential tremor  Hypothyroidism       MEDICATIONS:  See above    · It is important that you take the medication exactly as they are prescribed. · Keep your medication in the bottles provided by the pharmacist and keep a list of the medication names, dosages, and times to be taken in your wallet. · Do not take other medications without consulting your doctor. Pain Management: per above medications    What to do at [address]:  Resume previous diet    Recommended activity: Activity as tolerated    If you have questions regarding the hospital related prescriptions or hospital related issues please call [facility] at . You can always direct your questions to your primary care doctor if you are unable to reach your hospital physician; your PCP works as an extension of your hospital doctor just like your hospital doctor is an extension of your PCP for your time at [facility]. If you experience any of the following symptoms then please call your primary care physician or return to the emergency room if you cannot get hold of your doctor:  Fever, chills, nausea, vomiting, diarrhea, change in mentation, falling, bleeding, shortness of breath    Additional Instructions:      Bring these papers with you to your follow up appointments. The papers will help your doctors be sure to continue the care plan from the hospital.              Information obtained by :  I understand that if any problems occur once I am at home I am to contact my physician.     I understand and acknowledge receipt of the instructions indicated above.                                                                                                                                            Physician's or R.N.'s Signature                                                                  Date/Time                                                                                                                                              Patient or Representative Signature                                                          Date/Time

## 2019-01-17 NOTE — PROGRESS NOTES
Pt educated on discharge, prescriptions, and follow-up appointments. Pt verbalized understanding. [facility] With , the patient stated he would make when he gets home.

## 2019-01-17 NOTE — DISCHARGE SUMMARY
Hospitalist Discharge Summary     Patient ID:  Adrien Bingham  [id number]  43 y.o.  1945    PCP on record: Prasanth Jimenez NP    Admit date: 1/15/2019  Discharge date and time: 1/17/2019      DISCHARGE DIAGNOSIS:  Sepsis unclear etiology, present on admission (possible aspiration during colonoscopy)  Non insulin dependent DM2 controlled without complication  Essential hypertension, CAD s/p stent x4  Fibromyalgia  Chronic constipation  Essential tremor  Hypothyroidism  Obesity (Body mass index is 37.48 kg/m²)       CONSULTATIONS:  None    Excerpted HPI from H&P of Kalia Gallegos MD:  Toan Rausch is a 68 y.o.  male with known history as listed below presents to ED with complaint noted above. Available records were reviewed at the time of H&P. Patient  is a 78-year-old male with past medical history of diabetes mellitus 2, hypertension , CADs/p stent  x4, colon polyps who presented to the ED for sudden onset of fever and chills associated with headaches. Patient reporting getting colonoscopy this morning, was found to have polyps. When He went home,  he felt unwell, was complaining of diffuse abdominal pain and chills. He reported productive cough and shortness of breath. He denies any chest pain, diarrhea nausea or vomiting. In the ED, patient was found to be febrile with temperature of 103, tachycardic and hypertensive. Review of his labs showed elevated white blood cell count,  lactic acid of 2.9 . CT abdomen and pelvis was done and was negative for  any acute abdominal pathology but showed left lower lobe and lingula airspace disease concerning for pneumonia. Therefore patient admitted for severe sepsis secondary to community-acquired pneumonia    ______________________________________________________________________  DISCHARGE SUMMARY/HOSPITAL COURSE:  for full details see H&P, daily progress notes, labs, consult notes.      Hospital course:  Sepsis unclear etiology, present on admission: concern for possible aspiration with colonoscopy on the day of admission. Less likely PNA or colonic bacterial translocation. No hypoxia. Pt denies cough. Pt had CXR/abd xray with no acute abnormality in the chest or abdomen. He had CT A/P with no acute abdominal or pelvic pathology. Possible left lower lobe and lingular airspace disease. Blood cultures were negative. Pt was discharged with levaquin, flagyl to cover possible aspiration and intraabominal ramon. Non insulin dependent DM2 controlled without complication: resume metformin on discharge (it was held while hospitalized due to IV contrast for CT). Essential hypertension, CAD s/p stent x4: con't losartan. HR 60s, so no bblocker added. Pt should con't ASA, lipitor on discharge. Fibromyalgia: con't home cymbalta and home norco prn  Chronic constipation:  home daily miralax and probiotic. Rx for Linzess sent to Pt's pharmacy by Dr. Spring Perez. I d/w Dr. Spring Perez, not planning barium enema at this time. Should be done outpatient when Pt has recovered. Essential tremor: con't primidone  Hypothyroidism: con't synthroid  Obesity (Body mass index is 37.48 kg/m²)     _______________________________________________________________________  Patient seen and examined by me on discharge day. Pertinent Findings:  Gen: obese, awake, appropriate, NAD  HEENT: cl fletcher, no lesions  Chest: CTA bilaterally, no crackles or wheezes  Cv: RRR, no murmur, no edema  Abd: soft, NT, moderately distended, BS+, no mass  Neuro: CN intact  _______________________________________________________________________  DISCHARGE MEDICATIONS:   Current Discharge Medication List      START taking these medications    Details   levoFLOXacin (LEVAQUIN) 750 mg tablet Take 1 Tab by mouth every twenty-four (24) hours for 6 days. Qty: 6 Tab, Refills: 0      metroNIDAZOLE (FLAGYL) 500 mg tablet Take 1 Tab by mouth every twelve (12) hours for 6 days.   Qty: 12 Tab, Refills: 0 Blood-Glucose Meter monitoring kit For testing blood glucose twice daily  Qty: 1 Kit, Refills: 0      glucose blood VI test strips (PHARMACIST CHOICE) strip For testing blood glucose twice daily  Qty: 100 Strip, Refills: 0         CONTINUE these medications which have NOT CHANGED    Details   losartan (COZAAR) 25 mg tablet Take 25 mg by mouth daily. primidone (MYSOLINE) 50 mg tablet Take 25 mg by mouth daily. calcium-cholecalciferol, d3, (CALCIUM 600 + D) 600-125 mg-unit tab Take 2 Tabs by mouth daily. HYDROcodone-acetaminophen (NORCO) 7.5-325 mg per tablet Take 1 Tab by mouth every twelve (12) hours as needed for Pain.      polyethylene glycol (MIRALAX) 17 gram/dose powder Take 17 g by mouth daily. Qty: 289 g, Refills: 0      metFORMIN (GLUCOPHAGE) 1,000 mg tablet Take 1 Tab by mouth two (2) times daily (with meals). START TAKING 9/23 WITH DINNER  Qty: 30 Tab, Refills: 0      atorvastatin (LIPITOR) 80 mg tablet Take 80 mg by mouth nightly. hydroCHLOROthiazide (HYDRODIURIL) 25 mg tablet Take 25 mg by mouth daily. DULoxetine (CYMBALTA) 60 mg capsule Take 120 mg by mouth daily. aspirin delayed-release 81 mg tablet Take 81 mg by mouth daily. levothyroxine (SYNTHROID) 200 mcg tablet Take 200 mcg by mouth Daily (before breakfast). cholecalciferol, vitamin D3, (VITAMIN D3) 2,000 unit tab Take 2,000 Units by mouth daily. My Recommended Diet, Activity, Wound Care, and follow-up labs are listed in the patient's Discharge Insturctions which I have personally completed and reviewed.     ______________________________________________________________________    Risk of deterioration: Low    Condition at Discharge:  Stable  ______________________________________________________________________    Disposition  Home with family, no needs  ______________________________________________________________________    Care Plan discussed with:   Patient, RN, Consultant ( Carter Mcintosh)    ______________________________________________________________________    Code Status: Full Code  ______________________________________________________________________      Follow up with:   PCP : Ricardo Pierce NP  Follow-up Information     Follow up With Specialties Details Why Contact Info    Ricardo Pierce NP Nurse Practitioner Go on 1/23/2019 Hospital follow-up scheduled at 3:00pm ( If you have questions or need to reschedule please call [address] [place] [address]      Tesha Patricio MD Gastroenterology  As needed [place] [address]  [address] [place] [address] [phone]                Total time in minutes spent coordinating this discharge (includes going over instructions, follow-up, prescriptions, and preparing report for sign off to her PCP) :  35 minutes    Signed:  Rosamaria Fletcher MD

## 2019-01-17 NOTE — PROGRESS NOTES
Gastroenterology Progress Note 1/17/2019 Admit Date: 1/15/2019 Subjective: Follow up for: fever, possible pneumonia Slept well. No new issues. Delightful. WBC normalized. Patient was seen in rounds by me today. Current Facility-Administered Medications Medication Dose Route Frequency  levoFLOXacin (LEVAQUIN) tablet 750 mg  750 mg Oral Q24H  
 acetaminophen (TYLENOL) tablet 650 mg  650 mg Oral Q6H PRN  
 metroNIDAZOLE (FLAGYL) tablet 500 mg  500 mg Oral Q12H  
 butalbital-acetaminophen-caffeine (FIORICET, ESGIC) -40 mg per tablet 1 Tab  1 Tab Oral Q6H PRN  
 HYDROcodone-acetaminophen (NORCO) 7.5-325 mg per tablet 1 Tab  1 Tab Oral Q4H PRN  polyethylene glycol (MIRALAX) packet 17 g  17 g Oral DAILY  lactobac ac& pc-s.therm-b.anim (VIKASH Q/RISAQUAD)  1 Cap Oral DAILY  nitroglycerin (NITROBID) 2 % ointment 1 Inch  1 Inch Topical Q6H PRN  
 aspirin delayed-release tablet 81 mg  81 mg Oral DAILY  atorvastatin (LIPITOR) tablet 80 mg  80 mg Oral QHS  DULoxetine (CYMBALTA) capsule 120 mg  120 mg Oral DAILY  levothyroxine (SYNTHROID) tablet 200 mcg  200 mcg Oral ACB  losartan (COZAAR) tablet 25 mg  25 mg Oral DAILY  primidone (MYSOLINE) tablet 25 mg  25 mg Oral DAILY  insulin lispro (HUMALOG) injection   SubCUTAneous AC&HS  
 glucose chewable tablet 16 g  4 Tab Oral PRN  
 dextrose (D50W) injection syrg 12.5-25 g  12.5-25 g IntraVENous PRN  
 glucagon (GLUCAGEN) injection 1 mg  1 mg IntraMUSCular PRN  
 sodium chloride (NS) flush 5-40 mL  5-40 mL IntraVENous Q8H  
 sodium chloride (NS) flush 5-40 mL  5-40 mL IntraVENous PRN  
 enoxaparin (LOVENOX) injection 40 mg  40 mg SubCUTAneous Q24H  
 influenza vaccine 2018-19 (6 mos+)(PF) (FLUARIX QUAD/FLULAVAL QUAD) injection 0.5 mL  0.5 mL IntraMUSCular PRIOR TO DISCHARGE Objective:  
 
Blood pressure 146/72, pulse 79, temperature 98.2 °F (36.8 °C), resp.  rate 20, height 5' 10\" (1.778 m), weight 118.5 kg (261 lb 3.9 oz), SpO2 97 %. No intake/output data recorded. No intake/output data recorded. Physical Examination:  
 
 
General:AAO x 3, HEENT:  EOMI, Heart: S1, S2, RRR 
GI: Soft,  + bowel sounds. Data Review Recent Results (from the past 24 hour(s)) GLUCOSE, POC Collection Time: 01/16/19  8:05 AM  
Result Value Ref Range Glucose (POC) 138 (H) 65 - 100 mg/dL Performed by Katie Jones (RN)   
GLUCOSE, POC Collection Time: 01/16/19 11:09 AM  
Result Value Ref Range Glucose (POC) 173 (H) 65 - 100 mg/dL Performed by Yumiko Centeno (PCT) GLUCOSE, POC Collection Time: 01/16/19  4:31 PM  
Result Value Ref Range Glucose (POC) 126 (H) 65 - 100 mg/dL Performed by Yumiko Centeno (PCT) GLUCOSE, POC Collection Time: 01/16/19  9:01 PM  
Result Value Ref Range Glucose (POC) 187 (H) 65 - 100 mg/dL Performed by Missy Smith (PCT) METABOLIC PANEL, BASIC Collection Time: 01/17/19  5:36 AM  
Result Value Ref Range Sodium 138 136 - 145 mmol/L Potassium 3.8 3.5 - 5.1 mmol/L Chloride 107 97 - 108 mmol/L  
 CO2 26 21 - 32 mmol/L Anion gap 5 5 - 15 mmol/L Glucose 145 (H) 65 - 100 mg/dL BUN 9 6 - 20 MG/DL Creatinine 0.83 0.70 - 1.30 MG/DL  
 BUN/Creatinine ratio 11 (L) 12 - 20 GFR est AA >60 >60 ml/min/1.73m2 GFR est non-AA >60 >60 ml/min/1.73m2 Calcium 8.3 (L) 8.5 - 10.1 MG/DL  
CBC W/O DIFF Collection Time: 01/17/19  5:36 AM  
Result Value Ref Range WBC 9.2 4.1 - 11.1 K/uL  
 RBC 4.04 (L) 4.10 - 5.70 M/uL  
 HGB 11.0 (L) 12.1 - 17.0 g/dL HCT 34.6 (L) 36.6 - 50.3 % MCV 85.6 80.0 - 99.0 FL  
 MCH 27.2 26.0 - 34.0 PG  
 MCHC 31.8 30.0 - 36.5 g/dL  
 RDW 13.6 11.5 - 14.5 % PLATELET 335 857 - 965 K/uL MPV 10.0 8.9 - 12.9 FL  
 NRBC 0.0 0  WBC ABSOLUTE NRBC 0.00 0.00 - 0.01 K/uL Recent Labs  
  01/17/19 
0536 01/15/19 
1549 WBC 9.2 11.1 HGB 11.0* 13.3 HCT 34.6* 40.3  238 Recent Labs  
  01/17/19 
0536 01/15/19 
1549  138  
K 3.8 3.7  103 CO2 26 26 BUN 9 12 CREA 0.83 0.96  
* 170* CA 8.3* 8.6 Recent Labs  
  01/15/19 
1549 SGOT 59* AP 46  
TP 7.3 ALB 3.7 GLOB 3.6 LPSE 113 Recent Labs  
  01/15/19 
1549 INR 1.0 PTP 10.7 No results for input(s): FE, TIBC, PSAT, FERR in the last 72 hours. No results found for: FOL, RBCF No results for input(s): PH, PCO2, PO2 in the last 72 hours. No results for input(s): CPK, CKNDX, TROIQ in the last 72 hours. No lab exists for component: CPKMB No results found for: CHOL, CHOLX, CHLST, CHOLV, HDL, LDL, LDLC, DLDLP, TGLX, TRIGL, TRIGP, CHHD, CHHDX No components found for: [address] [place] [address] Lab Results Component Value Date/Time Color YELLOW/STRAW 01/15/2019 03:49 PM  
 Appearance CLEAR 01/15/2019 03:49 PM  
 Specific gravity 1.017 01/15/2019 03:49 PM  
 Specific gravity 1.010 01/18/2018 02:15 AM  
 pH (UA) 7.5 01/15/2019 03:49 PM  
 Protein NEGATIVE  01/15/2019 03:49 PM  
 Glucose NEGATIVE  01/15/2019 03:49 PM  
 Ketone NEGATIVE  01/15/2019 03:49 PM  
 Bilirubin NEGATIVE  01/15/2019 03:49 PM  
 Urobilinogen 1.0 01/15/2019 03:49 PM  
 Nitrites NEGATIVE  01/15/2019 03:49 PM  
 Leukocyte Esterase NEGATIVE  01/15/2019 03:49 PM  
 Epithelial cells FEW 03/06/2018 02:07 AM  
 Bacteria NEGATIVE  03/06/2018 02:07 AM  
 WBC 0-4 03/06/2018 02:07 AM  
 RBC 0-5 03/06/2018 02:07 AM  
 
  
ROS: -CP, SOB, Dysuria, palpitations, cough. Assessment: 
 
Active Problems: HCAP (healthcare-associated pneumonia) (1/15/2019) Plan/Discussion:  
 
· He has had no fevers since 11 am yesterday. On PO Levaquin and Flagyl. No BM but tolerating food without issue. · He can be discharged from my perspective. · PO Linzess 145 mcg PO every day as OP. · ACBE in 2-3 weeks time. I will arrange it. Signed By: Robert Howard.  Rodolfo Bustillo MD 
 
1/17/2019  7:40 AM

## 2019-01-17 NOTE — PROGRESS NOTES
Oncology Nursing Communication Tool 7:27 AM 
1/17/2019 Bedside and Verbal shift change report given to [facility], RN (incoming nurse) by Lalito Bailon (outgoing nurse) on [facility]. Report included the following information SBAR, Kardex and MAR. Shift Summary: pt stable throughout shift. Received one dose of PRN pain meds. Oncology Shift Note Admission Date 1/15/2019 Admission Diagnosis HCAP (healthcare-associated pneumonia) Code Status Full Code Consults None Cardiac Monitoring [] Yes [] No  
  
Purposeful Hourly Rounding [] Yes   
Sheldon Score Total Score: 2 Sheldon score 3 or > [] Bed Alarm [] Avasys [] 1:1 sitter [] Patient refused (Place signed refusal form in chart) Pain Managed [] Yes [] No  
 Key Pain Meds HYDROcodone-acetaminophen (NORCO) 7.5-325 mg per tablet Take 1 Tab by mouth every twelve (12) hours as needed for Pain. Influenza Vaccine Received Flu Vaccine for Current Season (usually Sept-March): No  
 Patient/Guardian Refused (Notify MD): No  
  
Oxygen needs? [] Room air Oxygen @  []1L    []2L    []3L   []4L    []5L   []6L Use home O2? [] Yes [] No 
Perform O2 challenge test using  smartphrase (.oxygenchallenge) Last bowel movement Last Bowel Movement Date: 01/15/19 
bowel movement Urinary Catheter LDAs Peripheral IV 01/15/19 Right Antecubital (Active) Site Assessment Clean, dry, & intact 1/17/2019  3:49 AM  
Phlebitis Assessment 0 1/17/2019  3:49 AM  
Infiltration Assessment 0 1/17/2019  3:49 AM  
Dressing Status Clean, dry, & intact 1/17/2019  3:49 AM  
Dressing Type Transparent 1/17/2019  3:49 AM  
Hub Color/Line Status Pink 1/17/2019  3:49 AM  
Action Taken Blood drawn 1/15/2019  3:57 PM  
                  
  
Readmission Risk Assessment Tool Score Low Risk   
      
 10 Total Score 3 Has Seen PCP in Last 6 Months (Yes=3, No=0) 2 . Living with Significant Other. Assisted Living. LTAC. SNF. or  
Rehab  
 5 Pt. Coverage (Medicare=5 , Medicaid, or Self-Pay=4) Criteria that do not apply:  
 Patient Length of Stay (>5 days = 3) IP Visits Last 12 Months (1-3=4, 4=9, >4=11) Charlson Comorbidity Score (Age + Comorbid Conditions) Expected Length of Stay 4d 19h Actual Length of Stay 2 [address],[place]

## 2019-01-20 LAB
BACTERIA SPEC CULT: NORMAL
SERVICE CMNT-IMP: NORMAL

## 2019-01-30 ENCOUNTER — OFFICE VISIT (OUTPATIENT)
Dept: ENDOCRINOLOGY | Age: 74
End: 2019-01-30

## 2019-01-30 VITALS
HEIGHT: 70 IN | DIASTOLIC BLOOD PRESSURE: 62 MMHG | BODY MASS INDEX: 37.19 KG/M2 | SYSTOLIC BLOOD PRESSURE: 127 MMHG | WEIGHT: 259.8 LBS | HEART RATE: 64 BPM

## 2019-01-30 DIAGNOSIS — E29.1 HYPOGONADISM IN MALE: Primary | ICD-10-CM

## 2019-01-30 DIAGNOSIS — R61 HYPERHIDROSIS: ICD-10-CM

## 2019-01-30 DIAGNOSIS — N40.0 BENIGN PROSTATIC HYPERPLASIA WITHOUT LOWER URINARY TRACT SYMPTOMS: ICD-10-CM

## 2019-01-30 NOTE — PROGRESS NOTES
Chief Complaint   Patient presents with    Sweats     \"extreme\" hyperhydrosis  PCP and pharmacy verified    Hypogonadism   Records reviewed  History of Present Illness: Evon Menard is a 68 y.o. male who I was asked to see in consult by Dr. Anders Schwab for evaluation of low testosterone. In November 2018 he presented to his PCP with the complaint of hyperhidrosis. His Total Testosterone was 202. He was then referred for further evaluation. Pt notes he has been having issues of increased sweating for the last 2-3 years. Pt notes that he keeps the temperature in his house in the 60s. He notes he has increased sweating \"all over my body\". He denies any traumas, illness or injuries that predated the onset of his increased sweating. Pt has hx of hypothyroidism, which is well controlled on LT4 200mcg daily (TSH 0.529 with FT4 of 1.7 in November 2018). Pt also has hx of DM, which is well controlled on Metformin 1000mg BID only. He does have Neuropathy from the DM. \"My A1C is always under 7%. \"  Pt has chronic pain issues, due to FM, DDD and OA. He is scheduled for a spinal stimulator in the near future. He is followed by a pain specialist. He does take PRN Hydrocodone for his pain. He was only started on the Hydrocodone about 3 months ago. He also has hx of FROILAN and he is using a CPAP. Pt notes the sweating can occur \"almost any time during the summer, but at other times it can occur with minimal activities\". He does not note any association of the increased sweating with food or drinking. He does not have issues of CP, tremors, HA or vision loss when he has the increased sweats. He has never sired children. He started puberty around the age of 16-14. He seems to recall reaching his pubertal milestones at the same time as his peers. No hx of testicular traumas. He denies any hx of head traumas or concussions. No hx of inguinal hernia.   He denies issues of CP, SOB he has chronic constipation. Pt does reports that \"I had a doctor tell me my prostate was a little swollen\", but he does not have any issues of LUTS. He denies any issues of changes in shoe size, ring size or jaw size. Past Medical History:   Diagnosis Date    Abdominal adhesions 2000    Arthritis     all joints; Degenerative disk disease    CAD (coronary artery disease)     stents x4, followed by Dr. Siobhan Erickson Chronic constipation     Chronic pain     all my joints    Diabetic neuropathy ([address]     Type II    Fibromyalgia     GERD (gastroesophageal reflux disease)     HLD (hyperlipidemia)     Hypertension     Ill-defined condition     fibromyalgia    Neuropathy     hands/feet    Sleep apnea     uses Cpap    Thyroid disease      Past Surgical History:   Procedure Laterality Date    ABDOMEN SURGERY PROC UNLISTED  early 2000's    abdominal hernia repair    ABDOMEN SURGERY PROC UNLISTED  2000's    adhesions from appendix sx.  ABDOMEN SURGERY PROC UNLISTED  2000's    colectomy: when removing adhesions, nicked intestines, removed 8\" of intestine    COLONOSCOPY N/A 12/18/2017    COLONOSCOPY performed by Mike Gibbons MD at [place] [address] COLONOSCOPY N/A 1/15/2019    COLONOSCOPY performed by Lanny Cyr [facility] at [facility] ENDOSCOPY    HX [address] 2014    HX ORTHOPAEDIC Bilateral 2000, 2007    hip replacement     Current Outpatient Medications   Medication Sig    Blood-Glucose Meter monitoring kit For testing blood glucose twice daily    glucose blood VI test strips (PHARMACIST CHOICE) strip For testing blood glucose twice daily    losartan (COZAAR) 25 mg tablet Take 25 mg by mouth daily.  primidone (MYSOLINE) 50 mg tablet Take 50 mg by mouth daily.  calcium-cholecalciferol, d3, (CALCIUM 600 + D) 600-125 mg-unit tab Take 2 Tabs by mouth daily.     HYDROcodone-acetaminophen (NORCO) 7.5-325 mg per tablet Take 1 Tab by mouth every twelve (12) hours as needed for Pain.  polyethylene glycol (MIRALAX) 17 gram/dose powder Take 17 g by mouth daily.  metFORMIN (GLUCOPHAGE) 1,000 mg tablet Take 1 Tab by mouth two (2) times daily (with meals). START TAKING 9/23 WITH DINNER    atorvastatin (LIPITOR) 80 mg tablet Take 80 mg by mouth nightly.  hydroCHLOROthiazide (HYDRODIURIL) 25 mg tablet Take 25 mg by mouth daily.  DULoxetine (CYMBALTA) 60 mg capsule Take 120 mg by mouth daily.  aspirin delayed-release 81 mg tablet Take 81 mg by mouth daily.  levothyroxine (SYNTHROID) 200 mcg tablet Take 200 mcg by mouth Daily (before breakfast).  cholecalciferol, vitamin D3, (VITAMIN D3) 2,000 unit tab Take 2,000 Units by mouth daily. No current facility-administered medications for this visit. No Known Allergies  Family History   Problem Relation Age of Onset    Heart Disease Brother     Obesity Brother     Other Mother         hiatal hernia    Arthritis Mother         back, spine    Heart Disease Mother         Angina    Psychiatric Disorder Mother         depression    No Known Problems Father     Diabetes Sister 16        Type 1    Arthritis Sister     Cancer Neg Hx      Social History     Socioeconomic History    Marital status:      Spouse name: Not on file    Number of children: Not on file    Years of education: Not on file    Highest education level: Not on file   Social Needs    Financial resource strain: Not on file    Food insecurity - worry: Not on file    Food insecurity - inability: Not on file   [employer] needs - medical: Not on file   [employer] needs - non-medical: Not on file   Occupational History    Not on file   Tobacco Use    Smoking status: Former Smoker     Years: 40.00    Smokeless tobacco: Never Used   Substance and Sexual Activity    Alcohol use:  Yes     Alcohol/week: 3.6 oz     Types: 2 Shots of liquor, 4 Glasses of wine per week    Drug use: No    Sexual activity: Not on file Other Topics Concern    Not on file   Social History Narrative    Not on file     Review of Systems:  - As noted in HPI    Physical Examination:  Blood pressure 127/62, pulse 64, height 5' 10\" (1.778 m), weight 259 lb 12.8 oz (117.8 kg). - General: pleasant, no distress, good eye contact  - HEENT: no exopthalmos, no periorbital edema, no scleral/conjunctival injection, EOMI, no lid lag or stare  - Neck: supple, no thyromegaly, masses, lymph nodes, or carotid bruits, no supraclavicular or dorsocervical fat pads  - Cardiovascular: regular, normal rate, normal S1 and S2, no murmurs/rubs/gallops, 2+ dorsalis pedis pulses bilaterally  - Respiratory: clear to auscultation bilaterally  - Gastrointestinal: soft, nontender, nondistended, no masses, no hepatosplenomegaly  - Musculoskeletal: no proximal muscle weakness in upper or lower extremities  - Integumentary: no acanthosis nigricans, no rashes, no edema,         -    Genital exam: Testicles descended, no masses, or tenderness  - Neurological: reflexes 2+ at biceps, no tremor  - Psychiatric: normal mood and affect    Data Reviewed:     Assessment/Plan:   1. Hypogonadism in male    2. Hyperhidrosis    3. Benign prostatic hyperplasia without lower urinary tract symptoms    1) Will repeat the testosterone (AM fasting sample) and will check an FSH, LH and prolactin level. Will also check a PSA and CBC. We discussed that if his testosterone is low, that can cause the hyperhidrosis and treatment of the hypogonadism could help with the excessive sweating. I have been having a lot of difficulty in 2019 with Medicare refusing topical testosterone preparations. Per the on-line formulary the only topical testosterone Medicare will cover is Generic Testosterone Gel 1%. Once the above labs are back we can discuss treatment options. 2) He does not have symptoms that would fit with acromegaly or pheochromocytoma.  As it can sometimes be asymptomatic, other than increased sweating, the Acromegaly is a potential cause for hyperhidrosis. I will order an IGF-1 level. If we do not find an endocrine cause for his hyperhidrosis, or if treating #1 does not help we can discuss other treatment options. He has neuropathy, and the possibility of autonomic neuropathy could cause increase sweating, but this is very rare, but treatment with clonidine, which is a sympatcomimetic and can suppress CNS sympathetic symptoms. Pt voices understanding and agreement with the plan. Pain noted and pt was recommended to call his PCP for further evaluation and treatment, as needed    RTC 3 months     Follow-up Disposition:  Return in about 3 months (around 4/30/2019).     Copy sent to:  Dr. Monica London

## 2019-01-30 NOTE — LETTER
2019 3:11 PM 
 
Patient:  Juilta De Santiago YOB: 1945 Date of Visit: 2019 Dear Lee Silva, JHONNY 
[address] [address] [address] VIA Facsimile: [phone] 
 : Thank you for referring Mr. Julita De Santiago to me for evaluation/treatment. Below are the relevant portions of my assessment and plan of care. If you have questions, please do not hesitate to call me. I look forward to following Mr. Arben Brandt along with you. Sincerely, Jammie Dewey MD

## 2019-02-06 LAB
ERYTHROCYTE [DISTWIDTH] IN BLOOD BY AUTOMATED COUNT: 14.4 % (ref 12.3–15.4)
FSH SERPL-ACNC: 10.3 MIU/ML (ref 1.5–12.4)
HCT VFR BLD AUTO: 39.9 % (ref 37.5–51)
HGB BLD-MCNC: 12.8 G/DL (ref 13–17.7)
IGF-I SERPL-MCNC: 91 NG/ML (ref 41–179)
LH SERPL-ACNC: 8.8 MIU/ML (ref 1.7–8.6)
MCH RBC QN AUTO: 27.2 PG (ref 26.6–33)
MCHC RBC AUTO-ENTMCNC: 32.1 G/DL (ref 31.5–35.7)
MCV RBC AUTO: 85 FL (ref 79–97)
PLATELET # BLD AUTO: 308 X10E3/UL (ref 150–379)
PROLACTIN SERPL-MCNC: 6 NG/ML (ref 4–15.2)
PSA SERPL-MCNC: 0.8 NG/ML (ref 0–4)
RBC # BLD AUTO: 4.7 X10E6/UL (ref 4.14–5.8)
REFLEX CRITERIA: NORMAL
TESTOST FREE SERPL-MCNC: 3.8 PG/ML (ref 6.6–18.1)
TESTOST SERPL-MCNC: 174 NG/DL (ref 264–916)
WBC # BLD AUTO: 5.5 X10E3/UL (ref 3.4–10.8)

## 2019-02-07 DIAGNOSIS — E29.1 HYPOGONADISM, TESTICULAR: Primary | ICD-10-CM

## 2019-02-07 NOTE — PROGRESS NOTES
Spoke with pt regarding his labs. The Testosterone was low and the LH was high, which suggests primary testicular. Will start pt on testosterone patch.

## 2019-02-13 PROBLEM — E29.1 PRIMARY HYPOGONADISM IN MALE: Status: ACTIVE | Noted: 2019-02-13

## 2019-02-28 ENCOUNTER — TELEPHONE (OUTPATIENT)
Dept: ENDOCRINOLOGY | Age: 74
End: 2019-02-28

## 2019-02-28 DIAGNOSIS — E29.1 HYPOGONADISM IN MALE: Primary | ICD-10-CM

## 2019-02-28 RX ORDER — TESTOSTERONE GEL, 1% 10 MG/G
50 GEL TRANSDERMAL DAILY
Qty: 30 PACKET | Refills: 3 | Status: SHIPPED | OUTPATIENT
Start: 2019-02-28 | End: 2019-03-11 | Stop reason: CLARIF

## 2019-02-28 NOTE — TELEPHONE ENCOUNTER
Rx was sent on 01/31/19 to [address] [place] [address]. Advised patient to call [address] [place] [address]. Patient agreed.

## 2019-02-28 NOTE — TELEPHONE ENCOUNTER
----- Message from Rubina Steele sent at 2/28/2019 12:31 PM EST -----  Regarding: /Refill  Pt called requesting a call back in regards to insurance has denied the medication androverm. Pt use the [facility] pharmacy on [facility] best contact number is [phone].

## 2019-03-05 ENCOUNTER — DOCUMENTATION ONLY (OUTPATIENT)
Dept: ENDOCRINOLOGY | Age: 74
End: 2019-03-05

## 2019-03-05 NOTE — PROGRESS NOTES
Began a PA for Androgel 1% gel packs. It takes 24-72 hours for a response.   The covered medications are:  Testosterone Cypionate  Testosterone Enanphate  The reference # is [id number]  Can check status as [phone]

## 2019-03-06 NOTE — PROGRESS NOTES
Patient has been notified that we are still working on the PA for Androderm. More questions were received today from insurance company. Advised him that when we hear from the PA dept, we will call him. Patient expressed understanding.

## 2019-03-11 DIAGNOSIS — E29.1 HYPOGONADISM IN MALE: Primary | ICD-10-CM

## 2019-03-11 RX ORDER — SYRINGE WITH NEEDLE, 1 ML 27GX1/2"
SYRINGE, EMPTY DISPOSABLE MISCELLANEOUS
Qty: 100 SYRINGE | Refills: 1 | Status: SHIPPED | OUTPATIENT
Start: 2019-03-11 | End: 2019-11-06 | Stop reason: ALTCHOICE

## 2019-09-18 ENCOUNTER — LAB ONLY (OUTPATIENT)
Dept: ENDOCRINOLOGY | Age: 74
End: 2019-09-18

## 2019-09-18 DIAGNOSIS — E29.1 PRIMARY HYPOGONADISM IN MALE: ICD-10-CM

## 2019-09-18 NOTE — PROGRESS NOTES
Patient was given testosterone cypionate 200 mg/ml 0.5 ml in left gluteal muscle. He tolerated injection well. His partner, Manolo Garcia, was taught how to give the injections. He observed today, but states he feels comfortable giving the injection today. Testosterone Cypionate 200 mg/ml. Lot J-  Exp 02/20  Mattie Gonzalez.

## 2019-09-25 ENCOUNTER — LAB ONLY (OUTPATIENT)
Dept: ENDOCRINOLOGY | Age: 74
End: 2019-09-25

## 2019-09-25 DIAGNOSIS — E29.1 PRIMARY HYPOGONADISM IN MALE: ICD-10-CM

## 2019-09-25 NOTE — PROGRESS NOTES
0.5 ml of Tesosterone Cypionate was injected into the Rt gluteal muscle below his spinal stimulator. Patient tolerated the injection well with no complaints. He said his partner was unable to come today, but will probably come next week and receive teaching again so that he can administer the injection at home. Testosterone Cypionate 200 mg/ml Lot J-.  Exp.09/2020  Norahg: Paz Lundberg

## 2019-10-03 ENCOUNTER — LAB ONLY (OUTPATIENT)
Dept: ENDOCRINOLOGY | Age: 74
End: 2019-10-03

## 2019-10-03 DIAGNOSIS — N42.9 DISORDER OF PROSTATE, UNSPECIFIED: ICD-10-CM

## 2019-10-03 DIAGNOSIS — E29.1 PRIMARY HYPOGONADISM IN MALE: Primary | ICD-10-CM

## 2019-10-03 NOTE — PROGRESS NOTES
0.5 ml of Tesosterone Cypionate was injected into the Left gluteal muscle. Patient tolerated the injection well with no complaints. He said his partner was unable to come today and is not interested in giving him the testosterone injections. Advised to come here weekly to receive the injections. Gave patient an appt for 11/11/19 with pre labs mailed to patient. Testosterone Cypionate 200 mg/ml Lot J-.  Exp.09/2020  Mfg: Lenora Mcintyre

## 2019-10-16 ENCOUNTER — LAB ONLY (OUTPATIENT)
Dept: ENDOCRINOLOGY | Age: 74
End: 2019-10-16

## 2019-10-16 DIAGNOSIS — E29.1 PRIMARY HYPOGONADISM IN MALE: ICD-10-CM

## 2019-10-16 NOTE — PROGRESS NOTES
Patient was given 0.5 ml of Tesosterone Cypionate injected into the right gluteal muscle laterally of the implanted transmitted at 3:30 pm. (Patient had forgotten his medication at 12:00 pm today). Patient tolerated the injection well with no complaints. Advised to come here weekly to receive the injections. Gave patient an appt for 11/11/19 with pre labs mailed to patient. Testosterone Cypionate 200 mg/ml Lot J-.  Exp.09/2020  Norahg: Rhea Parsons

## 2019-10-23 ENCOUNTER — LAB ONLY (OUTPATIENT)
Dept: ENDOCRINOLOGY | Age: 74
End: 2019-10-23

## 2019-10-23 DIAGNOSIS — E29.1 PRIMARY HYPOGONADISM IN MALE: ICD-10-CM

## 2019-10-23 NOTE — PROGRESS NOTES
Patient was given 0.5 ml of Tesosterone Cypionate injected into the left Upper Outer quadrant of his gluteal muscle. Patient tolerated the injection well with no complaints. Advised to come here weekly to receive the injections. Gave patient an appt for 11/11/19 with pre labs mailed to patient.   Testosterone Cypionate 200 mg/ml Lot J-. Exp.09/2020  Mfg: Perrigo  Advised to get his labs on a Monday between injections. Patient agreed.

## 2019-10-30 ENCOUNTER — LAB ONLY (OUTPATIENT)
Dept: ENDOCRINOLOGY | Age: 74
End: 2019-10-30

## 2019-10-30 DIAGNOSIS — E29.1 PRIMARY HYPOGONADISM IN MALE: ICD-10-CM

## 2019-10-30 LAB
ERYTHROCYTE [DISTWIDTH] IN BLOOD BY AUTOMATED COUNT: 13.6 % (ref 12.3–15.4)
HCT VFR BLD AUTO: 42.4 % (ref 37.5–51)
HGB BLD-MCNC: 13.9 G/DL (ref 13–17.7)
MCH RBC QN AUTO: 28.5 PG (ref 26.6–33)
MCHC RBC AUTO-ENTMCNC: 32.8 G/DL (ref 31.5–35.7)
MCV RBC AUTO: 87 FL (ref 79–97)
PLATELET # BLD AUTO: 268 X10E3/UL (ref 150–450)
PSA SERPL-MCNC: 1 NG/ML (ref 0–4)
RBC # BLD AUTO: 4.87 X10E6/UL (ref 4.14–5.8)
REFLEX CRITERIA: NORMAL
TESTOST FREE SERPL-MCNC: 7 PG/ML (ref 6.6–18.1)
TESTOST SERPL-MCNC: 412 NG/DL (ref 264–916)
WBC # BLD AUTO: 7.6 X10E3/UL (ref 3.4–10.8)

## 2019-10-30 NOTE — PROGRESS NOTES
Patient was given 0.5 ml of Tesosterone Cypionate injected into the Right Upper Outer quadrant of his gluteal muscle away from the implanted transmitter.  Patient tolerated the injection well with no complaints. Advised to come here weekly to receive the injections, unless partner wants to give injections. Patient states partner does not want to give injections.   Testosterone Cypionate 200 mg/ml Lot 6- Exp.07/2021  [facility] [place] [facility]: [employer]

## 2019-11-06 ENCOUNTER — OFFICE VISIT (OUTPATIENT)
Dept: ENDOCRINOLOGY | Age: 74
End: 2019-11-06

## 2019-11-06 ENCOUNTER — TELEPHONE (OUTPATIENT)
Dept: ENDOCRINOLOGY | Age: 74
End: 2019-11-06

## 2019-11-06 VITALS
SYSTOLIC BLOOD PRESSURE: 120 MMHG | WEIGHT: 265.2 LBS | HEART RATE: 79 BPM | BODY MASS INDEX: 38.05 KG/M2 | DIASTOLIC BLOOD PRESSURE: 71 MMHG

## 2019-11-06 DIAGNOSIS — E66.01 SEVERE OBESITY (HCC): ICD-10-CM

## 2019-11-06 DIAGNOSIS — E29.1 HYPOGONADISM IN MALE: ICD-10-CM

## 2019-11-06 DIAGNOSIS — E29.1 PRIMARY HYPOGONADISM IN MALE: Primary | ICD-10-CM

## 2019-11-06 NOTE — TELEPHONE ENCOUNTER
Filomena Rodriguez with [facility] needs clarification for the script that was sent in today for [facility] where it stated 1 kit.       Contact # [phone]

## 2019-11-06 NOTE — PATIENT INSTRUCTIONS
1) Draw up 0.75ml into the syringe and give yourself the injection just under the skin, in the abdomen. One shot every week.

## 2019-11-06 NOTE — PROGRESS NOTES
Chief Complaint   Patient presents with    Hypogonadism     pcp and pharmacy verified     History of Present Illness: Sam Puga is a 76 y.o. male here for follow up of hypothyroidism. In November 2018 he presented to his PCP with the complaint of hyperhidrosis. His Total Testosterone was 202. He was then referred for further evaluation. I re-tested his Testosterone level and it was again low at 174. His PSA was 0.8. His LH was elevated at 8.8, his prolactin was not elevated and his IGF-1 was not elevated. Pt was, eventually, started on IM Testosterone Cypionate, after a long struggle to get the insulin approved. He has been receiving the IM Testosterone Cypionate 100mg every week since September 2019. His mid-week trough Testosterone on 10/28/19 was 412, his PSA was 1.0 and his CBC was unremarkable. Pt denies issues of CP, SOB, HA, vision changes, N/V, abdominal pain. Pt denies issues of polyuria or LUTS symptoms. Pt continues to have the issues of increased sweating. He notes that since the weather has gotten cooler the sweating is less frequent, but he notes it can occur at anytime of day. He is also using an undershirt to [place] up any excess sweat\". Pt has hx of hypothyroidism, which is well controlled on LT4 200mcg daily (TSH 0.529 with FT4 of 1.7 in November 2018). Pt also has hx of DM, which is well controlled on Metformin 1000mg BID only. He does have Neuropathy from the DM. \"My A1C is always under 7%. \"  Pt has chronic pain issues, due to FM, DDD and OA. He had the nerve stimulator for his back and pt notes that it has been helping with his pain. He is followed by a pain specialist. He does take PRN Hydrocodone for his pain. He also has hx of FROILAN and he is using a CPAP. Pt notes he has been struggling with binge eating and this has caused his weight to fluctuate up and down.  Pt notes that his PCP gave him \"something to suppress my appetite\", but he is no longer taking it and can not recall why. Current Outpatient Medications   Medication Sig    Syringe with Needle, Disp, (TUBERCULIN SYRINGE) 1 mL 25 gauge x 5/8\" syrg One injection every week    testosterone cypionate 200 mg/mL kit 0.75ml under the skin every week.  testosterone cypionate (DEPOTESTOTERONE CYPIONATE) 200 mg/mL injection INJECT 0.5 ML INTRAMUSCULARLY EVERY WEEK    Syringe with Needle, Disp, (SYRINGE 3CC/20GX1\") 3 mL 20 gauge x 1\" syrg Use to administer testosterone injection every week. DX:E29.1    losartan (COZAAR) 25 mg tablet Take 25 mg by mouth daily.  primidone (MYSOLINE) 50 mg tablet Take 50 mg by mouth daily.  calcium-cholecalciferol, d3, (CALCIUM 600 + D) 600-125 mg-unit tab Take 2 Tabs by mouth daily.  HYDROcodone-acetaminophen (NORCO) 7.5-325 mg per tablet Take 1 Tab by mouth every twelve (12) hours as needed for Pain.  polyethylene glycol (MIRALAX) 17 gram/dose powder Take 17 g by mouth daily.  metFORMIN (GLUCOPHAGE) 1,000 mg tablet Take 1 Tab by mouth two (2) times daily (with meals). START TAKING 9/23 WITH DINNER    atorvastatin (LIPITOR) 80 mg tablet Take 80 mg by mouth nightly.  hydroCHLOROthiazide (HYDRODIURIL) 25 mg tablet Take 25 mg by mouth daily.  DULoxetine (CYMBALTA) 60 mg capsule Take 120 mg by mouth daily.  aspirin delayed-release 81 mg tablet Take 81 mg by mouth daily.  levothyroxine (SYNTHROID) 200 mcg tablet Take 200 mcg by mouth Daily (before breakfast).  cholecalciferol, vitamin D3, (VITAMIN D3) 2,000 unit tab Take 2,000 Units by mouth daily.  Blood-Glucose Meter monitoring kit For testing blood glucose twice daily    glucose blood VI test strips (PHARMACIST CHOICE) strip For testing blood glucose twice daily     No current facility-administered medications for this visit.       No Known Allergies  Review of Systems:  - Cardiovascular: no chest pain  - Neurological: no tremors  - Integumentary: skin is normal    Physical Examination:  Blood pressure 120/71, pulse 79, weight 265 lb 3.2 oz (120.3 kg). - General: pleasant, no distress, good eye contact   - Neck: no thyromegaly or thyroid bruits  - Cardiovascular: regular, normal rate, nl s1 and s2, no m/r/g   - Integumentary: skin is normal, no edema  - Neurological: reflexes 2+ at biceps, no tremors  - Psychiatric: normal mood and affect    Data Reviewed:   Component      Latest Ref Rng & Units 10/28/2019 10/28/2019 10/28/2019           2:51 PM  2:51 PM  2:51 PM   WBC      3.4 - 10.8 x10E3/uL  7.6    RBC      4.14 - 5.80 x10E6/uL  4.87    HGB      13.0 - 17.7 g/dL  13.9    HCT      37.5 - 51.0 %  42.4    MCV      79 - 97 fL  87    MCH      26.6 - 33.0 pg  28.5    MCHC      31.5 - 35.7 g/dL  32.8    RDW      12.3 - 15.4 %  13.6    PLATELET      126 - 173 x10E3/uL  268    Testosterone      264 - 916 ng/dL   412   Free testosterone (Direct)      6.6 - 18.1 pg/mL   7.0   Prostate Specific Ag      0.0 - 4.0 ng/mL 1.0     Reflex Criteria       Comment         Assessment/Plan:   1) Hypogonadism > Pt's Testosterone level on the Testosterone Cypionate 100mg every week has improved and was in the 400's range in October. His CBC and PSA were unremarkable. Since he is still having the hyperhidrosis we will increase his Testosterone dose to 150mg weekly. Pt given a prescription for tuberculin syringe 25 G with 5/8\" needle and instructed on how to give himself a SQ injection of testosterone weekly. 2) Obesity > Pt encouraged to speak with Dr. Samantha Sage about what he took previously for appetite control and see if she was willing to restart that, or if she had any objections. We spent 40 minutes of face to face time together and > 50% of the time was spent in counseling on the above issues. Pt voices understanding and agreement with the plan.   Pain noted and pt was recommended to call his PCP for further evaluation and treatment, as needed      RTC 3 months    Follow-up and Dispositions    · Return in about 6 months (around 5/6/2020).          Copy sent to:  Dr. Park Wyatt

## 2019-11-06 NOTE — TELEPHONE ENCOUNTER
Spoke with John Pro, pharmacist at [facility]. She said that Testosterone Cypionate is only indicated for IM use, according to the package insert. Only the Arnie Smart is indicated for sub Q use. Dayron Beasley is aware, but wants patient to use the Testosterone Cypionate sub-Q. John Pro expressed understanding.

## 2019-11-06 NOTE — PROGRESS NOTES
Blood pressure has been repeated in opposite arm. No symptoms, per patient.  ------  Patient was given 0.5 ml of Testosterone Cypionate 200 mg/ml in the left gluteal muscle. Patient tolerated the injection well. Testosterone Cypionate 200mg/ml. Lot 6-. Exp 7/21 Mfg: Miguelito Johnson. Patient was then seen by Dr. Julio Cesar Swartz as an office visit follow up.

## 2019-11-13 ENCOUNTER — LAB ONLY (OUTPATIENT)
Dept: ENDOCRINOLOGY | Age: 74
End: 2019-11-13

## 2019-11-13 ENCOUNTER — TELEPHONE (OUTPATIENT)
Dept: ENDOCRINOLOGY | Age: 74
End: 2019-11-13

## 2019-11-13 DIAGNOSIS — E29.1 PRIMARY HYPOGONADISM IN MALE: ICD-10-CM

## 2019-11-13 NOTE — TELEPHONE ENCOUNTER
Carmen Coto,        Gita Ree Wylie would like to know if he could come by this afternoon. He stated he needs assistance with starting his injections.

## 2019-11-13 NOTE — PROGRESS NOTES
Patient came in with his new testosterone cypionate vial asking for training to give himself a subcutaneous injection. Patient was first demonstrated how to draw up the testosterone with a 22 gauge x1\" needle/3 ml syringe. Patient also has 25 gauge 5/8\" separate needle to attach prior to injecting. Patient was watched and trained to draw up the testosterone and then switch the needle to the 5/8\" needle. Patient was observed giving himself the 0.75 ml injection of Testosterone Cypionate 200mg/ml in the right side of his abdomen about 3 inches from his naval.   Patient states that he feels comfortable giving himself the injection and will contact me if he has any questions. He gave lot# [id number], exp.7/2021 [facility]: [facility].

## 2020-01-29 ENCOUNTER — APPOINTMENT (OUTPATIENT)
Dept: GENERAL RADIOLOGY | Age: 75
End: 2020-01-29
Attending: EMERGENCY MEDICINE
Payer: MEDICARE

## 2020-01-29 ENCOUNTER — HOSPITAL ENCOUNTER (EMERGENCY)
Age: 75
Discharge: HOME | End: 2020-01-29
Attending: EMERGENCY MEDICINE
Payer: MEDICARE

## 2020-01-29 VITALS
OXYGEN SATURATION: 94 % | SYSTOLIC BLOOD PRESSURE: 144 MMHG | BODY MASS INDEX: 37.98 KG/M2 | WEIGHT: 256.39 LBS | HEART RATE: 60 BPM | RESPIRATION RATE: 14 BRPM | TEMPERATURE: 98.4 F | DIASTOLIC BLOOD PRESSURE: 70 MMHG | HEIGHT: 69 IN

## 2020-01-29 DIAGNOSIS — R07.9 CHEST PAIN, UNSPECIFIED TYPE: Primary | ICD-10-CM

## 2020-01-29 LAB
ALBUMIN SERPL-MCNC: 3.7 G/DL (ref 3.5–5)
ALBUMIN/GLOB SERPL: 1.1 {RATIO} (ref 1.1–2.2)
ALP SERPL-CCNC: 49 U/L (ref 45–117)
ALT SERPL-CCNC: 79 U/L (ref 12–78)
ANION GAP SERPL CALC-SCNC: 7 MMOL/L (ref 5–15)
AST SERPL-CCNC: 65 U/L (ref 15–37)
BASOPHILS # BLD: 0 K/UL (ref 0–0.1)
BASOPHILS NFR BLD: 0 % (ref 0–1)
BILIRUB SERPL-MCNC: 0.6 MG/DL (ref 0.2–1)
BUN SERPL-MCNC: 14 MG/DL (ref 6–20)
BUN/CREAT SERPL: 16 (ref 12–20)
CALCIUM SERPL-MCNC: 9.3 MG/DL (ref 8.5–10.1)
CHLORIDE SERPL-SCNC: 108 MMOL/L (ref 97–108)
CO2 SERPL-SCNC: 25 MMOL/L (ref 21–32)
CREAT SERPL-MCNC: 0.9 MG/DL (ref 0.7–1.3)
DIFFERENTIAL METHOD BLD: ABNORMAL
EOSINOPHIL # BLD: 0.3 K/UL (ref 0–0.4)
EOSINOPHIL NFR BLD: 3 % (ref 0–7)
ERYTHROCYTE [DISTWIDTH] IN BLOOD BY AUTOMATED COUNT: 12.8 % (ref 11.5–14.5)
GLOBULIN SER CALC-MCNC: 3.5 G/DL (ref 2–4)
GLUCOSE SERPL-MCNC: 136 MG/DL (ref 65–100)
HCT VFR BLD AUTO: 44.1 % (ref 36.6–50.3)
HGB BLD-MCNC: 14.7 G/DL (ref 12.1–17)
IMM GRANULOCYTES # BLD AUTO: 0 K/UL (ref 0–0.04)
IMM GRANULOCYTES NFR BLD AUTO: 1 % (ref 0–0.5)
LYMPHOCYTES # BLD: 1.7 K/UL (ref 0.8–3.5)
LYMPHOCYTES NFR BLD: 21 % (ref 12–49)
MCH RBC QN AUTO: 28.4 PG (ref 26–34)
MCHC RBC AUTO-ENTMCNC: 33.3 G/DL (ref 30–36.5)
MCV RBC AUTO: 85.1 FL (ref 80–99)
MONOCYTES # BLD: 0.8 K/UL (ref 0–1)
MONOCYTES NFR BLD: 10 % (ref 5–13)
NEUTS SEG # BLD: 5.1 K/UL (ref 1.8–8)
NEUTS SEG NFR BLD: 65 % (ref 32–75)
NRBC # BLD: 0 K/UL (ref 0–0.01)
NRBC BLD-RTO: 0 PER 100 WBC
PLATELET # BLD AUTO: 246 K/UL (ref 150–400)
PMV BLD AUTO: 10.4 FL (ref 8.9–12.9)
POTASSIUM SERPL-SCNC: 3.7 MMOL/L (ref 3.5–5.1)
PROT SERPL-MCNC: 7.2 G/DL (ref 6.4–8.2)
RBC # BLD AUTO: 5.18 M/UL (ref 4.1–5.7)
SODIUM SERPL-SCNC: 140 MMOL/L (ref 136–145)
TROPONIN I SERPL-MCNC: <0.05 NG/ML
TROPONIN I SERPL-MCNC: <0.05 NG/ML
WBC # BLD AUTO: 7.8 K/UL (ref 4.1–11.1)

## 2020-01-29 PROCEDURE — 99285 EMERGENCY DEPT VISIT HI MDM: CPT

## 2020-01-29 PROCEDURE — 71046 X-RAY EXAM CHEST 2 VIEWS: CPT

## 2020-01-29 PROCEDURE — 80053 COMPREHEN METABOLIC PANEL: CPT

## 2020-01-29 PROCEDURE — 85025 COMPLETE CBC W/AUTO DIFF WBC: CPT

## 2020-01-29 PROCEDURE — 84484 ASSAY OF TROPONIN QUANT: CPT

## 2020-01-29 PROCEDURE — 36415 COLL VENOUS BLD VENIPUNCTURE: CPT

## 2020-01-29 PROCEDURE — 93005 ELECTROCARDIOGRAM TRACING: CPT

## 2020-01-29 RX ORDER — NITROGLYCERIN 0.4 MG/1
0.4 TABLET SUBLINGUAL
Qty: 20 TAB | Refills: 0 | Status: SHIPPED | OUTPATIENT
Start: 2020-01-29

## 2020-01-29 NOTE — ED PROVIDER NOTES
EMERGENCY DEPARTMENT HISTORY AND PHYSICAL EXAM      Date: 1/29/2020  Patient Name: Shawna Brown    History of Presenting Illness     Chief Complaint   Patient presents with    Rib Pain     pain under L rib cage that started several hours ago; denies any injury or trauma, denies n/v; reports hx of the same when dx'd with angina; hx of stents x 4; pt states that he has a spinal cord stimulator        History Provided By: Patient    HPI: Shawna Brown, 76 y.o. male with CAD s/p 4 stent in 2012 at [facility], fibromyalgia, hyperlipidemia, hypertension presents to the ED with cc of left chest pain. Symptoms onset approximately 1 to 2 hours prior to arrival.  Patient was carrying groceries from [employer] to his car when symptoms onset. Described as an aching pain without radiation located to left rib cage underneath left breast.  Symptoms were constant for approximately 1 hour, resolved after rest.  Patient has had history of angina in the past and in 2012 underwent left heart catheterization and had 4 drug-eluting stents, this was in 2012 in [place]. Currently followed by Dr. Andres Livingston but he states that he has not had any stress test in the past few years. Currently on aspirin but no other antiplatelets or anticoagulation. States that today's pain is different than angina he has experienced in the past.  Denies any shortness of breath, nausea, vomiting, lightheadedness, diaphoresis. There are no other complaints, changes, or physical findings at this time. PCP: Coleen Shaffer NP    No current facility-administered medications on file prior to encounter. Current Outpatient Medications on File Prior to Encounter   Medication Sig Dispense Refill    Syringe with Needle, Disp, (TUBERCULIN SYRINGE) 1 mL 25 gauge x 5/8\" syrg One injection every week 50 Syringe 3    testosterone cypionate 200 mg/mL kit 0.75ml under the skin every week.  1 Kit 5    testosterone cypionate (DEPOTESTOTERONE CYPIONATE) 200 mg/mL injection INJECT 0.5 ML INTRAMUSCULARLY EVERY WEEK 2 mL 5    Syringe with Needle, Disp, (SYRINGE 3CC/20GX1\") 3 mL 20 gauge x 1\" syrg Use to administer testosterone injection every week. DX:E29.1 200 Syringe 3    Blood-Glucose Meter monitoring kit For testing blood glucose twice daily 1 Kit 0    glucose blood VI test strips (PHARMACIST CHOICE) strip For testing blood glucose twice daily 100 Strip 0    losartan (COZAAR) 25 mg tablet Take 25 mg by mouth daily.  primidone (MYSOLINE) 50 mg tablet Take 50 mg by mouth daily.  calcium-cholecalciferol, d3, (CALCIUM 600 + D) 600-125 mg-unit tab Take 2 Tabs by mouth daily.  HYDROcodone-acetaminophen (NORCO) 7.5-325 mg per tablet Take 1 Tab by mouth every twelve (12) hours as needed for Pain.  polyethylene glycol (MIRALAX) 17 gram/dose powder Take 17 g by mouth daily. 289 g 0    metFORMIN (GLUCOPHAGE) 1,000 mg tablet Take 1 Tab by mouth two (2) times daily (with meals). START TAKING 9/23 WITH DINNER 30 Tab 0    atorvastatin (LIPITOR) 80 mg tablet Take 80 mg by mouth nightly.  hydroCHLOROthiazide (HYDRODIURIL) 25 mg tablet Take 25 mg by mouth daily.  DULoxetine (CYMBALTA) 60 mg capsule Take 120 mg by mouth daily.  aspirin delayed-release 81 mg tablet Take 81 mg by mouth daily.  levothyroxine (SYNTHROID) 200 mcg tablet Take 200 mcg by mouth Daily (before breakfast).  cholecalciferol, vitamin D3, (VITAMIN D3) 2,000 unit tab Take 2,000 Units by mouth daily.          Past History     Past Medical History:  Past Medical History:   Diagnosis Date    Abdominal adhesions 2000    Arthritis     all joints; Degenerative disk disease    CAD (coronary artery disease)     stents x4, followed by Dr. Barbara Poe Chronic constipation     Chronic pain     all my joints    Diabetic neuropathy ([facility] [address]     Type II    Fibromyalgia     GERD (gastroesophageal reflux disease)     HLD (hyperlipidemia)     Hypertension     Ill-defined condition     fibromyalgia    Neuropathy     hands/feet    Sleep apnea     uses Cpap    Thyroid disease        Past Surgical History:  Past Surgical History:   Procedure Laterality Date    ABDOMEN SURGERY PROC UNLISTED  early 2000's    abdominal hernia repair    ABDOMEN SURGERY PROC UNLISTED  2000's    adhesions from appendix sx.  ABDOMEN SURGERY PROC UNLISTED  2000's    colectomy: when removing adhesions, nicked intestines, removed 8\" of intestine    COLONOSCOPY N/A 12/18/2017    COLONOSCOPY performed by Francie Curiel MD at [facility] AMBULATORY OR    COLONOSCOPY N/A 1/15/2019    COLONOSCOPY performed by Mary Mckoy MD at [facility] ENDOSCOPY     [address]    HX CORONARY STENT PLACEMENT  2014    HX ORTHOPAEDIC Bilateral 2000, 2007    hip replacement       Family History:  Family History   Problem Relation Age of Onset    Heart Disease Brother     Obesity Brother     Other Mother         hiatal hernia    Arthritis Mother         back, spine    Heart Disease Mother         Angina    Psychiatric Disorder Mother         depression    No Known Problems Father     Diabetes Sister 16        Type 1    Arthritis Sister     Cancer Neg Hx        Social History:  Social History     Tobacco Use    Smoking status: Former Smoker     Years: 40.00    Smokeless tobacco: Never Used   Substance Use Topics    Alcohol use: Yes     Alcohol/week: 6.0 standard drinks     Types: 2 Shots of liquor, 4 Glasses of wine per week    Drug use: No       Allergies:  No Known Allergies      Review of Systems   Review of Systems   Constitutional: Negative for activity change, appetite change, chills and fever. HENT: Negative. Eyes: Negative for visual disturbance. Respiratory: Positive for shortness of breath. Negative for cough. Cardiovascular: Positive for chest pain. Negative for leg swelling. Gastrointestinal: Negative for abdominal pain, nausea and vomiting. Genitourinary: Negative. Musculoskeletal: Negative for back pain and gait problem. Skin: Negative for color change and rash. Neurological: Negative for dizziness, weakness, light-headedness and headaches. Hematological: Does not bruise/bleed easily. All other systems reviewed and are negative. Physical Exam   Physical Exam  Vitals signs and nursing note reviewed. Constitutional:       General: He is not in acute distress. Appearance: Normal appearance. He is not ill-appearing, toxic-appearing or diaphoretic. HENT:      Head: Normocephalic and atraumatic. Mouth/Throat:      Mouth: Mucous membranes are moist.   Eyes:      Extraocular Movements: Extraocular movements intact. Pupils: Pupils are equal, round, and reactive to light. Neck:      Musculoskeletal: Neck supple. Cardiovascular:      Rate and Rhythm: Normal rate and regular rhythm. Heart sounds: Normal heart sounds. No murmur. Pulmonary:      Effort: Pulmonary effort is normal. No respiratory distress. Breath sounds: Normal breath sounds. No wheezing. Abdominal:      Palpations: Abdomen is soft. Tenderness: There is no abdominal tenderness. There is no guarding or rebound. Musculoskeletal: Normal range of motion. Right lower leg: No edema. Left lower leg: No edema. Skin:     General: Skin is warm and dry. Findings: No erythema or rash. Neurological:      General: No focal deficit present. Mental Status: He is alert and oriented to person, place, and time.          Diagnostic Study Results     Labs -     Recent Results (from the past 12 hour(s))   EKG, 12 LEAD, INITIAL    Collection Time: 01/29/20  3:23 PM   Result Value Ref Range    Ventricular Rate 60 BPM    Atrial Rate 60 BPM    P-R Interval 174 ms    QRS Duration 92 ms    Q-T Interval 404 ms    QTC Calculation (Bezet) 404 ms    Calculated P Axis 37 degrees    Calculated R Axis -45 degrees    Calculated T Axis 10 degrees    Diagnosis       Normal sinus rhythm with sinus arrhythmia  Left anterior fascicular block  When compared with ECG of 21-MAR-2017 04:32,  premature atrial complexes are no longer present     CBC WITH AUTOMATED DIFF    Collection Time: 01/29/20  4:32 PM   Result Value Ref Range    WBC 7.8 4.1 - 11.1 K/uL    RBC 5.18 4.10 - 5.70 M/uL    HGB 14.7 12.1 - 17.0 g/dL    HCT 44.1 36.6 - 50.3 %    MCV 85.1 80.0 - 99.0 FL    MCH 28.4 26.0 - 34.0 PG    MCHC 33.3 30.0 - 36.5 g/dL    RDW 12.8 11.5 - 14.5 %    PLATELET 680 482 - 761 K/uL    MPV 10.4 8.9 - 12.9 FL    NRBC 0.0 0  WBC    ABSOLUTE NRBC 0.00 0.00 - 0.01 K/uL    NEUTROPHILS 65 32 - 75 %    LYMPHOCYTES 21 12 - 49 %    MONOCYTES 10 5 - 13 %    EOSINOPHILS 3 0 - 7 %    BASOPHILS 0 0 - 1 %    IMMATURE GRANULOCYTES 1 (H) 0.0 - 0.5 %    ABS. NEUTROPHILS 5.1 1.8 - 8.0 K/UL    ABS. LYMPHOCYTES 1.7 0.8 - 3.5 K/UL    ABS. MONOCYTES 0.8 0.0 - 1.0 K/UL    ABS. EOSINOPHILS 0.3 0.0 - 0.4 K/UL    ABS. BASOPHILS 0.0 0.0 - 0.1 K/UL    ABS. IMM. GRANS. 0.0 0.00 - 0.04 K/UL    DF AUTOMATED     METABOLIC PANEL, COMPREHENSIVE    Collection Time: 01/29/20  4:32 PM   Result Value Ref Range    Sodium 140 136 - 145 mmol/L    Potassium 3.7 3.5 - 5.1 mmol/L    Chloride 108 97 - 108 mmol/L    CO2 25 21 - 32 mmol/L    Anion gap 7 5 - 15 mmol/L    Glucose 136 (H) 65 - 100 mg/dL    BUN 14 6 - 20 MG/DL    Creatinine 0.90 0.70 - 1.30 MG/DL    BUN/Creatinine ratio 16 12 - 20      GFR est AA >60 >60 ml/min/1.73m2    GFR est non-AA >60 >60 ml/min/1.73m2    Calcium 9.3 8.5 - 10.1 MG/DL    Bilirubin, total 0.6 0.2 - 1.0 MG/DL    ALT (SGPT) 79 (H) 12 - 78 U/L    AST (SGOT) 65 (H) 15 - 37 U/L    Alk.  phosphatase 49 45 - 117 U/L    Protein, total 7.2 6.4 - 8.2 g/dL    Albumin 3.7 3.5 - 5.0 g/dL    Globulin 3.5 2.0 - 4.0 g/dL    A-G Ratio 1.1 1.1 - 2.2     TROPONIN I    Collection Time: 01/29/20  4:32 PM   Result Value Ref Range    Troponin-I, Qt. <0.05 <0.05 ng/mL   TROPONIN I    Collection Time: 01/29/20  7:36 PM   Result Value Ref Range    Troponin-I, Qt. <0.05 <0.05 ng/mL       Radiologic Studies -   XR CHEST PA LAT   Final Result   IMPRESSION: Evidence of pulmonary hyperaeration. Slight prominence of the   basilar markings. No evidence of lobar consolidation. CT Results  (Last 48 hours)    None        CXR Results  (Last 48 hours)               01/29/20 1700  XR CHEST PA LAT Final result    Impression:  IMPRESSION: Evidence of pulmonary hyperaeration. Slight prominence of the   basilar markings. No evidence of lobar consolidation. Narrative:  Chest 2 views dated 1/29/2020       Comparison chest dated 3/6/2018       History is left sided rib pain       PA and lateral views of the chest were obtained. It is difficult to accurately   assess the size of the cardiac silhouette. There is hyperaeration of the lungs. There is prominence of the pulmonary interstitial markings (particularly at the   lung bases). No areas of lobar consolidation are identified. The costophrenic   angles are sharp in appearance. There is evidence of thoracic spondylosis. Medical Decision Making   I am the first provider for this patient. I reviewed the vital signs, available nursing notes, past medical history, past surgical history, family history and social history. Vital Signs-Reviewed the patient's vital signs. Patient Vitals for the past 12 hrs:   Temp Pulse Resp BP SpO2   01/29/20 2134 98.4 °F (36.9 °C) 60 14 144/70 94 %   01/29/20 1900 98.6 °F (37 °C) 67 14 152/77 98 %   01/29/20 1758  61 17  96 %   01/29/20 1730  (!) 56 18  97 %   01/29/20 1516 97.9 °F (36.6 °C) (!) 58 16 150/70 98 %       Records Reviewed: Nursing Notes, Old Medical Records, Previous Radiology Studies and Previous Laboratory Studies    Provider Notes (Medical Decision Making): This is a 63-year-old male here with left rib pain that occurred as he was putting groceries into his car after leaving [facility].   Symptoms lasted approximately 1 hour before resolving. Concern for anginal episode. He is afebrile and vital signs stable through entire ED stay. EKG nonischemic. Troponin negative x2 spaced at 3 hours apart. He remains chest pain-free throughout entire ED stay. I discussed with Dr. Maria A Barnett, cardiology, who recommends prescribing nitroglycerin tablets and discharged home with close follow-up with Dr. Lilian Castañeda. Patient agrees with plan as above and all questions answered. Discharged home in stable condition. ED Course:   Initial assessment performed. The patients presenting problems have been discussed, and they are in agreement with the care plan formulated and outlined with them. I have encouraged them to ask questions as they arise throughout their visit. ED Course as of Jan 29 2324 Wed Jan 29, 2020   1525 EKG per my interpretation normal sinus rhythm, rate 60 bpm, normal axis, no acute ischemic changes. [AK]      ED Course User Index  [AK] Edmund Jimenez MD       Critical Care Time:   0    Disposition:  Home with cardiology follow-up. PLAN:  1. Discharge Medication List as of 1/29/2020  9:10 PM      START taking these medications    Details   nitroglycerin (NITROSTAT) 0.4 mg SL tablet 1 Tab by SubLINGual route every five (5) minutes as needed for Chest Pain. Up to 3 doses. , Print, Disp-20 Tab, R-0         CONTINUE these medications which have NOT CHANGED    Details   ! ! Syringe with Needle, Disp, (TUBERCULIN SYRINGE) 1 mL 25 gauge x 5/8\" syrg One injection every week, Print, Disp-50 Syringe, R-3      testosterone cypionate 200 mg/mL kit 0.75ml under the skin every week., Print, Disp-1 Kit, R-5      testosterone cypionate (DEPOTESTOTERONE CYPIONATE) 200 mg/mL injection INJECT 0.5 ML INTRAMUSCULARLY EVERY WEEK, Print, Disp-2 mL, R-5      !! Syringe with Needle, Disp, (SYRINGE 3CC/20GX1\") 3 mL 20 gauge x 1\" syrg Use to administer testosterone injection every week.  DX:E29.1, Normal, Disp-200 Syringe, R-3      Blood-Glucose Meter monitoring kit For testing blood glucose twice daily, Print, Disp-1 Kit, R-0      glucose blood VI test strips (PHARMACIST CHOICE) strip For testing blood glucose twice daily, Print, Disp-100 Strip, R-0      losartan (COZAAR) 25 mg tablet Take 25 mg by mouth daily. , Historical Med      primidone (MYSOLINE) 50 mg tablet Take 50 mg by mouth daily. , Historical Med      calcium-cholecalciferol, d3, (CALCIUM 600 + D) 600-125 mg-unit tab Take 2 Tabs by mouth daily. , Historical Med      HYDROcodone-acetaminophen (NORCO) 7.5-325 mg per tablet Take 1 Tab by mouth every twelve (12) hours as needed for Pain., Historical Med      polyethylene glycol (MIRALAX) 17 gram/dose powder Take 17 g by mouth daily. , Normal, Disp-289 g, R-0      metFORMIN (GLUCOPHAGE) 1,000 mg tablet Take 1 Tab by mouth two (2) times daily (with meals). START TAKING 9/23 WITH DINNER, No Print, Disp-30 Tab, R-0      atorvastatin (LIPITOR) 80 mg tablet Take 80 mg by mouth nightly., Historical Med      hydroCHLOROthiazide (HYDRODIURIL) 25 mg tablet Take 25 mg by mouth daily. , Historical Med      DULoxetine (CYMBALTA) 60 mg capsule Take 120 mg by mouth daily. , Historical Med      aspirin delayed-release 81 mg tablet Take 81 mg by mouth daily. , Historical Med      levothyroxine (SYNTHROID) 200 mcg tablet Take 200 mcg by mouth Daily (before breakfast). , Historical Med      cholecalciferol, vitamin D3, (VITAMIN D3) 2,000 unit tab Take 2,000 Units by mouth daily. , Historical Med       !! - Potential duplicate medications found. Please discuss with provider. 2.   Follow-up Information     Follow up With Specialties Details Why Contact Info    Arben Nunes MD Cardiology Schedule an appointment as soon as possible for a visit  If symptoms worsen, As needed [address]  [id number]  [phone]          Return to ED if worse     Diagnosis     Clinical Impression:   1.  Chest pain, unspecified type        Attestations:    Taylor Lau Nir Ritter MD    Please note that this dictation was completed with [employer], the computer voice recognition software. Quite often unanticipated grammatical, syntax, homophones, and other interpretive errors are inadvertently transcribed by the computer software. Please disregard these errors. Please excuse any errors that have escaped final proofreading. Thank you.

## 2020-01-30 LAB
ATRIAL RATE: 60 BPM
CALCULATED P AXIS, ECG09: 37 DEGREES
CALCULATED R AXIS, ECG10: -45 DEGREES
CALCULATED T AXIS, ECG11: 10 DEGREES
DIAGNOSIS, 93000: NORMAL
P-R INTERVAL, ECG05: 174 MS
Q-T INTERVAL, ECG07: 404 MS
QRS DURATION, ECG06: 92 MS
QTC CALCULATION (BEZET), ECG08: 404 MS
VENTRICULAR RATE, ECG03: 60 BPM

## 2020-01-30 NOTE — DISCHARGE INSTRUCTIONS
You were evaluated in the emergency department for chest pain. Your examination was reassuring as was your work-up including blood work, chest xray, and ekg. It will be important for you to follow-up with Dr. Sinan Malik in 2-3 days. If you develop worsening symptoms such as worsening chest pain or shortness of breath not improved with the nitroglycerin, please return to the emergency department immediately.

## 2020-01-30 NOTE — ED NOTES
Kavita Morales MD reviewed discharge instructions with the patient. The patient verbalized understanding. All questions and concerns were addressed. The patient declined a wheelchair and is discharged ambulatory with instructions and prescriptions in hand. Pt is alert and oriented x 4. Respirations are clear and unlabored.

## 2020-01-30 NOTE — ED NOTES
TRANSFER - OUT REPORT:    Verbal report given to Radha Weinstein RN on Malva File  At bedside byt this RN  Report consisted of patients Situation, Background, Assessment and   Recommendations(SBAR). Information from the following report(s) SBAR was reviewed with the receiving nurse. Lines:   Peripheral IV 01/29/20 Right Hand (Active)   Site Assessment Clean, dry, & intact 1/29/2020  5:06 PM   Phlebitis Assessment 0 1/29/2020  5:06 PM   Infiltration Assessment 0 1/29/2020  5:06 PM   Dressing Status Clean, dry, & intact 1/29/2020  5:06 PM        Opportunity for questions and clarification was provided.

## 2020-02-06 DIAGNOSIS — E29.1 PRIMARY HYPOGONADISM IN MALE: ICD-10-CM

## 2020-05-06 ENCOUNTER — APPOINTMENT (OUTPATIENT)
Dept: CT IMAGING | Age: 75
End: 2020-05-06
Attending: EMERGENCY MEDICINE
Payer: MEDICARE

## 2020-05-06 ENCOUNTER — HOSPITAL ENCOUNTER (EMERGENCY)
Age: 75
Discharge: HOME | End: 2020-05-06
Attending: EMERGENCY MEDICINE
Payer: MEDICARE

## 2020-05-06 ENCOUNTER — APPOINTMENT (OUTPATIENT)
Dept: GENERAL RADIOLOGY | Age: 75
End: 2020-05-06
Attending: EMERGENCY MEDICINE
Payer: MEDICARE

## 2020-05-06 VITALS
OXYGEN SATURATION: 96 % | WEIGHT: 250 LBS | TEMPERATURE: 98.4 F | SYSTOLIC BLOOD PRESSURE: 120 MMHG | HEIGHT: 70 IN | DIASTOLIC BLOOD PRESSURE: 60 MMHG | RESPIRATION RATE: 18 BRPM | HEART RATE: 66 BPM | BODY MASS INDEX: 35.79 KG/M2

## 2020-05-06 DIAGNOSIS — M54.50 CHRONIC LEFT-SIDED LOW BACK PAIN, UNSPECIFIED WHETHER SCIATICA PRESENT: Primary | ICD-10-CM

## 2020-05-06 DIAGNOSIS — G89.29 CHRONIC LEFT-SIDED LOW BACK PAIN, UNSPECIFIED WHETHER SCIATICA PRESENT: Primary | ICD-10-CM

## 2020-05-06 DIAGNOSIS — M47.897 OTHER OSTEOARTHRITIS OF SPINE, LUMBOSACRAL REGION: ICD-10-CM

## 2020-05-06 DIAGNOSIS — M62.838 MUSCLE SPASM: ICD-10-CM

## 2020-05-06 LAB
ANION GAP SERPL CALC-SCNC: 4 MMOL/L (ref 5–15)
APPEARANCE UR: CLEAR
BACTERIA URNS QL MICRO: NEGATIVE /HPF
BASOPHILS # BLD: 0 K/UL (ref 0–0.1)
BASOPHILS NFR BLD: 0 % (ref 0–1)
BILIRUB UR QL: NEGATIVE
BUN SERPL-MCNC: 11 MG/DL (ref 6–20)
BUN/CREAT SERPL: 13 (ref 12–20)
CALCIUM SERPL-MCNC: 8.9 MG/DL (ref 8.5–10.1)
CHLORIDE SERPL-SCNC: 104 MMOL/L (ref 97–108)
CO2 SERPL-SCNC: 30 MMOL/L (ref 21–32)
COLOR UR: NORMAL
CREAT SERPL-MCNC: 0.83 MG/DL (ref 0.7–1.3)
DIFFERENTIAL METHOD BLD: NORMAL
EOSINOPHIL # BLD: 0.3 K/UL (ref 0–0.4)
EOSINOPHIL NFR BLD: 3 % (ref 0–7)
EPITH CASTS URNS QL MICRO: NORMAL /LPF
ERYTHROCYTE [DISTWIDTH] IN BLOOD BY AUTOMATED COUNT: 14.5 % (ref 11.5–14.5)
GLUCOSE SERPL-MCNC: 206 MG/DL (ref 65–100)
GLUCOSE UR STRIP.AUTO-MCNC: NEGATIVE MG/DL
HCT VFR BLD AUTO: 39.5 % (ref 36.6–50.3)
HGB BLD-MCNC: 13.2 G/DL (ref 12.1–17)
HGB UR QL STRIP: NEGATIVE
IMM GRANULOCYTES # BLD AUTO: 0 K/UL (ref 0–0.04)
IMM GRANULOCYTES NFR BLD AUTO: 0 % (ref 0–0.5)
KETONES UR QL STRIP.AUTO: NEGATIVE MG/DL
LEUKOCYTE ESTERASE UR QL STRIP.AUTO: NEGATIVE
LYMPHOCYTES # BLD: 1.4 K/UL (ref 0.8–3.5)
LYMPHOCYTES NFR BLD: 19 % (ref 12–49)
MCH RBC QN AUTO: 29.3 PG (ref 26–34)
MCHC RBC AUTO-ENTMCNC: 33.4 G/DL (ref 30–36.5)
MCV RBC AUTO: 87.6 FL (ref 80–99)
MONOCYTES # BLD: 0.6 K/UL (ref 0–1)
MONOCYTES NFR BLD: 8 % (ref 5–13)
NEUTS SEG # BLD: 5.3 K/UL (ref 1.8–8)
NEUTS SEG NFR BLD: 70 % (ref 32–75)
NITRITE UR QL STRIP.AUTO: NEGATIVE
NRBC # BLD: 0 K/UL (ref 0–0.01)
NRBC BLD-RTO: 0 PER 100 WBC
PH UR STRIP: 5.5 [PH] (ref 5–8)
PLATELET # BLD AUTO: 243 K/UL (ref 150–400)
PMV BLD AUTO: 10.2 FL (ref 8.9–12.9)
POTASSIUM SERPL-SCNC: 3.6 MMOL/L (ref 3.5–5.1)
PROT UR STRIP-MCNC: NEGATIVE MG/DL
RBC # BLD AUTO: 4.51 M/UL (ref 4.1–5.7)
RBC #/AREA URNS HPF: NORMAL /HPF (ref 0–5)
SODIUM SERPL-SCNC: 138 MMOL/L (ref 136–145)
SP GR UR REFRACTOMETRY: 1.02 (ref 1–1.03)
SPERM URNS QL MICRO: PRESENT
UA: UC IF INDICATED,UAUC: NORMAL
UROBILINOGEN UR QL STRIP.AUTO: 0.2 EU/DL (ref 0.2–1)
WBC # BLD AUTO: 7.6 K/UL (ref 4.1–11.1)
WBC URNS QL MICRO: NORMAL /HPF (ref 0–4)

## 2020-05-06 PROCEDURE — 74011250636 HC RX REV CODE- 250/636: Performed by: EMERGENCY MEDICINE

## 2020-05-06 PROCEDURE — 80048 BASIC METABOLIC PNL TOTAL CA: CPT

## 2020-05-06 PROCEDURE — 74176 CT ABD & PELVIS W/O CONTRAST: CPT

## 2020-05-06 PROCEDURE — 96375 TX/PRO/DX INJ NEW DRUG ADDON: CPT

## 2020-05-06 PROCEDURE — 36415 COLL VENOUS BLD VENIPUNCTURE: CPT

## 2020-05-06 PROCEDURE — 72100 X-RAY EXAM L-S SPINE 2/3 VWS: CPT

## 2020-05-06 PROCEDURE — 99284 EMERGENCY DEPT VISIT MOD MDM: CPT

## 2020-05-06 PROCEDURE — 96374 THER/PROPH/DIAG INJ IV PUSH: CPT

## 2020-05-06 PROCEDURE — 81001 URINALYSIS AUTO W/SCOPE: CPT

## 2020-05-06 PROCEDURE — 85025 COMPLETE CBC W/AUTO DIFF WBC: CPT

## 2020-05-06 RX ORDER — MORPHINE SULFATE 4 MG/ML
4 INJECTION INTRAVENOUS
Status: COMPLETED | OUTPATIENT
Start: 2020-05-06 | End: 2020-05-06

## 2020-05-06 RX ORDER — KETOROLAC TROMETHAMINE 30 MG/ML
15 INJECTION, SOLUTION INTRAMUSCULAR; INTRAVENOUS
Status: COMPLETED | OUTPATIENT
Start: 2020-05-06 | End: 2020-05-06

## 2020-05-06 RX ORDER — METHOCARBAMOL 750 MG/1
750 TABLET, FILM COATED ORAL 3 TIMES DAILY
Qty: 12 TAB | Refills: 0 | Status: SHIPPED | OUTPATIENT
Start: 2020-05-06 | End: 2020-05-10

## 2020-05-06 RX ADMIN — KETOROLAC TROMETHAMINE 15 MG: 30 INJECTION, SOLUTION INTRAMUSCULAR at 15:31

## 2020-05-06 RX ADMIN — SODIUM CHLORIDE 1000 ML: 900 INJECTION, SOLUTION INTRAVENOUS at 15:31

## 2020-05-06 RX ADMIN — MORPHINE SULFATE 4 MG: 4 INJECTION, SOLUTION INTRAMUSCULAR; INTRAVENOUS at 15:31

## 2020-05-06 NOTE — ED PROVIDER NOTES
EMERGENCY DEPARTMENT HISTORY AND PHYSICAL EXAM      Date: 5/6/2020  Patient Name: Dianne Alexander  Patient Age and Sex: 76 y.o. male    History of Presenting Illness     Chief Complaint   Patient presents with    Back Pain     Pain at left low back severe since this am.  Denies specific injury, hx of DJD with spinal chord stimulator. History Provided By: Patient    HPI: Dianne Alexander, is a 76 y.o. male whose medical history is noted below presents to the ED with sudden onset of left flank pain, started this morning. The patient has a longstanding history of degenerative arthritis in his spine and has a spinal cord stimulator. He is used to musculoskeletal discomfort originating from his arthritis and states that the pain that he is currently experiencing is very different from his normal back pain. The pain is sharp and pulsating in nature, in the left flank and at times radiating into left thigh. No recent history of trauma. No fevers or chills. No difficulty urinating. No changes in his bowel habits. Pt denies any other alleviating or exacerbating factors. There are no other complaints, changes or physical findings at this time. Past Medical History:   Diagnosis Date    Abdominal adhesions 2000    Arthritis     all joints; Degenerative disk disease    CAD (coronary artery disease)     stents x4, followed by Dr. Anneliese Toussaint Chronic constipation     Chronic pain     all my joints    Diabetic neuropathy ([facility] [address]     Type II    Fibromyalgia     GERD (gastroesophageal reflux disease)     HLD (hyperlipidemia)     Hypertension     Ill-defined condition     fibromyalgia    Neuropathy     hands/feet    Sleep apnea     uses Cpap    Thyroid disease      Past Surgical History:   Procedure Laterality Date    ABDOMEN SURGERY PROC UNLISTED  early 2000's    abdominal hernia repair    ABDOMEN SURGERY PROC UNLISTED  2000's    adhesions from appendix sx.    [address] [place] [address] UNLISTED  3143'V colectomy: when removing adhesions, nicked intestines, removed 8\" of intestine    COLONOSCOPY N/A 12/18/2017    COLONOSCOPY performed by Tayo Batres MD at [address] COLONOSCOPY N/A 1/15/2019    COLONOSCOPY performed by Val Melendez MD at [facility] ENDOSCOPY    HX [address] HX CORONARY STENT PLACEMENT  2014    HX ORTHOPAEDIC Bilateral 2000, 2007    hip replacement       PCP: Faibo Barnett NP    Past History   Past Medical History:  Past Medical History:   Diagnosis Date    Abdominal adhesions 2000    Arthritis     all joints; Degenerative disk disease    CAD (coronary artery disease)     stents x4, followed by Dr. Edward Power Chronic constipation     Chronic pain     all my joints    Diabetic neuropathy ([facility] [address]     Type II    Fibromyalgia     GERD (gastroesophageal reflux disease)     HLD (hyperlipidemia)     Hypertension     Ill-defined condition     fibromyalgia    Neuropathy     hands/feet    Sleep apnea     uses Cpap    Thyroid disease        Past Surgical History:  Past Surgical History:   Procedure Laterality Date    ABDOMEN SURGERY PROC UNLISTED  early 2000's    abdominal hernia repair    ABDOMEN SURGERY PROC UNLISTED  2000's    adhesions from appendix sx.     ABDOMEN SURGERY PROC UNLISTED  2000's    colectomy: when removing adhesions, nicked intestines, removed 8\" of intestine    COLONOSCOPY N/A 12/18/2017    COLONOSCOPY performed by Tayo Batres MD at [facility] AMBULATORY OR    COLONOSCOPY N/A 1/15/2019    COLONOSCOPY performed by Val Melendez MD at [facility] ENDOSCOPY    HX [address] 2014    HX ORTHOPAEDIC Bilateral 2000, 2007    hip replacement       Family History:  Family History   Problem Relation Age of Onset    Heart Disease Brother     Obesity Brother     Other Mother         hiatal hernia    Arthritis Mother         back, spine    Heart Disease Mother         Angina    Psychiatric Disorder Mother depression    No Known Problems Father     Diabetes Sister 16        Type 1    Arthritis Sister     Cancer Neg Hx        Social History:  Social History     Tobacco Use    Smoking status: Former Smoker     Years: 40.00    Smokeless tobacco: Never Used   Substance Use Topics    Alcohol use: Yes     Alcohol/week: 6.0 standard drinks     Types: 2 Shots of liquor, 4 Glasses of wine per week    Drug use: No       Allergies:  No Known Allergies    Current Medications:  No current facility-administered medications on file prior to encounter. Current Outpatient Medications on File Prior to Encounter   Medication Sig Dispense Refill    nitroglycerin (NITROSTAT) 0.4 mg SL tablet 1 Tab by SubLINGual route every five (5) minutes as needed for Chest Pain. Up to 3 doses. 20 Tab 0    Syringe with Needle, Disp, (TUBERCULIN SYRINGE) 1 mL 25 gauge x 5/8\" syrg One injection every week 50 Syringe 3    testosterone cypionate 200 mg/mL kit 0.75ml under the skin every week. 1 Kit 5    testosterone cypionate (DEPOTESTOTERONE CYPIONATE) 200 mg/mL injection INJECT 0.5 ML INTRAMUSCULARLY EVERY WEEK 2 mL 5    Syringe with Needle, Disp, (SYRINGE 3CC/20GX1\") 3 mL 20 gauge x 1\" syrg Use to administer testosterone injection every week. DX:E29.1 200 Syringe 3    Blood-Glucose Meter monitoring kit For testing blood glucose twice daily 1 Kit 0    glucose blood VI test strips (PHARMACIST CHOICE) strip For testing blood glucose twice daily 100 Strip 0    losartan (COZAAR) 25 mg tablet Take 25 mg by mouth daily.  primidone (MYSOLINE) 50 mg tablet Take 50 mg by mouth daily.  calcium-cholecalciferol, d3, (CALCIUM 600 + D) 600-125 mg-unit tab Take 2 Tabs by mouth daily.  HYDROcodone-acetaminophen (NORCO) 7.5-325 mg per tablet Take 1 Tab by mouth every twelve (12) hours as needed for Pain.  polyethylene glycol (MIRALAX) 17 gram/dose powder Take 17 g by mouth daily.  289 g 0    metFORMIN (GLUCOPHAGE) 1,000 mg tablet Take 1 Tab by mouth two (2) times daily (with meals). START TAKING 9/23 WITH DINNER 30 Tab 0    atorvastatin (LIPITOR) 80 mg tablet Take 80 mg by mouth nightly.  hydroCHLOROthiazide (HYDRODIURIL) 25 mg tablet Take 25 mg by mouth daily.  DULoxetine (CYMBALTA) 60 mg capsule Take 120 mg by mouth daily.  aspirin delayed-release 81 mg tablet Take 81 mg by mouth daily.  levothyroxine (SYNTHROID) 200 mcg tablet Take 200 mcg by mouth Daily (before breakfast).  cholecalciferol, vitamin D3, (VITAMIN D3) 2,000 unit tab Take 2,000 Units by mouth daily. Review of Systems     Review of Systems   Constitutional: Negative for appetite change, chills and fever. Respiratory: Negative for cough, chest tightness and shortness of breath. Cardiovascular: Negative for chest pain, palpitations and leg swelling. Gastrointestinal: Negative for abdominal distention, abdominal pain, blood in stool, constipation, diarrhea, nausea and vomiting. Genitourinary: Positive for flank pain. Negative for decreased urine volume, difficulty urinating, dysuria, frequency and hematuria. Musculoskeletal: Positive for back pain. Negative for arthralgias, joint swelling, myalgias, neck pain and neck stiffness. Skin: Negative for rash. Neurological: Negative for dizziness, weakness, light-headedness, numbness and headaches. Hematological: Negative for adenopathy. All other systems reviewed and are negative. Physical Exam     Physical Exam  Vitals signs and nursing note reviewed. Constitutional:       Appearance: He is not toxic-appearing. HENT:      Mouth/Throat:      Mouth: Mucous membranes are moist.   Eyes:      General: No scleral icterus. Extraocular Movements: Extraocular movements intact. Conjunctiva/sclera: Conjunctivae normal.      Pupils: Pupils are equal, round, and reactive to light. Neck:      Musculoskeletal: Normal range of motion and neck supple.       Vascular: No JVD.   Cardiovascular:      Rate and Rhythm: Normal rate and regular rhythm. Pulses: Normal pulses. Heart sounds: Normal heart sounds. Pulmonary:      Effort: Pulmonary effort is normal.      Breath sounds: Normal breath sounds. Chest:      Chest wall: No tenderness. Abdominal:      General: Bowel sounds are normal. There is no distension. Palpations: Abdomen is soft. Tenderness: There is no abdominal tenderness. Musculoskeletal: Normal range of motion. General: No swelling. Lumbar back: He exhibits tenderness and spasm. He exhibits no bony tenderness, no swelling and normal pulse. Back:       Right lower leg: No edema. Left lower leg: No edema. Skin:     General: Skin is warm and dry. Capillary Refill: Capillary refill takes less than 2 seconds. Findings: No erythema or rash. Neurological:      General: No focal deficit present. Mental Status: Mental status is at baseline. Cranial Nerves: Cranial nerves are intact. Sensory: Sensation is intact. Motor: Motor function is intact. Coordination: Coordination is intact. Gait: Gait is intact. Deep Tendon Reflexes: Babinski sign absent on the right side. Babinski sign absent on the left side. Reflex Scores:       Patellar reflexes are 2+ on the right side and 2+ on the left side. Psychiatric:         Mood and Affect: Mood normal.         Behavior: Behavior normal.         Diagnostic Study Results     Labs - I have personally reviewed and interpreted all laboratory results.  Mikel Narvaez MD, MSc  Recent Results (from the past 24 hour(s))   CBC WITH AUTOMATED DIFF    Collection Time: 05/06/20  2:57 PM   Result Value Ref Range    WBC 7.6 4.1 - 11.1 K/uL    RBC 4.51 4.10 - 5.70 M/uL    HGB 13.2 12.1 - 17.0 g/dL    HCT 39.5 36.6 - 50.3 %    MCV 87.6 80.0 - 99.0 FL    MCH 29.3 26.0 - 34.0 PG    MCHC 33.4 30.0 - 36.5 g/dL    RDW 14.5 11.5 - 14.5 %    PLATELET 490 257 - 699 K/uL    MPV 10.2 8.9 - 12.9 FL    NRBC 0.0 0  WBC    ABSOLUTE NRBC 0.00 0.00 - 0.01 K/uL    NEUTROPHILS 70 32 - 75 %    LYMPHOCYTES 19 12 - 49 %    MONOCYTES 8 5 - 13 %    EOSINOPHILS 3 0 - 7 %    BASOPHILS 0 0 - 1 %    IMMATURE GRANULOCYTES 0 0.0 - 0.5 %    ABS. NEUTROPHILS 5.3 1.8 - 8.0 K/UL    ABS. LYMPHOCYTES 1.4 0.8 - 3.5 K/UL    ABS. MONOCYTES 0.6 0.0 - 1.0 K/UL    ABS. EOSINOPHILS 0.3 0.0 - 0.4 K/UL    ABS. BASOPHILS 0.0 0.0 - 0.1 K/UL    ABS. IMM. GRANS. 0.0 0.00 - 0.04 K/UL    DF AUTOMATED         Radiologic Studies - I have personally reviewed and interpreted all imaging studies and agree with radiology interpretation and report. Matthieu Covarrubias MD, MSc  CT ABD PELV WO CONT   Final Result   IMPRESSION: No acute process in the abdomen and pelvis. XR SPINE LUMB 2 OR 3 V   Final Result   IMPRESSION:   Moderate to severe degenerative disc disease. Mild to moderate facet   osteoarthritis. Medical Decision Making   I am the first provider for this patient. Records Reviewed: I reviewed our electronic medical record system for any past medical records that were available that may contribute to the patient's current condition, including their PMH, surgical history, social and family history. Reviewed the nursing notes and vital signs from today's visit. Nursing notes will be reviewed as they become available in realtime while the pt has been in the ED. Mikel Narvaez MD Msc    Vital Signs-Reviewed the patient's vital signs. Patient Vitals for the past 24 hrs:   Temp Pulse Resp BP SpO2   05/06/20 1314 98.3 °F (36.8 °C) (!) 56 16 144/65 100 %       Provider Notes (Medical Decision Making):   Patient presents with acute on chronic low back pain that is left sided and also involves his left flank.  DDx: renal stone, muscular strain, unlikely AAA given the story, unlikely intra-abdominal issue as abdomen itself is not tender at all on exam.  Patient has history of chronic low back pain but currently no red flags identified, meaning no h/o cancer, corticosteroid use, abnormal neurologic physical findings (including new ataxia and/or difficulty walking), and anticoagulant use. On exam: Straight leg raise negative. Patient ambulating appropriately with a normal gait. No sudden bladder or bowel retention or incontinence. No lower extremity weakness. No saddle numbness, hypoesthesia, or anesthesia. Patellar and Achilles reflexes equal and normal. All of this is reassuring and c/w lumbar musculoskeletal pain/spasm rather than spinal cord compression syndrome, cauda equina or acute disc herniation with cord compression. Patient reassessed and imaging studies reviewed. CT scan shows several unrelated findings, none to explain his pain. Hi sysmptoms have improved, however, he was observed in the ED and has a normal gait now, feels much better. I recommend heat packs, lidocaine patch, robaxin (which I will prescribe) for a few days. Given his history, urged him to call his doctor today. Return precautions discussed in detail. ED Course:   Initial assessment performed. The patients presenting problems have been discussed, and they are in agreement with the care plan formulated and outlined with them. I have encouraged them to ask questions as they arise throughout their visit. Debbie Doll MD, am the attending of record for this patient encounter.     ED Orders Placed/Medications Administered During ED Course:   Orders Placed This Encounter    XR SPINE LUMB 2 OR 3 V    CT ABD PELV WO CONT    CBC WITH AUTOMATED DIFF    BASIC METABOLIC PANEL    URINALYSIS W/ REFLEX CULTURE    morphine injection 4 mg    ketorolac (TORADOL) injection 15 mg    sodium chloride 0.9 % bolus infusion 1,000 mL       Medications   morphine injection 4 mg (has no administration in time range)   ketorolac (TORADOL) injection 15 mg (has no administration in time range)   sodium chloride 0.9 % bolus infusion 1,000 mL (has no administration in time range)     Progress note:  Patient has been reassessed and reports feeling considerably better, has normal vital signs and feels comfortable going home. I think this is reasonable as no findings today suggest a life-threatening condition. DISPOSITION: DISCHARGE  The patient's results have been reviewed with patient and available family and/or caregiver. They verbally convey their understanding and agreement of the patient's signs, symptoms, diagnosis, treatment and prognosis and additionally agree to follow up as recommended in the discharge instructions or to return to the Emergency Department should the patient's condition change prior to their follow-up appointment. The patient and available family and/or caregiver verbally agree with the care plan and all of their questions have been answered. The discharge instructions have also been provided to the them with educational information regarding the patient's diagnosis as well a list of reasons why the patient would want to return to the ER prior to their follow-up appointment should any concerns arise, the patient's condition change or symptoms worsen. Tamiko Farmer MD, Msc    PLAN:  Discharge Medication List as of 5/6/2020  4:49 PM      START taking these medications    Details   methocarbamoL (Robaxin-750) 750 mg tablet Take 1 Tab by mouth three (3) times daily for 12 doses. , Normal, Disp-12 Tab, R-0         CONTINUE these medications which have NOT CHANGED    Details   nitroglycerin (NITROSTAT) 0.4 mg SL tablet 1 Tab by SubLINGual route every five (5) minutes as needed for Chest Pain. Up to 3 doses. , Print, Disp-20 Tab, R-0      !!  Syringe with Needle, Disp, (TUBERCULIN SYRINGE) 1 mL 25 gauge x 5/8\" syrg One injection every week, Print, Disp-50 Syringe, R-3      testosterone cypionate 200 mg/mL kit 0.75ml under the skin every week., Print, Disp-1 Kit, R-5      testosterone cypionate (DEPOTESTOTERONE CYPIONATE) 200 mg/mL injection INJECT 0.5 ML INTRAMUSCULARLY EVERY WEEK, Print, Disp-2 mL, R-5      !! Syringe with Needle, Disp, (SYRINGE 3CC/20GX1\") 3 mL 20 gauge x 1\" syrg Use to administer testosterone injection every week. DX:E29.1, Normal, Disp-200 Syringe, R-3      Blood-Glucose Meter monitoring kit For testing blood glucose twice daily, Print, Disp-1 Kit, R-0      glucose blood VI test strips (PHARMACIST CHOICE) strip For testing blood glucose twice daily, Print, Disp-100 Strip, R-0      losartan (COZAAR) 25 mg tablet Take 25 mg by mouth daily. , Historical Med      primidone (MYSOLINE) 50 mg tablet Take 50 mg by mouth daily. , Historical Med      calcium-cholecalciferol, d3, (CALCIUM 600 + D) 600-125 mg-unit tab Take 2 Tabs by mouth daily. , Historical Med      HYDROcodone-acetaminophen (NORCO) 7.5-325 mg per tablet Take 1 Tab by mouth every twelve (12) hours as needed for Pain., Historical Med      polyethylene glycol (MIRALAX) 17 gram/dose powder Take 17 g by mouth daily. , Normal, Disp-289 g, R-0      metFORMIN (GLUCOPHAGE) 1,000 mg tablet Take 1 Tab by mouth two (2) times daily (with meals). START TAKING 9/23 WITH DINNER, No Print, Disp-30 Tab, R-0      atorvastatin (LIPITOR) 80 mg tablet Take 80 mg by mouth nightly., Historical Med      hydroCHLOROthiazide (HYDRODIURIL) 25 mg tablet Take 25 mg by mouth daily. , Historical Med      DULoxetine (CYMBALTA) 60 mg capsule Take 120 mg by mouth daily. , Historical Med      aspirin delayed-release 81 mg tablet Take 81 mg by mouth daily. , Historical Med      levothyroxine (SYNTHROID) 200 mcg tablet Take 200 mcg by mouth Daily (before breakfast). , Historical Med      cholecalciferol, vitamin D3, (VITAMIN D3) 2,000 unit tab Take 2,000 Units by mouth daily. , Historical Med       !! - Potential duplicate medications found. Please discuss with provider.       1.   2.     Follow-up Information     Follow up With Specialties Details Why Contact Info    Rosamaria Marques NP Nurse Practitioner Call in 1 day  [id number] [place] [address]  [phone]      \[facility]\"" EMERGENCY DEPT Emergency Medicine  As needed, If symptoms worsen [address]  [address] [facility]  [phone]        3. Return to ED if worse       Diagnosis     Clinical Impression:   1. Chronic left-sided low back pain, unspecified whether sciatica present    2. Muscle spasm    3. Other osteoarthritis of spine, lumbosacral region        Attestation:  I personally performed the services described in this documentation on this date 5/6/2020 for patient Ann Rausch. Mirta Vidales MD    Please note that this dictation was completed with "[employer] the computer voice recognition software. Quite often unanticipated grammatical, syntax, homophones, and other interpretive errors are inadvertently transcribed by the computer software. Please disregard these errors. Please excuse any errors that have escaped final proofreading.

## 2020-05-06 NOTE — DISCHARGE INSTRUCTIONS
Patient Education        Learning About Relief for Back Pain  What is back tension and strain? Back strain happens when you overstretch, or pull, a muscle in your back. You may hurt your back in an accident or when you exercise or lift something. Most back pain will get better with rest and time. You can take care of yourself at home to help your back heal.  What can you do first to relieve back pain? When you first feel back pain, try these steps:  · Walk. Take a short walk (10 to 20 minutes) on a level surface (no slopes, hills, or stairs) every 2 to 3 hours. Walk only distances you can manage without pain, especially leg pain. · Relax. Find a comfortable position for rest. Some people are comfortable on the floor or a medium-firm bed with a small pillow under their head and another under their knees. Some people prefer to lie on their side with a pillow between their knees. Don't stay in one position for too long. · Try heat or ice. Try using a heating pad on a low or medium setting, or take a warm shower, for 15 to 20 minutes every 2 to 3 hours. Or you can buy single-use heat wraps that last up to 8 hours. You can also try an ice pack for 10 to 15 minutes every 2 to 3 hours. You can use an ice pack or a bag of frozen vegetables wrapped in a thin towel. There is not strong evidence that either heat or ice will help, but you can try them to see if they help. You may also want to try switching between heat and cold. · Take pain medicine exactly as directed. ¨ If the doctor gave you a prescription medicine for pain, take it as prescribed. ¨ If you are not taking a prescription pain medicine, ask your doctor if you can take an over-the-counter medicine. What else can you do? · Stretch and exercise. Exercises that increase flexibility may relieve your pain and make it easier for your muscles to keep your spine in a good, neutral position. And don't forget to keep walking. · Do self-massage.  You can use self-massage to unwind after work or school or to energize yourself in the morning. You can easily massage your feet, hands, or neck. Self-massage works best if you are in comfortable clothes and are sitting or lying in a comfortable position. Use oil or lotion to massage bare skin. · Reduce stress. Back pain can lead to a vicious [place]: Distress about the pain tenses the muscles in your back, which in turn causes more pain. Learn how to relax your mind and your muscles to lower your stress. Where can you learn more? Go to http://rosaura-jessica.info/. Enter S406 in the search box to learn more about \"Learning About Relief for Back Pain. \"  Current as of: March 21, 2017  Content Version: 11.5  © 2353-2462 [employer]. Care instructions adapted under license by [employer] (which disclaims liability or warranty for this information). If you have questions about a medical condition or this instruction, always ask your healthcare professional. [address] any warranty or liability for your use of this information.

## 2020-05-07 ENCOUNTER — PATIENT OUTREACH (OUTPATIENT)
Dept: CARDIOLOGY CLINIC | Age: 75
End: 2020-05-07

## 2020-05-07 NOTE — PROGRESS NOTES
Patient contacted regarding recent discharge and COVID-19 risk   Care Transition Nurse/ Ambulatory Care Manager contacted the patient by telephone to perform post discharge assessment. Verified name and  with patient as identifiers. Patient has following risk factors of: heart failure and diabetes. CTN/ACM reviewed discharge instructions, medical action plan and red flags related to discharge diagnosis. Reviewed and educated them on any new and changed medications related to discharge diagnosis. Advised obtaining a 90-day supply of all daily and as-needed medications. Education provided regarding infection prevention, and signs and symptoms of COVID-19 and when to seek medical attention with patient who verbalized understanding. Discussed exposure protocols and quarantine from [address] you at higher risk for severe illness  and given an opportunity for questions and concerns. The patient agrees to contact the COVID-19 hotline [phone] or PCP office for questions related to their healthcare. CTN/ACM provided contact information for future reference. From CDC: Are you at higher risk for severe illness?  Wash your hands often.  Avoid close contact (6 feet, which is about two arm lengths) with people who are sick.  Put distance between yourself and other people if COVID-19 is spreading in your community.  Clean and disinfect frequently touched surfaces.  Avoid all cruise travel and non-essential air travel.  Call your healthcare professional if you have concerns about COVID-19 and your underlying condition or if you are sick. For more information on steps you can take to protect yourself, see CDC's How to Protect Yourself      Patient/family/caregiver given information for [facility] and agrees to enroll no      Plan for follow-up call in 7-14 days based on severity of symptoms and risk factors.

## 2020-05-21 ENCOUNTER — PATIENT OUTREACH (OUTPATIENT)
Dept: CARDIOLOGY CLINIC | Age: 75
End: 2020-05-21

## 2020-05-21 NOTE — PROGRESS NOTES
Patient resolved from Transition of Care episode on 5/21/20  Patient/family has been provided the following resources and education related to COVID-19:                         Signs, symptoms and red flags related to COVID-19            CDC exposure and quarantine guidelines            [facility] exposure contact - [phone]            Contact for their local Department of Health     Patient states he has a follow up with Orthopedic tomorrow 5/22/20                Patient currently reports that the following symptoms have improved:  no new symptoms. No further outreach scheduled with this CTN/ACM. Episode of Care resolved. Patient has this CTN/ACM contact information if future needs arise.

## 2020-07-31 ENCOUNTER — VIRTUAL VISIT (OUTPATIENT)
Dept: ENDOCRINOLOGY | Age: 75
End: 2020-07-31

## 2020-07-31 DIAGNOSIS — E03.9 ACQUIRED HYPOTHYROIDISM: ICD-10-CM

## 2020-07-31 DIAGNOSIS — E66.01 SEVERE OBESITY (HCC): ICD-10-CM

## 2020-07-31 DIAGNOSIS — E11.9 TYPE 2 DIABETES MELLITUS WITHOUT COMPLICATION, WITHOUT LONG-TERM CURRENT USE OF INSULIN (HCC): ICD-10-CM

## 2020-07-31 DIAGNOSIS — E29.1 PRIMARY HYPOGONADISM IN MALE: Primary | ICD-10-CM

## 2020-07-31 DIAGNOSIS — R61 HYPERHIDROSIS: ICD-10-CM

## 2020-07-31 RX ORDER — FLASH GLUCOSE SENSOR
KIT MISCELLANEOUS
Qty: 2 KIT | Refills: 5 | Status: SHIPPED | OUTPATIENT
Start: 2020-07-31 | End: 2021-02-18

## 2020-07-31 RX ORDER — FLASH GLUCOSE SCANNING READER
EACH MISCELLANEOUS
Qty: 1 EACH | Refills: 0 | Status: SHIPPED | OUTPATIENT
Start: 2020-07-31 | End: 2021-02-18

## 2020-07-31 RX ORDER — PHENOL/SODIUM PHENOLATE
20 AEROSOL, SPRAY (ML) MUCOUS MEMBRANE EVERY OTHER DAY
COMMUNITY
End: 2022-08-22

## 2020-07-31 RX ORDER — NALTREXONE HYDROCHLORIDE 50 MG/1
TABLET, FILM COATED ORAL
COMMUNITY
Start: 2020-07-10 | End: 2020-11-23

## 2020-07-31 NOTE — PROGRESS NOTES
Chief Complaint   Patient presents with    Diabetes     No recent labs Doxy: [phone]    [address] [place] [address]     Pharmacy; [email or web address]            **THIS IS A VIRTUAL VISIT VIA A VIDEO ENCOUNTER. PATIENT AGREED TO HAVE THEIR CARE DELIVERED OVER VIDEO IN PLACE OF THEIR REGULARLY SCHEDULED OFFICE VISIT**      History of Present Illness: Nica Doshi is a 76 y.o. male here for follow up of hypothyroidism. In November 2018 he presented to his PCP with the complaint of hyperhidrosis. His Total Testosterone was 202. He was then referred for further evaluation. I re-tested his Testosterone level and it was again low at 174. His PSA was 0.8. His LH was elevated at 8.8, his prolactin was not elevated and his IGF-1 was not elevated. Pt was, eventually, started on IM Testosterone Cypionate, after a long struggle to get the insulin approved. He has been receiving the IM Testosterone Cypionate 100mg every week since September 2019. Pt notes he is no longer taking the Testosterone. He felt that it was not helping with his symptoms of diaphoresis so he stopped taking it. He has not noted any change in the sweating. He notes the perspiration is on the face, chest, stomach and axillae. Pt notes he has been dieting and he notes that he has lost about 25 pounds. Pt denies issues of CP, SOB, HA, vision changes, N/V, abdominal pain. Pt denies issues of polyuria or LUTS symptoms. Pt continues to have the issues of increased sweating. He notes that since the weather has gotten cooler the sweating is less frequent, but he notes it can occur at anytime of day. He is also using an undershirt to [place] up any excess sweat\". Pt has hx of hypothyroidism, which is well controlled on LT4 200mcg daily. Pt denies issues of CP, SOB, palpitation, heat or cold intolerance. He does have chronic constipation. Pt also has hx of DM, which is well controlled on Metformin 1000mg BID only. He does have Neuropathy from the DM.    He is not able to check his BGs because of tremors, he can not hold the test strips or get the blood on the end of the strip. Pt is eating 3 meals per day. He has breakfast around 9AM, this AM he had two eggs, [place], a biscuit, apple sauce and coffee. He has lunch around 2PM, yesterday he had a salad, pork loin and iced tea. She has dinner around 7-8PM, last night he had pork loin, brussel sprouts, cucumbers and water. Pt has chronic pain issues, due to FM, DDD and OA. He had the nerve stimulator for his back and pt notes that it has been helping with his pain. He is followed by a pain specialist. He does take PRN Hydrocodone for his pain. He also has hx of FROILAN and he is using a CPAP. Pt notes he has been struggling with binge eating and this has caused his weight to fluctuate up and down. Pt notes that his PCP gave him \"something to suppress my appetite\", but he is no longer taking it and can not recall why. Current Outpatient Medications   Medication Sig    Omeprazole delayed release (PRILOSEC D/R) 20 mg tablet Take 20 mg by mouth daily.  nitroglycerin (NITROSTAT) 0.4 mg SL tablet 1 Tab by SubLINGual route every five (5) minutes as needed for Chest Pain. Up to 3 doses.  Blood-Glucose Meter monitoring kit For testing blood glucose twice daily    losartan (COZAAR) 25 mg tablet Take 25 mg by mouth daily.  primidone (MYSOLINE) 50 mg tablet Take 50 mg by mouth daily.  calcium-cholecalciferol, d3, (CALCIUM 600 + D) 600-125 mg-unit tab Take 2 Tabs by mouth daily.  HYDROcodone-acetaminophen (NORCO) 7.5-325 mg per tablet Take 1 Tab by mouth every twelve (12) hours as needed for Pain.  polyethylene glycol (MIRALAX) 17 gram/dose powder Take 17 g by mouth daily.  metFORMIN (GLUCOPHAGE) 1,000 mg tablet Take 1 Tab by mouth two (2) times daily (with meals). START TAKING 9/23 WITH DINNER    atorvastatin (LIPITOR) 80 mg tablet Take 80 mg by mouth nightly.     hydroCHLOROthiazide (HYDRODIURIL) 25 mg tablet Take 25 mg by mouth daily.  DULoxetine (CYMBALTA) 60 mg capsule Take 60 mg by mouth two (2) times a day. 1 tab BID    aspirin delayed-release 81 mg tablet Take 81 mg by mouth daily.  levothyroxine (SYNTHROID) 200 mcg tablet Take 200 mcg by mouth Daily (before breakfast).  cholecalciferol, vitamin D3, (VITAMIN D3) 2,000 unit tab Take 2,000 Units by mouth daily.  naltrexone (DEPADE) 50 mg tablet TAKE 1 TABLET BY MOUTH ONCE DAILY    Syringe with Needle, Disp, (TUBERCULIN SYRINGE) 1 mL 25 gauge x 5/8\" syrg One injection every week    testosterone cypionate 200 mg/mL kit 0.75ml under the skin every week.  testosterone cypionate (DEPOTESTOTERONE CYPIONATE) 200 mg/mL injection INJECT 0.5 ML INTRAMUSCULARLY EVERY WEEK    Syringe with Needle, Disp, (SYRINGE 3CC/20GX1\") 3 mL 20 gauge x 1\" syrg Use to administer testosterone injection every week. DX:E29.1    glucose blood VI test strips (PHARMACIST CHOICE) strip For testing blood glucose twice daily     No current facility-administered medications for this visit. No Known Allergies  Review of Systems:  - Cardiovascular: no chest pain  - Neurological: no tremors  - Integumentary: skin is normal    Physical Examination:  There were no vitals taken for this visit. - GENERAL: NCAT, Appears well nourished   - EYES: EOMI, non-icteric, no proptosis   - Ear/Nose/Throat: NCAT, no visible inflammation or masses   - CARDIOVASCULAR: no cyanosis, no visible JVD   - RESPIRATORY: respiratory effort normal without any distress or labored breathing   - MUSCULOSKELETAL: Normal ROM of neck and upper extremities observed   - SKIN: No rash on face   - NEUROLOGIC:  No facial asymmetry (Cranial nerve 7 motor function), No gaze palsy, + tremors (chronic).   - PSYCHIATRIC: Normal affect, Normal insight and judgement   -     Data Reviewed:   Component      Latest Ref Rng & Units 5/6/2020 5/6/2020           2:57 PM  2:57 PM   WBC      4.1 - 11.1 K/uL  7.6   RBC      4.10 - 5.70 M/uL  4.51   HGB      12.1 - 17.0 g/dL  13.2   HCT      36.6 - 50.3 %  39.5   MCV      80.0 - 99.0 FL  87.6   MCH      26.0 - 34.0 PG  29.3   MCHC      30.0 - 36.5 g/dL  33.4   RDW      11.5 - 14.5 %  14.5   PLATELET      963 - 197 K/uL  243   MPV      8.9 - 12.9 FL  10.2   NRBC      0  WBC  0.0   ABSOLUTE NRBC      0.00 - 0.01 K/uL  0.00   NEUTROPHILS      32 - 75 %  70   LYMPHOCYTES      12 - 49 %  19   MONOCYTES      5 - 13 %  8   EOSINOPHILS      0 - 7 %  3   BASOPHILS      0 - 1 %  0   IMMATURE GRANULOCYTES      0.0 - 0.5 %  0   ABS. NEUTROPHILS      1.8 - 8.0 K/UL  5.3   ABS. LYMPHOCYTES      0.8 - 3.5 K/UL  1.4   ABS. MONOCYTES      0.0 - 1.0 K/UL  0.6   ABS. EOSINOPHILS      0.0 - 0.4 K/UL  0.3   ABS. BASOPHILS      0.0 - 0.1 K/UL  0.0   ABS. IMM. GRANS.      0.00 - 0.04 K/UL  0.0   DF        AUTOMATED   Sodium      136 - 145 mmol/L 138    Potassium      3.5 - 5.1 mmol/L 3.6    Chloride      97 - 108 mmol/L 104    CO2      21 - 32 mmol/L 30    Anion gap      5 - 15 mmol/L 4 (L)    Glucose      65 - 100 mg/dL 206 (H)    BUN      6 - 20 MG/DL 11    Creatinine      0.70 - 1.30 MG/DL 0.83    BUN/Creatinine ratio      12 - 20   13    GFR est AA      >60 ml/min/1.73m2 >60    GFR est non-AA      >60 ml/min/1.73m2 >60    Calcium      8.5 - 10.1 MG/DL 8.9        Assessment/Plan:   1) Hypogonadism > Pt stopped the Testosterone since it was not giving him any clinical benefit. 2) Hypothyroidism > Pt is clinically euthyroid on the LT4 200mcg daily. Will check TFTs and adjust his dose as needed. 3) DM >  Will order an A1C, pt has had good weight loss and was encouraged to keep up the good work. Because of his tremors and dexterity issues he is not able to use a glucometer to test his BGs. He would benefit from a CGM such as a [facility]. Will order the CREATIV to test his BGs daily. 4) Obestiy > Pt has lost 25 pounds with dietary and lifestyle changes.  Pt encouraged to keep up the excellent work. 5) Hyperhidrosis > We discussed topical options to help with the sweating, but he wants to wait on adding any new medications at this time    Pt voices understanding and agreement with the plan.   Pain noted and pt was recommended to call his PCP for further evaluation and treatment, as needed      RTC 6 months        Copy sent to:  Dr. [facility]

## 2020-08-01 LAB
ALBUMIN/CREAT UR: 5 MG/G CREAT (ref 0–29)
CREAT UR-MCNC: 86.6 MG/DL
EST. AVERAGE GLUCOSE BLD GHB EST-MCNC: 169 MG/DL
HBA1C MFR BLD: 7.5 % (ref 4.8–5.6)
MICROALBUMIN UR-MCNC: 4.3 UG/ML
T4 FREE SERPL-MCNC: 1.44 NG/DL (ref 0.82–1.77)
TSH SERPL DL<=0.005 MIU/L-ACNC: 1.24 UIU/ML (ref 0.45–4.5)

## 2020-08-03 NOTE — PROGRESS NOTES
Spoke with pt regarding the results of his labs. Pt to continue the LT4 200mcg daily and Metformin 1000mg BID. Pt voices understanding and agreement with the plan.

## 2020-09-17 ENCOUNTER — TELEPHONE (OUTPATIENT)
Dept: ENDOCRINOLOGY | Age: 75
End: 2020-09-17

## 2020-09-17 NOTE — TELEPHONE ENCOUNTER
----- Message from Niurka Aguirre sent at 9/17/2020  3:53 PM EDT -----  Regarding: Dr Nita Mixon Message/Vendor Calls    Caller's first and last name:      Reason for call:pt would like to  ask Dr Johanna Landa if he is a  candidate for the Trulicity medication,      Callback required yes/no and why:yes for reason given above      Best contact number(s):[phone]      Details to clarify the request:      Niurka Aguirre

## 2020-09-18 NOTE — TELEPHONE ENCOUNTER
I feel he would be a good candidate. I spoke with him and discussed the [address] and potential side effects. Will send in a prescription for Trulicity 3.30TS weekly.

## 2020-10-02 PROCEDURE — 96374 THER/PROPH/DIAG INJ IV PUSH: CPT

## 2020-10-02 PROCEDURE — 96375 TX/PRO/DX INJ NEW DRUG ADDON: CPT

## 2020-10-02 PROCEDURE — 99285 EMERGENCY DEPT VISIT HI MDM: CPT

## 2020-10-03 ENCOUNTER — HOSPITAL ENCOUNTER (EMERGENCY)
Age: 75
Discharge: HOME | End: 2020-10-03
Attending: EMERGENCY MEDICINE
Payer: MEDICARE

## 2020-10-03 VITALS
TEMPERATURE: 98.5 F | BODY MASS INDEX: 35.07 KG/M2 | RESPIRATION RATE: 19 BRPM | WEIGHT: 245 LBS | DIASTOLIC BLOOD PRESSURE: 58 MMHG | HEIGHT: 70 IN | SYSTOLIC BLOOD PRESSURE: 142 MMHG | HEART RATE: 61 BPM | OXYGEN SATURATION: 97 %

## 2020-10-03 DIAGNOSIS — G89.29 ACUTE EXACERBATION OF CHRONIC LOW BACK PAIN: Primary | ICD-10-CM

## 2020-10-03 DIAGNOSIS — M51.36 DDD (DEGENERATIVE DISC DISEASE), LUMBAR: ICD-10-CM

## 2020-10-03 DIAGNOSIS — M54.50 ACUTE EXACERBATION OF CHRONIC LOW BACK PAIN: Primary | ICD-10-CM

## 2020-10-03 DIAGNOSIS — M51.34 BULGING OF THORACIC INTERVERTEBRAL DISC: ICD-10-CM

## 2020-10-03 LAB
APPEARANCE UR: CLEAR
BACTERIA URNS QL MICRO: NEGATIVE /HPF
BILIRUB UR QL: NEGATIVE
COLOR UR: NORMAL
EPITH CASTS URNS QL MICRO: NORMAL /LPF
GLUCOSE UR STRIP.AUTO-MCNC: NEGATIVE MG/DL
HGB UR QL STRIP: NEGATIVE
HYALINE CASTS URNS QL MICRO: NORMAL /LPF (ref 0–5)
KETONES UR QL STRIP.AUTO: NEGATIVE MG/DL
LEUKOCYTE ESTERASE UR QL STRIP.AUTO: NEGATIVE
NITRITE UR QL STRIP.AUTO: NEGATIVE
PH UR STRIP: 5 [PH] (ref 5–8)
PROT UR STRIP-MCNC: NEGATIVE MG/DL
RBC #/AREA URNS HPF: NORMAL /HPF (ref 0–5)
SP GR UR REFRACTOMETRY: 1.02 (ref 1–1.03)
UA: UC IF INDICATED,UAUC: NORMAL
UROBILINOGEN UR QL STRIP.AUTO: 0.2 EU/DL (ref 0.2–1)
WBC URNS QL MICRO: NORMAL /HPF (ref 0–4)

## 2020-10-03 PROCEDURE — 74011250637 HC RX REV CODE- 250/637: Performed by: EMERGENCY MEDICINE

## 2020-10-03 PROCEDURE — 74011250636 HC RX REV CODE- 250/636: Performed by: EMERGENCY MEDICINE

## 2020-10-03 PROCEDURE — 81001 URINALYSIS AUTO W/SCOPE: CPT

## 2020-10-03 PROCEDURE — 96374 THER/PROPH/DIAG INJ IV PUSH: CPT

## 2020-10-03 PROCEDURE — 96375 TX/PRO/DX INJ NEW DRUG ADDON: CPT

## 2020-10-03 RX ORDER — DEXAMETHASONE SODIUM PHOSPHATE 4 MG/ML
10 INJECTION, SOLUTION INTRA-ARTICULAR; INTRALESIONAL; INTRAMUSCULAR; INTRAVENOUS; SOFT TISSUE
Status: COMPLETED | OUTPATIENT
Start: 2020-10-03 | End: 2020-10-03

## 2020-10-03 RX ORDER — KETOROLAC TROMETHAMINE 30 MG/ML
15 INJECTION, SOLUTION INTRAMUSCULAR; INTRAVENOUS
Status: COMPLETED | OUTPATIENT
Start: 2020-10-03 | End: 2020-10-03

## 2020-10-03 RX ORDER — GABAPENTIN 300 MG/1
300 CAPSULE ORAL 3 TIMES DAILY
Qty: 30 CAP | Refills: 0 | Status: SHIPPED | OUTPATIENT
Start: 2020-10-03 | End: 2020-10-13

## 2020-10-03 RX ORDER — NAPROXEN 500 MG/1
500 TABLET ORAL
Qty: 20 TAB | Refills: 0 | Status: SHIPPED | OUTPATIENT
Start: 2020-10-03 | End: 2020-11-23

## 2020-10-03 RX ORDER — DIAZEPAM 5 MG/1
5 TABLET ORAL
Status: COMPLETED | OUTPATIENT
Start: 2020-10-03 | End: 2020-10-03

## 2020-10-03 RX ORDER — GABAPENTIN 300 MG/1
300 CAPSULE ORAL
Status: COMPLETED | OUTPATIENT
Start: 2020-10-03 | End: 2020-10-03

## 2020-10-03 RX ORDER — MORPHINE SULFATE 2 MG/ML
4 INJECTION, SOLUTION INTRAMUSCULAR; INTRAVENOUS
Status: COMPLETED | OUTPATIENT
Start: 2020-10-03 | End: 2020-10-03

## 2020-10-03 RX ORDER — PREDNISONE 10 MG/1
TABLET ORAL
Qty: 48 TAB | Refills: 0 | Status: SHIPPED | OUTPATIENT
Start: 2020-10-03 | End: 2020-11-23

## 2020-10-03 RX ADMIN — KETOROLAC TROMETHAMINE 15 MG: 30 INJECTION, SOLUTION INTRAMUSCULAR at 01:40

## 2020-10-03 RX ADMIN — GABAPENTIN 300 MG: 300 CAPSULE ORAL at 04:19

## 2020-10-03 RX ADMIN — DEXAMETHASONE SODIUM PHOSPHATE 10 MG: 4 INJECTION, SOLUTION INTRA-ARTICULAR; INTRALESIONAL; INTRAMUSCULAR; INTRAVENOUS; SOFT TISSUE at 04:20

## 2020-10-03 RX ADMIN — MORPHINE SULFATE 4 MG: 2 INJECTION, SOLUTION INTRAMUSCULAR; INTRAVENOUS at 04:24

## 2020-10-03 RX ADMIN — DIAZEPAM 5 MG: 5 TABLET ORAL at 01:34

## 2020-10-03 NOTE — ED PROVIDER NOTES
EMERGENCY DEPARTMENT HISTORY AND PHYSICAL EXAM     ----------------------------------------------------------------------------  Please note that this dictation was completed with TransferGo, the computer voice recognition software. Quite often unanticipated grammatical, syntax, homophones, and other interpretive errors are inadvertently transcribed by the computer software. Please disregard these errors. Please excuse any errors that have escaped final proofreading  ----------------------------------------------------------------------------      Date: 10/3/2020  Patient Name: Jess Denney    History of Presenting Illness     Chief Complaint   Patient presents with    Back Pain     Patient reports h/o back pain, he reports he has a spine stimulator, follows w a pain specialist, he took 2 oxycodone and a muscle relaxer. History Provided By:  Patient    HPI: Jess Denney is a 76 y.o. male, with significant pmhx of back pain, cholesterol, hypertension, GERD, neuropathy, CAD, diabetes, who presents via private vehicle to the ED with c/o exacerbation of his chronic back pain. Notes he has a spine stimulator and follows with a pain specialist took 2 oxycodone's at a muscle relaxor prior to presenting to the emergency department without relief. Has bowel or bladder incontinence, numbness or tingling in his groin area. Patient reports he is scheduled to follow-up with his pain specialist after having multiple x-rays taken recently. Notes that his spine specialist wants to make adjustments to his spine stimulator settings before doing any further injections. Notes that he is seen 2 other pain specialist in the past without relief of his chronic back pain. Patient denies recent fall, trauma or other acute exacerbating factor for his chronic back pain. Patient with CT and lumbar plain film done in May of this year without significant findings.   Patient  specifically denies any associated fevers, chills, CP, SOB, nausea, vomiting, diarrhea, abd pain, changes in BM, urinary sxs, or headache. Social Hx: Former tobacco  + EtOH , denies Illicit Drugs    There are no other complaints, changes, or physical findings at this time. PCP: Felicia Isaac MD    No Known Allergies    Current Facility-Administered Medications   Medication Dose Route Frequency Provider Last Rate Last Dose    gabapentin (NEURONTIN) capsule 300 mg  300 mg Oral NOW Yojana Rodriguez MD        morphine injection 4 mg  4 mg IntraVENous NOW Yojana Rodriguez MD        dexamethasone (DECADRON) 4 mg/mL injection 10 mg  10 mg IntraVENous NOW Yojana Rodriguez MD         Current Outpatient Medications   Medication Sig Dispense Refill    gabapentin (NEURONTIN) 300 mg capsule Take 1 Cap by mouth three (3) times daily for 10 days. Max Daily Amount: 900 mg. 30 Cap 0    predniSONE (STERAPRED DS) 10 mg dose pack Take as directed on packaging 48 Tab 0    naproxen (NAPROSYN) 500 mg tablet Take 1 Tab by mouth every twelve (12) hours as needed for Pain. 20 Tab 0    dulaglutide (TRULICITY) 2.48 OM/3.0 mL sub-q pen 0.5 mL by SubCUTAneous route every seven (7) days. 4 Pen 3    naltrexone (DEPADE) 50 mg tablet TAKE 1 TABLET BY MOUTH ONCE DAILY      Omeprazole delayed release (PRILOSEC D/R) 20 mg tablet Take 20 mg by mouth daily.  flash glucose sensor (FreeStyle Bella 14 Day Sensor) kit Check sugars daily. Pt unable to use a glucometer due to excessive tremors. E11.9 and R25.1 2 Kit 5    flash glucose scanning reader (FreeStyle Bella 14 Day [place]) misc Check sugars daily. Pt unable to use a glucometer due to excessive tremors. E11.9 and R25.1 1 Each 0    nitroglycerin (NITROSTAT) 0.4 mg SL tablet 1 Tab by SubLINGual route every five (5) minutes as needed for Chest Pain. Up to 3 doses.  20 Tab 0    Syringe with Needle, Disp, (TUBERCULIN SYRINGE) 1 mL 25 gauge x 5/8\" syrg One injection every week 50 Syringe 3    testosterone cypionate 200 mg/mL kit 0.75ml under the skin every week. 1 Kit 5    testosterone cypionate (DEPOTESTOTERONE CYPIONATE) 200 mg/mL injection INJECT 0.5 ML INTRAMUSCULARLY EVERY WEEK 2 mL 5    Syringe with Needle, Disp, (SYRINGE 3CC/20GX1\") 3 mL 20 gauge x 1\" syrg Use to administer testosterone injection every week. DX:E29.1 200 Syringe 3    Blood-Glucose Meter monitoring kit For testing blood glucose twice daily 1 Kit 0    glucose blood VI test strips (PHARMACIST CHOICE) strip For testing blood glucose twice daily 100 Strip 0    losartan (COZAAR) 25 mg tablet Take 25 mg by mouth daily.  primidone (MYSOLINE) 50 mg tablet Take 50 mg by mouth daily.  calcium-cholecalciferol, d3, (CALCIUM 600 + D) 600-125 mg-unit tab Take 2 Tabs by mouth daily.  HYDROcodone-acetaminophen (NORCO) 7.5-325 mg per tablet Take 1 Tab by mouth every twelve (12) hours as needed for Pain.  polyethylene glycol (MIRALAX) 17 gram/dose powder Take 17 g by mouth daily. 289 g 0    metFORMIN (GLUCOPHAGE) 1,000 mg tablet Take 1 Tab by mouth two (2) times daily (with meals). START TAKING 9/23 WITH DINNER 30 Tab 0    atorvastatin (LIPITOR) 80 mg tablet Take 80 mg by mouth nightly.  hydroCHLOROthiazide (HYDRODIURIL) 25 mg tablet Take 25 mg by mouth daily.  DULoxetine (CYMBALTA) 60 mg capsule Take 60 mg by mouth two (2) times a day. 1 tab BID      aspirin delayed-release 81 mg tablet Take 81 mg by mouth daily.  levothyroxine (SYNTHROID) 200 mcg tablet Take 200 mcg by mouth Daily (before breakfast).  cholecalciferol, vitamin D3, (VITAMIN D3) 2,000 unit tab Take 2,000 Units by mouth daily.          Past History     Past Medical History:  Past Medical History:   Diagnosis Date    Abdominal adhesions 2000    Arthritis     all joints; Degenerative disk disease    CAD (coronary artery disease)     stents x4, followed by Dr. Nikki Davey Chronic constipation     Chronic pain     all my joints    Diabetic neuropathy (HCC)     Type II    Fibromyalgia  GERD (gastroesophageal reflux disease)     HLD (hyperlipidemia)     Hypertension     Ill-defined condition     fibromyalgia    Neuropathy     hands/feet    Sleep apnea     uses Cpap    Thyroid disease        Past Surgical History:  Past Surgical History:   Procedure Laterality Date    ABDOMEN SURGERY PROC UNLISTED  early 2000's    abdominal hernia repair    ABDOMEN SURGERY PROC UNLISTED  2000's    adhesions from appendix sx.  ABDOMEN SURGERY PROC UNLISTED  2000's    colectomy: when removing adhesions, nicked intestines, removed 8\" of intestine    COLONOSCOPY N/A 12/18/2017    COLONOSCOPY performed by Sung Hendrix MD at [facility] AM[facility]UL[facility]ORY OR    COLONOSCOPY N/A 1/15/2019    COLONOSCOPY performed by Billi Apley, MD at [facility] ENDOSCOPY     [address]    HX CORONARY STENT PLACEMENT  2014    HX ORTHOPAEDIC Bilateral 2000, 2007    hip replacement       Family History:  Family History   Problem Relation Age of Onset    Heart Disease Brother     Obesity Brother     Other Mother         hiatal hernia    Arthritis Mother         back, spine    Heart Disease Mother         Angina    Psychiatric Disorder Mother         depression    No Known Problems Father     Diabetes Sister 16        Type 1    Arthritis Sister     Cancer Neg Hx        Social History:  Social History     Tobacco Use    Smoking status: Former Smoker     Years: 40.00    Smokeless tobacco: Never Used   Substance Use Topics    Alcohol use: Yes     Alcohol/week: 6.0 standard drinks     Types: 4 Glasses of wine, 2 Shots of liquor per week     Comment: occ    Drug use: No       Allergies:  No Known Allergies      Review of Systems   Review of Systems   Constitutional: Negative for chills and fever. HENT: Negative. Eyes: Negative. Respiratory: Negative for cough, chest tightness and shortness of breath. Cardiovascular: Negative for chest pain and leg swelling.    Gastrointestinal: Negative for abdominal pain, diarrhea, nausea and vomiting. Endocrine: Negative. Genitourinary: Negative for difficulty urinating and dysuria. Musculoskeletal: Positive for back pain. Negative for myalgias. Skin: Negative. Neurological: Negative. Psychiatric/Behavioral: Negative. All other systems reviewed and are negative. Physical Exam   Physical Exam  Vitals signs and nursing note reviewed. Constitutional:       General: He is not in acute distress. Appearance: He is well-developed. He is not diaphoretic. HENT:      Head: Normocephalic and atraumatic. Nose: Nose normal.      Mouth/Throat:      Pharynx: No oropharyngeal exudate. Eyes:      Conjunctiva/sclera: Conjunctivae normal.      Pupils: Pupils are equal, round, and reactive to light. Neck:      Musculoskeletal: Normal range of motion and neck supple. Vascular: No JVD. Cardiovascular:      Rate and Rhythm: Normal rate and regular rhythm. Heart sounds: Normal heart sounds. No murmur. No friction rub. Pulmonary:      Effort: Pulmonary effort is normal. No respiratory distress. Breath sounds: Normal breath sounds. No stridor. No wheezing or rales. Abdominal:      General: Bowel sounds are normal. There is no distension. Palpations: Abdomen is soft. Tenderness: There is no abdominal tenderness. There is no rebound. Musculoskeletal: Normal range of motion. General: No tenderness. Skin:     General: Skin is warm and dry. Capillary Refill: Capillary refill takes less than 2 seconds. Findings: No rash. Neurological:      General: No focal deficit present. Mental Status: He is alert and oriented to person, place, and time. Cranial Nerves: No cranial nerve deficit. Psychiatric:         Speech: Speech normal.         Behavior: Behavior normal.         Thought Content:  Thought content normal.         Judgment: Judgment normal.           Diagnostic Study Results     Labs -     Recent Results (from the past 12 hour(s))   URINALYSIS W/ REFLEX CULTURE    Collection Time: 10/03/20  2:41 AM    Specimen: Urine   Result Value Ref Range    Color YELLOW/STRAW      Appearance CLEAR CLEAR      Specific gravity 1.025 1.003 - 1.030      pH (UA) 5.0 5.0 - 8.0      Protein Negative NEG mg/dL    Glucose Negative NEG mg/dL    Ketone Negative NEG mg/dL    Bilirubin Negative NEG      Blood Negative NEG      Urobilinogen 0.2 0.2 - 1.0 EU/dL    Nitrites Negative NEG      Leukocyte Esterase Negative NEG      WBC 0-4 0 - 4 /hpf    RBC 0-5 0 - 5 /hpf    Epithelial cells FEW FEW /lpf    Bacteria Negative NEG /hpf    UA:UC IF INDICATED CULTURE NOT INDICATED BY UA RESULT CNI      Hyaline cast 0-2 0 - 5 /lpf       Radiologic Studies -   No orders to display     CT Results  (Last 48 hours)    None        CXR Results  (Last 48 hours)    None            Medical Decision Making   I am the first provider for this patient. I reviewed the vital signs, available nursing notes, past medical history, past surgical history, family history and social history. Vital Signs-Reviewed the patient's vital signs. Patient Vitals for the past 12 hrs:   Temp Pulse Resp BP SpO2   10/02/20 2355 98.5 °F (36.9 °C) 63 20 (!) 166/74 96 %       Pulse Oximetry Analysis - 96% on RA    Cardiac Monitor:   Rate: 63 bpm  Rhythm: nsr      Provider Notes (Medical Decision Making):     DDX:  Acute on chronic back pain, muscle spasm, lumbar radiculopathy    Plan:  Ua, analgesic, muscle relaxant    Impression:  Muscle spasm    ED Course:   Initial assessment performed. The patients presenting problems have been discussed, and they are in agreement with the care plan formulated and outlined with them. I have encouraged them to ask questions as they arise throughout their visit.     I reviewed our electronic medical record system for any past medical records that were available that may contribute to the patients current condition, the nursing notes and and vital signs from today's visit    Nursing notes will be reviewed as they become available in realtime while the pt has been in the ED. Natalie Salvador MD    I personally reviewed/interpreted pt's imaging. Agree with official read by radiology as noted above. Natalie Salvador MD    3:49 AM  Progress note: Had extensive discussion with patient regarding his chronic, longstanding back pain. He was quickly realized that patient has very little understanding of his pathology or why he has a neurostimulator at all. Reviewed his imaging since 2017 where he had his last MRI showing arthritis, foraminal narrowing and disc bulging from L1-S1. Further discussed with patient mechanism of disc bulging causing nerve irritation and subsequent muscle spasms leading to pain. Patient notes that he has never had his diagnosis fully explained. Further discussed with him how his neurostimulator is used to dampen the pain messages being sent from his spine to his muscles in effort to control his pain. Patient notes that he has a remote for his nerve stimulator and can turn it up but if he goes to the higher levels he \"loses control of his legs. \"  Patient again notes that Dr. Samina Hays (pain management specialist) wants to make further adjustments to his neurostimulator settings prior to doing any further spinal injections. Further discussed patient's previous pain management. Notes that he has taken hydrocodone in the past but has not needed them lately although notes having received a bottle of 10 mg oxycodone from his sister who no longer uses the medication. Notes that he uses this so that he can go on with his life and do activities of daily living. Notes that his pain is become so excruciating that he \"cannot do anything. \"   ready for discharge. Discussed lab  findings with pt, specifically noting no uti. Pt will follow up with pain management as instructed.  All questions have been answered, pt voiced understanding and agreement with plan. Not currently taking any nerve pain medication (gabapentin or Lyrica.)  Notes that he was previously on gabapentin for his peripheral neuropathy but has not been on it for some time. Would like to restart this medication in effort to get improved sleep. Had extensive discussion about mixing of medications and my concerns about his oxycodone use. Discussed that I would be happy to provide him with a prescription for gabapentin, oral steroids and anti-inflammatory medication but that he should refrain from taking the oxycodone. Patient also agreed to follow-up with Dr. Alex Mccall of orthopedic surgery or Dr. Jb Lilly of neurosurgery for further evaluation of his ongoing/worsening pain with potential for repeat MRI and/or evaluation for surgical intervention. Patient also notes that he and his partner are scheduled to leave for [facility] later today. I noted to him that should he choose to take this extensive car ride he would likely have worsening of his pain at the end of it. Patient acknowledged understanding and is agreement with current plan. Specific return precautions provided in addition to instructions for pt to return to the ED immediately should sx worsen at any time. Luis Antonio Das MD           Critical Care Time:     none      Diagnosis     Clinical Impression:   1. Acute exacerbation of chronic low back pain    2. Bulging of thoracic intervertebral disc    3. DDD (degenerative disc disease), lumbar        PLAN:  1. Current Discharge Medication List      START taking these medications    Details   gabapentin (NEURONTIN) 300 mg capsule Take 1 Cap by mouth three (3) times daily for 10 days. Max Daily Amount: 900 mg.   Qty: 30 Cap, Refills: 0    Associated Diagnoses: Acute exacerbation of chronic low back pain; Bulging of thoracic intervertebral disc; DDD (degenerative disc disease), lumbar      predniSONE (STERAPRED DS) 10 mg dose pack Take as directed on packaging  Qty: 48 Tab, Refills: 0      naproxen (NAPROSYN) 500 mg tablet Take 1 Tab by mouth every twelve (12) hours as needed for Pain. Qty: 20 Tab, Refills: 0           2. Follow-up Information     Follow up With Specialties Details Why Contact Info    Alonzo Scheuermann, MD Orthopedic Surgery Schedule an appointment as soon as possible for a visit in 2 days  [address] [facility] [address]  [address] [id number] [phone]      Sade Ramirez MD Neurosurgery   [address] [facility] [address] [facility]      Sarah Gray MD Family Medicine Schedule an appointment as soon as possible for a visit in 2 days  [address] [facility] [address] [facility]      Bryce Zamora MD Physical Medicine and Rehabilitation, Pain Management Schedule an appointment as soon as possible for a visit in 2 days  [address] [facility]  [phone]          Return to ED if worse     Disposition:  4:03 AM  The patient's results have been reviewed with family and/or caregiver. They verbally convey their understanding and agreement of the patient's signs, symptoms, diagnosis, treatment and prognosis and additionally agree to follow up as recommended in the discharge instructions or to return to the Emergency Room should the patient's condition change prior to their follow-up appointment. The family and/or caregiver verbally agrees with the care-plan and all of their questions have been answered. The discharge instructions have also been provided to the them with educational information regarding the patient's diagnosis as well a list of reasons why the patient would want to return to the ER prior to their follow-up appointment should their condition change. Natalie Salvador MD            This note will not be viewable in [address]

## 2020-10-03 NOTE — DISCHARGE INSTRUCTIONS
Patient Education   Patient Education        Herniated Disc: Care Instructions  Your Care Instructions     The bones that form the spine in your back are cushioned by small discs. If a disc is damaged, it may bulge or break open (herniate). A herniated disc can result from normal wear and tear as we age or from an injury or disease. If a herniated disc irritates or presses on a nerve, it can cause pain and numbness in your leg (sciatica) and/or back pain. You may be able to heal your herniated disc with a few weeks or months of rest, medicine, and exercises. In some cases, you may need surgery. Follow-up care is a key part of your treatment and safety. Be sure to make and go to all appointments, and call your doctor if you are having problems. It's also a good idea to know your test results and keep a list of the medicines you take. How can you care for yourself at home? · Take your medicines exactly as prescribed. Call your doctor if you think you are having a problem with your medicine. · Ask your doctor if you can take an over-the-counter pain medicine, such as acetaminophen (Tylenol), ibuprofen (Advil, Motrin), or naproxen (Aleve). Read and follow all instructions on the label. · Do not take two or more pain medicines at the same time unless the doctor told you to. Many pain medicines have acetaminophen, which is Tylenol. Too much acetaminophen (Tylenol) can be harmful. · Rest your back if your pain is severe. · Avoid movements and positions that increase your pain or numbness. · Try taking short walks and doing light activities that do not cause pain. Even if you are feeling some pain, it is important to keep your muscles active and strong. · Use heat or ice to relieve pain. ? To apply heat, put a warm water bottle, heating pad set on low, or warm cloth on your back. Do not go to sleep with a heating pad on your skin. ? To use ice, put ice or a cold pack on the area for 10 to 20 minutes at a time. Put a thin cloth between the ice and your skin. · Your doctor may recommend a physical therapy program, where you learn exercises to do at home. These exercises strengthen the muscles that support your lower back and prevent reinjury. · Stay at a healthy weight. This may reduce the load on your back. · Quit smoking if you smoke. If you need help quitting, talk to your doctor about stop-smoking programs and medicines. These can increase your chances of quitting for good. · To avoid hurting your back when lifting:  ? Lift with your legs, not your back, by squatting and bending your knees. Avoid bending forward at the waist when lifting. ? Rise slowly. ? Keep the load as close to your body as possible, at the level of your navel. ? Avoid turning or twisting your body while holding a heavy object. ? Get help if you need to lift a heavy object. Never lift a heavy object above shoulder level. When should you call for help? Call 911 anytime you think you may need emergency care. For example, call if:    · You are unable to move a leg at all. Call your doctor now or seek immediate medical care if:    · You have new or worse symptoms in your arms, legs, chest, belly, or buttocks. Symptoms may include:  ? Numbness or tingling. ? Weakness. ? Pain.     · You lose bladder or bowel control. Watch closely for changes in your health, and be sure to contact your doctor if:    · You are not getting better as expected. Where can you learn more? Go to http://www.gray.com/  Enter F534 in the search box to learn more about \"Herniated Disc: Care Instructions. \"  Current as of: March 2, 2020               Content Version: 12.6  © 1136-2575 [employer]. Care instructions adapted under license by [employer] (which disclaims liability or warranty for this information).  If you have questions about a medical condition or this instruction, always ask your healthcare professional. [address] any warranty or liability for your use of this information. Patient Education        Learning About Degenerative Disc Disease  What is degenerative disc disease? Degenerative disc disease isn't really a disease. It's a term used to describe the normal changes in your spinal discs as you age. Spinal discs are small, spongy discs that separate the bones (vertebrae) that make up the spine. The discs act as shock absorbers for the spine. They let your spine flex, bend, and twist.  Degenerative disc disease can take place in one or more places along the spine. It most often occurs in the discs in the lower back and the neck. The changes in the discs can cause back and neck pain. They can also lead to osteoarthritis, a herniated disc, or spinal stenosis. What causes it? As we age, our spinal discs break down, or degenerate. This breakdown causes the symptoms of degenerative disc disease in some people. When the discs break down, they can lose fluid and dry out, and their outer layers can have tiny cracks or tears. This leads to less padding and less space between the bones in the spine. The body reacts to this by making bony growths on the spine called bone spurs. These spurs can press on the spinal nerve roots or spinal cord. This can cause pain and can affect how well the nerves work. These changes in the discs are more likely to occur if you smoke, do heavy physical work (such as repeated heavy lifting), or are very overweight. A sudden injury may also cause changes to occur. What are the symptoms? Many people with degenerative disc disease have no pain. But others have severe pain or other symptoms that limit their activities. Some of the most common symptoms are:  · Pain in the back or neck. Where the pain occurs depends on which discs are affected.   · Pain that gets worse when you move, such as when you bend over, reach up, or twist.  · Pain that may occur in the rear end (buttocks), arm, or leg if a nerve is pinched. · Numbness or tingling in your arm or leg. The pain may start after a major injury (such as from a car accident), a minor injury (such as a fall from a low height), or a normal motion (such as bending over to pick something up). It may also start gradually for no known reason and get worse over time. How is it diagnosed? A doctor can often diagnose degenerative disc disease while doing a physical exam. If your exam shows no signs of a serious condition, imaging tests (such as an X-ray) aren't likely to help your doctor find the cause of your symptoms. Sometimes degenerative disc disease is found when an X-ray is taken for another reason, such as an injury or other health problem. But even if the doctor finds degenerative disc disease, that doesn't always mean that you will have symptoms. How is degenerative disc disease treated? Self-care may be all you need to relieve pain caused by disc changes. This may include using ice or heat and taking over-the-counter medicines. Your doctor can prescribe stronger medicines if needed. If you develop health problems such as osteoarthritis, a herniated disc, or spinal stenosis, you may need other treatments. These include physical therapy and exercises for strengthening and stretching the back. In some cases, surgery may be recommended. It usually involves removing the damaged disc. In some cases, the bone is then permanently joined (fused) to protect the spinal cord. In rare cases, an artificial disc may be used to replace the disc that is removed. How can you care for yourself? Here are some things you can do to help manage pain from degenerative disc disease. · Use ice or heat (whichever feels better) on the affected area. ? Put ice or a cold pack on the area for 10 to 20 minutes at a time. Put a thin cloth between the ice and your skin.   ? Put a warm water bottle, a heating pad set on low, or a warm cloth on your back. Put a thin cloth between the heating pad and your skin. Do not go to sleep with a heating pad on your skin. · Ask your doctor if you can take an over-the-counter pain medicine. These include acetaminophen (such as Tylenol) and nonsteroidal anti-inflammatory drugs, such as ibuprofen or naproxen. Your doctor can prescribe stronger medicines if needed. Be safe with medicines. Read and follow all instructions on the label. · Get some exercise every day. Exercise is one of the best ways to help your back feel better and stay better. It's best to start each exercise slowly. You may notice a little soreness, and that's okay. But if an exercise makes your pain worse, stop doing it. Here are things you can try:  ? Walking. It's the simplest and maybe the best activity for your back. It gets your blood moving and helps your muscles stay strong. ? Exercises that gently stretch and strengthen your stomach, back, and leg muscles. The stronger those muscles are, the better they're able to protect your back. Follow-up care is a key part of your treatment and safety. Be sure to make and go to all appointments, and call your doctor if you are having problems. It's also a good idea to know your test results and keep a list of the medicines you take. Where can you learn more? Go to http://www.gray.com/  Enter R380 in the search box to learn more about \"Learning About Degenerative Disc Disease. \"  Current as of: March 2, 2020               Content Version: 12.6  © 2006-2020 [employer]. Care instructions adapted under license by [employer] (which disclaims liability or warranty for this information). If you have questions about a medical condition or this instruction, always ask your healthcare professional. [place] [address] any warranty or liability for your use of this information.          Patient Education        [facility] Back Pain: Exercises  Introduction  Here are some examples of typical rehabilitation exercises for your condition. Start each exercise slowly. Ease off the exercise if you start to have pain. Your doctor or physical therapist will tell you when you can start these exercises and which ones will work best for you. When you are not being active, find a comfortable position for rest. Some people are comfortable on the floor or a medium-firm bed with a small pillow under their head and another under their knees. Some people prefer to lie on their side with a pillow between their knees. Don't stay in one position for too long. Take short walks (10 to 20 minutes) every 2 to 3 hours. Avoid slopes, hills, and stairs until you feel better. Walk only distances you can manage without pain, especially leg pain. How to do the exercises  Back stretches   1. Get down on your hands and knees on the floor. 2. Relax your head and allow it to droop. Round your back up toward the ceiling until you feel a nice stretch in your upper, middle, and lower back. Hold this stretch for as long as it feels comfortable, or about 15 to 30 seconds. 3. Return to the starting position with a flat back while you are on your hands and knees. 4. Let your back sway by pressing your stomach toward the floor. Lift your buttocks toward the ceiling. 5. Hold this position for 15 to 30 seconds. 6. Repeat 2 to 4 times. Follow-up care is a key part of your treatment and safety. Be sure to make and go to all appointments, and call your doctor if you are having problems. It's also a good idea to know your test results and keep a list of the medicines you take. Where can you learn more? Go to http://www.gray.com/  Enter Z071 in the search box to learn more about \"Acute Low Back Pain: Exercises. \"  Current as of: March 2, 2020               Content Version: 12.6  © 2500-3900 [employer].    Care instructions adapted under license by [email or web address] (which disclaims liability or warranty for this information). If you have questions about a medical condition or this instruction, always ask your healthcare professional. [address] any warranty or liability for your use of this information. Patient Education        Learning About Opioids  Introduction     Opioids are medicines used to relieve moderate to severe pain. They may be used for a short time for pain, such as after surgery. Or in some cases a doctor might prescribe them for long-term pain. They don't cure a health problem. But they help you manage the pain. Opioids relieve pain by changing the way your body feels pain and the way you feel about pain. Sometimes opioids are used for people who can't take other pain medicines. They may be prescribed if you have heart, kidney, or liver problems. For instance, you may take an opioid instead of nonsteroidal anti-inflammatory drugs (NSAIDs). NSAIDs include ibuprofen (Advil, Motrin) and naproxen (Aleve). Opioids are strong medicines. They can help you manage pain when you use them the right way. But if you misuse them, they can cause serious harm and even death. If you decide to take opioids, here are some things to remember. · Keep your doctor informed. You can develop opioid use disorder. Moderate to severe opioid use disorder is sometimes called addiction. The risk is higher if you have a history of substance use. Your doctor will monitor you closely for signs of opioid use disorder and to figure out when you no longer need to take opioids. · Make a treatment plan. The goal of your plan is to be able to function and do the things you need to do, even if you still have some pain. You might be able to manage your pain with other non-opioid options like physical therapy, relaxation, or over-the-counter pain medicines. · Be aware of the side effects.  Opioids can cause serious side effects, such as constipation, dry mouth, and nausea. And over time, you may need a higher dose to get pain relief. This is called tolerance. Your body also gets used to opioids. This is called physical dependence. If you suddenly stop taking them, you may have withdrawal symptoms. Examples  Opioids or other medicines that contain them include:  · Codeine (Tylenol 3). · Hydrocodone (Norco). · Oxycodone (OxyContin, Percocet). Safety tips  If you need to take opioids to manage your pain, remember these safety tips. · Follow directions carefully. It's easy to misuse opioids if you take a dose other than what's prescribed by your doctor. This can lead to overdose and even death. Even sharing them with someone they weren't meant for is misuse. · Be cautious. Opioids may affect your judgment and decision making. Do not drive or operate machinery until you can think clearly. Talk with your doctor about when it is safe to drive. · Reduce the risk of drug interactions. Opioids can be dangerous if you take them with alcohol or with certain drugs like sleeping pills and muscle relaxers. Make sure your doctor knows about all the other medicines you take, including over-the-counter medicines. Don't start any new medicines before you talk to your doctor or pharmacist.  · Safely store and dispose of opioids. Store opioids in a safe and secure place. Make sure that pets, children, friends, and family can't get to them. When you're done using opioids, make sure to dispose of them safely and as quickly as possible. The U[place] Food and Drug Administration (FDA) recommends these disposal options. ? The best option is to take your medicine to a drop-off box or take-back program that is authorized by the [address] [employer] ([place]).   ? If these programs aren't available in your area and your medicine doesn't have specific disposal instructions (such as flushing), you can throw them into your household trash if you follow the FDA's instructions. Visit fda.gov and search for \"unused medicine disposal.\"  ? If you have opioid patches (used or unused), your options are to take them to a [place]-authorized site or flush them down the toilet. Do not throw them in the trash. ? Only flush your medicine down the toilet if you can't get to a [place]-approved site or your medicine instructions state clearly to flush them. · Reduce the risk of overdose. Misuse of opioids can be very dangerous. Protect yourself by asking your doctor about a naloxone rescue kit. It can help you--and even save your life--if you take too much of an opioid. Who is most at risk? Your risk rises if you misuse opioids, take high doses, have certain health problems, or if you've overdosed before. You're also at higher risk if you use them with another substance or take illegal opioids, or if you used them regularly and then take them again after you'd cut back or stopped. When should you call for help? Call 911 anytime you think you may need emergency care. For example, call if:  · You have symptoms of a severe allergic reaction. These may include:  ? Sudden raised, red areas (hives) all over your body. ? Swelling of the throat, mouth, lips, or tongue. ? Trouble breathing. ? Passing out (losing consciousness). Or you may feel very lightheaded or suddenly feel weak, confused, or restless. · You have signs of an overdose. These include:  ? Cold, clammy skin. ? Confusion. ? Severe nervousness or restlessness. ? Severe dizziness, drowsiness, or weakness. ? Slow breathing. ? Seizures. Call your doctor now or seek immediate medical care if:  · You have symptoms of an allergic reaction, such as:  ? A rash or hives (raised, red areas on the skin). ? Itching. ? Swelling. ? Belly pain, nausea, or vomiting.   Watch closely for changes in your health, and be sure to contact your doctor if:  · You think you might be taking too much pain medicine, and you need help to take less or stop. · Your medicine is not helping with the pain. · You are having side effects, such as constipation. Where can you learn more? Go to http://www.gray.com/  Enter F734 in the search box to learn more about \"Learning About Opioids. \"  Current as of: November 20, 2019               Content Version: 12.6  © 8592-9952 [employer]. Care instructions adapted under license by [employer] (which disclaims liability or warranty for this information). If you have questions about a medical condition or this instruction, always ask your healthcare professional. [address] any warranty or liability for your use of this information. Patient Education        Learning About Spinal Cord Stimulation  What is it? Spinal cord stimulation is a treatment for chronic pain. It uses a mild electrical current. It's mostly used for low back pain, pain in the arms and legs, and pain in the trunk. A small generator is placed in your body. It sends electrical pulses to a tiny electrode near your spinal cord. You may feel a tingle from the pulses. The pulses can help relieve pain. Why is it done? This treatment may be done for people with severe, chronic pain who have:  · Had back surgery that didn't help their pain. · Pain from a nerve problem. · Pain that does not respond to other treatments. This includes complex regional pain syndrome. · Pain from severe peripheral vascular disease that the doctor feels cannot be treated with surgery. If this treatment is right for you, you may have a spinal cord stimulator implanted for long-term use. How is it done? Spinal cord stimulation is done in two steps. Your doctor will first insert a temporary electrode through your skin. It will stay there for about a week. This first step is to see if the treatment will help your pain.   You and your doctor will test different stimulation settings and programs. Your doctor will ask you how you feel at different settings. Let your doctor know if you feel any discomfort. You'll use a wireless remote control or other controller. If the test works, you may get a permanent stimulator. The electrode is implanted in your spine. A lead wire runs from your spine to a small generator. It can be under the skin in your lower or upper back, buttock area, chest, or belly area. You may get medicine that relaxes you or puts you in a light sleep. Some people may need to have general anesthesia. The areas being worked on will be numb. After the stimulator is placed, your doctor will show you how to care for the areas where you had surgery. What are the different types of spinal cord stimulators? This treatment targets the area of your body where you feel pain. There are different types of devices you may get. · The devices can have different power sources. ? Some generators have batteries that need to be replaced every 3 to 5 years. Some last longer. ? Some have rechargeable batteries. A special wireless  may come with your system. These last much longer. But they still may need to be replaced at some point. ? Some stimulators may have a power source outside your body. These are more often for short-term use. · The leads that carry the electrical current can be placed at different spots along the spinal cord. · You will have a controller to program the device. Your doctor will show you how to use it. What can you expect after getting a spinal cord stimulator? You may have a spinal cord stimulator for many years. It can help you live with much less pain, but you will have to learn how to use it. After the surgery, you and your doctor can figure out the best pulse strength. It may need to be adjusted a few times. Your doctor will show you how to use the stimulator at home. You may feel a tingle or some warmth while you use electrical nerve stimulation.   Your doctor will show you how to be safe with a stimulator. This may include trying not to lift, bend, stretch, or twist too much. Being too active could move or disconnect the leads. Light exercise, such as walking, is good. After a few weeks, you will be able to move more. You may get important instructions on driving and air travel, as well. Your device may set off metal detectors. And anti-theft devices in stores can cause a burst of stimulation. Be sure to tell other doctors about your stimulator before you have any other procedures or scans. Some scans and procedures can cause serious problems with your device. What are the risks? There are some risks to spinal cord stimulation. For example:  · Placing the stimulator requires surgery. Surgery has risks, such as the risk of bleeding, infection, or fluid buildup at the surgical site. Anesthesia also has some risks. · The stimulator device and wires can fail. The wires may also move, so the stimulator doesn't work as well as it should. · The tingling or warm feeling from the electrical current may bother you. You may need the device removed if it bothers you too much. · If you build up a tolerance to the stimulation, your doctor may need to change the amount of current or placement of the wires. · Your pain may come back, and the device may no longer work for you. Where can you learn more? Go to http://www.gray.com/  Enter S115 in the search box to learn more about \"Learning About Spinal Cord Stimulation. \"  Current as of: November 20, 2019               Content Version: 12.6  © 0091-4233 [employer]. Care instructions adapted under license by [employer] (which disclaims liability or warranty for this information).  If you have questions about a medical condition or this instruction, always ask your healthcare professional. [place] [address] any warranty or liability for your use of this information. Patient Education        The Spine: Anatomy Sketch    Current as of: March 2, 2020               Content Version: 12.6  © 2006-2020 [employer]. Care instructions adapted under license by [employer] (which disclaims liability or warranty for this information). If you have questions about a medical condition or this instruction, always ask your healthcare professional. [address] any warranty or liability for your use of this information. Chronic Pain: Care Instructions  Your Care Instructions     Chronic pain is pain that lasts a long time (months or even years) and may or may not have a clear cause. It is different from acute pain, which usually does have a clear cause--like an injury or illness--and gets better over time. Chronic pain:  · Lasts over time but may vary from day to day. · Does not go away despite efforts to end it. · May disrupt your sleep and lead to fatigue. · May cause depression or anxiety. · May make your muscles tense, causing more pain. · Can disrupt your work, hobbies, home life, and relationships with friends and family. Chronic pain is a very real condition. It is not just in your head. Treatment can help and usually includes several methods used together, such as medicines, physical therapy, exercise, and other treatments. Learning how to relax and changing negative thought patterns can also help you cope. Chronic pain is complex. Taking an active role in your treatment will help you better manage your pain. Tell your doctor if you have trouble dealing with your pain. You may have to try several things before you find what works best for you. Follow-up care is a key part of your treatment and safety. Be sure to make and go to all appointments, and call your doctor if you are having problems. It's also a good idea to know your test results and keep a list of the medicines you take.   How can you care for yourself at home? · Pace yourself. Break up large jobs into smaller tasks. Save harder tasks for days when you have less pain, or go back and forth between hard tasks and easier ones. Take rest breaks. · Relax, and reduce stress. Relaxation techniques such as deep breathing or meditation can help. · Keep moving. Gentle, daily exercise can help reduce pain over the long run. Try low- or no-impact exercises such as walking, swimming, and stationary biking. Do stretches to stay flexible. · Try heat, cold packs, and massage. · Get enough sleep. Chronic pain can make you tired and drain your energy. Talk with your doctor if you have trouble sleeping because of pain. · Think positive. Your thoughts can affect your pain level. Do things that you enjoy to distract yourself when you have pain instead of focusing on the pain. See a movie, read a book, listen to music, or spend time with a friend. · If you think you are depressed, talk to your doctor about treatment. · Keep a daily pain diary. Record how your moods, thoughts, sleep patterns, activities, and medicine affect your pain. You may find that your pain is worse during or after certain activities or when you are feeling a certain emotion. Having a record of your pain can help you and your doctor find the best ways to treat your pain. · Take pain medicines exactly as directed. ? If the doctor gave you a prescription medicine for pain, take it as prescribed. ? If you are not taking a prescription pain medicine, ask your doctor if you can take an over-the-counter medicine. Reducing constipation caused by pain medicine  · Include fruits, vegetables, beans, and whole grains in your diet each day. These foods are high in fiber. · Drink plenty of fluids, enough so that your urine is light yellow or clear like water. If you have kidney, heart, or liver disease and have to limit fluids, talk with your doctor before you increase the amount of fluids you drink.   · If your doctor recommends it, get more exercise. Walking is a good choice. Bit by bit, increase the amount you walk every day. Try for at least 30 minutes on most days of the week. · Schedule time each day for a bowel movement. A daily routine may help. Take your time and do not strain when having a bowel movement. When should you call for help? Call your doctor now or seek immediate medical care if:    · Your pain gets worse or is out of control.     · You feel down or blue, or you do not enjoy things like you once did. You may be depressed, which is common in people with chronic pain. Depression can be treated.     · You have vomiting or cramps for more than 2 hours. Watch closely for changes in your health, and be sure to contact your doctor if:    · You cannot sleep because of pain.     · You are very worried or anxious about your pain.     · You have trouble taking your pain medicine.     · You have any concerns about your pain medicine.     · You have trouble with bowel movements, such as:  ? No bowel movement in 3 days. ? Blood in the anal area, in your stool, or on the toilet paper. ? Diarrhea for more than 24 hours. Where can you learn more? Go to http://www.gray.com/  Enter N004 in the search box to learn more about \"Chronic Pain: Care Instructions. \"  Current as of: November 20, 2019               Content Version: 12.6  © 9560-8242 [employer]. Care instructions adapted under license by [employer] (which disclaims liability or warranty for this information). If you have questions about a medical condition or this instruction, always ask your healthcare professional. [place] [address] any warranty or liability for your use of this information.

## 2020-10-03 NOTE — ED NOTES
Pt discharged home with written and verbal instructions given and pt to lobby via wheelchair to wait for his ride,

## 2020-10-05 ENCOUNTER — PATIENT OUTREACH (OUTPATIENT)
Dept: CASE MANAGEMENT | Age: 75
End: 2020-10-05

## 2020-10-07 ENCOUNTER — PATIENT OUTREACH (OUTPATIENT)
Dept: INTERNAL MEDICINE CLINIC | Age: 75
End: 2020-10-07

## 2020-10-07 NOTE — ACP (ADVANCE CARE PLANNING)
Advance Care Planning:   Does patient have an Advance Directive: currently not on file; education provided    No changes made to Healthcare Decision Maker-Tato Batista-glenys

## 2020-10-07 NOTE — PROGRESS NOTES
Education provided due to At Risk Condition: ED:  Exac of Chronic Back Pain/Bulging Thoracic intervertebral disc   Patient contacted regarding recent discharge and COVID-19 risk. Discussed COVID-19 related testing which was not done at this time. Test results were not done. Patient informed of results, if available? no    Care Transition Nurse/ Ambulatory Care Manager/ LPN Care Coordinator contacted the patient by telephone to perform post discharge assessment. Verified name and  with patient as identifiers. Patient has following risk factors of: diabetes. CTN/ACM/LPN reviewed discharge instructions, medical action plan and red flags related to discharge diagnosis. Reviewed and educated them on any new and changed medications related to discharge diagnosis. Advised obtaining a 90-day supply of all daily and as-needed medications. Advance Care Planning:   Does patient have an Advance Directive: currently not on file; education provided    No changes made to Healthcare Decision Maker-Tato Batista-spouse     Education provided regarding infection prevention, and signs and symptoms of COVID-19 and when to seek medical attention with patient who verbalized understanding. Discussed exposure protocols and quarantine from [address] you at higher risk for severe illness  and given an opportunity for questions and concerns. The patient agrees to contact the COVID-19 hotline [phone] or PCP office for questions related to their healthcare. CTN/ACM/LPN provided contact information for future reference. From CDC: Are you at higher risk for severe illness?  Wash your hands often.  Avoid close contact (6 feet, which is about two arm lengths) with people who are sick.  Put distance between yourself and other people if COVID-19 is spreading in your community.  Clean and disinfect frequently touched surfaces.  Avoid all cruise travel and non-essential air travel.    Call your healthcare professional if you have concerns about COVID-19 and your underlying condition or if you are sick. For more information on steps you can take to protect yourself, see CDC's How to Protect Yourself      Patient/family/caregiver given information for [employer] [facility] and agrees to enroll no    Plan for follow-up call in 7-14 days based on severity of symptoms and risk factors.

## 2020-10-08 ENCOUNTER — TRANSCRIBE ORDER (OUTPATIENT)
Dept: SCHEDULING | Age: 75
End: 2020-10-08

## 2020-10-08 DIAGNOSIS — M47.817 LUMBOSACRAL SPONDYLOSIS WITHOUT MYELOPATHY: ICD-10-CM

## 2020-10-08 DIAGNOSIS — M17.12 LOCALIZED OSTEOARTHRITIS OF LEFT KNEE: ICD-10-CM

## 2020-10-08 DIAGNOSIS — E66.9 OBESITY: ICD-10-CM

## 2020-10-08 DIAGNOSIS — M51.36 DDD (DEGENERATIVE DISC DISEASE), LUMBAR: Primary | ICD-10-CM

## 2020-10-08 DIAGNOSIS — M17.11 OSTEOARTHRITIS OF RIGHT KNEE: ICD-10-CM

## 2020-10-08 DIAGNOSIS — M54.50 LOW BACK PAIN, UNSPECIFIED BACK PAIN LATERALITY, UNSPECIFIED CHRONICITY, UNSPECIFIED WHETHER SCIATICA PRESENT: ICD-10-CM

## 2020-10-08 DIAGNOSIS — M79.7 FIBROMYALGIA: ICD-10-CM

## 2020-10-14 ENCOUNTER — PATIENT OUTREACH (OUTPATIENT)
Dept: CASE MANAGEMENT | Age: 75
End: 2020-10-14

## 2020-10-16 NOTE — PROGRESS NOTES
10/16/2020    Patient resolved from Transition of Care episode on 10/16/2020. ACM/CTN was unsuccessful at contacting this patient today. Patient/family was provided the following resources and education related to COVID-19 during the initial call:                         Signs, symptoms and red flags related to COVID-19            CDC exposure and quarantine guidelines            [facility] exposure contact - [phone]            Contact for their local Department of Health                 Patient has not had any additional ED or hospital visits. No further outreach scheduled with this CTN/ACM. Episode of Care resolved. Patient has this CTN/ACM contact information if future needs arise.

## 2020-10-20 ENCOUNTER — TRANSCRIBE ORDER (OUTPATIENT)
Dept: SCHEDULING | Age: 75
End: 2020-10-20

## 2020-10-20 DIAGNOSIS — M17.12 LOCALIZED OSTEOARTHRITIS OF LEFT KNEE: ICD-10-CM

## 2020-10-20 DIAGNOSIS — M51.36 DDD (DEGENERATIVE DISC DISEASE), LUMBAR: Primary | ICD-10-CM

## 2020-10-20 DIAGNOSIS — E66.9 OBESITY, UNSPECIFIED: ICD-10-CM

## 2020-10-20 DIAGNOSIS — M54.50 LOW BACK PAIN WITHOUT SCIATICA, UNSPECIFIED BACK PAIN LATERALITY, UNSPECIFIED CHRONICITY: ICD-10-CM

## 2020-10-20 DIAGNOSIS — M79.7 FIBROMYALGIA: ICD-10-CM

## 2020-10-20 DIAGNOSIS — M17.11 LOCALIZED OSTEOARTHRITIS OF RIGHT KNEE: ICD-10-CM

## 2020-10-20 DIAGNOSIS — M47.817 LUMBOSACRAL SPONDYLOSIS WITHOUT MYELOPATHY: ICD-10-CM

## 2020-10-30 ENCOUNTER — TRANSCRIBE ORDER (OUTPATIENT)
Dept: SCHEDULING | Age: 75
End: 2020-10-30

## 2020-10-30 DIAGNOSIS — M17.11 LOCALIZED OSTEOARTHRITIS OF RIGHT KNEE: ICD-10-CM

## 2020-10-30 DIAGNOSIS — M54.50 LOW BACK PAIN, UNSPECIFIED BACK PAIN LATERALITY, UNSPECIFIED CHRONICITY, UNSPECIFIED WHETHER SCIATICA PRESENT: ICD-10-CM

## 2020-10-30 DIAGNOSIS — M79.7 FIBROMYALGIA: ICD-10-CM

## 2020-10-30 DIAGNOSIS — M51.36 DDD (DEGENERATIVE DISC DISEASE), LUMBAR: ICD-10-CM

## 2020-10-30 DIAGNOSIS — M54.41 LOW BACK PAIN WITH BILATERAL SCIATICA, UNSPECIFIED BACK PAIN LATERALITY, UNSPECIFIED CHRONICITY: ICD-10-CM

## 2020-10-30 DIAGNOSIS — M47.817 LUMBOSACRAL SPONDYLOSIS WITHOUT MYELOPATHY: ICD-10-CM

## 2020-10-30 DIAGNOSIS — M54.42 LOW BACK PAIN WITH BILATERAL SCIATICA, UNSPECIFIED BACK PAIN LATERALITY, UNSPECIFIED CHRONICITY: ICD-10-CM

## 2020-10-30 DIAGNOSIS — M17.12 LOCALIZED OSTEOARTHRITIS OF LEFT KNEE: Primary | ICD-10-CM

## 2020-11-03 ENCOUNTER — TRANSCRIBE ORDER (OUTPATIENT)
Dept: SCHEDULING | Age: 75
End: 2020-11-03

## 2020-11-03 DIAGNOSIS — M17.11 LOCALIZED OSTEOARTHRITIS OF RIGHT KNEE: ICD-10-CM

## 2020-11-03 DIAGNOSIS — M54.50 LOW BACK PAIN, UNSPECIFIED BACK PAIN LATERALITY, UNSPECIFIED CHRONICITY, UNSPECIFIED WHETHER SCIATICA PRESENT: ICD-10-CM

## 2020-11-03 DIAGNOSIS — M79.7 FIBROMYALGIA: ICD-10-CM

## 2020-11-03 DIAGNOSIS — M17.12 LOCALIZED OSTEOARTHRITIS OF LEFT KNEE: Primary | ICD-10-CM

## 2020-11-03 DIAGNOSIS — M51.36 DDD (DEGENERATIVE DISC DISEASE), LUMBAR: ICD-10-CM

## 2020-11-03 DIAGNOSIS — M47.817 LUMBOSACRAL SPONDYLOSIS WITHOUT MYELOPATHY: ICD-10-CM

## 2020-11-23 ENCOUNTER — HOSPITAL ENCOUNTER (OUTPATIENT)
Dept: PREADMISSION TESTING | Age: 75
Discharge: HOME | End: 2020-11-23
Attending: ORTHOPAEDIC SURGERY
Payer: MEDICARE

## 2020-11-23 VITALS
RESPIRATION RATE: 18 BRPM | HEIGHT: 70 IN | TEMPERATURE: 98.7 F | HEART RATE: 61 BPM | OXYGEN SATURATION: 98 % | DIASTOLIC BLOOD PRESSURE: 62 MMHG | WEIGHT: 253.09 LBS | BODY MASS INDEX: 36.23 KG/M2 | SYSTOLIC BLOOD PRESSURE: 131 MMHG

## 2020-11-23 LAB
ABO + RH BLD: NORMAL
ALBUMIN SERPL-MCNC: 3.9 G/DL (ref 3.5–5)
ALBUMIN/GLOB SERPL: 1.1 {RATIO} (ref 1.1–2.2)
ALP SERPL-CCNC: 54 U/L (ref 45–117)
ALT SERPL-CCNC: 57 U/L (ref 12–78)
ANION GAP SERPL CALC-SCNC: 6 MMOL/L (ref 5–15)
APPEARANCE UR: CLEAR
AST SERPL-CCNC: 45 U/L (ref 15–37)
BACTERIA URNS QL MICRO: NEGATIVE /HPF
BILIRUB SERPL-MCNC: 0.6 MG/DL (ref 0.2–1)
BILIRUB UR QL: NEGATIVE
BLOOD GROUP ANTIBODIES SERPL: NORMAL
BUN SERPL-MCNC: 15 MG/DL (ref 6–20)
BUN/CREAT SERPL: 17 (ref 12–20)
CALCIUM SERPL-MCNC: 9.7 MG/DL (ref 8.5–10.1)
CHLORIDE SERPL-SCNC: 106 MMOL/L (ref 97–108)
CO2 SERPL-SCNC: 27 MMOL/L (ref 21–32)
COLOR UR: NORMAL
CREAT SERPL-MCNC: 0.88 MG/DL (ref 0.7–1.3)
EPITH CASTS URNS QL MICRO: NORMAL /LPF
ERYTHROCYTE [DISTWIDTH] IN BLOOD BY AUTOMATED COUNT: 12.7 % (ref 11.5–14.5)
EST. AVERAGE GLUCOSE BLD GHB EST-MCNC: 143 MG/DL
GLOBULIN SER CALC-MCNC: 3.6 G/DL (ref 2–4)
GLUCOSE SERPL-MCNC: 179 MG/DL (ref 65–100)
GLUCOSE UR STRIP.AUTO-MCNC: NEGATIVE MG/DL
HBA1C MFR BLD: 6.6 % (ref 4–5.6)
HCT VFR BLD AUTO: 43.6 % (ref 36.6–50.3)
HGB BLD-MCNC: 14.4 G/DL (ref 12.1–17)
HGB UR QL STRIP: NEGATIVE
HYALINE CASTS URNS QL MICRO: NORMAL /LPF (ref 0–5)
INR PPP: 1 (ref 0.9–1.1)
KETONES UR QL STRIP.AUTO: NEGATIVE MG/DL
LEUKOCYTE ESTERASE UR QL STRIP.AUTO: NEGATIVE
MCH RBC QN AUTO: 30.3 PG (ref 26–34)
MCHC RBC AUTO-ENTMCNC: 33 G/DL (ref 30–36.5)
MCV RBC AUTO: 91.6 FL (ref 80–99)
NITRITE UR QL STRIP.AUTO: NEGATIVE
NRBC # BLD: 0 K/UL (ref 0–0.01)
NRBC BLD-RTO: 0 PER 100 WBC
PH UR STRIP: 5.5 [PH] (ref 5–8)
PLATELET # BLD AUTO: 242 K/UL (ref 150–400)
PMV BLD AUTO: 10.4 FL (ref 8.9–12.9)
POTASSIUM SERPL-SCNC: 4 MMOL/L (ref 3.5–5.1)
PROT SERPL-MCNC: 7.5 G/DL (ref 6.4–8.2)
PROT UR STRIP-MCNC: NEGATIVE MG/DL
PROTHROMBIN TIME: 10.3 SEC (ref 9–11.1)
RBC # BLD AUTO: 4.76 M/UL (ref 4.1–5.7)
RBC #/AREA URNS HPF: NORMAL /HPF (ref 0–5)
SODIUM SERPL-SCNC: 139 MMOL/L (ref 136–145)
SP GR UR REFRACTOMETRY: 1.02 (ref 1–1.03)
SPECIMEN EXP DATE BLD: NORMAL
UA: UC IF INDICATED,UAUC: NORMAL
UROBILINOGEN UR QL STRIP.AUTO: 0.2 EU/DL (ref 0.2–1)
WBC # BLD AUTO: 7.7 K/UL (ref 4.1–11.1)
WBC URNS QL MICRO: NORMAL /HPF (ref 0–4)

## 2020-11-23 PROCEDURE — 85610 PROTHROMBIN TIME: CPT

## 2020-11-23 PROCEDURE — 80053 COMPREHEN METABOLIC PANEL: CPT

## 2020-11-23 PROCEDURE — 81001 URINALYSIS AUTO W/SCOPE: CPT

## 2020-11-23 PROCEDURE — 83036 HEMOGLOBIN GLYCOSYLATED A1C: CPT

## 2020-11-23 PROCEDURE — 85027 COMPLETE CBC AUTOMATED: CPT

## 2020-11-23 PROCEDURE — 36415 COLL VENOUS BLD VENIPUNCTURE: CPT

## 2020-11-23 PROCEDURE — 86900 BLOOD TYPING SEROLOGIC ABO: CPT

## 2020-11-23 RX ORDER — PREGABALIN 75 MG/1
75 CAPSULE ORAL 2 TIMES DAILY
COMMUNITY
End: 2022-08-22

## 2020-11-23 RX ORDER — SODIUM CHLORIDE, SODIUM LACTATE, POTASSIUM CHLORIDE, CALCIUM CHLORIDE 600; 310; 30; 20 MG/100ML; MG/100ML; MG/100ML; MG/100ML
25 INJECTION, SOLUTION INTRAVENOUS CONTINUOUS
Status: CANCELLED | OUTPATIENT
Start: 2020-11-30

## 2020-11-23 NOTE — PERIOP NOTES

## 2020-11-23 NOTE — PERIOP NOTES
Ortho Video/Book reviewed during PAT visit, time allowed for questions. Called to [facility] requested Cardiac Notes and EKG, called to [facility], requested latest office notes, called to Dr. Royer Winters office requested notes where patient was treated for MRSA-no record found.

## 2020-11-23 NOTE — PERIOP NOTES
[facility]  Joint/Spine Preoperative Instructions    Surgery Date 11/30/2020          Time of Arrival 0545  Contact # [phone]     1. On the day of your surgery, please report to the Surgical Services Registration Desk and sign in at your designated time. The Surgery Center is located to the right of the Emergency Room. 2. You must have someone with you to drive you home. You should not drive a car for 24 hours following surgery. Please make arrangements for a friend or family member to stay with you for the first 24 hours after your surgery. 3. No food after midnight 11/29/2020. Medications morning of surgery should be taken with a sip of water. Please follow pre-surgery drink instructions that were given at your Pre Admission Testing appointment. 4. We recommend you do not drink any alcoholic beverages for 24 hours before and after your surgery. 5. Contact your surgeons office for instructions on the following medications: non-steroidal anti-inflammatory drugs (i.e. Advil, Aleve), vitamins, and supplements. (Some surgeons will want you to stop these medications prior to surgery and others may allow you to take them)  **If you are currently taking Plavix, Coumadin, Aspirin and/or other blood-thinning agents, contact your surgeon for instructions. ** Your surgeon will partner with the physician prescribing these medications to determine if it is safe to stop or if you need to continue taking. Please do not stop taking these medications without instructions from your surgeon    6. Wear comfortable clothes. Wear glasses instead of contacts. Do not bring any money or jewelry. Please bring picture ID, insurance card, and any prearranged co-payment or hospital payment. Do not wear make-up, particularly mascara the morning of your surgery. Do not wear nail polish, particularly if you are having foot /hand surgery.   Wear your hair loose or down, no ponytails, buns, kristi pins or clips. All body piercings must be removed. Please shower with antibacterial soap for three consecutive days before and on the morning of surgery, but do not apply any lotions, powders or deodorants after the shower on the day of surgery. Please use a fresh towels after each shower. Please sleep in clean clothes and change bed linens the night before surgery. Please do not shave for 48 hours prior to surgery. Shaving of the face is acceptable. 7. You should understand that if you do not follow these instructions your surgery may be cancelled. If your physical condition changes (I.e. fever, cold or flu) please contact your surgeon as soon as possible. 8. It is important that you be on time. If a situation occurs where you may be late, please call [phone] (OR Holding Area). 9. If you have any questions and or problems, please call [phone] (Pre-admission Testing). 10. Your surgery time may be subject to change. You will receive a phone call the evening prior if your time changes. 11.  If having outpatient surgery, you must have someone to drive you here, stay with you during the duration of your stay, and to drive you home at time of discharge. 12. The following link is for the educational video for patients and/or families. [email or web address]    Special Instructions:   Covid test scheduled 11/27/2020 @ 1015. Please see attached directions/location. Patient advised to self-quarantine after test and up to day of surgery. Practice incentive spirometer per instructions and bring to hospital day of surgery. Bring your CPAP machine and your remote with charged batteries to your spinal cord stimulator with you to the hospital day of surgery.     TAKE ALL MEDICATIONS THE DAY OF SURGERY EXCEPT: Metformin, Losartan      I understand a pre-operative phone call will be made to verify my surgery time. In the event that I am not available, I give permission for a message to be left on my answering service and/or with another person?   yes         ___________________        __________   11/23/2020 @ 4096    (Signature of Patient)             (Witness)                (Date and Time)

## 2020-11-23 NOTE — PERIOP NOTES
Hibiclens/Chlorhexidine    Preventing Infections Before and After - Your Surgery    IMPORTANT INSTRUCTIONS    Please read and follow these instructions carefully. If you are unable to comply with the below instructions your procedure will be cancelled. Every Night for Three (3) nights before your surgery:  1. Shower with an antibacterial soap, such as Dial, or the soap provided at your preassessment appointment. A shower is better than a bath for cleaning your skin. 2. If needed, ask someone to help you reach all areas of your body. Dont forget to clean your belly button with every shower. The night before your surgery: If you lose your Hibiclens/chlorhexidine please contact surgery center or you can purchase it at a local pharmacy  1. On the night before your surgery, shower with an antibacterial soap, such as Dial, or the soap provided at your preassessment appointment. 2. With one packet of Hibiclens/Chlorhexidine in hand, turn water off.  3. Apply Hibiclens antiseptic skin cleanser with a clean, freshly washed washcloth. ? Gently apply to your body from chin to toes (except the genital area) and especially the area(s) where your incision(s) will be. ? Leave Hibiclens/Chlorhexidine on your skin for at least 20 seconds. CAUTION: If needed, Hibiclens/chlorhexidine may be used to clean the folds of skin of the legs (such as in the area of the groin) and on your buttocks and hips. However, do not use Hibiclens/Chlorhexidine above the neck or in the genital area (your bottom) or put inside any area of your body. 4. Turn the water back on and rinse. 5. Dry gently with a clean, freshly washed towel. 6. After your shower, do not use any powder, deodorant, perfumes or lotion. 7. Use clean, freshly washed towels and washcloths every time you shower. 8. Wear clean, freshly washed pajamas to bed the night before surgery. 9. Sleep on clean, freshly washed sheets.   10. Do not allow pets to sleep in your bed with you. The Morning of your surgery:  1. Shower again thoroughly with an antibacterial soap, such as Dial or the soap provided at your preassessment appointment. If needed, ask someone for help to reach all areas of your body. Dont forget to clean your belly button! Rinse. 2. Dry gently with a clean, freshly washed towel. 3. After your shower, do not use any powder, deodorant, perfumes or lotion prior to surgery. 4. Put on clean, freshly washed clothing. Tips to help prevent infections after your surgery:  1. Protect your surgical wound from germs:  ? Hand washing is the most important thing you and your caregivers can do to prevent infections. ? Keep your bandage clean and dry! ? Do not touch your surgical wound. 2. Use clean, freshly washed towels and washcloths every time you shower; do not share bath linens with others. 3. Until your surgical wound is healed, wear clothing and sleep on bed linens each day that are clean and freshly washed. 4. Do not allow pets to sleep in your bed with you or touch your surgical wound. 5. Do not smoke - smoking delays wound healing. This may be a good time to stop smoking. 6. If you have diabetes, it is important for you to manage your blood sugar levels properly before your surgery as well as after your surgery. Poorly managed blood sugar levels slow down wound healing and prevent you from healing completely. If you lose your Hibiclens/chlorhexidine, please call the [facility], or it is available for purchase at your pharmacy.                ___________________      ___________________      11/23/2020 @ 5802  (Signature of Patient)          (Witness)                   (Date and Time)

## 2020-11-23 NOTE — PERIOP NOTES
Incentive Spirometer        Using the incentive spirometer helps expand the small air sacs of your lungs, helps you breathe deeply, and helps improve your lung function. Use your incentive spirometer twice a day (10 breaths each time) prior to surgery. How to Use Your Incentive Spirometer:  1. Hold the incentive spirometer in an upright position. 2. Breathe out as usual.   3. Place the mouthpiece in your mouth and seal your lips tightly around it. 4. Take a deep breath. Breathe in slowly and as deeply as possible. Keep the blue flow rate guide between the arrows. 5. Hold your breath as long as possible. Then exhale slowly and allow the piston to fall to the bottom of the column. 6. Rest for a few seconds and repeat steps one through five at least 10 times. PAT Tidal Volume__2500_____  x______2__________  Date__11/23/2020___    Dori Quinteros THE INCENTIVE SPIROMETER WITH YOU TO THE HOSPITAL ON THE DAY OF YOUR SURGERY. Opportunity given to ask and answer questions as well as to observe return demonstration.     Patient signature_____________________________          Witness____________________________

## 2020-11-24 LAB
BACTERIA SPEC CULT: NORMAL
BACTERIA SPEC CULT: NORMAL
SERVICE CMNT-IMP: NORMAL

## 2020-11-24 RX ORDER — ACETAMINOPHEN 500 MG
1000 TABLET ORAL ONCE
Status: CANCELLED | OUTPATIENT
Start: 2020-11-30 | End: 2020-11-30

## 2020-11-24 RX ORDER — VANCOMYCIN/0.9 % SOD CHLORIDE 1.5G/250ML
1500 PLASTIC BAG, INJECTION (ML) INTRAVENOUS ONCE
Status: CANCELLED | OUTPATIENT
Start: 2020-11-30 | End: 2020-11-30

## 2020-11-24 RX ORDER — CELECOXIB 200 MG/1
400 CAPSULE ORAL ONCE
Status: CANCELLED | OUTPATIENT
Start: 2020-11-30 | End: 2020-11-30

## 2020-11-24 RX ORDER — PREGABALIN 75 MG/1
75 CAPSULE ORAL ONCE
Status: CANCELLED | OUTPATIENT
Start: 2020-11-30 | End: 2020-11-30

## 2020-11-27 ENCOUNTER — HOSPITAL ENCOUNTER (OUTPATIENT)
Dept: PREADMISSION TESTING | Age: 75
Discharge: HOME | End: 2020-11-27
Payer: MEDICARE

## 2020-11-27 PROCEDURE — 87635 SARS-COV-2 COVID-19 AMP PRB: CPT

## 2020-11-28 LAB
HEALTH STATUS, XMCV2T: NORMAL
SARS-COV-2, COV2NT: NOT DETECTED
SOURCE, COVRS: NORMAL
SPECIMEN SOURCE, FCOV2M: NORMAL
SPECIMEN TYPE, XMCV1T: NORMAL

## 2020-11-30 ENCOUNTER — ANESTHESIA EVENT (OUTPATIENT)
Dept: SURGERY | Age: 75
End: 2020-11-30
Payer: MEDICARE

## 2020-11-30 ENCOUNTER — HOSPITAL ENCOUNTER (OUTPATIENT)
Age: 75
Setting detail: OUTPATIENT SURGERY
Discharge: HOME | End: 2020-11-30
Attending: ORTHOPAEDIC SURGERY | Admitting: ORTHOPAEDIC SURGERY
Payer: MEDICARE

## 2020-11-30 ENCOUNTER — ANESTHESIA (OUTPATIENT)
Dept: SURGERY | Age: 75
End: 2020-11-30
Payer: MEDICARE

## 2020-11-30 VITALS
RESPIRATION RATE: 16 BRPM | HEART RATE: 74 BPM | DIASTOLIC BLOOD PRESSURE: 59 MMHG | SYSTOLIC BLOOD PRESSURE: 138 MMHG | BODY MASS INDEX: 35.6 KG/M2 | OXYGEN SATURATION: 96 % | WEIGHT: 248.68 LBS | HEIGHT: 70 IN | TEMPERATURE: 98.2 F

## 2020-11-30 DIAGNOSIS — Z96.651 STATUS POST RIGHT PARTIAL KNEE REPLACEMENT: Primary | ICD-10-CM

## 2020-11-30 LAB
GLUCOSE BLD STRIP.AUTO-MCNC: 167 MG/DL (ref 65–100)
GLUCOSE BLD STRIP.AUTO-MCNC: 176 MG/DL (ref 65–100)
SERVICE CMNT-IMP: ABNORMAL
SERVICE CMNT-IMP: ABNORMAL

## 2020-11-30 PROCEDURE — 76010000161 HC OR TIME 1 TO 1.5 HR INTENSV-TIER 1: Performed by: ORTHOPAEDIC SURGERY

## 2020-11-30 PROCEDURE — 74011250636 HC RX REV CODE- 250/636: Performed by: NURSE ANESTHETIST, CERTIFIED REGISTERED

## 2020-11-30 PROCEDURE — 74011250637 HC RX REV CODE- 250/637: Performed by: ORTHOPAEDIC SURGERY

## 2020-11-30 PROCEDURE — C1776 JOINT DEVICE (IMPLANTABLE): HCPCS | Performed by: ORTHOPAEDIC SURGERY

## 2020-11-30 PROCEDURE — 82962 GLUCOSE BLOOD TEST: CPT

## 2020-11-30 PROCEDURE — 77030039497 HC CST PAD STERILE CHCS -A: Performed by: ORTHOPAEDIC SURGERY

## 2020-11-30 PROCEDURE — 77030013079 HC BLNKT BAIR HGGR 3M -A: Performed by: NURSE ANESTHETIST, CERTIFIED REGISTERED

## 2020-11-30 PROCEDURE — 74011000272 HC RX REV CODE- 272: Performed by: ORTHOPAEDIC SURGERY

## 2020-11-30 PROCEDURE — 77030034479 HC ADH SKN CLSR PRINEO J&J -B: Performed by: ORTHOPAEDIC SURGERY

## 2020-11-30 PROCEDURE — 77030000032 HC CUF TRNQT ZIMM -B: Performed by: ORTHOPAEDIC SURGERY

## 2020-11-30 PROCEDURE — 2709999900 HC NON-CHARGEABLE SUPPLY

## 2020-11-30 PROCEDURE — 77030040361 HC SLV COMPR DVT MDII -B: Performed by: ORTHOPAEDIC SURGERY

## 2020-11-30 PROCEDURE — 74011000250 HC RX REV CODE- 250: Performed by: NURSE ANESTHETIST, CERTIFIED REGISTERED

## 2020-11-30 PROCEDURE — 77030022704 HC SUT VLOC COVD -B: Performed by: ORTHOPAEDIC SURGERY

## 2020-11-30 PROCEDURE — 76210000021 HC REC RM PH II 0.5 TO 1 HR: Performed by: ORTHOPAEDIC SURGERY

## 2020-11-30 PROCEDURE — 77030036638 HC ACC KT GPS KNE V2 EXAC -D: Performed by: ORTHOPAEDIC SURGERY

## 2020-11-30 PROCEDURE — 77030008552 HC TBNG SMK EVAC BFLF -A: Performed by: ORTHOPAEDIC SURGERY

## 2020-11-30 PROCEDURE — 77030033138 HC SUT PGA STRATFX J&J -B: Performed by: ORTHOPAEDIC SURGERY

## 2020-11-30 PROCEDURE — 77030018836 HC SOL IRR NACL ICUM -A: Performed by: ORTHOPAEDIC SURGERY

## 2020-11-30 PROCEDURE — 77030031139 HC SUT VCRL2 J&J -A: Performed by: ORTHOPAEDIC SURGERY

## 2020-11-30 PROCEDURE — 77030006835 HC BLD SAW SAG STRY -B: Performed by: ORTHOPAEDIC SURGERY

## 2020-11-30 PROCEDURE — 77030033067 HC SUT PDO STRATFX SPIR J&J -B: Performed by: ORTHOPAEDIC SURGERY

## 2020-11-30 PROCEDURE — 74011000250 HC RX REV CODE- 250: Performed by: ORTHOPAEDIC SURGERY

## 2020-11-30 PROCEDURE — 77030026438 HC STYL ET INTUB CARD -A: Performed by: NURSE ANESTHETIST, CERTIFIED REGISTERED

## 2020-11-30 PROCEDURE — 77030036722: Performed by: ORTHOPAEDIC SURGERY

## 2020-11-30 PROCEDURE — 2709999900 HC NON-CHARGEABLE SUPPLY: Performed by: ORTHOPAEDIC SURGERY

## 2020-11-30 PROCEDURE — 76060000033 HC ANESTHESIA 1 TO 1.5 HR: Performed by: ORTHOPAEDIC SURGERY

## 2020-11-30 PROCEDURE — 77030018846 HC SOL IRR STRL H20 ICUM -A: Performed by: ORTHOPAEDIC SURGERY

## 2020-11-30 PROCEDURE — 74011250636 HC RX REV CODE- 250/636: Performed by: ORTHOPAEDIC SURGERY

## 2020-11-30 PROCEDURE — 77030041680 HC PNCL ELECSURG SMK EVAC CNMD -B: Performed by: ORTHOPAEDIC SURGERY

## 2020-11-30 PROCEDURE — 77030008684 HC TU ET CUF COVD -B: Performed by: NURSE ANESTHETIST, CERTIFIED REGISTERED

## 2020-11-30 PROCEDURE — 74011250636 HC RX REV CODE- 250/636: Performed by: ANESTHESIOLOGY

## 2020-11-30 PROCEDURE — 77030040922 HC BLNKT HYPOTHRM STRY -A

## 2020-11-30 PROCEDURE — 77030028907 HC WRP KNEE WO BGS SOLM -B

## 2020-11-30 PROCEDURE — 77030018673: Performed by: ORTHOPAEDIC SURGERY

## 2020-11-30 PROCEDURE — 77030006812 HC BLD SAW RECIP STRY -B: Performed by: ORTHOPAEDIC SURGERY

## 2020-11-30 PROCEDURE — 76210000016 HC OR PH I REC 1 TO 1.5 HR: Performed by: ORTHOPAEDIC SURGERY

## 2020-11-30 RX ORDER — ESMOLOL HYDROCHLORIDE 10 MG/ML
INJECTION INTRAVENOUS AS NEEDED
Status: DISCONTINUED | OUTPATIENT
Start: 2020-11-30 | End: 2020-11-30 | Stop reason: HOSPADM

## 2020-11-30 RX ORDER — SODIUM CHLORIDE 0.9 % (FLUSH) 0.9 %
5-40 SYRINGE (ML) INJECTION EVERY 8 HOURS
Status: DISCONTINUED | OUTPATIENT
Start: 2020-11-30 | End: 2020-11-30 | Stop reason: HOSPADM

## 2020-11-30 RX ORDER — DEXAMETHASONE SODIUM PHOSPHATE 4 MG/ML
INJECTION, SOLUTION INTRA-ARTICULAR; INTRALESIONAL; INTRAMUSCULAR; INTRAVENOUS; SOFT TISSUE AS NEEDED
Status: DISCONTINUED | OUTPATIENT
Start: 2020-11-30 | End: 2020-11-30 | Stop reason: HOSPADM

## 2020-11-30 RX ORDER — EPHEDRINE SULFATE/0.9% NACL/PF 50 MG/5 ML
SYRINGE (ML) INTRAVENOUS AS NEEDED
Status: DISCONTINUED | OUTPATIENT
Start: 2020-11-30 | End: 2020-11-30 | Stop reason: HOSPADM

## 2020-11-30 RX ORDER — TRANEXAMIC ACID 100 MG/ML
INJECTION, SOLUTION INTRAVENOUS AS NEEDED
Status: DISCONTINUED | OUTPATIENT
Start: 2020-11-30 | End: 2020-11-30 | Stop reason: HOSPADM

## 2020-11-30 RX ORDER — PROPOFOL 10 MG/ML
INJECTION, EMULSION INTRAVENOUS AS NEEDED
Status: DISCONTINUED | OUTPATIENT
Start: 2020-11-30 | End: 2020-11-30 | Stop reason: HOSPADM

## 2020-11-30 RX ORDER — ASPIRIN 81 MG/1
81 TABLET ORAL 2 TIMES DAILY
Qty: 60 TAB | Refills: 0 | Status: SHIPPED | OUTPATIENT
Start: 2020-11-30 | End: 2022-08-31

## 2020-11-30 RX ORDER — OXYCODONE AND ACETAMINOPHEN 5; 325 MG/1; MG/1
1-2 TABLET ORAL
Qty: 60 TAB | Refills: 0 | Status: SHIPPED | OUTPATIENT
Start: 2020-11-30 | End: 2020-12-07

## 2020-11-30 RX ORDER — CELECOXIB 200 MG/1
400 CAPSULE ORAL ONCE
Status: COMPLETED | OUTPATIENT
Start: 2020-11-30 | End: 2020-11-30

## 2020-11-30 RX ORDER — CEFADROXIL 500 MG/1
500 CAPSULE ORAL 2 TIMES DAILY
Qty: 14 CAP | Refills: 0 | Status: SHIPPED | OUTPATIENT
Start: 2020-11-30 | End: 2022-08-22

## 2020-11-30 RX ORDER — SUCCINYLCHOLINE CHLORIDE 20 MG/ML
INJECTION INTRAMUSCULAR; INTRAVENOUS AS NEEDED
Status: DISCONTINUED | OUTPATIENT
Start: 2020-11-30 | End: 2020-11-30 | Stop reason: HOSPADM

## 2020-11-30 RX ORDER — HYDROMORPHONE HYDROCHLORIDE 2 MG/ML
0.2 INJECTION, SOLUTION INTRAMUSCULAR; INTRAVENOUS; SUBCUTANEOUS
Status: DISCONTINUED | OUTPATIENT
Start: 2020-11-30 | End: 2020-11-30 | Stop reason: HOSPADM

## 2020-11-30 RX ORDER — LIDOCAINE HYDROCHLORIDE 10 MG/ML
0.1 INJECTION, SOLUTION EPIDURAL; INFILTRATION; INTRACAUDAL; PERINEURAL AS NEEDED
Status: DISCONTINUED | OUTPATIENT
Start: 2020-11-30 | End: 2020-11-30 | Stop reason: HOSPADM

## 2020-11-30 RX ORDER — GLYCOPYRROLATE 0.2 MG/ML
INJECTION INTRAMUSCULAR; INTRAVENOUS AS NEEDED
Status: DISCONTINUED | OUTPATIENT
Start: 2020-11-30 | End: 2020-11-30 | Stop reason: HOSPADM

## 2020-11-30 RX ORDER — ROPIVACAINE HYDROCHLORIDE 5 MG/ML
INJECTION, SOLUTION EPIDURAL; INFILTRATION; PERINEURAL AS NEEDED
Status: DISCONTINUED | OUTPATIENT
Start: 2020-11-30 | End: 2020-11-30 | Stop reason: HOSPADM

## 2020-11-30 RX ORDER — PREGABALIN 75 MG/1
75 CAPSULE ORAL ONCE
Status: COMPLETED | OUTPATIENT
Start: 2020-11-30 | End: 2020-11-30

## 2020-11-30 RX ORDER — VANCOMYCIN/0.9 % SOD CHLORIDE 1.5G/250ML
1500 PLASTIC BAG, INJECTION (ML) INTRAVENOUS ONCE
Status: COMPLETED | OUTPATIENT
Start: 2020-11-30 | End: 2020-11-30

## 2020-11-30 RX ORDER — NEOSTIGMINE METHYLSULFATE 1 MG/ML
INJECTION, SOLUTION INTRAVENOUS AS NEEDED
Status: DISCONTINUED | OUTPATIENT
Start: 2020-11-30 | End: 2020-11-30 | Stop reason: HOSPADM

## 2020-11-30 RX ORDER — ONDANSETRON 2 MG/ML
INJECTION INTRAMUSCULAR; INTRAVENOUS AS NEEDED
Status: DISCONTINUED | OUTPATIENT
Start: 2020-11-30 | End: 2020-11-30 | Stop reason: HOSPADM

## 2020-11-30 RX ORDER — FENTANYL CITRATE 50 UG/ML
25 INJECTION, SOLUTION INTRAMUSCULAR; INTRAVENOUS
Status: DISCONTINUED | OUTPATIENT
Start: 2020-11-30 | End: 2020-11-30 | Stop reason: HOSPADM

## 2020-11-30 RX ORDER — ROPIVACAINE HYDROCHLORIDE 5 MG/ML
60 INJECTION, SOLUTION EPIDURAL; INFILTRATION; PERINEURAL ONCE
Status: COMPLETED | OUTPATIENT
Start: 2020-11-30 | End: 2020-11-30

## 2020-11-30 RX ORDER — KETOROLAC TROMETHAMINE 10 MG/1
10 TABLET, FILM COATED ORAL
Qty: 20 TAB | Refills: 0 | Status: SHIPPED | OUTPATIENT
Start: 2020-11-30 | End: 2021-02-18

## 2020-11-30 RX ORDER — MIDAZOLAM HYDROCHLORIDE 1 MG/ML
INJECTION, SOLUTION INTRAMUSCULAR; INTRAVENOUS AS NEEDED
Status: DISCONTINUED | OUTPATIENT
Start: 2020-11-30 | End: 2020-11-30 | Stop reason: HOSPADM

## 2020-11-30 RX ORDER — ROCURONIUM BROMIDE 10 MG/ML
INJECTION, SOLUTION INTRAVENOUS AS NEEDED
Status: DISCONTINUED | OUTPATIENT
Start: 2020-11-30 | End: 2020-11-30 | Stop reason: HOSPADM

## 2020-11-30 RX ORDER — SODIUM CHLORIDE 0.9 % (FLUSH) 0.9 %
5-40 SYRINGE (ML) INJECTION AS NEEDED
Status: DISCONTINUED | OUTPATIENT
Start: 2020-11-30 | End: 2020-11-30 | Stop reason: HOSPADM

## 2020-11-30 RX ORDER — ACETAMINOPHEN 500 MG
1000 TABLET ORAL ONCE
Status: COMPLETED | OUTPATIENT
Start: 2020-11-30 | End: 2020-11-30

## 2020-11-30 RX ORDER — SODIUM CHLORIDE, SODIUM LACTATE, POTASSIUM CHLORIDE, CALCIUM CHLORIDE 600; 310; 30; 20 MG/100ML; MG/100ML; MG/100ML; MG/100ML
25 INJECTION, SOLUTION INTRAVENOUS CONTINUOUS
Status: DISCONTINUED | OUTPATIENT
Start: 2020-11-30 | End: 2020-11-30 | Stop reason: HOSPADM

## 2020-11-30 RX ORDER — FENTANYL CITRATE 50 UG/ML
INJECTION, SOLUTION INTRAMUSCULAR; INTRAVENOUS AS NEEDED
Status: DISCONTINUED | OUTPATIENT
Start: 2020-11-30 | End: 2020-11-30 | Stop reason: HOSPADM

## 2020-11-30 RX ORDER — DIPHENHYDRAMINE HYDROCHLORIDE 50 MG/ML
12.5 INJECTION, SOLUTION INTRAMUSCULAR; INTRAVENOUS AS NEEDED
Status: DISCONTINUED | OUTPATIENT
Start: 2020-11-30 | End: 2020-11-30 | Stop reason: HOSPADM

## 2020-11-30 RX ORDER — LIDOCAINE HYDROCHLORIDE 20 MG/ML
INJECTION, SOLUTION EPIDURAL; INFILTRATION; INTRACAUDAL; PERINEURAL AS NEEDED
Status: DISCONTINUED | OUTPATIENT
Start: 2020-11-30 | End: 2020-11-30 | Stop reason: HOSPADM

## 2020-11-30 RX ORDER — HYDROMORPHONE HYDROCHLORIDE 2 MG/ML
INJECTION, SOLUTION INTRAMUSCULAR; INTRAVENOUS; SUBCUTANEOUS AS NEEDED
Status: DISCONTINUED | OUTPATIENT
Start: 2020-11-30 | End: 2020-11-30 | Stop reason: HOSPADM

## 2020-11-30 RX ADMIN — ESMOLOL HYDROCHLORIDE 20 MG: 10 INJECTION, SOLUTION INTRAVENOUS at 08:11

## 2020-11-30 RX ADMIN — PREGABALIN 75 MG: 75 CAPSULE ORAL at 06:38

## 2020-11-30 RX ADMIN — ONDANSETRON HYDROCHLORIDE 4 MG: 2 INJECTION, SOLUTION INTRAMUSCULAR; INTRAVENOUS at 08:35

## 2020-11-30 RX ADMIN — CELECOXIB 400 MG: 200 CAPSULE ORAL at 06:39

## 2020-11-30 RX ADMIN — HYDROMORPHONE HYDROCHLORIDE 0.4 MG: 2 INJECTION, SOLUTION INTRAMUSCULAR; INTRAVENOUS; SUBCUTANEOUS at 09:18

## 2020-11-30 RX ADMIN — FENTANYL CITRATE 50 MCG: 50 INJECTION INTRAMUSCULAR; INTRAVENOUS at 08:00

## 2020-11-30 RX ADMIN — FENTANYL CITRATE 25 MCG: 50 INJECTION, SOLUTION INTRAMUSCULAR; INTRAVENOUS at 09:37

## 2020-11-30 RX ADMIN — ROPIVACAINE HYDROCHLORIDE 10 ML: 5 INJECTION, SOLUTION EPIDURAL; INFILTRATION; PERINEURAL at 07:08

## 2020-11-30 RX ADMIN — DEXAMETHASONE SODIUM PHOSPHATE 4 MG: 4 INJECTION, SOLUTION INTRAMUSCULAR; INTRAVENOUS at 08:35

## 2020-11-30 RX ADMIN — GLYCOPYRROLATE 0.6 MG: 0.2 INJECTION, SOLUTION INTRAMUSCULAR; INTRAVENOUS at 08:55

## 2020-11-30 RX ADMIN — TRANEXAMIC ACID 1 G: 100 INJECTION, SOLUTION INTRAVENOUS at 07:59

## 2020-11-30 RX ADMIN — FENTANYL CITRATE 50 MCG: 50 INJECTION INTRAMUSCULAR; INTRAVENOUS at 07:04

## 2020-11-30 RX ADMIN — Medication 10 MG: at 08:03

## 2020-11-30 RX ADMIN — ACETAMINOPHEN 1000 MG: 500 TABLET ORAL at 06:38

## 2020-11-30 RX ADMIN — ROPIVACAINE HYDROCHLORIDE 10 ML: 5 INJECTION, SOLUTION EPIDURAL; INFILTRATION; PERINEURAL at 07:07

## 2020-11-30 RX ADMIN — SODIUM CHLORIDE, POTASSIUM CHLORIDE, SODIUM LACTATE AND CALCIUM CHLORIDE: 600; 310; 30; 20 INJECTION, SOLUTION INTRAVENOUS at 06:37

## 2020-11-30 RX ADMIN — MIDAZOLAM HYDROCHLORIDE 2 MG: 1 INJECTION, SOLUTION INTRAMUSCULAR; INTRAVENOUS at 07:04

## 2020-11-30 RX ADMIN — VANCOMYCIN HYDROCHLORIDE 1500 MG: 10 INJECTION, POWDER, LYOPHILIZED, FOR SOLUTION INTRAVENOUS at 07:02

## 2020-11-30 RX ADMIN — SUCCINYLCHOLINE CHLORIDE 160 MG: 20 INJECTION, SOLUTION INTRAMUSCULAR; INTRAVENOUS at 07:50

## 2020-11-30 RX ADMIN — PROPOFOL 150 MG: 10 INJECTION, EMULSION INTRAVENOUS at 07:48

## 2020-11-30 RX ADMIN — Medication 10 MG: at 08:05

## 2020-11-30 RX ADMIN — FENTANYL CITRATE 50 MCG: 50 INJECTION INTRAMUSCULAR; INTRAVENOUS at 07:48

## 2020-11-30 RX ADMIN — PROPOFOL 50 MG: 10 INJECTION, EMULSION INTRAVENOUS at 07:50

## 2020-11-30 RX ADMIN — LIDOCAINE HYDROCHLORIDE 100 MG: 20 INJECTION, SOLUTION INTRAVENOUS at 07:48

## 2020-11-30 RX ADMIN — FENTANYL CITRATE 50 MCG: 50 INJECTION INTRAMUSCULAR; INTRAVENOUS at 08:09

## 2020-11-30 RX ADMIN — Medication 3 MG: at 08:55

## 2020-11-30 RX ADMIN — Medication 3 AMPULE: at 06:41

## 2020-11-30 RX ADMIN — HYDROMORPHONE HYDROCHLORIDE 0.4 MG: 2 INJECTION, SOLUTION INTRAMUSCULAR; INTRAVENOUS; SUBCUTANEOUS at 09:01

## 2020-11-30 RX ADMIN — FENTANYL CITRATE 50 MCG: 50 INJECTION INTRAMUSCULAR; INTRAVENOUS at 07:05

## 2020-11-30 RX ADMIN — ROCURONIUM BROMIDE 10 MG: 10 INJECTION INTRAVENOUS at 07:48

## 2020-11-30 RX ADMIN — WATER 2 G: 1 INJECTION INTRAMUSCULAR; INTRAVENOUS; SUBCUTANEOUS at 08:01

## 2020-11-30 NOTE — H&P
Kiko Casillas MD - Adult Reconstruction and Total Joint Replacement     Orthopaedic History and Physical        NAME: Cade Cardoso       :  1945       MRN:  [id number]        Subjective:   Patient ID: Cade Cardoso is a 76 y.o. male. Chief Complaint: Follow-up of the Right Knee and Follow-up of the Left Knee    Mr. Amelia Hill presents today for a follow-up of his bilateral knee pain. He states his right is consistently more painful than his left. He notes a sharp pain at his medial right knee when he twists his leg the wrong way. He says this sharp pain is localized specifically to his medial knee, but experiences pain all around his knees. He says his pain is daily and constant. He has difficulty getting in and out of his car and using the stairs due to pain. Pain also regularly keeps him up at night. He has also had a few falls due to pain. He denies any known aggravating incident or injury to his knees. He was last here on 2020 and received bilateral knee injections. He states this provided him no relief at all. The first injections he received lasted for about 4 months. He has also been experiencing back pain. He is currently seeing Dr. Syliva Lennox for this. He has an MRI scheduled for his back tomorrow.      Patient Active Problem List    Diagnosis Date Noted    Severe obesity ([address] 2019    Primary hypogonadism in male 2019    HCAP (healthcare-associated pneumonia) 01/15/2019    Lactic acidosis 2017     Past Medical History:   Diagnosis Date    Abdominal adhesions     Adverse effect of anesthesia     sleep apnea uses cpap machine       Arthritis     all joints; Degenerative disk disease    CAD (coronary artery disease)     stents x4, followed by Dr. Nikki Davey Chronic constipation     Chronic pain     all my joints    CPAP (continuous positive airway pressure) dependence     at night    Diabetic neuropathy (HCC)     Type II    Fibromyalgia     GERD (gastroesophageal reflux disease)     HLD (hyperlipidemia)     Hypertension     Ill-defined condition     fibromyalgia    Neuropathy     hands/feet    S/P insertion of spinal cord stimulator     with remote    Sleep apnea     uses Cpap    Thyroid disease     low      Past Surgical History:   Procedure Laterality Date    ABDOMEN SURGERY PROC UNLISTED  early 2000's    abdominal hernia repair    ABDOMEN SURGERY PROC UNLISTED  2000's    adhesions from appendix sx.  ABDOMEN SURGERY PROC UNLISTED  2000's    colectomy: when removing adhesions, nicked intestines, removed 8\" of intestine    COLONOSCOPY N/A 12/18/2017    COLONOSCOPY performed by Enrrique Villatoro MD at [address] [place] [address] COLONOSCOPY N/A 1/15/2019    COLONOSCOPY performed by Jose Olmstead MD at [facility] ENDOSCOPY    HX APPENDECTOMY  1995    HX CORONARY STENT PLACEMENT  2014    HX ORTHOPAEDIC Bilateral 2000, 2007    hip replacement      Prior to Admission medications    Medication Sig Start Date End Date Taking? Authorizing Provider   pregabalin (LYRICA) 75 mg capsule Take 75 mg by mouth two (2) times a day. Indications: diabetic complication causing injury to some body nerves   Yes Provider, Historical   losartan (COZAAR) 25 mg tablet Take 25 mg by mouth daily. Yes Provider, Historical   primidone (MYSOLINE) 50 mg tablet Take 50 mg by mouth every evening. Yes Provider, Historical   metFORMIN (GLUCOPHAGE) 1,000 mg tablet Take 1 Tab by mouth two (2) times daily (with meals). START TAKING 9/23 WITH DINNER  Patient taking differently: Take 1,000 mg by mouth two (2) times daily (with meals). Breakfast and Lunch 9/22/17  Yes Emely Vallejo MD   hydroCHLOROthiazide (HYDRODIURIL) 25 mg tablet Take 25 mg by mouth daily. Yes Other, MD Deon   levothyroxine (SYNTHROID) 200 mcg tablet Take 200 mcg by mouth Daily (before breakfast). Yes Other, MD Deon   Omeprazole delayed release (PRILOSEC D/R) 20 mg tablet Take 20 mg by mouth every other day.     Provider, Historical   flash glucose sensor (FreeStyle Bella 14 Day Sensor) kit Check sugars daily. Pt unable to use a glucometer due to excessive tremors. E11.9 and R25.1 20   Kar Cool MD   flash glucose scanning reader [facility] 14 Day [place]) [facility] Check sugars daily. Pt unable to use a glucometer due to excessive tremors. E11.9 and R25.1 20   Kar Cool MD   nitroglycerin (NITROSTAT) 0.4 mg SL tablet 1 Tab by SubLINGual route every five (5) minutes as needed for Chest Pain. Up to 3 doses. 20   Raad Barr MD   calcium-cholecalciferol, d3, (CALCIUM 600 + D) 600-125 mg-unit tab Take 2 Tabs by mouth daily (after lunch). Provider, Sharon   atorvastatin (LIPITOR) 80 mg tablet Take 80 mg by mouth nightly. Deon Parmar MD   DULoxetine (CYMBALTA) 60 mg capsule Take 60 mg by mouth two (2) times a day. 1 tab BID    Deon Parmar MD   aspirin delayed-release 81 mg tablet Take 81 mg by mouth daily. Deon Parmar MD   cholecalciferol, vitamin D3, (VITAMIN D3) 2,000 unit tab Take 2,000 Units by mouth daily (after lunch). Deon Parmar MD     No Known Allergies   Social History     Tobacco Use    Smoking status: Former Smoker     Packs/day: 0.50     Years: 40.00     Pack years: 20.00     Last attempt to quit:      Years since quittin.9    Smokeless tobacco: Never Used   Substance Use Topics    Alcohol use:  Yes     Alcohol/week: 6.0 standard drinks     Types: 4 Glasses of wine, 2 Shots of liquor per week     Comment: occ      Family History   Problem Relation Age of Onset    Heart Disease Brother     Obesity Brother     Other Mother         hiatal hernia    Arthritis Mother         back, spine    Heart Disease Mother         Angina    Psychiatric Disorder Mother         depression    No Known Problems Father     Diabetes Sister 16        Type 1    Arthritis Sister     Cancer Neg Hx         REVIEW OF SYSTEMS: A comprehensive review of systems was negative except for that written in the HPI. Objective:   Constitutional: He appears stated age. Pt is cooperative and is in no acute distress. Well nourished. Well developed. Body habitus is obese. Body mass index is 36.88 kg/m². Gait / Assistive devices: antalgic with a cane. Eyes: Sclera are nonicteric. Respiratory: No labored breathing. Cardiovascular: No marked edema. Well perfused extremities bilaterally. Skin: No marked skin ulcers. No lymphedema or skin abnormalities. Neurological: No marked sensory loss noted. Grossly neurovascularly intact. Both lower extremities are intact to distal sensory and motor function. Psychiatric: Alert and oriented x3. MUSCULOSKELETAL: Lumbar spine is nonfocal. No obvious LLD. 5/5 BLE strength. Good ROM bilaterally . TTP medial joint line bilaterally. No TTP lateral joint line laterally. No ligamentous instability bilaterally. No appreciable effusion bilaterally. Imaging:     Films from today of bilateral knees show severe loss of joint space in medial compartments bilaterally, more advanced since last films in February of 2020. Previous films of both knees from February 2020 show significant joint space narrowing medially. Assessment:     ICD-10-CM   1. Localized osteoarthritis of left knee M17.12   2. Localized osteoarthritis of right knee M17.11     Plan: At this time, I recommended a R UKA but to be prepared for a TKA if arthritic change is worse than films show. He would like to schedule this as soon as possible. He will follow up with his PCP before surgery. I also encouraged him to work on weight loss so that he is comfortably under the BMI cutoff by the time of surgery. All questions were answered to his satisfaction. Follow up after surgery. I ordered no new films in the office today.         JESSICA Pablo

## 2020-11-30 NOTE — ANESTHESIA PREPROCEDURE EVALUATION
Anesthetic History   No history of anesthetic complications            Review of Systems / Medical History  Patient summary reviewed, nursing notes reviewed and pertinent labs reviewed    Pulmonary        Sleep apnea: CPAP        Comments: Former smoker - Quit 2014 - 20 pack years   Neuro/Psych             Comments: Neuropathy Cardiovascular    Hypertension          CAD (s/p stents x5), cardiac stents and hyperlipidemia    Exercise tolerance: >4 METS  Comments: ECG (1/30/20):  Normal sinus rhythm with sinus arrhythmia   Leftward axis    GI/Hepatic/Renal     GERD: well controlled           Endo/Other    Diabetes: type 2  Hypothyroidism: well controlled  Morbid obesity and arthritis    Comments: Right Knee OA  Primary hypogonadism Other Findings   Comments: Fibromyalgia         Physical Exam    Airway  Mallampati: III  TM Distance: 4 - 6 cm  Neck ROM: normal range of motion   Mouth opening: Normal     Cardiovascular    Rhythm: regular  Rate: normal         Dental    Dentition: Caps/crowns, Bridges and Upper partial plate     Pulmonary  Breath sounds clear to auscultation               Abdominal  GI exam deferred       Other Findings            Anesthetic Plan    ASA: 3  Anesthesia type: general    Monitoring Plan: BIS  Post-op pain plan if not by surgeon: peripheral nerve block single    Induction: Intravenous  Anesthetic plan and risks discussed with: Patient      [employer]

## 2020-11-30 NOTE — ANESTHESIA PROCEDURE NOTES
Peripheral Block    Start time: 11/30/2020 7:04 AM  End time: 11/30/2020 7:08 AM  Performed by: Meryle Sat, MD  Authorized by: Meryle Sat, MD       Pre-procedure: Indications: at surgeon's request and post-op pain management    Preanesthetic Checklist: patient identified, risks and benefits discussed, site marked, timeout performed, anesthesia consent given and patient being monitored    Timeout Time: 07:04          Block Type:   Block Type:   Adductor canal  Laterality:  Right  Monitoring:  Standard ASA monitoring, continuous pulse ox, frequent vital sign checks, heart rate, responsive to questions and oxygen  Injection Technique:  Single shot  Procedures: ultrasound guided    Patient Position: supine  Prep: chlorhexidine    Location:  Mid thigh  Needle Type:  Stimuplex  Needle Gauge:  21 G  Needle Localization:  Anatomical landmarks and ultrasound guidance    Assessment:  Number of attempts:  1  Injection Assessment:  Incremental injection every 5 mL, local visualized surrounding nerve on ultrasound, negative aspiration for blood, no paresthesia, no intravascular symptoms and ultrasound image on chart  Patient tolerance:  Patient tolerated the procedure well with no immediate complications

## 2020-11-30 NOTE — PERIOP NOTES
covid test negative. Pt wear mask in public. Pt states no s/s covid virus nor has he been around anyone with the covid virus that he knows of. Sheltered in as best as possible.

## 2020-11-30 NOTE — OP NOTES
PREOPERATIVE DIAGNOSIS:  Right medial knee degenerative joint disease    POSTOPERATIVE DIAGNOSIS:  Same    PROCEDURE:   Press-fit right medial unicompartmental knee replacement, imageless computer navigation    Implants Used:   Engage Medial UKA  Femur: 5 Tibia: 5  Poly: 9mm    Anesthesia:  Block plus general    Surgeon: Danielle Smith     Assist: JESSICA Huffman (Performing all or most of the following assistant-at-surgery services including but not limited to: proper patient positioning, sterile/prep and draping, placement of instruments/trackers, operative exposure, minor portions of bone / soft tissue excision, final irrigation and debridement, deep and superficial closure, application of final dressings)    EBL: minimal    Drains: none     Specimens: none     Complications: none     Condition: stable to PACU     INDICATIONS: Longstanding knee pain unresponsive to conservative measures. The risks, benefits, and alternatives to the procedure were explained to the patient and they wished to proceed. They understood no guarantees could be given about the outcome of the procedure. DESCRIPTION OF PROCEDURE:  The patient was brought in to the operating room and placed supine on a standard OR table. Anesthesia was provided by the anesthesia team without difficulty. A thigh tourniquet was applied to the limb which was then prepped and draped in the usual fashion. Pre-operative antibiotics were administered. An appropriate time-out was performed. The leg was exsanguinated and the tourniquet inflated. We made a small medial parapatellar approach. A portion of the fat pad and medial meniscus were excised. We thoroughly examined the entire knee. The patient was felt to be a good candidate for unicompartmental knee replacement and we elected to proceed. We began by placing the tibial tracker and mapping the tibia. We then placed the femoral tracker and found the hip center and obtained initial kinematics.   We then used the navigation system to dial in tibial resection for the cut block. This was pinned into place and the medial tibial resection performed. We sized the tibia. We then used navigation to map the femur and performed the distal femoral cut. Our alignment was checked with a spacer block in place. We sized the femur and made the posterior and chamfer cut and drilled for the lugs of the implant. We then checked our flexion and extension gaps with trial components and had excellent balance and range of motion. We then prepared the tibia for the lugs of the component. We inserted the final tibial tray and prepped and inserted the keel. The femoral component was seated and we trialed once more. We then snapped the final tibial insert into place. Final alignment and kinematics were obtained. The knee was again taken through range of motion and found to be well balanced with good range of motion and no impingement. We thoroughly lavaged and closed in routine fashion. Periarticular injection was performed. A sterile compressive dressing was applied. The patient was awakened, moved to the stretcher, and taken to the recovery room in stable condition. At the conclusion of the procedure, all counts were correct. There were no immediate complications.

## 2020-11-30 NOTE — ANESTHESIA POSTPROCEDURE EVALUATION
Procedure(s):  RIGHT UNICONDYLAR KNEE ARTHROPLASTY. general    Anesthesia Post Evaluation        Patient location during evaluation: PACU  Note status: Adequate. Level of consciousness: responsive to verbal stimuli and sleepy but conscious  Pain management: satisfactory to patient  Airway patency: patent  Anesthetic complications: no  Cardiovascular status: acceptable  Respiratory status: acceptable  Hydration status: acceptable  Comments: +Post-Anesthesia Evaluation and Assessment    Patient: Micky Wick MRN: [id number]  SSN: [id number]   YOB: 1945  Age: 76 y.o. Sex: male      Cardiovascular Function/Vital Signs    BP (!) 146/74   Pulse 81   Temp 36.8 °C (98.3 °F)   Resp 22   Ht 5' 10\" (1.778 m)   Wt 112.8 kg (248 lb 10.9 oz)   SpO2 98%   BMI 35.68 kg/m²     Patient is status post Procedure(s):  RIGHT UNICONDYLAR KNEE ARTHROPLASTY. Nausea/Vomiting: Controlled. Postoperative hydration reviewed and adequate. Pain:  Pain Scale 1: Numeric (0 - 10) (11/30/20 3814)  Pain Intensity 1: 5 (11/30/20 9594)   Managed. Neurological Status:   Neuro (WDL): Exceptions to WDL (11/30/20 0911)   At baseline. Mental Status and Level of Consciousness: Arousable. Pulmonary Status:   O2 Device: Nasal cannula (11/30/20 6029)   Adequate oxygenation and airway patent. Complications related to anesthesia: None    Post-anesthesia assessment completed. No concerns. Signed By: Gogo Menchaca MD    11/30/2020  Post anesthesia nausea and vomiting:  controlled      INITIAL Post-op Vital signs:   Vitals Value Taken Time   /74 11/30/2020  9:45 AM   Temp 36.8 °C (98.3 °F) 11/30/2020  9:15 AM   Pulse 83 11/30/2020  9:58 AM   Resp 18 11/30/2020  9:58 AM   SpO2 95 % 11/30/2020  9:58 AM   Vitals shown include unvalidated device data.

## 2020-11-30 NOTE — H&P
Date of Surgery Update:  Angela Dumont was seen and examined on the day of surgery prior to the procedure by the surgical team.    There were no significant clinical changes since the completion of the History and Physical.    Exam today prior to surgery showed no acute cardiac findings, no respiratory difficulty, and no abdominal complaints or pain.        Signed By: Fatuma Jain PA-C    November 30, 2020 7:27 AM

## 2020-11-30 NOTE — BRIEF OP NOTE
Brief Postoperative Note    Patient: Ronal Conn  YOB: 1945  MRN: [id number]    Date of Procedure: 11/30/2020     Pre-Op Diagnosis: RIGHT KNEE OA    Post-Op Diagnosis: Same as preoperative diagnosis. Procedure(s):  RIGHT UNICONDYLAR KNEE ARTHROPLASTY    Surgeon(s):  Scarlett Posadas MD    Surgical Assistant: Physician Assistant: JESSICA Sauer    Anesthesia: General     Estimated Blood Loss (mL): Minimal    Complications: none    Specimens: * No specimens in log *     Implants:   Implant Name Type Inv.  Item Serial No.  Lot No. LRB No. Used Action   Engage Prtial Knee System [id number] Coated tibial Tray   NA  [id number] Right [id number] Implanted   Ribial [place] system   NA  [id number] Right 1 Implanted   Porous Coated Femoral Component   NA  [id number] Right 1 Implanted   Tibial Insert   NA  [id number] Right 1 Implanted       Drains: * No LDAs found *    Findings: none    Electronically Signed by JESSICA Suarez on 11/30/2020 at 9:09 AM

## 2020-11-30 NOTE — DISCHARGE SUMMARY
Angi Chamorro MD - Adult Reconstruction and Total Joint Replacement    Geno Peck - MRN [id number] - : 1945 (76 y.o.)    Discharge Summary    Admit date: 2020    Discharge date and time: No discharge date for patient encounter. Admitting Physician: Angi Chamorro MD     Date of Surgery: 2020     Preoperative Diagnosis:  RIGHT KNEE OA    Postoperative Diagnosis: RIGHT KNEE OA    Procedure(s): Procedure(s):  RIGHT UNICONDYLAR KNEE ARTHROPLASTY    Surgeon: Surgeon(s) and Role:     Diamond Berger MD - Primary      Anesthesia:  General    HPI:  Pt is a 76 y.o. male who has a history of RIGHT KNEE OA  with pain and limitations of activities of daily living who presents at this time for total joint replacement following the failure of conservative management. PMH:   Past Medical History:   Diagnosis Date    Abdominal adhesions     Adverse effect of anesthesia     sleep apnea uses cpap machine       Arthritis     all joints; Degenerative disk disease    CAD (coronary artery disease)     stents x4, followed by Dr. Silverio Carvahlo Chronic constipation     Chronic pain     all my joints    CPAP (continuous positive airway pressure) dependence     at night    Diabetic neuropathy (HCC)     Type II    Fibromyalgia     GERD (gastroesophageal reflux disease)     HLD (hyperlipidemia)     Hypertension     Ill-defined condition     fibromyalgia    Neuropathy     hands/feet    S/P insertion of spinal cord stimulator     with remote    Sleep apnea     uses Cpap    Thyroid disease     low       Medications upon admission :   Prior to Admission Medications   Prescriptions Last Dose Informant Patient Reported? Taking? DULoxetine (CYMBALTA) 60 mg capsule 2020 at 2000  Yes No   Sig: Take 60 mg by mouth two (2) times a day. 1 tab BID   Omeprazole delayed release (PRILOSEC D/R) 20 mg tablet 2020  Yes No   Sig: Take 20 mg by mouth every other day.    aspirin delayed-release 81 mg tablet 2020  Yes No   Sig: Take 81 mg by mouth daily. atorvastatin (LIPITOR) 80 mg tablet 2020 at 2000  Yes No   Sig: Take 80 mg by mouth nightly. calcium-cholecalciferol, d3, (CALCIUM 600 + D) 600-125 mg-unit tab 2020  Yes No   Sig: Take 2 Tabs by mouth daily (after lunch). cholecalciferol, vitamin D3, (VITAMIN D3) 2,000 unit tab 2020  Yes No   Sig: Take 2,000 Units by mouth daily (after lunch). flash glucose scanning reader (FreeStyle Bella 14 Day [place]) misc  at Unknown time  No No   Sig: Check sugars daily. Pt unable to use a glucometer due to excessive tremors. E11.9 and R25.1   flash glucose sensor (FreeStyle Bella 14 Day Sensor) kit Not Taking at Unknown time  No No   Sig: Check sugars daily. Pt unable to use a glucometer due to excessive tremors. E11.9 and R25.1   hydroCHLOROthiazide (HYDRODIURIL) 25 mg tablet 2020 at 0900  Yes Yes   Sig: Take 25 mg by mouth daily. levothyroxine (SYNTHROID) 200 mcg tablet 2020 at 0430  Yes Yes   Sig: Take 200 mcg by mouth Daily (before breakfast). losartan (COZAAR) 25 mg tablet 2020 at 0800  Yes Yes   Sig: Take 25 mg by mouth daily. metFORMIN (GLUCOPHAGE) 1,000 mg tablet 2020 at 1400  No Yes   Sig: Take 1 Tab by mouth two (2) times daily (with meals). START TAKING  WITH DINNER   Patient taking differently: Take 1,000 mg by mouth two (2) times daily (with meals). Breakfast and Lunch   nitroglycerin (NITROSTAT) 0.4 mg SL tablet Not Taking at Unknown time  No No   Si Tab by SubLINGual route every five (5) minutes as needed for Chest Pain. Up to 3 doses. pregabalin (LYRICA) 75 mg capsule 2020 at   Yes Yes   Sig: Take 75 mg by mouth two (2) times a day. Indications: diabetic complication causing injury to some body nerves   primidone (MYSOLINE) 50 mg tablet 2020 at   Yes Yes   Sig: Take 50 mg by mouth every evening.       Facility-Administered Medications: None        Allergies:  No Known Allergies     Hospital Course: The patient underwent surgery. There were no immediate post-operative complications. The patient was taken to the PACU in stable condition and then discharged. Hemoglobin at discharge:    Lab Results   Component Value Date/Time    HGB 14.4 11/23/2020 10:40 AM    INR 1.0 11/23/2020 10:40 AM       Dressing was changed postoperatively and the incision was noted to be clean, dry and intact. Normal significant erythema and swelling was noted. Neurovascular exam within normal limits. Wound appears to be healing without any evidence of infection. Physical Therapy started postoperatively per protocol. The patient was allowed to bear weight as tolerated. Discharge instructions:  -Anticoagulate per discharge instructions  -Resume pre hospital diet              Current Discharge Medication List      START taking these medications    Details   cefadroxil (DURICEF) 500 mg capsule Take 1 Cap by mouth two (2) times a day. Qty: 14 Cap, Refills: 0      oxyCODONE-acetaminophen (PERCOCET) 5-325 mg per tablet Take 1-2 Tabs by mouth every four (4) hours as needed for Pain for up to 7 days. Max Daily Amount: 12 Tabs. Qty: 60 Tab, Refills: 0    Associated Diagnoses: Status post right partial knee replacement      ketorolac (TORADOL) 10 mg tablet Take 1 Tab by mouth every six (6) hours as needed for Pain. Qty: 20 Tab, Refills: 0         CONTINUE these medications which have CHANGED    Details   aspirin delayed-release 81 mg tablet Take 1 Tab by mouth two (2) times a day. Qty: 60 Tab, Refills: 0         CONTINUE these medications which have NOT CHANGED    Details   pregabalin (LYRICA) 75 mg capsule Take 75 mg by mouth two (2) times a day. Indications: diabetic complication causing injury to some body nerves      losartan (COZAAR) 25 mg tablet Take 25 mg by mouth daily. primidone (MYSOLINE) 50 mg tablet Take 50 mg by mouth every evening.       metFORMIN (GLUCOPHAGE) 1,000 mg tablet Take 1 Tab by mouth two (2) times daily (with meals). START TAKING 9/23 WITH DINNER  Qty: 30 Tab, Refills: 0      hydroCHLOROthiazide (HYDRODIURIL) 25 mg tablet Take 25 mg by mouth daily. levothyroxine (SYNTHROID) 200 mcg tablet Take 200 mcg by mouth Daily (before breakfast). Omeprazole delayed release (PRILOSEC D/R) 20 mg tablet Take 20 mg by mouth every other day. flash glucose sensor (FreeStyle Bella 14 Day Sensor) kit Check sugars daily. Pt unable to use a glucometer due to excessive tremors. E11.9 and R25.1  Qty: 2 Kit, Refills: 5      flash glucose scanning reader (FreeStyle Bella 14 Day [place]) misc Check sugars daily. Pt unable to use a glucometer due to excessive tremors. E11.9 and R25.1  Qty: 1 Each, Refills: 0      nitroglycerin (NITROSTAT) 0.4 mg SL tablet 1 Tab by SubLINGual route every five (5) minutes as needed for Chest Pain. Up to 3 doses. Qty: 20 Tab, Refills: 0      calcium-cholecalciferol, d3, (CALCIUM 600 + D) 600-125 mg-unit tab Take 2 Tabs by mouth daily (after lunch). atorvastatin (LIPITOR) 80 mg tablet Take 80 mg by mouth nightly. DULoxetine (CYMBALTA) 60 mg capsule Take 60 mg by mouth two (2) times a day. 1 tab BID      cholecalciferol, vitamin D3, (VITAMIN D3) 2,000 unit tab Take 2,000 Units by mouth daily (after lunch). -Discharge activity: as tolerated +/- assistive devices as prescribed.   -Routine Wound Care  -Follow up in office in 2-3 weeks      Signed:  JESSICA Hernadez  11/30/2020  9:16 AM

## 2020-11-30 NOTE — DISCHARGE INSTRUCTIONS
Discharge Instructions: How to [address]  Surgery: Total Knee Replacement  Surgeon:   Joo Dooley MD  Surgery Date:  11/30/2020     To relieve pain:   Use ice/gel packs.  Put the ice pack directly over the wound, or anywhere you are hurting or swollen.  To control pain and swelling, keep ice on regularly, especially after physical activity.  The packs should stay cold for 3-4 hours. When it is not cold anymore, rotate with the packs in the freezer.  Elevate your leg. This will also keep swelling down.  Rest for at least 20 minutes between activity or exercises.  To keep track of your pain medications, write down what you take and when you take it.  The last dose of pain medication you got in the hospital was:       Medication    Dose    Date & Time           Choose your medications based on the pain scale below:     To keep your pain under control, take Tylenol every 6 hours for 14 days - even if you feel like you dont need it.  For mild to moderate pain (4-6 on pain scale), take one pain pill every 3-4 hours as needed.  For severe pain (7-10 on pain scale), take two pain pills every 3-4 hours as needed.  To prevent nausea, take your pain medications with food. Pain Scale                 As your pain lessens:     Slowly start taking less pain medication. You may do this by waiting longer between doses or by taking smaller doses.  Stop using the pain medications as soon as you no longer need it, usually in 2-3 weeks.  Aspirin   To prevent blood clots, you will need to take Aspirin 81 mg twice a day for 30 days.  To prevent stomach upset or bleeding:   Do not take non-steroidal anti-inflammatory medications (Ibuprofen, Advil, Motrin, Naproxen, etc) other than the Ketorolac you were prescribed.      Take Pepcid 20 mg twice a day, or a similar home medication, while you are taking a blood thinner. OPSITE (Honeycomb dressing)     Keep your clear, waterproof dressing in place for 5-7 days after your leave the hospital.     If you are still having drainage, you will need to change your dressing in 5-7 days. You will be given one extra dressing to use at home.  If there is no more drainage from the wound, you may leave it open to the air.  You will have some swelling, warmth, and bruising around the knee and up and down the leg after surgery. This will may get worse in the first few days you are home and will slowly get better over the next few weeks. [address] is a type of skin glue and mesh that is keeping your wound together.  Leave this covering alone. Do not peel it off.  Do not apply oils or lotions over it.  You may shower with this dressing but do not soak it. Gently pat your wound dry when you get out of the shower.  DO NOTs:   Do not rub your surgical wound   Do not put lotion or oils on your wound.  Do not take a tub bath or go swimming until your doctor says it is ok.  You may shower with this dressing over your wound.  After showering pat the dressing dry.  If you have staples a home health nurse will remove them in about 10 days.  To increase and promote healing:   Stop Smoking (or at least cut back on       Smoking).  Eat a well-balanced diet (high in protein       and vitamin C).  If you have a poor appetite, drink Ensure, Glucerna, or         Lithia Instant Breakfast for the next 30 days.  If you are diabetic, you should control your blood         sugars to prevent infection and help your wound         to heal.                     f you have a poor appetite, drink Ensure, Glucerna, or Lithia Instant Breakfast for the next 30 days.      If you are diabetic, you should control your blood                                                sugars to prevent infection and help your wound                                                to heal.     Prevent Infection    1. Wash your hands.  This is the most important thing you or your caregiver can do.  Wash your hands with soap and water (or use the hand  we gave you) before you touch any wounds. 2. Shower.  Use the antibacterial soap we gave you when you take a shower.  Shower with this soap until your wounds are healed. 3.  Use clean sheets.  Put freshly cleaned sheets on your bed after surgery.  To keep the surgery site clean, do not allow pets to sleep with you while your wound is still healing.  To prevent constipation, stay active and drink plenty of fluid.  While using pain medications, you should also take stool softeners and laxatives, such as Pericolace       and Miralax.  If you are having too many bowel       Movements, then you may need       to stop taking the laxatives.  You should have a bowel movement 3-4 days        after surgery and then at least every other day while       on pain medication.  To improve your recovery, you must be active!  Use your walker and take short walks (in your home)         about every 2 hours during the day.  Try to increase how far you walk each day.  You can put as much weight on your leg as you can tolerate while walking.  To avoid getting a stiff knee, work on getting your knee bent and straight as soon as possible.  Start PT right away. If using home health, Home health physical therapy will come to your       home the day after you leave the hospital.  The       therapist will visit about 4 times over the next        couple of weeks to teach you how to get out of        bed, to safely walk in your home, and to do your        exercises.       NO DRIVING until your surgeon tells you it is ok.  You can return to work when cleared by a physician.  Please call your physician immediately if you have:     Constant bleeding from your wound.  Increasing redness or swelling around your wound (Some warmth, bruising and swelling is normal).  Change in wound drainage (increase in amount, color, or bad smell).  Change in mental status (unusual behavior   Temperature over 101.5 degrees Fahrenheit      Pain or redness in the calf (back of your lower leg - see picture)     Increased swelling of the thigh, ankle, calf, or foot.  Emergency: CALL 911 if you have:     Shortness of breath     Chest pain when you cough or taking a deep breath     Please call your surgeons office at [phone] for a follow up appointment. _   You should call as soon as you get home or the next day after discharge. Ask to make an appointment in 2 weeks.  If you have questions or concerns during normal business hours, you may reach Dr. Enrique Flores' Team at [phone]. TO PREVENT AN INFECTION      1. [address] YOUR HANDS     To prevent infection, good handwashing is the most important thing you or your caregiver can do.  Wash your hands with soap and water or use the hand  we gave you before you touch any wounds. 2. SHOWER     Use the antibacterial soap we gave you when you take a shower.  Shower with this soap until your wounds are healed.  To reach all areas of your body, you may need someone to help you.  Dont forget to clean your belly button with every shower. 3.  USE CLEAN SHEETS     Use freshly cleaned sheets on your bed after surgery.  To keep the surgery site clean, do not allow pets to sleep with you while your wound is still healing. 4. STOP SMOKING     Stop smoking, or at least cut back on smoking     Smoking slows your healing.      5.  CONTROL YOUR BLOOD SUGAR     High blood sugars slow wound healing. If you are diabetic, control your blood sugar levels before and after your surgery. DISCHARGE SUMMARY from Nurse    The following personal items are in your possession at time of discharge:    Dental Appliances: None  Visual Aid: None  Home Medications: None  Jewelry: None  Clothing: None (left in in pt. room)  Other Valuables: None             PATIENT INSTRUCTIONS:    After general anesthesia or intravenous sedation, for 24 hours or while taking prescription Narcotics:  Limit your activities  Do not drive and operate hazardous machinery  Do not make important personal or business decisions  Do  not drink alcoholic beverages  If you have not urinated within 8 hours after discharge, please contact your surgeon on call. Report the following to your surgeon:  Excessive pain, swelling, redness or odor of or around the surgical area  Temperature over 100.5  Nausea and vomiting lasting longer than 4 hours or if unable to take medications  Any signs of decreased circulation or nerve impairment to extremity: change in color, persistent  numbness, tingling, coldness or increase pain  Any questions    To prevent infection remember to refer to your handout on handwashing given to you by your nurse. \  *  Please give a list of your current medications to your Primary Care Provider. *  Please update this list whenever your medications are discontinued, doses are      changed, or new medications (including over-the-counter products) are added. *  Please carry medication information at all times in case of emergency situations. These are general instructions for a healthy lifestyle:    No smoking/ No tobacco products/ Avoid exposure to second hand smoke    Surgeon General's Warning:  Quitting smoking now greatly reduces serious risk to your health.     Obesity, smoking, and sedentary lifestyle greatly increases your risk for illness    A healthy diet, regular physical exercise & weight monitoring are important for maintaining a healthy lifestyle    You may be retaining fluid if you have a history of heart failure or if you experience any of the following symptoms:  Weight gain of 3 pounds or more overnight or 5 pounds in a week, increased swelling in our hands or feet or shortness of breath while lying flat in bed. Please call your doctor as soon as you notice any of these symptoms; do not wait until your next office visit. Recognize signs and symptoms of STROKE:    F-face looks uneven    A-arms unable to move or move unevenly    S-speech slurred or non-existent    T-time-call 911 as soon as signs and symptoms begin-DO NOT go       Back to bed or wait to see if you get better-TIME IS BRAIN. The discharge information has been reviewed with the patient. The patient verbalized understanding. Patient Education      Oxycodone/Acetaminophen (Percocet, Roxicet) - (By mouth)   Why this medicine is used:   Treats pain. This medicine contains a narcotic pain reliever. Contact a nurse or doctor right away if you have:  Extreme weakness, shallow breathing, slow heartbeat  Sweating or cold, clammy skin  Skin blisters, rash, or peeling     Common side effects:  Constipation  Nausea, vomiting  Tiredness  © 2017 [facility] Information is for End User's use only and may not be sold, redistributed or otherwise used for commercial purposes.

## 2021-02-18 ENCOUNTER — VIRTUAL VISIT (OUTPATIENT)
Dept: ENDOCRINOLOGY | Age: 76
End: 2021-02-18
Payer: MEDICARE

## 2021-02-18 DIAGNOSIS — E29.1 PRIMARY HYPOGONADISM IN MALE: ICD-10-CM

## 2021-02-18 DIAGNOSIS — E03.9 ACQUIRED HYPOTHYROIDISM: ICD-10-CM

## 2021-02-18 DIAGNOSIS — E11.9 TYPE 2 DIABETES MELLITUS WITHOUT COMPLICATION, WITHOUT LONG-TERM CURRENT USE OF INSULIN (HCC): Primary | ICD-10-CM

## 2021-02-18 DIAGNOSIS — E66.01 SEVERE OBESITY (HCC): ICD-10-CM

## 2021-02-18 PROCEDURE — 99214 OFFICE O/P EST MOD 30 MIN: CPT | Performed by: INTERNAL MEDICINE

## 2021-02-18 PROCEDURE — G8427 DOCREV CUR MEDS BY ELIG CLIN: HCPCS | Performed by: INTERNAL MEDICINE

## 2021-02-18 PROCEDURE — G8432 DEP SCR NOT DOC, RNG: HCPCS | Performed by: INTERNAL MEDICINE

## 2021-02-18 PROCEDURE — 3046F HEMOGLOBIN A1C LEVEL >9.0%: CPT | Performed by: INTERNAL MEDICINE

## 2021-02-18 PROCEDURE — 2022F DILAT RTA XM EVC RTNOPTHY: CPT | Performed by: INTERNAL MEDICINE

## 2021-02-18 PROCEDURE — 1101F PT FALLS ASSESS-DOCD LE1/YR: CPT | Performed by: INTERNAL MEDICINE

## 2021-02-18 PROCEDURE — G8417 CALC BMI ABV UP PARAM F/U: HCPCS | Performed by: INTERNAL MEDICINE

## 2021-02-18 PROCEDURE — 3017F COLORECTAL CA SCREEN DOC REV: CPT | Performed by: INTERNAL MEDICINE

## 2021-02-18 PROCEDURE — G8536 NO DOC ELDER MAL SCRN: HCPCS | Performed by: INTERNAL MEDICINE

## 2021-02-18 RX ORDER — DICLOFENAC SODIUM 10 MG/G
2-4 GEL TOPICAL
COMMUNITY
Start: 2021-01-25 | End: 2021-06-20 | Stop reason: SDUPTHER

## 2021-02-18 RX ORDER — MELOXICAM 15 MG/1
TABLET ORAL
COMMUNITY
Start: 2021-01-25 | End: 2022-08-22

## 2021-02-18 RX ORDER — CYCLOBENZAPRINE HCL 10 MG
TABLET ORAL
COMMUNITY
Start: 2021-02-04 | End: 2022-08-22

## 2021-02-18 NOTE — PROGRESS NOTES
Chief Complaint   Patient presents with    Diabetes     pcp and pharmacy verified. Eye exam: 2020. DOXY. ME     Thyroid Problem    Hypogonadism            **THIS IS A VIRTUAL VISIT VIA A VIDEO ENCOUNTER. PATIENT AGREED TO HAVE THEIR CARE DELIVERED OVER VIDEO IN PLACE OF THEIR REGULARLY SCHEDULED OFFICE VISIT**      History of Present Illness: Kimmie Crawley is a 76 y.o. male here for follow up of hypothyroidism, hypogonadism and diabetes. . In November 2018 he presented to his PCP with the complaint of hyperhidrosis. His Total Testosterone was 202. He was then referred for further evaluation. I re-tested his Testosterone level and it was again low at 174. His PSA was 0.8. His LH was elevated at 8.8, his prolactin was not elevated and his IGF-1 was not elevated. Pt was, eventually, started on IM Testosterone Cypionate. He had been receiving the IM Testosterone Cypionate 100mg every week since September 2019. Pt notes he is no longer taking the Testosterone. He felt that it was not helping with his symptoms of diaphoresis so he stopped taking it. He has not noted any change in the sweating. He notes the perspiration is on the face, chest, stomach and axillae. His most recent A1C was 6.6% in November 2020. In November 2020 he had Right TKR. \"I have not completely healed from it, I still have some soreness and stiffness, but basically I am doing well. \"    Pt notes he has two herniated discs and is seeing Dr. Arelis Ramirez and he is scheduled for a CT of the spine next week. He has not received any steroids or joint/spine injections recently. For his diabetes pt is currently taking Metformin 1000mg BID. I had ordered Trulicity 6.09BB weekly, but he does not recall ever starting it, he seems to recall the cost was too high, but does not specifically recall. He would like to try and have it ordered again. For his hypothyroidism pt is currently taking LT4 200mcg daily.     Pt denies issues of CP, SOB, HA, vision changes, N/V, abdominal pain, palpitations, tremors, diarrhea or constipation. Pt denies issues of polyuria or LUTS symptoms. He denies issues of dysphagia or dysphonia. He does not check his BGs regularly, he does have a chronic tremor, which makes testing his BGs difficult. Pt is eating 3 meals per day. He has breakfast around 9AM, this AM he had two eggs, l[place], a biscuit, apple sauce and coffee. He has lunch around 2PM, yesterday he had a salad, pork loin and iced tea. She has dinner around 7-8PM, last night he had pork loin, brussel sprouts, cucumbers and water. Pt notes he has been struggling with binge eating and this has caused his weight to fluctuate up and down. Pt notes that his PCP gave him \"something to suppress my appetite\", but he is no longer taking it and can not recall why. Pt notes he has lost an additional 12 pounds after his knee surgery and today he weighs 240 pounds. He notes the binge eating is not a much of a problem as it was in the past.    Pt is eating 3 meals per day. He has breakfast around 10AM, yesterday he had cereal, banana and milk. He has lunch around 2PM, yesterday he had a green salad and water. He has dinner around 7-8PM, last night he had meatloaf, spaghetti, garden salad and red wine. He denies snacking during the day or in the evening. Current Outpatient Medications   Medication Sig    aspirin delayed-release 81 mg tablet Take 1 Tab by mouth two (2) times a day.  cefadroxil (DURICEF) 500 mg capsule Take 1 Cap by mouth two (2) times a day.  ketorolac (TORADOL) 10 mg tablet Take 1 Tab by mouth every six (6) hours as needed for Pain.  pregabalin (LYRICA) 75 mg capsule Take 75 mg by mouth two (2) times a day. Indications: diabetic complication causing injury to some body nerves    Omeprazole delayed release (PRILOSEC D/R) 20 mg tablet Take 20 mg by mouth every other day.     flash glucose sensor (FreeStyle Bella 14 Day Sensor) kit Check sugars daily. Pt unable to use a glucometer due to excessive tremors. E11.9 and R25.1    flash glucose scanning reader ([employer] Bella 14 Day [place]) misc Check sugars daily. Pt unable to use a glucometer due to excessive tremors. E11.9 and R25.1    nitroglycerin (NITROSTAT) 0.4 mg SL tablet 1 Tab by SubLINGual route every five (5) minutes as needed for Chest Pain. Up to 3 doses.  losartan (COZAAR) 25 mg tablet Take 25 mg by mouth daily.  primidone (MYSOLINE) 50 mg tablet Take 50 mg by mouth every evening.  calcium-cholecalciferol, d3, (CALCIUM 600 + D) 600-125 mg-unit tab Take 2 Tabs by mouth daily (after lunch).  metFORMIN (GLUCOPHAGE) 1,000 mg tablet Take 1 Tab by mouth two (2) times daily (with meals). START TAKING 9/23 WITH DINNER (Patient taking differently: Take 1,000 mg by mouth two (2) times daily (with meals). Breakfast and Lunch)    atorvastatin (LIPITOR) 80 mg tablet Take 80 mg by mouth nightly.  hydroCHLOROthiazide (HYDRODIURIL) 25 mg tablet Take 25 mg by mouth daily.  DULoxetine (CYMBALTA) 60 mg capsule Take 60 mg by mouth two (2) times a day. 1 tab BID    levothyroxine (SYNTHROID) 200 mcg tablet Take 200 mcg by mouth Daily (before breakfast).  cholecalciferol, vitamin D3, (VITAMIN D3) 2,000 unit tab Take 2,000 Units by mouth daily (after lunch). No current facility-administered medications for this visit. No Known Allergies  Review of Systems:  - Cardiovascular: no chest pain  - Neurological: no tremors  - Integumentary: skin is normal    Physical Examination:  There were no vitals taken for this visit.   - GENERAL: NCAT, Appears well nourished   - EYES: EOMI, non-icteric, no proptosis   - Ear/Nose/Throat: NCAT, no visible inflammation or masses   - CARDIOVASCULAR: no cyanosis, no visible JVD   - RESPIRATORY: respiratory effort normal without any distress or labored breathing   - MUSCULOSKELETAL: Normal ROM of neck and upper extremities observed   - SKIN: No rash on face   - NEUROLOGIC:  No facial asymmetry (Cranial nerve 7 motor function), No gaze palsy, + tremors (chronic). - PSYCHIATRIC: Normal affect, Normal insight and judgement   -     Data Reviewed:   None    Assessment/Plan:   1) Hypogonadism > Pt stopped the Testosterone since it was not giving him any clinical benefit. 2) Hypothyroidism > Pt is clinically euthyroid on the LT4 200mcg daily. Will check TFTs and adjust his dose as needed. 3) DM >  Will order an A1C, pt has had good weight loss and was encouraged to keep up the good work. Because of his tremors and dexterity issues he is not able to use a glucometer to test his BGs. He would benefit from a CGM such as a [email or web address]COM. Will order the [employer] to test his BGs daily. Will order the Trulicity 5.44DT weekly to help with BGs and weight issues. We discussed the mechanism of action of the GLP-1 agents and the risk vs benefits, including Nausea, pancreatitis and hypoglycemia. 4) Obestiy > Pt has lost 15 pounds with dietary and lifestyle changes. Pt encouraged to keep up the excellent work. See #3    Pt voices understanding and agreement with the plan.   Pain noted and pt was recommended to call his PCP for further evaluation and treatment, as needed      RTC 6 months        Copy sent to:  Dr. Deejay Horta

## 2021-02-20 LAB
CREATININE, EXTERNAL: 0.72
HBA1C MFR BLD HPLC: 7.4 %
LDL-C, EXTERNAL: 57
PSA, EXTERNAL: 0.7

## 2021-02-22 ENCOUNTER — HOSPITAL ENCOUNTER (OUTPATIENT)
Dept: GENERAL RADIOLOGY | Age: 76
Discharge: HOME | End: 2021-02-22
Attending: ORTHOPAEDIC SURGERY
Payer: MEDICARE

## 2021-02-22 ENCOUNTER — HOSPITAL ENCOUNTER (OUTPATIENT)
Dept: CT IMAGING | Age: 76
Discharge: HOME | End: 2021-02-22
Attending: ORTHOPAEDIC SURGERY
Payer: MEDICARE

## 2021-02-22 DIAGNOSIS — M17.12 LOCALIZED OSTEOARTHRITIS OF LEFT KNEE: ICD-10-CM

## 2021-02-22 DIAGNOSIS — M79.7 FIBROMYALGIA: ICD-10-CM

## 2021-02-22 DIAGNOSIS — M54.41 LOW BACK PAIN WITH BILATERAL SCIATICA, UNSPECIFIED BACK PAIN LATERALITY, UNSPECIFIED CHRONICITY: ICD-10-CM

## 2021-02-22 DIAGNOSIS — M47.817 LUMBOSACRAL SPONDYLOSIS WITHOUT MYELOPATHY: ICD-10-CM

## 2021-02-22 DIAGNOSIS — M51.36 DDD (DEGENERATIVE DISC DISEASE), LUMBAR: ICD-10-CM

## 2021-02-22 DIAGNOSIS — M54.50 LOW BACK PAIN, UNSPECIFIED BACK PAIN LATERALITY, UNSPECIFIED CHRONICITY, UNSPECIFIED WHETHER SCIATICA PRESENT: ICD-10-CM

## 2021-02-22 DIAGNOSIS — M17.11 LOCALIZED OSTEOARTHRITIS OF RIGHT KNEE: ICD-10-CM

## 2021-02-22 DIAGNOSIS — M54.42 LOW BACK PAIN WITH BILATERAL SCIATICA, UNSPECIFIED BACK PAIN LATERALITY, UNSPECIFIED CHRONICITY: ICD-10-CM

## 2021-02-22 PROCEDURE — 72132 CT LUMBAR SPINE W/DYE: CPT

## 2021-02-22 PROCEDURE — 74011000250 HC RX REV CODE- 250: Performed by: RADIOLOGY

## 2021-02-22 PROCEDURE — 74011000636 HC RX REV CODE- 636: Performed by: RADIOLOGY

## 2021-02-22 PROCEDURE — 62304 MYELOGRAPHY LUMBAR INJECTION: CPT

## 2021-02-22 RX ORDER — LIDOCAINE HYDROCHLORIDE 10 MG/ML
4 INJECTION, SOLUTION EPIDURAL; INFILTRATION; INTRACAUDAL; PERINEURAL
Status: COMPLETED | OUTPATIENT
Start: 2021-02-22 | End: 2021-02-22

## 2021-02-22 RX ADMIN — LIDOCAINE HYDROCHLORIDE 4 ML: 10 INJECTION, SOLUTION EPIDURAL; INFILTRATION; INTRACAUDAL; PERINEURAL at 10:01

## 2021-02-22 RX ADMIN — IOHEXOL 17 ML: 180 INJECTION INTRAVENOUS at 09:40

## 2021-02-22 NOTE — DISCHARGE INSTRUCTIONS
[facility]  Special Procedures/Radiology Department    Radiologist: Carter Kim    Date: February 222, 2021       Myelogram Discharge Instructions    Restrict your activity for the next 48 hours. You may get up and sit in the chair or get up and go to the bathroom with slow movements. You must keep your head above your chest until 8 a.m. tomorrow. Rest as much as possible tonight and tomorrow. Resume your previous diet and follow the medication reconciliation form. Drink plenty of water. Avoid products with caffeine. You may take Tylenol, as directed on the label, for pain or discomfort. Avoid ibuprofen (Advil, Motrin) and aspirin as they may cause bleeding. Avoid lifting anything heavier than 5 pounds. Avoid excessive bending and lifting as this may increase the chance of having a headache. Be sure to follow up with your physician. Take your CD disk with you. If you have severe pain, or if you have any questions or concerns, please call [phone] and ask to speak to the nurse on-call.

## 2021-02-24 ENCOUNTER — TELEPHONE (OUTPATIENT)
Dept: ENDOCRINOLOGY | Age: 76
End: 2021-02-24

## 2021-02-24 NOTE — TELEPHONE ENCOUNTER
Called pt to discuss labs that I received from Dr. Maida Camarena. No answer LVM. His A1C had increased to 7.4%. Pt to start the Trulicity 8.60HP weekly to help with A1C and help with improving weight loss. His TSH was 12.5 and his FT4 was 1.3. Pt is taking LT4 200mcg daily, we can discuss increasing his dose.

## 2021-06-19 ENCOUNTER — HOSPITAL ENCOUNTER (EMERGENCY)
Age: 76
Discharge: HOME | End: 2021-06-20
Attending: EMERGENCY MEDICINE | Admitting: EMERGENCY MEDICINE
Payer: MEDICARE

## 2021-06-19 ENCOUNTER — APPOINTMENT (OUTPATIENT)
Dept: GENERAL RADIOLOGY | Age: 76
End: 2021-06-19
Attending: STUDENT IN AN ORGANIZED HEALTH CARE EDUCATION/TRAINING PROGRAM
Payer: MEDICARE

## 2021-06-19 VITALS
OXYGEN SATURATION: 98 % | BODY MASS INDEX: 36.3 KG/M2 | HEIGHT: 70 IN | HEART RATE: 68 BPM | WEIGHT: 253.53 LBS | DIASTOLIC BLOOD PRESSURE: 67 MMHG | TEMPERATURE: 98.6 F | RESPIRATION RATE: 16 BRPM | SYSTOLIC BLOOD PRESSURE: 156 MMHG

## 2021-06-19 DIAGNOSIS — S43.401A SPRAIN OF RIGHT SHOULDER, UNSPECIFIED SHOULDER SPRAIN TYPE, INITIAL ENCOUNTER: Primary | ICD-10-CM

## 2021-06-19 DIAGNOSIS — S46.001A INJURY OF RIGHT ROTATOR CUFF, INITIAL ENCOUNTER: ICD-10-CM

## 2021-06-19 PROCEDURE — 73080 X-RAY EXAM OF ELBOW: CPT

## 2021-06-19 PROCEDURE — 73030 X-RAY EXAM OF SHOULDER: CPT

## 2021-06-19 PROCEDURE — 74011250637 HC RX REV CODE- 250/637: Performed by: EMERGENCY MEDICINE

## 2021-06-19 PROCEDURE — 99283 EMERGENCY DEPT VISIT LOW MDM: CPT

## 2021-06-19 RX ORDER — NAPROXEN 250 MG/1
500 TABLET ORAL
Status: COMPLETED | OUTPATIENT
Start: 2021-06-19 | End: 2021-06-19

## 2021-06-19 RX ADMIN — NAPROXEN 500 MG: 250 TABLET ORAL at 23:43

## 2021-06-20 RX ORDER — OXYCODONE AND ACETAMINOPHEN 5; 325 MG/1; MG/1
2 TABLET ORAL
Qty: 15 TABLET | Refills: 0 | Status: SHIPPED | OUTPATIENT
Start: 2021-06-20 | End: 2021-06-23

## 2021-06-20 RX ORDER — DICLOFENAC SODIUM 10 MG/G
2-4 GEL TOPICAL 4 TIMES DAILY
Qty: 1 EACH | Refills: 0 | Status: SHIPPED | OUTPATIENT
Start: 2021-06-20 | End: 2021-06-25

## 2021-06-20 RX ORDER — LIDOCAINE 4 G/100G
1 PATCH TOPICAL EVERY 12 HOURS
Qty: 10 PATCH | Refills: 0 | Status: SHIPPED | OUTPATIENT
Start: 2021-06-20 | End: 2021-06-25

## 2021-06-20 NOTE — DISCHARGE INSTRUCTIONS
It was a pleasure taking care of you in our Emergency Department today. We know that when you come to [facility], you are entrusting us with your health, comfort, and safety. Our physicians and nurses honor that trust, and truly appreciate the opportunity to care for you and your loved ones. We also value your feedback. If you receive a survey about your Emergency Department experience today, please fill it out. We care about our patients' feedback, and we listen to what you have to say. Thank you!

## 2021-06-24 NOTE — ED PROVIDER NOTES
EMERGENCY DEPARTMENT HISTORY AND PHYSICAL EXAM    Please note that this dictation was completed with [employer], the computer voice recognition software. Quite often unanticipated grammatical, syntax, homophones, and other interpretive errors are inadvertently transcribed by the computer software. Please disregard these errors. Please excuse any errors that have escaped final proofreading. Date: 6/19/2021  Patient Name: Leopold Oram  Patient Age and Sex: 68 y.o. male    History of Presenting Illness     Chief Complaint   Patient presents with   Buren Neer Fall     reports fall yesterday, pain in right shouler and elbow.  Arm Injury       History Provided By: Patient    HPI: Leopold Oram, is a 68 y.o. male presents to the ED with right shoulder pain after a non-syncopal fall that occurred yesterday. He tripped and fell backwards hitting his right shoulder on a brick patio. Did not hit head or loose consciousness. No other injuries or pain. Has been resting and icing the shoulder but as pain continued to persist into today and this evening, he wanted to get checked out to make sure nothing is broken. Pain is worse with movement of shoulder or rue. Somewhat better with rest.   It is constant, throbbing, non-radiating, 7/10. Normal sensation in [place]. Normal strength and rom in right wrist and elbow. Pt denies any other alleviating or exacerbating factors. No other associated signs or symptoms. There are no other complaints, changes or physical findings at this time.      PCP: Christopher Peck MD    Past History   All documented elements of the [facility] reviewed and verified by me. -Leoncio Barrera MD    Past Medical History:  Past Medical History:   Diagnosis Date    Abdominal adhesions 2000    Adverse effect of anesthesia     sleep apnea uses cpap machine       Arthritis     all joints; Degenerative disk disease    CAD (coronary artery disease)     stents x4, followed by Dr. Aminata Knox Chronic constipation     Chronic pain     all my joints    CPAP (continuous positive airway pressure) dependence     at night    Diabetic neuropathy (HCC)     Type II    Fibromyalgia     GERD (gastroesophageal reflux disease)     HLD (hyperlipidemia)     Hypertension     Ill-defined condition     fibromyalgia    Neuropathy     hands/feet    S/P insertion of spinal cord stimulator     with remote    Sleep apnea     uses Cpap    Thyroid disease     low       Past Surgical History:  Past Surgical History:   Procedure Laterality Date    COLONOSCOPY N/A 2017    COLONOSCOPY performed by Hiro Martin MD at [address] COLONOSCOPY N/A 1/15/2019    COLONOSCOPY performed by Oswald Bernard MD at [facility] ENDOSCOPY    HX APPENDECTOMY      HX CORONARY STENT PLACEMENT      HX ORTHOPAEDIC Bilateral ,     hip replacement    NH ABDOMEN SURGERY PROC UNLISTED  early 's    abdominal hernia repair    NH ABDOMEN SURGERY PROC UNLISTED      adhesions from appendix sx.  NH ABDOMEN SURGERY PROC UNLISTED      colectomy: when removing adhesions, nicked intestines, removed 8\" of intestine       Family History:  Family History   Problem Relation Age of Onset    Heart Disease Brother     Obesity Brother     Other Mother         hiatal hernia    Arthritis Mother         back, spine    Heart Disease Mother         Angina    Psychiatric Disorder Mother         depression    No Known Problems Father     Diabetes Sister 16        Type 1    Arthritis Sister     Cancer Neg Hx        Social History:  Social History     Tobacco Use    Smoking status: Current Every Day Smoker     Packs/day: 0.25     Years: 40.00     Pack years: 10.00     Last attempt to quit:      Years since quittin.4    Smokeless tobacco: Never Used   Substance Use Topics    Alcohol use:  Yes     Alcohol/week: 6.0 standard drinks     Types: 4 Glasses of wine, 2 Shots of liquor per week     Comment: occ    Drug use: Yes     Types: Prescription, OTC       Allergies:  No Known Allergies    Review of Systems   All other systems reviewed and negative    Review of Systems   Constitutional: Negative for appetite change and fever. HENT: Negative. Eyes: Negative. Respiratory: Negative for cough and shortness of breath. Cardiovascular: Negative for chest pain and palpitations. Gastrointestinal: Negative for abdominal pain, nausea and vomiting. Endocrine: Negative. Genitourinary: Negative for dysuria, flank pain and hematuria. Musculoskeletal: Positive for arthralgias. Negative for back pain and joint swelling. Skin: Negative. Negative for color change, rash and wound. Neurological: Negative for dizziness, weakness, light-headedness, numbness and headaches. Hematological: Negative for adenopathy. Does not bruise/bleed easily. All other systems reviewed and are negative. Physical Exam   Reviewed patients vital signs and nursing note    Physical Exam  Vitals and nursing note reviewed. Constitutional:       Appearance: He is not ill-appearing. HENT:      Head: Atraumatic. Mouth/Throat:      Mouth: Mucous membranes are moist.   Eyes:      General: No scleral icterus. Extraocular Movements: Extraocular movements intact. Conjunctiva/sclera: Conjunctivae normal.      Pupils: Pupils are equal, round, and reactive to light. Cardiovascular:      Rate and Rhythm: Normal rate and regular rhythm. Pulses: Normal pulses. Heart sounds: Normal heart sounds. Pulmonary:      Effort: Pulmonary effort is normal.      Breath sounds: Normal breath sounds. Abdominal:      Palpations: Abdomen is soft. Tenderness: There is no abdominal tenderness. Musculoskeletal:      Right shoulder: Tenderness present. No swelling, deformity or effusion. Decreased range of motion. Normal strength. Normal pulse.       Left shoulder: Normal.      Right upper arm: Normal.      Left upper arm: Normal.      Right elbow: Normal.      Left elbow: Normal.      Cervical back: Normal range of motion and neck supple. No tenderness. Skin:     General: Skin is warm and dry. Capillary Refill: Capillary refill takes less than 2 seconds. Neurological:      General: No focal deficit present. Mental Status: He is alert and oriented to person, place, and time. Sensory: Sensation is intact. Motor: Motor function is intact. No weakness. Psychiatric:         Mood and Affect: Mood normal.         Behavior: Behavior normal.         Diagnostic Study Results     Labs - I have personally reviewed and interpreted all laboratory results. Ghazal Zuniga MD, MSc  No results found for this or any previous visit (from the past 24 hour(s)). Radiologic Studies - I have personally reviewed and interpreted all imaging studies and agree with radiology interpretation and report. - Ghazal Zuniga MD, MSc  XR SHOULDER RT AP/LAT MIN 2 V   Final Result   Degenerative changes and evidence of chronic rotator cuff tear. No   acute abnormality is identified. XR ELBOW RT MIN 3 V   Final Result   No acute abnormality. Medical Decision Making   I am the first provider for this patient. Records Reviewed: I reviewed our electronic medical record system for any past medical records that were available that may contribute to the patient's current condition, including their PMH, surgical history, social and family history. Reviewed the nursing notes and vital signs from today's visit. Nursing notes will be reviewed as they become available in realtime while the pt has been in the ED. In addition, I read most recent discharge summaries, if available and reviewed prior ECGs or imaging studies for comparison purposes. Ghazal Zuniga MD Msc    Vital Signs-Reviewed the patient's vital signs. Provider Notes (Medical Decision Making):   Patient presents with pain and decreased rom in right shoulder brought on by a fall.  No deformity or ecchymosis noted. Full rom but movement worsens the pain. Neurovascular intact. Ddx: fx, dislocation, sprain  Plan: xrays of shoulder and elbow obtained showing no acute injuries. - provided patient with sling for comfort but stressed need for early mobilization of shoulder  - analgesia  - rom exercises  - prompt follow up with pmd or ortho        ED Course:   Initial assessment performed. The patients presenting problems have been discussed, and they are in agreement with the care plan formulated and outlined with them. I have encouraged them to ask questions as they arise throughout their visit. Progress note:  Patient has been reassessed and reports feeling considerably better, has normal vital signs and feels comfortable going home. I think this is reasonable as no findings today suggest a life-threatening condition. DISPOSITION: DISCHARGE  The patient's results have been reviewed with patient and available family and/or caregiver. They verbally convey their understanding and agreement of the patient's signs, symptoms, diagnosis, treatment and prognosis and additionally agree to follow up as recommended in the discharge instructions or to return to the Emergency Department should the patient's condition change prior to their follow-up appointment. The patient and available family and/or caregiver verbally agree with the care plan and all of their questions have been answered. The discharge instructions have also been provided to the them with educational information regarding the patient's diagnosis as well a list of reasons why the patient would want to return to the ER prior to their follow-up appointment should any concerns arise, the patient's condition change or symptoms worsen.     Luciana Jackman MD, Msc    PLAN:  Discharge Medication List as of 6/20/2021 12:11 AM      START taking these medications    Details   lidocaine 4 % patch 1 Patch by TransDERmal route every twelve (12) hours every twelve (12) hours for 5 days. , Normal, Disp-10 Patch, R-0      oxyCODONE-acetaminophen (Percocet) 5-325 mg per tablet Take 2 Tablets by mouth every six (6) hours as needed for Pain for up to 3 days. Max Daily Amount: 8 Tablets., Normal, Disp-15 Tablet, R-0         CONTINUE these medications which have CHANGED    Details   diclofenac (VOLTAREN) 1 % gel Apply 2-4 g to affected area four (4) times daily for 5 days. , Normal, Disp-1 Each, R-0         CONTINUE these medications which have NOT CHANGED    Details   cyclobenzaprine (FLEXERIL) 10 mg tablet TAKE 1 TABLET BY MOUTH THREE TIMES DAILY AS NEEDED FOR MUSCLE SPASMS, Historical Med      meloxicam (MOBIC) 15 mg tablet Historical Med      dulaglutide (TRULICITY) 7.89 PZ/7.1 mL sub-q pen 0.5 mL by SubCUTAneous route every seven (7) days. , Normal, Disp-4 Pen, R-5      aspirin delayed-release 81 mg tablet Take 1 Tab by mouth two (2) times a day., Normal, Disp-60 Tab,R-0      cefadroxil (DURICEF) 500 mg capsule Take 1 Cap by mouth two (2) times a day., Normal, Disp-14 Cap,R-0      pregabalin (LYRICA) 75 mg capsule Take 75 mg by mouth two (2) times a day. Indications: diabetic complication causing injury to some body nerves, Historical Med      Omeprazole delayed release (PRILOSEC D/R) 20 mg tablet Take 20 mg by mouth every other day., Historical Med      nitroglycerin (NITROSTAT) 0.4 mg SL tablet 1 Tab by SubLINGual route every five (5) minutes as needed for Chest Pain. Up to 3 doses. , Print, Disp-20 Tab, R-0      losartan (COZAAR) 25 mg tablet Take 25 mg by mouth daily. , Historical Med      primidone (MYSOLINE) 50 mg tablet Take 50 mg by mouth every evening., Historical Med      metFORMIN (GLUCOPHAGE) 1,000 mg tablet Take 1 Tab by mouth two (2) times daily (with meals).  START TAKING 9/23 WITH DINNER, No Print, Disp-30 Tab, R-0      atorvastatin (LIPITOR) 80 mg tablet Take 80 mg by mouth nightly., Historical Med      hydroCHLOROthiazide (HYDRODIURIL) 25 mg tablet Take 25 mg by mouth daily. , Historical Med      DULoxetine (CYMBALTA) 60 mg capsule Take 60 mg by mouth two (2) times a day. 1 tab BID, Historical Med      levothyroxine (SYNTHROID) 200 mcg tablet Take 200 mcg by mouth Daily (before breakfast). , Historical Med      cholecalciferol, vitamin D3, (VITAMIN D3) 2,000 unit tab Take 2,000 Units by mouth two (2) times a day., Historical Med         1.   2.     Follow-up Information     Follow up With Specialties Details Why Contact Info    Dana Inman MD Family Medicine Schedule an appointment as soon as possible for a visit in 3 days  [address] [place] [address]  [phone]      [facility] EMERGENCY DEPT Emergency Medicine  As needed, If symptoms worsen [address] [place] [address] Drive  [address] [facility]  [phone]        3. Return to ED if worse       I, Annie Kemp MD, am the attending of record for this patient encounter. Diagnosis     Clinical Impression:   1. Sprain of right shoulder, unspecified shoulder sprain type, initial encounter    2. Injury of right rotator cuff, initial encounter        Attestation:  I personally performed the services described in this documentation on this date 6/19/2021 for patient Cherylene Burke.   Diane Hernández MD

## 2021-08-27 ENCOUNTER — TRANSCRIBE ORDER (OUTPATIENT)
Dept: SCHEDULING | Age: 76
End: 2021-08-27

## 2021-08-27 DIAGNOSIS — Z96.651 STATUS POST RIGHT PARTIAL KNEE REPLACEMENT: Primary | ICD-10-CM

## 2021-08-31 ENCOUNTER — HOSPITAL ENCOUNTER (OUTPATIENT)
Dept: NUCLEAR MEDICINE | Age: 76
Discharge: HOME | End: 2021-08-31
Attending: ORTHOPAEDIC SURGERY
Payer: MEDICARE

## 2021-08-31 DIAGNOSIS — Z96.651 STATUS POST RIGHT PARTIAL KNEE REPLACEMENT: ICD-10-CM

## 2021-08-31 PROCEDURE — 78300 BONE IMAGING LIMITED AREA: CPT

## 2022-01-04 ENCOUNTER — HOSPITAL ENCOUNTER (EMERGENCY)
Age: 77
Discharge: HOME | End: 2022-01-04
Attending: EMERGENCY MEDICINE
Payer: MEDICARE

## 2022-01-04 VITALS
BODY MASS INDEX: 36.71 KG/M2 | WEIGHT: 256.39 LBS | DIASTOLIC BLOOD PRESSURE: 76 MMHG | TEMPERATURE: 98.9 F | HEIGHT: 70 IN | SYSTOLIC BLOOD PRESSURE: 162 MMHG | OXYGEN SATURATION: 98 % | RESPIRATION RATE: 22 BRPM | HEART RATE: 68 BPM

## 2022-01-04 DIAGNOSIS — Z20.822 PERSON UNDER INVESTIGATION FOR COVID-19: Primary | ICD-10-CM

## 2022-01-04 DIAGNOSIS — J06.9 ACUTE UPPER RESPIRATORY INFECTION: ICD-10-CM

## 2022-01-04 PROCEDURE — U0005 INFEC AGEN DETEC AMPLI PROBE: HCPCS

## 2022-01-04 PROCEDURE — 99281 EMR DPT VST MAYX REQ PHY/QHP: CPT

## 2022-01-05 ENCOUNTER — PATIENT OUTREACH (OUTPATIENT)
Dept: CASE MANAGEMENT | Age: 77
End: 2022-01-05

## 2022-01-05 LAB
SARS-COV-2, XPLCVT: DETECTED
SOURCE, COVRS: ABNORMAL

## 2022-01-05 NOTE — PROGRESS NOTES
RE COVID: Patient returned call. Verified demographics. Informed of positive result and questions answered.

## 2022-01-05 NOTE — PROGRESS NOTES
Patient contacted regarding COVID-19 risk. Discussed COVID-19 related testing which was pending at this time. Test results were pending. Patient informed of results, if available? Results pending. Ambulatory Care Manager contacted the patient by telephone to perform post discharge assessment. Call within 2 business days of discharge: Yes Verified name and  with patient as identifiers. Provided introduction to self, and explanation of the CTN/ACM role, and reason for call due to risk factors for infection and/or exposure to COVID-19. Symptoms reviewed with patient who verbalized the following symptoms: fever, cough and congestion      Due to no new or worsening symptoms encounter was not routed to provider for escalation. Discussed follow-up appointments. If no appointment was previously scheduled, appointment scheduling offered:  no. [facility] follow up appointment(s): No future appointments. Non-[facility] follow up appointment(s): no    Interventions to address risk factors: Obtained and reviewed discharge summary and/or continuity of care documents     Advance Care Planning:   Does patient have an Advance Directive: not on file. Educated patient about risk for severe COVID-19 due to risk factors according to CDC guidelines. ACM reviewed discharge instructions, medical action plan and red flag symptoms with the patient who verbalized understanding. Discussed COVID vaccination status: no. Education provided on COVID-19 vaccination as appropriate. Discussed exposure protocols and quarantine with CDC Guidelines. Patient was given an opportunity to verbalize any questions and concerns and agrees to contact ACM or health care provider for questions related to their healthcare. Reviewed and educated patient on any new and changed medications related to discharge diagnosis     Was patient discharged with a pulse oximeter? no   Pt states he is not having a fever and his cough is less.  He reports he does have a runny nose. Reinforced importance of drinking plenty of fluids and discussed taking tylenol for aches. Also, discussed how to access My Chart. Pt voices no further questions or concerns. ACM provided contact information. No further follow-up call identified based on severity of symptoms and risk factors.

## 2022-01-05 NOTE — ED PROVIDER NOTES
EMERGENCY DEPARTMENT HISTORY AND PHYSICAL EXAM     ------------------------------------------------------------------------------------------------------  Please note that this dictation was completed with [employer], the [employer] voice recognition software. Quite often unanticipated grammatical, syntax, homophones, and other interpretive errors are inadvertently transcribed by the computer software. Please disregard these errors. Please excuse any errors that have escaped final proofreading.  -----------------------------------------------------------------------------------------------------------------    Date: 1/4/2022  Patient Name: Shaw Matt    History of Presenting Illness     Chief Complaint   Patient presents with    Fever     Low grade fever    Nasal Congestion     Nasal congestion and a cough for a couple of days.  Cough       History Provided By: Patient    HPI: Shaw Matt is a 68 y.o. male, with significant pmhx of HTN GERD, CAD, DM, who presents via private vehicle to the ED with concern for COVID -19. Notes having cold like sx for 3 days and wants Covid test. Pt also specifically denies any recent chills, CP, SOB, nausea, vomiting, diarrhea, abd pain, changes in BM, urinary sxs, or headache. PCP: Kourtney Thomas MD    Social Hx: denies tobacco, denies EtOH, denies recreational/ Illicit Drugs     There are no other complaints, changes, or physical findings at this time. No Known Allergies      Current Outpatient Medications   Medication Sig Dispense Refill    cyclobenzaprine (FLEXERIL) 10 mg tablet TAKE 1 TABLET BY MOUTH THREE TIMES DAILY AS NEEDED FOR MUSCLE SPASMS      meloxicam (MOBIC) 15 mg tablet       dulaglutide (TRULICITY) 6.69 WZ/3.4 mL sub-q pen 0.5 mL by SubCUTAneous route every seven (7) days. 4 Pen 5    aspirin delayed-release 81 mg tablet Take 1 Tab by mouth two (2) times a day.  (Patient taking differently: Take 81 mg by mouth daily.) 60 Tab 0    cefadroxil (DURICEF) 500 mg capsule Take 1 Cap by mouth two (2) times a day. 14 Cap 0    pregabalin (LYRICA) 75 mg capsule Take 75 mg by mouth two (2) times a day. Indications: diabetic complication causing injury to some body nerves      Omeprazole delayed release (PRILOSEC D/R) 20 mg tablet Take 20 mg by mouth every other day.  nitroglycerin (NITROSTAT) 0.4 mg SL tablet 1 Tab by SubLINGual route every five (5) minutes as needed for Chest Pain. Up to 3 doses. 20 Tab 0    losartan (COZAAR) 25 mg tablet Take 25 mg by mouth daily.  primidone (MYSOLINE) 50 mg tablet Take 50 mg by mouth every evening.  metFORMIN (GLUCOPHAGE) 1,000 mg tablet Take 1 Tab by mouth two (2) times daily (with meals). START TAKING 9/23 WITH DINNER (Patient taking differently: Take 1,000 mg by mouth two (2) times daily (with meals). Breakfast and Lunch) 30 Tab 0    atorvastatin (LIPITOR) 80 mg tablet Take 80 mg by mouth nightly.  hydroCHLOROthiazide (HYDRODIURIL) 25 mg tablet Take 25 mg by mouth daily.  DULoxetine (CYMBALTA) 60 mg capsule Take 60 mg by mouth two (2) times a day. 1 tab BID      levothyroxine (SYNTHROID) 200 mcg tablet Take 200 mcg by mouth Daily (before breakfast).  cholecalciferol, vitamin D3, (VITAMIN D3) 2,000 unit tab Take 2,000 Units by mouth two (2) times a day.          Past History     Past Medical History:  Past Medical History:   Diagnosis Date    Abdominal adhesions 2000    Adverse effect of anesthesia     sleep apnea uses cpap machine       Arthritis     all joints; Degenerative disk disease    CAD (coronary artery disease)     stents x4, followed by Dr. Kori Wick Chronic constipation     Chronic pain     all my joints    CPAP (continuous positive airway pressure) dependence     at night    Diabetic neuropathy (HCC)     Type II    Fibromyalgia     GERD (gastroesophageal reflux disease)     HLD (hyperlipidemia)     Hypertension     Ill-defined condition     fibromyalgia    Neuropathy     hands/feet    S/P insertion of spinal cord stimulator     with remote    Sleep apnea     uses Cpap    Thyroid disease     low       Past Surgical History:  Past Surgical History:   Procedure Laterality Date    COLONOSCOPY N/A 2017    COLONOSCOPY performed by Jose Castellanos MD at [place] [address] COLONOSCOPY N/A 1/15/2019    COLON[facility] Opal Sam MD at [facility] ENDOSCOPY    HX [address]      HX ORTHOPAEDIC Bilateral ,     hip replacement    LA ABDOMEN SURGERY PROC UNLISTED  early 's    abdominal hernia repair    LA ABDOMEN SURGERY PROC UNLISTED      adhesions from appendix sx.  LA ABDOMEN SURGERY PROC UNLISTED  's    colectomy: when removing adhesions, nicked intestines, removed 8\" of intestine       Family History:  Family History   Problem Relation Age of Onset    Heart Disease Brother     Obesity Brother     Other Mother         hiatal hernia    Arthritis Mother         back, spine    Heart Disease Mother         Angina    Psychiatric Disorder Mother         depression    No Known Problems Father     Diabetes Sister 16        Type 1    Arthritis Sister     Cancer Neg Hx        Social History:  Social History     Tobacco Use    Smoking status: Current Every Day Smoker     Packs/day: 0.25     Years: 40.00     Pack years: 10.00     Last attempt to quit:      Years since quittin.0    Smokeless tobacco: Never Used   Substance Use Topics    Alcohol use: Yes     Alcohol/week: 6.0 standard drinks     Types: 4 Glasses of wine, 2 Shots of liquor per week     Comment: occ    Drug use: Yes     Types: Prescription, OTC       Allergies:  No Known Allergies      Review of Systems   Review of Systems   Constitutional: Positive for fatigue and fever (SUBJECTIVE). Negative for chills. HENT: Positive for congestion, rhinorrhea and sore throat. Eyes: Negative.     Respiratory: Negative for cough, chest tightness and shortness of breath. Cardiovascular: Negative for chest pain and leg swelling. Gastrointestinal: Negative for abdominal pain, diarrhea, nausea and vomiting. Endocrine: Negative. Genitourinary: Negative for difficulty urinating and dysuria. Musculoskeletal: Negative for myalgias. Skin: Negative. Neurological: Negative. Psychiatric/Behavioral: Negative. All other systems reviewed and are negative. Physical Exam   Physical Exam  Vitals and nursing note reviewed. HENT:      Head: Normocephalic and atraumatic. Nose: No nasal deformity. Mouth/Throat:      Lips: No lesions. Eyes:      Conjunctiva/sclera: Conjunctivae normal.   Cardiovascular:      Rate and Rhythm: Normal rate. Pulmonary:      Effort: Pulmonary effort is normal.   Musculoskeletal:         General: Normal range of motion. Cervical back: Normal range of motion. No pain with movement. Right lower leg: No edema. Left lower leg: No edema. Skin:     General: Skin is dry. Findings: No rash. Neurological:      General: No focal deficit present. Mental Status: He is alert. Psychiatric:         Mood and Affect: Mood normal.           Diagnostic Study Results     Labs -   No results found for this or any previous visit (from the past 12 hour(s)). Radiologic Studies -   No orders to display     CT Results  (Last 48 hours)    None        CXR Results  (Last 48 hours)    None            Medical Decision Making   I am the first provider for this patient. I reviewed the vital signs, available nursing notes, past medical history, past surgical history, family history and social history. Vital Signs-Reviewed the patient's vital signs.   Patient Vitals for the past 12 hrs:   Temp Pulse Resp BP SpO2   01/04/22 1651 98.9 °F (37.2 °C) 68 22 (!) 162/76 98 %       Pulse Oximetry Analysis - 98% on RA Normal    Records Reviewed/Interpretted: Nursing Notes from triage and Old Medical Records,     Provider Notes (Medical Decision Making):     DDX:  Elena Rivera    Plan:  COVID swab     Impression:  PUI for covid    ED Course:   Initial assessment performed. The patients presenting problems have been discussed, and they are in agreement with the care plan formulated and outlined with them. I have encouraged them to ask questions as they arise throughout their visit. I reviewed our electronic medical record system for any past medical records that were available that may contribute to the patients current condition, the nursing notes and and vital signs from today's visit  Nursing notes will be reviewed as they become available in realtime while the pt has been in the ED. Deanna Novak MD    HYPERTENSION COUNSELING:  Patient made aware of their elevated blood pressure and is instructed to follow up this week with their Primary Care or [address] [place] [address] for a recheck (should they be discharged.) Patient is counseled regarding consequences of chronic, uncontrolled hypertension including kidney disease, heart disease, stroke or even death. Patient states their understanding    7:52 P  Progress note:  Pt noted to be ready for discharge, Pt will follow up with PCP/this ED as instructed. All questions have been answered, pt voiced understanding and agreement with plan. Specific return precautions provided in addition to instructions for pt to return to the ED immediately should sx worsen at any time. Deanna Novak MD             Critical Care Time:     none      Diagnosis     Clinical Impression:   1. Person under investigation for COVID-19    2. Acute upper respiratory infection        PLAN:  1. Discharge Medication List as of 1/4/2022  7:47 PM        2.    Follow-up Information     Follow up With Specialties Details Why Contact Info    Emi Richardson MD Family Medicine Schedule an appointment as soon as possible for a visit in 2 days  [address] [facility] [address]  [phone]          Return to ED if worse     Disposition:    7:52 PM    The patient's results have been reviewed with family and/or caregiver. They verbally convey their understanding and agreement of the patient's signs, symptoms, diagnosis, treatment and prognosis and additionally agree to follow up as recommended in the discharge instructions or to return to the Emergency Room should the patient's condition change prior to their follow-up appointment. The family and/or caregiver verbally agrees with the care-plan and all of their questions have been answered. The discharge instructions have also been provided to the them with educational information regarding the patient's diagnosis as well a list of reasons why the patient would want to return to the ER prior to their follow-up appointment should their condition change.   Moisés Ruelas MD

## 2022-01-05 NOTE — PROGRESS NOTES
RE COVID: Patient needs to be informed of the positive result. Left HIPPA complaint VM for return call to discuss results.

## 2022-01-05 NOTE — DISCHARGE INSTRUCTIONS
WE ARE 6-7 DAYS BEHIND IN CALLING PATIENTS BACK FOR POSITIVE RESULTS OF COVID. YOU MUST CHECK MYCHART FOR YOUR RESULTS IF YOU WOULD LIKE TO KNOW SOONER. It was a pleasure taking care of you in our Emergency Department today. We know that when you come to [facility], you are entrusting us with your health, comfort, and safety. Our physicians and nurses honor that trust, and truly appreciate the opportunity to care for you and your loved ones. We also value your feedback. If you receive a survey about your Emergency Department experience today, please fill it out. We care about our patients' feedback, and we listen to what you have to say. Thank you!       Dr. Trenton Reece MD.

## 2022-01-21 ENCOUNTER — TRANSCRIBE ORDER (OUTPATIENT)
Dept: SCHEDULING | Age: 77
End: 2022-01-21

## 2022-01-21 DIAGNOSIS — M54.50 LUMBAGO: Primary | ICD-10-CM

## 2022-01-21 DIAGNOSIS — M51.36 DEGENERATION OF LUMBAR INTERVERTEBRAL DISC: ICD-10-CM

## 2022-01-21 DIAGNOSIS — M17.11 OSTEOARTHRITIS OF RIGHT KNEE: ICD-10-CM

## 2022-01-21 DIAGNOSIS — M48.062 PSEUDOCLAUDICATION SYNDROME: ICD-10-CM

## 2022-01-21 DIAGNOSIS — M54.6 PAIN IN THORACIC SPINE: ICD-10-CM

## 2022-01-21 DIAGNOSIS — M47.817 LUMBOSACRAL SPONDYLOSIS WITHOUT MYELOPATHY: ICD-10-CM
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
## 2022-01-21 DIAGNOSIS — M48.10 ANKYLOSING VERTEBRAL HYPEROSTOSIS: ICD-10-CM

## 2022-01-21 DIAGNOSIS — M79.7 FIBROMYALGIA: ICD-10-CM

## 2022-01-30 ENCOUNTER — HOSPITAL ENCOUNTER (OUTPATIENT)
Dept: MRI IMAGING | Age: 77
Discharge: HOME | End: 2022-01-30
Attending: ORTHOPAEDIC SURGERY
Payer: MEDICARE

## 2022-01-30 DIAGNOSIS — M51.36 DEGENERATION OF LUMBAR INTERVERTEBRAL DISC: ICD-10-CM

## 2022-01-30 DIAGNOSIS — M17.11 OSTEOARTHRITIS OF RIGHT KNEE: ICD-10-CM

## 2022-01-30 DIAGNOSIS — M54.50 LUMBAGO: ICD-10-CM

## 2022-01-30 DIAGNOSIS — M79.7 FIBROMYALGIA: ICD-10-CM
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
## 2022-01-30 DIAGNOSIS — M54.6 PAIN IN THORACIC SPINE: ICD-10-CM

## 2022-01-30 DIAGNOSIS — M48.10 ANKYLOSING VERTEBRAL HYPEROSTOSIS: ICD-10-CM

## 2022-01-30 DIAGNOSIS — M48.062 PSEUDOCLAUDICATION SYNDROME: ICD-10-CM

## 2022-01-30 DIAGNOSIS — M47.817 LUMBOSACRAL SPONDYLOSIS WITHOUT MYELOPATHY: ICD-10-CM

## 2022-01-30 PROCEDURE — 72146 MRI CHEST SPINE W/O DYE: CPT

## 2022-01-30 PROCEDURE — 72148 MRI LUMBAR SPINE W/O DYE: CPT

## 2022-03-18 PROBLEM — E87.20 LACTIC ACIDOSIS: Status: ACTIVE | Noted: 2017-09-21

## 2022-03-19 PROBLEM — E29.1 PRIMARY HYPOGONADISM IN MALE: Status: ACTIVE | Noted: 2019-02-13

## 2022-03-19 PROBLEM — J18.9 HCAP (HEALTHCARE-ASSOCIATED PNEUMONIA): Status: ACTIVE | Noted: 2019-01-15

## 2022-03-19 PROBLEM — E66.01 SEVERE OBESITY (HCC): Status: ACTIVE | Noted: 2019-11-06

## 2022-04-05 ENCOUNTER — APPOINTMENT (OUTPATIENT)
Dept: GENERAL RADIOLOGY | Age: 77
End: 2022-04-05
Attending: EMERGENCY MEDICINE
Payer: MEDICARE

## 2022-04-05 ENCOUNTER — HOSPITAL ENCOUNTER (EMERGENCY)
Age: 77
Discharge: HOME | End: 2022-04-06
Attending: EMERGENCY MEDICINE
Payer: MEDICARE

## 2022-04-05 ENCOUNTER — APPOINTMENT (OUTPATIENT)
Dept: CT IMAGING | Age: 77
End: 2022-04-05
Attending: EMERGENCY MEDICINE
Payer: MEDICARE

## 2022-04-05 DIAGNOSIS — S09.90XA CLOSED HEAD INJURY, INITIAL ENCOUNTER: Primary | ICD-10-CM

## 2022-04-05 DIAGNOSIS — S00.81XA ABRASION OF FOREHEAD, INITIAL ENCOUNTER: ICD-10-CM

## 2022-04-05 PROCEDURE — 74011250637 HC RX REV CODE- 250/637: Performed by: EMERGENCY MEDICINE

## 2022-04-05 PROCEDURE — 90471 IMMUNIZATION ADMIN: CPT

## 2022-04-05 PROCEDURE — 72125 CT NECK SPINE W/O DYE: CPT

## 2022-04-05 PROCEDURE — 99284 EMERGENCY DEPT VISIT MOD MDM: CPT

## 2022-04-05 PROCEDURE — 74011250636 HC RX REV CODE- 250/636: Performed by: EMERGENCY MEDICINE

## 2022-04-05 PROCEDURE — 70450 CT HEAD/BRAIN W/O DYE: CPT

## 2022-04-05 PROCEDURE — 90715 TDAP VACCINE 7 YRS/> IM: CPT | Performed by: EMERGENCY MEDICINE

## 2022-04-05 RX ORDER — ONDANSETRON 4 MG/1
4 TABLET, ORALLY DISINTEGRATING ORAL
Status: COMPLETED | OUTPATIENT
Start: 2022-04-05 | End: 2022-04-05

## 2022-04-05 RX ADMIN — TETANUS TOXOID, REDUCED DIPHTHERIA TOXOID AND ACELLULAR PERTUSSIS VACCINE, ADSORBED 0.5 ML: 5; 2.5; 8; 8; 2.5 SUSPENSION INTRAMUSCULAR at 23:39

## 2022-04-05 RX ADMIN — ONDANSETRON 4 MG: 4 TABLET, ORALLY DISINTEGRATING ORAL at 22:01

## 2022-04-06 VITALS
OXYGEN SATURATION: 97 % | WEIGHT: 256 LBS | DIASTOLIC BLOOD PRESSURE: 72 MMHG | RESPIRATION RATE: 20 BRPM | BODY MASS INDEX: 36.65 KG/M2 | HEART RATE: 84 BPM | TEMPERATURE: 98.7 F | SYSTOLIC BLOOD PRESSURE: 124 MMHG | HEIGHT: 70 IN

## 2022-04-06 NOTE — ED PROVIDER NOTES
EMERGENCY DEPARTMENT HISTORY AND PHYSICAL EXAM      Date: 4/5/2022  Patient Name: Daiana Hunter    History of Presenting Illness     Chief Complaint   Patient presents with    Fall       History Provided By: Patient    HPI: Daiana Hunter, 68 y.o. male with PMHx as noted below presents the emergency department for evaluation after a fall. Patient states that just prior to arrival he lost his balance falling to the ground hitting his head on concrete. Since then reports a dull, moderate aching pain throughout his head that is nonradiating. He has associated nausea. Patient did drink some alcohol earlier this evening. There is no loss of consciousness or prodromal symptoms. Other than mild headache and nausea patient feels at his baseline currently. Pt denies any other alleviating or exacerbating factors. Additionally, pt specifically denies any recent fever, chills, vomiting, abdominal pain, CP, SOB, lightheadedness, dizziness, numbness, weakness, BLE swelling, heart palpitations, urinary sxs, diarrhea, constipation, melena, hematochezia, cough, or congestion. PCP: Hollis Morales MD    Current Outpatient Medications   Medication Sig Dispense Refill    cyclobenzaprine (FLEXERIL) 10 mg tablet TAKE 1 TABLET BY MOUTH THREE TIMES DAILY AS NEEDED FOR MUSCLE SPASMS      meloxicam (MOBIC) 15 mg tablet       dulaglutide (TRULICITY) 1.03 TJ/3.2 mL sub-q pen 0.5 mL by SubCUTAneous route every seven (7) days. 4 Pen 5    aspirin delayed-release 81 mg tablet Take 1 Tab by mouth two (2) times a day. (Patient taking differently: Take 81 mg by mouth daily.) 60 Tab 0    cefadroxil (DURICEF) 500 mg capsule Take 1 Cap by mouth two (2) times a day. 14 Cap 0    pregabalin (LYRICA) 75 mg capsule Take 75 mg by mouth two (2) times a day. Indications: diabetic complication causing injury to some body nerves      Omeprazole delayed release (PRILOSEC D/R) 20 mg tablet Take 20 mg by mouth every other day.       nitroglycerin (NITROSTAT) 0.4 mg SL tablet 1 Tab by SubLINGual route every five (5) minutes as needed for Chest Pain. Up to 3 doses. 20 Tab 0    losartan (COZAAR) 25 mg tablet Take 25 mg by mouth daily.  primidone (MYSOLINE) 50 mg tablet Take 50 mg by mouth every evening.  metFORMIN (GLUCOPHAGE) 1,000 mg tablet Take 1 Tab by mouth two (2) times daily (with meals). START TAKING 9/23 WITH DINNER (Patient taking differently: Take 1,000 mg by mouth two (2) times daily (with meals). Breakfast and Lunch) 30 Tab 0    atorvastatin (LIPITOR) 80 mg tablet Take 80 mg by mouth nightly.  hydroCHLOROthiazide (HYDRODIURIL) 25 mg tablet Take 25 mg by mouth daily.  DULoxetine (CYMBALTA) 60 mg capsule Take 60 mg by mouth two (2) times a day. 1 tab BID      levothyroxine (SYNTHROID) 200 mcg tablet Take 200 mcg by mouth Daily (before breakfast).  cholecalciferol, vitamin D3, (VITAMIN D3) 2,000 unit tab Take 2,000 Units by mouth two (2) times a day.          Past History     Past Medical History:  Past Medical History:   Diagnosis Date    Abdominal adhesions 2000    Adverse effect of anesthesia     sleep apnea uses cpap machine       Arthritis     all joints; Degenerative disk disease    CAD (coronary artery disease)     stents x4, followed by Dr. Juan Jacob Chronic constipation     Chronic pain     all my joints    CPAP (continuous positive airway pressure) dependence     at night    Diabetic neuropathy (HCC)     Type II    Fibromyalgia     GERD (gastroesophageal reflux disease)     HLD (hyperlipidemia)     Hypertension     Ill-defined condition     fibromyalgia    Neuropathy     hands/feet    S/P insertion of spinal cord stimulator     with remote    Sleep apnea     uses Cpap    Thyroid disease     low       Past Surgical History:  Past Surgical History:   Procedure Laterality Date    COLONOSCOPY N/A 12/18/2017    COLON[facility] performed [facility] at [facility] AM[facility]ORY [facility]    COLONOSCOPY N/A 1/15/2019    COLONOSCOPY performed by Neftali [facility] at [facility] ENDOSCOPY    HX APPENDECTOMY      HX CORONARY STENT PLACEMENT      HX ORTHOPAEDIC Bilateral 2007    hip replacement    SD ABDOMEN SURGERY PROC UNLISTED  early     abdominal hernia repair    SD ABDOMEN SURGERY PROC UNLISTED      adhesions from appendix sx.  SD ABDOMEN SURGERY PROC UNLISTED      colectomy: when removing adhesions, nicked intestines, removed 8\" of intestine       Family History:  Family History   Problem Relation Age of Onset    Heart Disease Brother     Obesity Brother     Other Mother         hiatal hernia    Arthritis Mother         back, spine    Heart Disease Mother         Angina    Psychiatric Disorder Mother         depression    No Known Problems Father     Diabetes Sister 16        Type 1    Arthritis Sister     Cancer Neg Hx        Social History:  Social History     Tobacco Use    Smoking status: Current Every Day Smoker     Packs/day: 0.25     Years: 40.00     Pack years: 10.00     Last attempt to quit:      Years since quittin.2    Smokeless tobacco: Never Used   Substance Use Topics    Alcohol use: Yes     Alcohol/week: 6.0 standard drinks     Types: 4 Glasses of wine, 2 Shots of liquor per week     Comment: occ    Drug use: Yes     Types: Prescription, OTC       Allergies:  No Known Allergies      Review of Systems   Review of Systems  Constitutional: Negative for fever, chills, and fatigue. HENT: Negative for congestion, sore throat, rhinorrhea, sneezing and neck stiffness   Eyes: Negative for discharge and redness. Respiratory: Negative for  shortness of breath, wheezing   Cardiovascular: Negative for chest pain, palpitations   Gastrointestinal: Negative for nausea, vomiting, abdominal pain, constipation, diarrhea and blood in stool.    Genitourinary: Negative for dysuria, hematuria, flank pain, decreased urine volume, discharge,   Musculoskeletal: Negative for myalgias or joint pain . Skin: Negative for rash or lesions . Neurological: Negative weakness, light-headedness, numbness. Positive headaches. Physical Exam   Physical Exam    GENERAL: alert and oriented, no acute distress  EYES: PEERL, No injection, discharge or icterus. HENT: Mucous membranes pink and moist.  Several facial abrasions noted  NECK: Supple, no midline tenderness  LUNGS: Airway patent. Non-labored respirations. Breath sounds clear with good air entry bilaterally. HEART: Regular rate and rhythm. No peripheral edema  ABDOMEN: Non-distended and non-tender, without guarding or rebound. SKIN:  warm, dry  MSK/EXTREMITIES: There is some mild swelling and tenderness the patient's left wrist.  Patient does have full range of motion, palpable distal pulses, small superimposed abrasion noted  NEUROLOGICAL: Alert, oriented      Diagnostic Study Results     Labs -   No results found for this or any previous visit (from the past 12 hour(s)). Radiologic Studies -   CT HEAD WO CONT   Final Result   1. No evidence of acute intracranial abnormality by this modality. 2. Bruising in the left forehead. CT SPINE CERV WO CONT   Final Result   1. No visualized fracture or subluxation. XR WRIST LT AP/LAT/OBL MIN 3V    (Results Pending)     CT Results  (Last 48 hours)               04/05/22 2230  CT HEAD WO CONT Final result    Impression:  1. No evidence of acute intracranial abnormality by this modality. 2. Bruising in the left forehead. Narrative:  EXAM:  CT HEAD WO CONT   INDICATION:   None provided   Additional history: Fall. Ground-level fall with abrasion and hematoma above   left eye. Abrasion on left knee and wrist.   COMPARISON: None. .   TECHNIQUE:    Unenhanced CT of the head was performed using 5 mm images. Coronal and sagittal   reformats were produced. Brain and bone windows were generated.     CT dose reduction was achieved through use of a standardized protocol tailored   for this examination and automatic exposure control for dose modulation. Thresa Abed FINDINGS:   The ventricles and sulci are normal in size, shape and configuration and   midline. There is no significant white matter disease. There is no intracranial   hemorrhage, extra-axial collection, mass, mass effect or midline shift. The   basilar cisterns are open. No acute infarct is identified. The bone windows demonstrate no abnormalities. The visualized portions of the   paranasal sinuses and mastoid air cells are clear. Swelling in the left forehead   with associated increased attenuation   . 04/05/22 2230  CT SPINE CERV WO CONT Final result    Impression:  1. No visualized fracture or subluxation. Narrative:  INDICATION:  None provided. Additional history: Fall. Ground-level fall with abrasion and hematoma above   left eye. Abrasion on left knee and wrist.   COMPARISON: None. .   TECHNIQUE:      Noncontrast axial CT imaging of the cervical spine was performed. Coronal and   sagittal reconstructions were obtained. CT dose reduction was achieved through use of a standardized protocol tailored   for this examination and automatic exposure control for dose modulation. Thresa Abed FINDINGS:   There are nonfocal degenerative changes. Fusion of the C3 and C4 facets on the   right. The alignment is normal. There is no visualized fracture or subluxation. Incidental imaging of the paraspinal soft tissues is unremarkable. Paraseptal and centrilobular emphysema. .           CXR Results  (Last 48 hours)    None            Medical Decision Making     Mamie PALMER MD am the first provider for this patient and am the attending of record for this patient encounter. I reviewed the vital signs, available nursing notes, past medical history, past surgical history, family history and social history. Vital Signs-Reviewed the patient's vital signs.   Patient Vitals for the past 12 hrs:   Temp Pulse Resp BP SpO2   04/06/22 0026 98.7 °F (37.1 °C) 84 20 124/72 97 %   04/05/22 2128 98.9 °F (37.2 °C) 75 20 136/65 95 %         Records Reviewed: Nursing Notes and Old Medical Records    Provider Notes (Medical Decision Making): On presentation, the patient is well appearing, in no acute distress with normal vital signs. Patient is in after head trauma. Differential includes intracranial hemorrhage, closed head injury/concussion, cervical spine injury, wrist fracture. CT scan of the head and cervical spine showed no acute findings on my interpretation. Had plan to obtain x-ray of the left wrist however patient is refusing x-ray. I explained to the patient that we cannot exclude the possibility of a fracture and if he does have an underlying fracture could limit healing and have some permanent disability or pain due to improper healing. Patient was still like to decline x-ray/immobilization. While the patient is only experiencing mild headache at this time I did  them on possible post concussive symptoms that may develop and when to seek further help or return to the ED. Plan to discharge home with return precautions. ED Course:   Initial assessment performed. The patients presenting problems have been discussed, and they are in agreement with the care plan formulated and outlined with them. I have encouraged them to ask questions as they arise throughout their visit. Medications   ondansetron (ZOFRAN ODT) tablet 4 mg (4 mg Oral Given 4/5/22 2201)   diph,Pertuss(AC),Tet Vac-PF (BOOSTRIX) suspension 0.5 mL (0.5 mL IntraMUSCular Given 4/5/22 2339)       PROGRESS  Rosalina Rice's  results have been reviewed with him. He has been counseled regarding his diagnosis. He verbally conveys understanding and agreement of the signs, symptoms, diagnosis, treatment and prognosis and additionally agrees to follow up as recommended with Dr. Alcon Montes MD in 24 - 48 hours. He also agrees with the care-plan and conveys that all of his questions have been answered. I have also put together some discharge instructions for him that include: 1) educational information regarding their diagnosis, 2) how to care for their diagnosis at home, as well a 3) list of reasons why they would want to return to the ED prior to their follow-up appointment, should their condition change. Disposition:  home    PLAN:  1. Discharge Medication List as of 4/6/2022 12:03 AM        2. Follow-up Information     Follow up With Specialties Details Why Contact Info    Rod Mccormick MD Family Medicine Schedule an appointment as soon as possible for a visit in 2 days  [facility]  [phone]      [facility] [address]  If symptoms worsen [address] [place] [address]  [phone]        Return to ED if worse     Diagnosis     Clinical Impression:   1. Closed head injury, initial encounter    2. Abrasion of forehead, initial encounter        Please note that this dictation was completed with Dragon, computer voice recognition software. Quite often unanticipated grammatical, syntax, homophones, and other interpretive errors are inadvertently transcribed by the computer software. Please disregard these errors. Additionally, please excuse any errors that have escaped final proofreading.

## 2022-04-06 NOTE — ED TRIAGE NOTES
Pt states that he has a hx of balance issues, tonight after a couple drinks he lost his balance on the patio. PT states that he has problems with his arms and wasn't able to get up so EMS was called and they wanted him to come in and get his head and wrist checked out.  PT has abrasion and hematoma above left eye, abrasion on left knee and wrist. PT denies LOC, A+Ox4, positive for blood thinners    I

## 2022-04-06 NOTE — ED NOTES
This nurse went in to clean pt abrasions, pt refused and stated \" I can do it\" PT was left with 4x4's that had been moistened.  PT refuses to stay in bed, he constantly opens the door and walks around the room

## 2022-07-19 DIAGNOSIS — M25.561 RIGHT KNEE PAIN, UNSPECIFIED CHRONICITY: Primary | ICD-10-CM

## 2022-07-21 ENCOUNTER — HOSPITAL ENCOUNTER (OUTPATIENT)
Dept: GENERAL RADIOLOGY | Age: 77
Discharge: HOME | End: 2022-07-21
Payer: MEDICARE

## 2022-07-21 ENCOUNTER — OFFICE VISIT (OUTPATIENT)
Dept: ORTHOPEDIC SURGERY | Age: 77
End: 2022-07-21
Payer: MEDICARE

## 2022-07-21 VITALS
WEIGHT: 253.2 LBS | HEIGHT: 70 IN | SYSTOLIC BLOOD PRESSURE: 146 MMHG | HEART RATE: 57 BPM | RESPIRATION RATE: 20 BRPM | DIASTOLIC BLOOD PRESSURE: 71 MMHG | OXYGEN SATURATION: 98 % | TEMPERATURE: 97.7 F | BODY MASS INDEX: 36.25 KG/M2

## 2022-07-21 DIAGNOSIS — T84.092A OTHER MECHANICAL COMPLICATION OF INTERNAL RIGHT KNEE PROSTHESIS, INITIAL ENCOUNTER (HCC): Primary | ICD-10-CM

## 2022-07-21 DIAGNOSIS — M25.561 RIGHT KNEE PAIN, UNSPECIFIED CHRONICITY: ICD-10-CM

## 2022-07-21 PROCEDURE — 99203 OFFICE O/P NEW LOW 30 MIN: CPT | Performed by: ORTHOPAEDIC SURGERY

## 2022-07-21 PROCEDURE — G8419 CALC BMI OUT NRM PARAM NOF/U: HCPCS | Performed by: ORTHOPAEDIC SURGERY

## 2022-07-21 PROCEDURE — 1123F ACP DISCUSS/DSCN MKR DOCD: CPT | Performed by: ORTHOPAEDIC SURGERY

## 2022-07-21 PROCEDURE — G8510 SCR DEP NEG, NO PLAN REQD: HCPCS | Performed by: ORTHOPAEDIC SURGERY

## 2022-07-21 PROCEDURE — G8536 NO DOC ELDER MAL SCRN: HCPCS | Performed by: ORTHOPAEDIC SURGERY

## 2022-07-21 PROCEDURE — G8427 DOCREV CUR MEDS BY ELIG CLIN: HCPCS | Performed by: ORTHOPAEDIC SURGERY

## 2022-07-21 PROCEDURE — 1101F PT FALLS ASSESS-DOCD LE1/YR: CPT | Performed by: ORTHOPAEDIC SURGERY

## 2022-07-21 PROCEDURE — 73564 X-RAY EXAM KNEE 4 OR MORE: CPT

## 2022-07-21 NOTE — PROGRESS NOTES
Rm    Chief Complaint   Patient presents with    New Patient     Rt knee pain         Visit Vitals  BP (!) 146/71   Pulse (!) 57   Temp 97.7 °F (36.5 °C) (Temporal)   Resp 20   Ht 5' 10\" (1.778 m)   Wt 253 lb 3.2 oz (114.9 kg)   SpO2 98%   BMI 36.33 kg/m²        1. Have you been to the ER, urgent care clinic since your last visit? Hospitalized since your last visit? No    2. Have you seen or consulted any other health care providers outside of the [address] [place] [address] since your last visit? Include any pap smears or colon screening. No     Health Maintenance Due   Topic Date Due    Hepatitis C Screening  Never done    Depression Screen  Never done    COVID-19 Vaccine (1) Never done    Foot Exam Q1  Never done    Eye Exam Retinal or Dilated  Never done    Shingrix Vaccine Age 50> (1 of 2) Never done    Pneumococcal 65+ years (2 - PPSV23 or PCV20) 09/12/2012    Medicare Yearly Exam  Never done    MICROALBUMIN Q1  07/31/2021    Lipid Screen  02/20/2022        3 most recent PHQ Screens 7/21/2022   Little interest or pleasure in doing things Not at all   Feeling down, depressed, irritable, or hopeless Not at all   Total Score PHQ 2 0        Fall Risk Assessment, last 12 mths 7/21/2022   Able to walk? Yes   Fall in past 12 months? 1   Do you feel unsteady? 1   Are you worried about falling 1   Is TUG test greater than 12 seconds? (No Data)   Is the gait abnormal? 0   Number of falls in past 12 months 2   Fall with injury? 1       No flowsheet data found.

## 2022-07-21 NOTE — LETTER
7/21/2022    Patient: Cecily Lopez   YOB: 1945   Date of Visit: 7/21/2022     Fredis Sheridan MD  [address] [place], [place] [id number]  Via Fax: [phone]    Dear Fredis Sheridan MD,      Thank you for referring Mr. Kandy Pressley to [facility] for evaluation. My notes for this consultation are attached. If you have questions, please do not hesitate to call me. I look forward to following your patient along with you.       Sincerely,    Davida Mckeon, DO

## 2022-07-22 ENCOUNTER — TELEPHONE (OUTPATIENT)
Dept: ORTHOPEDIC SURGERY | Age: 77
End: 2022-07-22

## 2022-07-22 NOTE — TELEPHONE ENCOUNTER
LVM for PT to C/B to be scheduled for his Video Bone Scan Results Appointment.  His Bone Scan is scheduled for 8/1/22

## 2022-07-22 NOTE — PROGRESS NOTES
7/21/2022    Chief Complaint: Right knee pain    HPI: This is a(n) 68 y.o. male  who complains of Right knee pain. Onset was gradual, though he's had a partial knee medially on this knee. He did not have improvement after surgery, and has had pain since, which is now worse. The patient has had pain for several years. The pain is in the medial knee, it is severe in intensity. The patient has tried activity modification, he has done physical therapy, injections have not been attempted. The pain causes some limitation with walking, he does have pain with the first five steps of walking. The patient complains of feelings of instability in the knee. Past Medical History:   Diagnosis Date    Abdominal adhesions 2000    Adverse effect of anesthesia     sleep apnea uses cpap machine       Arthritis     all joints; Degenerative disk disease    CAD (coronary artery disease)     stents x4, followed by Dr. Earnestine Rowe    Chronic constipation     Chronic pain     all my joints    CPAP (continuous positive airway pressure) dependence     at night    Diabetic neuropathy (HCC)     Type II    Fibromyalgia     GERD (gastroesophageal reflux disease)     HLD (hyperlipidemia)     Hypertension     Ill-defined condition     fibromyalgia    Neuropathy     hands/feet    S/P insertion of spinal cord stimulator     with remote    Sleep apnea     uses Cpap    Thyroid disease     low       Past Surgical History:   Procedure Laterality Date    COLONOSCOPY N/A 12/18/2017    COLONOSCOPY performed by Cristina Livingston MD at [address] N/A 1/15/2019    COLONOSCOPY performed by Con Keane MD at [place] [address] CORONARY STENT PLACEMENT  2014    HX ORTHOPAEDIC Bilateral 2000, 2007    hip replacement    ID ABDOMEN SURGERY PROC UNLISTED  early 2000's    abdominal hernia repair    ID ABDOMEN SURGERY PROC UNLISTED  2000's    adhesions from appendix sx.     ID ABDOMEN SURGERY PROC UNLISTED 2000's    colectomy: when removing adhesions, nicked intestines, removed 8\" of intestine       Current Outpatient Medications on File Prior to Visit   Medication Sig Dispense Refill    cyclobenzaprine (FLEXERIL) 10 mg tablet TAKE 1 TABLET BY MOUTH THREE TIMES DAILY AS NEEDED FOR MUSCLE SPASMS      dulaglutide (TRULICITY) 6.12 AI/7.2 mL sub-q pen 0.5 mL by SubCUTAneous route every seven (7) days. 4 Pen 5    aspirin delayed-release 81 mg tablet Take 1 Tab by mouth two (2) times a day. (Patient taking differently: Take 81 mg by mouth in the morning.) 60 Tab 0    cefadroxil (DURICEF) 500 mg capsule Take 1 Cap by mouth two (2) times a day. 14 Cap 0    pregabalin (LYRICA) 75 mg capsule Take 75 mg by mouth two (2) times a day. Indications: diabetic complication causing injury to some body nerves      losartan (COZAAR) 25 mg tablet Take 25 mg by mouth daily. primidone (MYSOLINE) 50 mg tablet Take 50 mg by mouth every evening. atorvastatin (LIPITOR) 80 mg tablet Take 80 mg by mouth nightly. hydroCHLOROthiazide (HYDRODIURIL) 25 mg tablet Take 25 mg by mouth daily. levothyroxine (SYNTHROID) 200 mcg tablet Take 200 mcg by mouth Daily (before breakfast). cholecalciferol, vitamin D3, (VITAMIN D3) 2,000 unit tab Take 2,000 Units by mouth two (2) times a day. meloxicam (MOBIC) 15 mg tablet       Omeprazole delayed release (PRILOSEC D/R) 20 mg tablet Take 20 mg by mouth every other day. (Patient not taking: Reported on 7/21/2022)      nitroglycerin (NITROSTAT) 0.4 mg SL tablet 1 Tab by SubLINGual route every five (5) minutes as needed for Chest Pain. Up to 3 doses. (Patient not taking: Reported on 7/21/2022) 20 Tab 0    metFORMIN (GLUCOPHAGE) 1,000 mg tablet Take 1 Tab by mouth two (2) times daily (with meals). START TAKING 9/23 WITH DINNER (Patient not taking: Reported on 7/21/2022) 30 Tab 0    DULoxetine (CYMBALTA) 60 mg capsule Take 60 mg by mouth two (2) times a day.  1 tab BID (Patient not taking: Reported on 2022)       No current facility-administered medications on file prior to visit. No Known Allergies    Family History   Problem Relation Age of Onset    Heart Disease Brother     Obesity Brother     Other Mother         hiatal hernia    Arthritis Mother         back, spine    Heart Disease Mother         Angina    Psychiatric Disorder Mother         depression    No Known Problems Father     Diabetes Sister 16        Type 1    Arthritis Sister     Cancer Neg Hx        Social History     Socioeconomic History    Marital status: LIFE PARTNER   Tobacco Use    Smoking status: Every Day     Packs/day: 0.25     Years: 40.00     Pack years: 10.00     Types: Cigarettes     Last attempt to quit:      Years since quittin.5    Smokeless tobacco: Never   Substance and Sexual Activity    Alcohol use: Yes     Alcohol/week: 6.0 standard drinks     Types: 4 Glasses of wine, 2 Shots of liquor per week     Comment: occ    Drug use: Yes     Types: Prescription, OTC    Sexual activity: Not Currently         Review of Systems:       General: Denies headache, lethargy, fever, weight loss  Ears/Nose/Throat: Denies ear discharge, drainage, nosebleeds, hoarse voice, dental problems  Cardiovascular: Denies chest pain, shortness of breath  Lungs: Denies chest pain, breathing problems, wheezing, pneumonia  Stomach: Denies stomach pain, heartburn, constipation, irritable bowel  Skin: Denies rash, sores, open wounds  Musculoskeletal: Admits to knee pain, no deformity. Genitourinary: Denies dysuria, hematuria, polyuria  Gastrointestinal: Denies constipation, obstipation, diarrhea  Neurological: Denies changes in sight, smell, hearing, taste, seizures. Denies loss of consciousness.   Psychiatric: Denies depression, sleep pattern changes, anxiety, change in personality  Endocrine: Denies mood swings, heat or cold intolerance  Hematologic/Lymphatic: Denies anemia, purpura, petechia  Allergic/Immunologic: Denies swelling of throat, pain or swelling at lymph nodes      Physical Examination:    Visit Vitals  BP (!) 146/71   Pulse (!) 57   Temp 97.7 °F (36.5 °C) (Temporal)   Resp 20   Ht 5' 10\" (1.778 m)   Wt 253 lb 3.2 oz (114.9 kg)   SpO2 98%   BMI 36.33 kg/m²        General: AOX3, no apparent distress  Psychiatric: mood and affect appropriate  Lungs: breathing is symmetric and unlabored bilaterally  Heart: regular rate and rhythm  Abdomen: no guarding  Head: normocephalic, atraumatic  Skin: No significant abnormalities, good turgor  Sensation intact to light touch: L1-S1 dermatomes  Muscular exam: 5/5 strength in all major muscle groups unless noted in specialty exam.    Extremities:      Left upper extremity: Full active and passive range of motion without pain, deformity, no open wound, strength 5/5 in all major muscle groups. Right upper extremity: Full active and passive range of motion without pain, deformity, no open wound, strength 5/5 in all major muscle groups. Left lower extremity: Full active and passive range of motion without pain, deformity, no open wound, strength 5/5 in all major muscle groups. Right lower extremity:  No deformity is noted. Range of motion of the knee is 0-130. Ligamentous testing of the knee indicates stability of the the ACL, PCL, LCL, MCL. Lachman's, anterior and posterior drawer tests are specifically negative. Joint line tenderness to palpation medially. Popliteal area is unremarkable. No effusion. No patellar crepitus. Patella tracks centrally with a negative apprehension and grind test.  Pivot shift is negative. Strength testing is indicative of 5/5 strength at hip flexion, extension, knee flexion and extension, tibialis anterior, EHL, and FHL. Sensation is intact to light touch in the L1-S1 dermatomes. Capillary refill is less than 2 seconds in the toes.     Diagnostics:    Pertinent Diagnostics:   Xrays of the right knee ordered and reviewed by myself, indicates partial knee medially, in anatomic position but with apparent radiolucency at the tibial tray. Otherwise, mild djd knee, otherwise, indicate no fractures, osseus lesions, abnormalities, cartilage space is well maintained. Overall alignment is within normal limits, no effusion or other soft tissue abnormality. Assessment: Pain in right knee, likely loosening tibial component    Plan: This patient likely has loosening of his tibial component. Though it is a little under two years since surgery, a bone scan is justified. He is aware this may need a revision surgery to a total knee replacement. He will follow up when imaging done. Mr. Pan Hebert has a reminder for a \"due or due soon\" health maintenance. I have asked that he contact his primary care provider for follow-up on this health maintenance.

## 2022-07-28 ENCOUNTER — TELEPHONE (OUTPATIENT)
Dept: ORTHOPEDIC SURGERY | Age: 77
End: 2022-07-28

## 2022-07-28 NOTE — TELEPHONE ENCOUNTER
Returned the patient's phone call. RENEE for C/B to get patient scheduled for a video virtual appointment for his bone scan results. Patient's bone scan is scheduled for 8/1/22.

## 2022-08-01 ENCOUNTER — HOSPITAL ENCOUNTER (OUTPATIENT)
Dept: NUCLEAR MEDICINE | Age: 77
Discharge: HOME | End: 2022-08-01
Attending: ORTHOPAEDIC SURGERY
Payer: MEDICARE

## 2022-08-01 DIAGNOSIS — T84.092A OTHER MECHANICAL COMPLICATION OF INTERNAL RIGHT KNEE PROSTHESIS, INITIAL ENCOUNTER (HCC): ICD-10-CM

## 2022-08-01 PROCEDURE — 78306 BONE IMAGING WHOLE BODY: CPT

## 2022-08-02 ENCOUNTER — TELEPHONE (OUTPATIENT)
Dept: ORTHOPEDIC SURGERY | Age: 77
End: 2022-08-02

## 2022-08-02 ENCOUNTER — VIRTUAL VISIT (OUTPATIENT)
Dept: ORTHOPEDIC SURGERY | Age: 77
End: 2022-08-02
Payer: MEDICARE

## 2022-08-02 DIAGNOSIS — T84.092A OTHER MECHANICAL COMPLICATION OF INTERNAL RIGHT KNEE PROSTHESIS, INITIAL ENCOUNTER (HCC): Primary | ICD-10-CM

## 2022-08-02 PROCEDURE — 99442 PR PHYS/QHP TELEPHONE EVALUATION 11-20 MIN: CPT | Performed by: ORTHOPAEDIC SURGERY

## 2022-08-02 NOTE — TELEPHONE ENCOUNTER
Contacted patient to schedule surgery. Scheduled for 8/29/22. Advised patient that clearances from Dr. Alejandra Higgins and [facility]- Dr. Ezra Larios would be required, and would need to be received no less than 2 business days before surgery. Patient advised to contact the office(s) to make pre op appts as soon as possible. Patient verbalized understanding and was encouraged to contact our office with any questions or concerns regarding upcoming surgery or required clearances. Clearance letters faxed to [phone] & [phone].  Confirmation was received.         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

## 2022-08-02 NOTE — PROGRESS NOTES
8/2/2022      CC: Right knee pain    HPI:      This is a 68y.o. year old patient who presents for bone scan follow up of the right knee. The patient states their pain is still consistent per the previous visit. PMH:  Past Medical History:   Diagnosis Date    Abdominal adhesions 2000    Adverse effect of anesthesia     sleep apnea uses cpap machine       Arthritis     all joints; Degenerative disk disease    CAD (coronary artery disease)     stents x4, followed by Dr. Glenn Beltran    Chronic constipation     Chronic pain     all my joints    CPAP (continuous positive airway pressure) dependence     at night    Diabetic neuropathy (HCC)     Type II    Fibromyalgia     GERD (gastroesophageal reflux disease)     HLD (hyperlipidemia)     Hypertension     Ill-defined condition     fibromyalgia    Neuropathy     hands/feet    S/P insertion of spinal cord stimulator     with remote    Sleep apnea     uses Cpap    Thyroid disease     low       PSxHx:  Past Surgical History:   Procedure Laterality Date    COLONOSCOPY N/A 12/18/2017    COLONOSCOPY performed by Gordon Rose MD at [facility] N/A 1/15/2019    COLONOSCOPY performed by Zoya Benitez MD at [place] [address] CORONARY STENT PLACEMENT  2014    HX ORTHOPAEDIC Bilateral 2000, 2007    hip replacement    HI ABDOMEN SURGERY PROC UNLISTED  early 2000's    abdominal hernia repair    HI ABDOMEN SURGERY PROC UNLISTED  2000's    adhesions from appendix sx. HI ABDOMEN SURGERY PROC UNLISTED  2000's    colectomy: when removing adhesions, nicked intestines, removed 8\" of intestine       Meds:    Current Outpatient Medications:     cyclobenzaprine (FLEXERIL) 10 mg tablet, TAKE 1 TABLET BY MOUTH THREE TIMES DAILY AS NEEDED FOR MUSCLE SPASMS, Disp: , Rfl:     meloxicam (MOBIC) 15 mg tablet, , Disp: , Rfl:     dulaglutide (TRULICITY) 8.64 RY/8.4 mL sub-q pen, 0.5 mL by SubCUTAneous route every seven (7) days. , Disp: 4 Pen, Rfl: 5    aspirin delayed-release 81 mg tablet, Take 1 Tab by mouth two (2) times a day. (Patient taking differently: Take 81 mg by mouth in the morning.), Disp: 60 Tab, Rfl: 0    cefadroxil (DURICEF) 500 mg capsule, Take 1 Cap by mouth two (2) times a day., Disp: 14 Cap, Rfl: 0    pregabalin (LYRICA) 75 mg capsule, Take 75 mg by mouth two (2) times a day. Indications: diabetic complication causing injury to some body nerves, Disp: , Rfl:     Omeprazole delayed release (PRILOSEC D/R) 20 mg tablet, Take 20 mg by mouth every other day. (Patient not taking: Reported on 7/21/2022), Disp: , Rfl:     nitroglycerin (NITROSTAT) 0.4 mg SL tablet, 1 Tab by SubLINGual route every five (5) minutes as needed for Chest Pain. Up to 3 doses. (Patient not taking: Reported on 7/21/2022), Disp: 20 Tab, Rfl: 0    losartan (COZAAR) 25 mg tablet, Take 25 mg by mouth daily. , Disp: , Rfl:     primidone (MYSOLINE) 50 mg tablet, Take 50 mg by mouth every evening., Disp: , Rfl:     metFORMIN (GLUCOPHAGE) 1,000 mg tablet, Take 1 Tab by mouth two (2) times daily (with meals). START TAKING 9/23 WITH DINNER (Patient not taking: Reported on 7/21/2022), Disp: 30 Tab, Rfl: 0    atorvastatin (LIPITOR) 80 mg tablet, Take 80 mg by mouth nightly., Disp: , Rfl:     hydroCHLOROthiazide (HYDRODIURIL) 25 mg tablet, Take 25 mg by mouth daily. , Disp: , Rfl:     DULoxetine (CYMBALTA) 60 mg capsule, Take 60 mg by mouth two (2) times a day. 1 tab BID (Patient not taking: Reported on 7/21/2022), Disp: , Rfl:     levothyroxine (SYNTHROID) 200 mcg tablet, Take 200 mcg by mouth Daily (before breakfast). , Disp: , Rfl:     cholecalciferol, vitamin D3, (VITAMIN D3) 2,000 unit tab, Take 2,000 Units by mouth two (2) times a day., Disp: , Rfl:   No current facility-administered medications for this visit.     All:  No Known Allergies    Social Hx:  Social History     Socioeconomic History    Marital status: LIFE PARTNER   Tobacco Use    Smoking status: Every Day Packs/day: 0.25     Years: 40.00     Pack years: 10.00     Types: Cigarettes     Last attempt to quit:      Years since quittin.5    Smokeless tobacco: Never   Substance and Sexual Activity    Alcohol use: Yes     Alcohol/week: 6.0 standard drinks     Types: 4 Glasses of wine, 2 Shots of liquor per week     Comment: occ    Drug use: Yes     Types: Prescription, OTC    Sexual activity: Not Currently       Family Hx:  Family History   Problem Relation Age of Onset    Heart Disease Brother     Obesity Brother     Other Mother         hiatal hernia    Arthritis Mother         back, spine    Heart Disease Mother         Angina    Psychiatric Disorder Mother         depression    No Known Problems Father     Diabetes Sister 16        Type 1    Arthritis Sister     Cancer Neg Hx          Review of Systems:       General: Denies headache, lethargy, fever, weight loss  Ears/Nose/Throat: Denies ear discharge, drainage, nosebleeds, hoarse voice, dental problems  Cardiovascular: Denies chest pain, shortness of breath  Lungs: Denies chest pain, breathing problems, wheezing, pneumonia  Stomach: Denies stomach pain, heartburn, constipation, irritable bowel  Skin: Denies rash, sores, open wounds  Musculoskeletal: right knee pain  Genitourinary: Denies dysuria, hematuria, polyuria  Gastrointestinal: Denies constipation, obstipation, diarrhea  Neurological: Denies changes in sight, smell, hearing, taste, seizures. Denies loss of consciousness. Psychiatric: Denies depression, sleep pattern changes, anxiety, change in personality  Endocrine: Denies mood swings, heat or cold intolerance  Hematologic/Lymphatic: Denies anemia, purpura, petechia  Allergic/Immunologic: Denies swelling of throat, pain or swelling at lymph nodes      Physical Examination:    There were no vitals taken for this visit.      General: AOX3, no apparent distress  Psychiatric: mood and affect appropriate      Diagnostics:    Pertinent Diagnostics: Bone scan is available of the right knee, indicating increased uptake at the medial tibial plateau. Assessment: Tibial component loosening, left partial knee replacement  Plan: This patient I had a long discussion regarding treatment options, the is aware that at this point there is good objective evidence to match his issues postoperatively, the only solution from a definitive standpoint would likely be to revise this to a total knee replacement. We discussed the risks and benefits thereof. We did discuss the risks of surgery which include but are not limited to infection, nerve or blood vessel damage, failure of fixation, failure of any possible implant, need for reoperation, postoperative pain and swelling, intra-or postoperative fracture, postoperative dislocation, leg length inequality, need for reoperation, implant failure, death, disability, organ dysfunction, wound healing issues, DVT, PE, and the need for further procedures. The patient did freely state their understanding and satisfaction with our discussion. We will proceed after medical clearances. The patient was counseled at length about the risks of manju Covid-19 during their perioperative period and any recovery window from their procedure. The patient was made aware that manuj Covid-19  may worsen their prognosis for recovering from their procedure and lend to a higher morbidity and/or mortality risk. All material risks, benefits, and reasonable alternatives including postponing the procedure were discussed. The patient does  wish to proceed with the procedure at this time. I was in the office while conducting this encounter. Consent:  He and/or his healthcare decision maker is aware that this patient-initiated Telehealth encounter is a billable service, with coverage as determined by his insurance carrier.  He is aware that he may receive a bill and has provided verbal consent to proceed: Yes    This virtual visit was conducted telephone encounter only. -  I affirm this is a Patient Initiated Episode with an Established Patient who has not had a related appointment within my department in the past 7 days or scheduled within the next 24 hours. Note: this encounter is not billable if this call serves to triage the patient into an appointment for the relevant concern. Total Time: minutes: 11-20 minutes. Mr. Diane Sanchez has a reminder for a \"due or due soon\" health maintenance. I have asked that he contact his primary care provider for follow-up on this health maintenance.

## 2022-08-11 ENCOUNTER — TELEPHONE (OUTPATIENT)
Dept: ORTHOPEDIC SURGERY | Age: 77
End: 2022-08-11

## 2022-08-11 NOTE — TELEPHONE ENCOUNTER
Perlita canseco would like a return phone from the nurse or doctor to discuss a question he has related to his upcoming surgery on 8/29/22.     The patient can be reached at [phone]

## 2022-08-22 ENCOUNTER — HOSPITAL ENCOUNTER (OUTPATIENT)
Dept: PREADMISSION TESTING | Age: 77
Discharge: HOME | End: 2022-08-22
Attending: ORTHOPAEDIC SURGERY
Payer: MEDICARE

## 2022-08-22 VITALS
HEIGHT: 68 IN | RESPIRATION RATE: 16 BRPM | SYSTOLIC BLOOD PRESSURE: 137 MMHG | OXYGEN SATURATION: 96 % | WEIGHT: 256.62 LBS | HEART RATE: 67 BPM | DIASTOLIC BLOOD PRESSURE: 62 MMHG | TEMPERATURE: 99.2 F | BODY MASS INDEX: 38.89 KG/M2

## 2022-08-22 DIAGNOSIS — E11.65 TYPE 2 DIABETES MELLITUS WITH HYPERGLYCEMIA, WITHOUT LONG-TERM CURRENT USE OF INSULIN (HCC): Primary | ICD-10-CM

## 2022-08-22 LAB
ABO + RH BLD: NORMAL
APPEARANCE UR: CLEAR
BACTERIA URNS QL MICRO: NEGATIVE /HPF
BILIRUB UR QL: NEGATIVE
BLOOD GROUP ANTIBODIES SERPL: NORMAL
COLOR UR: NORMAL
EPITH CASTS URNS QL MICRO: NORMAL /LPF
GLUCOSE UR STRIP.AUTO-MCNC: NEGATIVE MG/DL
HGB UR QL STRIP: NEGATIVE
HYALINE CASTS URNS QL MICRO: NORMAL /LPF (ref 0–2)
INR PPP: 1 (ref 0.9–1.1)
KETONES UR QL STRIP.AUTO: NEGATIVE MG/DL
LEUKOCYTE ESTERASE UR QL STRIP.AUTO: NEGATIVE
NITRITE UR QL STRIP.AUTO: NEGATIVE
PH UR STRIP: 5.5 [PH] (ref 5–8)
PROT UR STRIP-MCNC: NEGATIVE MG/DL
PROTHROMBIN TIME: 10.6 SEC (ref 9–11.1)
RBC #/AREA URNS HPF: NORMAL /HPF (ref 0–5)
SP GR UR REFRACTOMETRY: 1.02
SPECIMEN EXP DATE BLD: NORMAL
UA: UC IF INDICATED,UAUC: NORMAL
UROBILINOGEN UR QL STRIP.AUTO: 0.2 EU/DL (ref 0.2–1)
WBC URNS QL MICRO: NORMAL /HPF (ref 0–4)

## 2022-08-22 PROCEDURE — 86900 BLOOD TYPING SEROLOGIC ABO: CPT

## 2022-08-22 PROCEDURE — 97161 PT EVAL LOW COMPLEX 20 MIN: CPT

## 2022-08-22 PROCEDURE — 85610 PROTHROMBIN TIME: CPT

## 2022-08-22 PROCEDURE — 81001 URINALYSIS AUTO W/SCOPE: CPT

## 2022-08-22 PROCEDURE — 36415 COLL VENOUS BLD VENIPUNCTURE: CPT

## 2022-08-22 PROCEDURE — 97116 GAIT TRAINING THERAPY: CPT

## 2022-08-22 RX ORDER — SODIUM CHLORIDE, SODIUM LACTATE, POTASSIUM CHLORIDE, CALCIUM CHLORIDE 600; 310; 30; 20 MG/100ML; MG/100ML; MG/100ML; MG/100ML
25 INJECTION, SOLUTION INTRAVENOUS CONTINUOUS
Status: CANCELLED | OUTPATIENT
Start: 2022-08-29

## 2022-08-22 RX ORDER — PREGABALIN 200 MG/1
200 CAPSULE ORAL 2 TIMES DAILY
COMMUNITY

## 2022-08-22 NOTE — PROGRESS NOTES
[facility]  Physical Therapy Pre-surgery evaluation  [address] [facility], [address] [facility]    PHYSICAL THERAPY PRE TKR SURGERY EVALUATION  Patient: Jake oGnzales (74 y.o. male)  Date: 8/22/2022  Primary Diagnosis: pat  Procedure(s) (LRB):  REVISION RIGHT TOTAL KNEE ARTHROPLASTY (GENERAL WITH ADDUCTOR CANAL BLOCK) (Right)     Precautions:        ASSESSMENT :  Based on the objective data described below, the patient presents with impaired gait, balance, pain, and overall high level functional mobility due to end stage degenerative joint disease in the right knee. Discussed anticipated disposition to home with possible discharge within a 1 to 2 day time frame post-surgery. Patient and  in agreement. GOALS: (Goals have been discussed and agreed upon with patient.)  DISCHARGE GOALS: Time Frame: 1 DAY  Patient will demonstrate increased strength, range of motion, and pain control via a home exercise program in order to minimize functional deficits in preparation for their upcoming surgery. This will be achieved by using education, demonstration and through the use of an informational handout including a home exercise program.  REHABILITATION POTENTIAL FOR STATED GOALS: Good     RECOMMENDATIONS AND PLANNED INTERVENTIONS: (Benefits and precautions of physical therapy have been discussed with the patient.)  Home Exercise Program  TREATMENT PLAN EFFECTIVE DATES: 8/22/2022 TO 8/22/2022  FREQUENCY/DURATION: Patient to continue to perform home exercise program at least twice daily until his surgery. SUBJECTIVE:   Patient stated It was never right after (first surgery).     OBJECTIVE DATA SUMMARY:   HISTORY:    Past Medical History:   Diagnosis Date    Abdominal adhesions 2000    Adverse effect of anesthesia     sleep apnea uses cpap machine       Ankylosing spondylitis (HCC)     Arthritis     all joints; Degenerative disk disease    CAD (coronary artery disease)     stents x4, followed by Dr. Emma José    Chronic constipation     Chronic pain     all my joints    CPAP (continuous positive airway pressure) dependence     at night    Diabetic neuropathy (HCC)     Type II    DISH (diffuse idiopathic skeletal hyperostosis)     Fibromyalgia     GERD (gastroesophageal reflux disease)     HLD (hyperlipidemia)     Hypertension     Neuropathy     hands/feet    S/P insertion of spinal cord stimulator     with remote- Been Removed    Sleep apnea     uses Cpap    Thyroid disease     low     Past Surgical History:   Procedure Laterality Date    COLONOSCOPY N/A 12/18/2017    COLONOSCOPY performed by Shanel Givens MD at [address] N/A 01/15/2019    COLONOSCOPY performed by Ashley Yanes MD at [facility] ENDOSCOPY    HX APPENDECTOMY  1995    HX CORONARY STENT PLACEMENT  2014    HX HERNIA REPAIR      abdominal early 2000's    HX HIP REPLACEMENT Bilateral     2000, 2007    HX KNEE REPLACEMENT Right     partial right knee replacement    NC ABDOMEN SURGERY PROC UNLISTED  2000's    adhesions from appendix sx. NC ABDOMEN SURGERY PROC UNLISTED  2000's    colectomy: when removing adhesions, nicked intestines, removed 8\" of intestine     Prior Level of Function/Home Situation: Pt has remained independent with no device inside and walking stick outside but does report a hx of falls - 3 in last year reported  Personal factors and/or comorbidities impacting plan of care:     Patient []   does  [x]   does not state signs/symptoms of shortness of breath/dyspnea on exertion/respiratory distress.     Home Situation  Home Environment: Private residence  # Steps to Enter: 2  Rails to Enter: No (but hand hold)  One/Two Story Residence: Other (Comment) (but will stay in first floor bedroom post surgery)  # of Interior Steps: [address]: Left  Lift Chair Available: No  Living Alone: No  Support Systems: Spouse/Significant Other  Patient Expects to be Discharged to[de-identified] Home with family assistance  Current DME Used/Available at Home: Walker, rolling, Other (comment), Glucometer, Blood pressure cuff (walking sticks)  Tub or Shower Type: Tub/Shower combination      EXAMINATION/PRESENTATION/DECISION MAKING:     ADLs (Current Functional Status):    Bathing/Showering:   [x] Independent  [] Requires Assistance from Someone  [] Sponge Bath Only   Ambulation:  [x] Independent  [] Walk Indoors Only  [] Walk Outdoors  [x] Use Assistive Device - outdoors  [] Use Wheelchair Only     Dressing:  [x] [address] from Someone for:  [] Sock/Shoes  [] Pants  [] Everything   Household Activities:  [] Routine house and yard work  [] Light Housework Only  [x] None       Critical Behavior:  Neurologic State: Alert  Orientation Level: Oriented X4  Cognition: Follows commands       Strength:    Strength: Generally decreased, functional                    Tone & Sensation:   Tone: Normal              Sensation: Intact               Range Of Motion:  AROM: Within functional limits                       Coordination:  Coordination: Within functional limits    Functional Mobility:  Transfers:  Sit to Stand: Modified independent, Additional time  Stand to Sit: Modified independent, Additional time                       Balance:   Sitting: Intact  Standing: Impaired  Standing - Static: Fair  Standing - Dynamic : Fair  Ambulation/Gait Training:  Distance (ft): 20 Feet (ft)  Assistive Device:  (none but counselled in need for use of RW)  Ambulation - Level of Assistance: Stand-by assistance        Gait Abnormalities: Decreased step clearance, Path deviations, Trunk sway increased, Other (flexed trunk, hip/knees)        Base of Support: Shift to left  Stance: Right decreased  Speed/Shanell: Slow, Pace decreased (<100 feet/min)  Step Length: Right shortened, Left shortened          Therapeutic Exercises:   The patient was educated in, has demonstrated, and has received written instructions to complete for their home exercise program per total knee replacement protocol. Functional Measure:  Timed up and go:    Timed Get Up And Go Test: 29.21       < than 10 seconds=Normal  Greater then 13.5 seconds (in elderly)=Increased fall risk   Kuldip Daily [place] M. Predicting the probability for falls in community dwelling older adults using the Timed Up and Go Test. Phys Ther. 2000;80:896-903. Pain:  Pain Scale 1: Numeric (0 - 10)  Pain Intensity 1: 9  Pain Location 1: Knee  Pain Orientation 1: Right  Pain Description 1: Aching       Activity Tolerance:   Good for session  Patient []   does  [x]   does not demonstrate signs/symptoms of shortness of breath/dyspnea on exertion/respiratory distress. COMMUNICATION/EDUCATION:   The patient was educated on:  [x]         Importance of post-operative mobility to achieve their desired outcomes and restore biological function  [x]         The key post-operative time frame to address ROM to prevent additional complications    The patients plan of care was discussed with:   [x]         The patient verbalized understanding of his plan in preparation for their upcoming surgery  []         The patient's  was present for this session  []         The patient reports that he/she does not have a  identified at this time  []         The  verbalized understanding of the education regarding the patient's upcoming surgery  [x]         Patient/family agree to work toward stated goals and plan of care. []         Patient understands intent and goals of therapy, but is neutral about his/her participation. []         Patient is unable to participate in goal setting and plan of care.     Thank you for this referral.  Leslie Santana, PT   Time Calculation: 19 mins

## 2022-08-22 NOTE — ADVANCED PRACTICE NURSE
PAT Nurse Practitioner   Pre-Operative Chart Review/Assessment:-ORTHOPEDIC/NEUROSURGICAL SPINE                Patient Name:  Sly Emery                                                           Age:   68 y.o.    :  1945     Today's Date:  2022     Date of PAT:   22      Date of Surgery:    2022      Procedure(s):  Right  Total  Knee arthroplasty Revision      Surgeon:   Abel Asif     Medical Clearance:  Dr. Chyna Beltran                   PLAN:      1)  Cardiac Clearance:  Dr. Polo Root       2)  Program for Diabetes Health Consult:  Ordered. A1C 7.6 on 22 at PCP office (known diabetic on oral agents)      3)  Sleep Apnea evaluation:   Has dx of FROILAN-compliant w/ CPAP           4) Treatment for MRSA/Staph Aureus:  Negative       5) Additional Concerns:  BMI 39.6, CAD s/p multiple stents, T2DM, FROILAN, fibromyalgia, ankylosing spondylitis, chronic pain, neuropathy, chronic constipation, current smoker.  + fall hx                  Vital Signs:         Visit Vitals  /62 (BP 1 Location: Right upper arm, BP Patient Position: At rest;Sitting)   Pulse 67   Temp 99.2 °F (37.3 °C)   Resp 16   Ht 5' 7.5\" (1.715 m)   Wt 116.4 kg (256 lb 9.9 oz)   SpO2 96%   BMI 39.60 kg/m²                        ____________________________________________  PAST MEDICAL HISTORY  Past Medical History:   Diagnosis Date    Abdominal adhesions     Adverse effect of anesthesia     sleep apnea uses cpap machine       Ankylosing spondylitis (HCC)     Arthritis     all joints; Degenerative disk disease    CAD (coronary artery disease)     stents x4, followed by Dr. Polo Root    Chronic constipation     Chronic pain     all my joints    CPAP (continuous positive airway pressure) dependence     at night    Diabetic neuropathy (HCC)     Type II    DISH (diffuse idiopathic skeletal hyperostosis)     Fibromyalgia     GERD (gastroesophageal reflux disease)     HLD (hyperlipidemia)     Hypertension     Neuropathy     hands/feet    S/P insertion of spinal cord stimulator     with remote- Been Removed    Sleep apnea     uses Cpap    Thyroid disease     low      ____________________________________________  PAST SURGICAL HISTORY  Past Surgical History:   Procedure Laterality Date    COLONOSCOPY N/A 12/18/2017    COLONOSCOPY performed by Grant Walker MD at [facility] AMBULATORY OR    COLONOSCOPY N/A 01/15/2019    COLONOSCOPY performed by Todd Marion MD at [facility] ENDOSCOPY    HX APPENDECTOMY  1995    HX CORONARY STENT PLACEMENT  2014    HX HERNIA REPAIR      abdominal early 2000's    HX HIP REPLACEMENT Bilateral     2000, 2007    HX KNEE REPLACEMENT Right     partial right knee replacement    NC ABDOMEN SURGERY PROC UNLISTED  2000's    adhesions from appendix sx. NC ABDOMEN SURGERY PROC UNLISTED  2000's    colectomy: when removing adhesions, nicked intestines, removed 8\" of intestine      ____________________________________________  HOME MEDICATIONS    Current Outpatient Medications   Medication Sig    pregabalin (Lyrica) 200 mg capsule Take 200 mg by mouth two (2) times a day. aspirin delayed-release 81 mg tablet Take 1 Tab by mouth two (2) times a day. (Patient taking differently: Take 81 mg by mouth daily.)    nitroglycerin (NITROSTAT) 0.4 mg SL tablet 1 Tab by SubLINGual route every five (5) minutes as needed for Chest Pain. Up to 3 doses. losartan (COZAAR) 25 mg tablet Take 25 mg by mouth daily. primidone (MYSOLINE) 50 mg tablet Take 50 mg by mouth every evening. metFORMIN (GLUCOPHAGE) 1,000 mg tablet Take 1 Tab by mouth two (2) times daily (with meals). START TAKING 9/23 WITH DINNER (Patient taking differently: Take 1,000 mg by mouth two (2) times daily (with meals). Patient restarted on 8/19/22)    atorvastatin (LIPITOR) 80 mg tablet Take 80 mg by mouth nightly. hydroCHLOROthiazide (HYDRODIURIL) 25 mg tablet Take 25 mg by mouth daily. levothyroxine (SYNTHROID) 200 mcg tablet Take 200 mcg by mouth Daily (before breakfast). cholecalciferol, vitamin D3, (VITAMIN D3) 2,000 unit tab Take 2,000 Units by mouth daily. No current facility-administered medications for this encounter.      ____________________________________________  ALLERGIES  No Known Allergies   ____________________________________________  SOCIAL HISTORY  Social History     Tobacco Use    Smoking status: Some Days     Packs/day: 0.25     Years: 40.00     Pack years: 10.00     Types: Cigarettes    Smokeless tobacco: Never   Substance Use Topics    Alcohol use: Yes     Alcohol/week: 6.0 standard drinks     Types: 4 Glasses of wine, 2 Shots of liquor per week     Comment: occ      ____________________________________________  COVID VACCINATION STATUS:      Internal Administration   First Dose COVID-19, PFIZER PURPLE top, DILUTE for use, (age 15 y+), IM, 30mcg/0.3mL  02/25/2021   Second Dose COVID-19, PFIZER PURPLE top, DILUTE for use, (age 15 y+), IM, 30mcg/0.3mL  03/18/2021      Last COVID Lab SARS-CoV-2 ( )   Date Value   01/04/2022 Detected (A)   11/27/2020 Not Detected                      Labs:     Hospital Outpatient Visit on 08/22/2022   Component Date Value Ref Range Status    Crossmatch Expiration 08/22/2022 09/01/2022,2359   Final    ABO/Rh(D) 08/22/2022 B POSITIVE   Final    Antibody screen 08/22/2022 NEG   Final    INR 08/22/2022 1.0  0.9 - 1.1   Final    A single therapeutic range for Vit K antagonists may not be optimal for all indications - see June, 2008 issue of Chest, American College of Chest Physicians Evidence-Based Clinical Practice Guidelines, 8th Edition.     Prothrombin time 08/22/2022 10.6  9.0 - 11.1 sec Final    Color 08/22/2022 YELLOW/STRAW    Final    Color Reference Range: Straw, Yellow or Dark Yellow    Appearance 08/22/2022 CLEAR  CLEAR   Final    Specific gravity 08/22/2022 1.019    Final    pH (UA) 08/22/2022 5.5  5.0 - 8.0   Final    Protein 08/22/2022 Negative  NEG mg/dL Final    Glucose 08/22/2022 Negative  NEG mg/dL Final    Ketone 08/22/2022 Negative  NEG mg/dL Final    Bilirubin 08/22/2022 Negative  NEG   Final    Blood 08/22/2022 Negative  NEG   Final    Urobilinogen 08/22/2022 0.2  0.2 - 1.0 EU/dL Final    Nitrites 08/22/2022 Negative  NEG   Final    Leukocyte Esterase 08/22/2022 Negative  NEG   Final    UA:UC IF INDICATED 08/22/2022 CULTURE NOT INDICATED BY UA RESULT  CNI   Final    WBC 08/22/2022 0-4  0 - 4 /hpf Final    RBC 08/22/2022 0-5  0 - 5 /hpf Final    Epithelial cells 08/22/2022 FEW  FEW /lpf Final    Epithelial cell category consists of squamous cells and /or transitional urothelial cells. Renal tubular cells, if present, are separately identified as such. Bacteria 08/22/2022 Negative  NEG /hpf Final    Hyaline cast 08/22/2022 0-2  0 - 2 /lpf Final    Special Requests: 08/22/2022 NO SPECIAL REQUESTS    Final    Culture result: 08/22/2022 MRSA NOT PRESENT    Final    Culture result: 08/22/2022     Final                    Value:Screening of patient nares for MRSA is for surveillance purposes and, if positive, to facilitate isolation considerations in high risk settings. It is not intended for automatic decolonization interventions per se as regimens are not sufficiently effective to warrant routine use. XR Results (most recent):    Results from Hospital Encounter encounter on 07/21/22    XR KNEE RT MIN 4 V    Narrative  EXAM: XR KNEE RT MIN 4 V    INDICATION: pain. COMPARISON: None. FINDINGS: Four views of the right knee demonstrate no fracture or other acute  osseous or articular abnormality. There is a mild sized joint effusion. kenna  prostheses in place. Spurring patella. Diffuse soft tissue swelling. Impression  No acute bone abnormality. Skin:   Denies open wounds, cuts, sores, rashes or other areas of concern in PAT assessment.         Jose Funk NP

## 2022-08-22 NOTE — PERIOP NOTES
[facility]  Joint/Spine Preoperative Instructions        Surgery Date 8/29/22          Time of Arrival to be called @ [phone]    1. On the day of your surgery, please report to the Surgical Services Registration Desk and sign in at your designated time. The Surgery Center is located to the right of the Emergency Room. 2. You must have someone with you to drive you home. You should not drive a car for 24 hours following surgery. Please make arrangements for a friend or family member to stay with you for the first 24 hours after your surgery. 3. No food after midnight . Medications morning of surgery should be taken with a sip of water. 4. We recommend you do not drink any alcoholic beverages for 24 hours before and after your surgery. 5. Contact your surgeons office for instructions on the following medications: non-steroidal anti-inflammatory drugs (i.e. Advil, Aleve), vitamins, and supplements. (Some surgeons will want you to stop these medications prior to surgery and others may allow you to take them)  **If you are currently taking Plavix, Coumadin, Aspirin and/or other blood-thinning agents, contact your surgeon for instructions. ** Your surgeon will partner with the physician prescribing these medications to determine if it is safe to stop or if you need to continue taking. Please do not stop taking these medications without instructions from your surgeon    6. Wear comfortable clothes. Wear glasses instead of contacts. Do not bring any money or jewelry. Please bring picture ID, insurance card, and any prearranged co-payment or hospital payment. Do not wear make-up, particularly mascara the morning of your surgery. Do not wear nail polish, particularly if you are having foot /hand surgery. Wear your hair loose or down, no ponytails, buns, kristi pins or clips. All body piercings must be removed.   Please shower with antibacterial soap for three consecutive days before and on the morning of surgery, but do not apply any lotions, powders or deodorants after the shower on the day of surgery. Please use a fresh towels after each shower. Please sleep in clean clothes and change bed linens the night before surgery. Please do not shave for 48 hours prior to surgery. Shaving of the face is acceptable. 7. You should understand that if you do not follow these instructions your surgery may be cancelled. If your physical condition changes (I.e. fever, cold or flu) please contact your surgeon as soon as possible. 8. It is important that you be on time. If a situation occurs where you may be late, please call [phone] (OR Holding Area). 9. If you have any questions and or problems, please call [phone] (Pre-admission Testing). 10. Your surgery time may be subject to change. You will receive a phone call the evening prior if your time changes. 11.  If having outpatient surgery, you must have someone to drive you here, stay with you during the duration of your stay, and to drive you home at time of discharge. 12. The following link is for the educational video for patients and/or families. [email or web address]-tghbbws-vsopwif/[place]/[facility]/educational-materials    13  Due to current COVID restrictions, only two adults may accompany you the day of the procedure. We have limited seating available. If our waiting room is at capacity, your ride may be asked to remain in their vehicle. No children are allowed in the waiting room    Special Instructions: to stop aspirin 3 days prior to to surgery per Dr Henley Every: metformin, vit d      I understand a pre-operative phone call will be made to verify my surgery time.   In the event that I am not available, I give permission for a message to be left on my answering service and/or with another person?   yes         ___________________      __________   _________    (Signature of Patient)             (Witness)                (Date and Time)

## 2022-08-22 NOTE — PROGRESS NOTES
Patient attended the Joint Replacement Education Class at [facility]. The content of the class was presented using a power point presentation specific for patients undergoing hip and knee replacement surgery. The [facility] Joint Replacement Education Handbook was given to the patient. Preparing for surgery, day of surgery routine and expectations, discharge process and help at home expectations, nutrition,medications, infection control, pain management, DVT prevention, ice therapy and safety were reviewed in class. Opportunity for questions provided, patient verbalized understanding of instructions.

## 2022-08-22 NOTE — PERIOP NOTES

## 2022-08-22 NOTE — PERIOP NOTES
Hibiclens/Chlorhexidine    Preventing Infections Before and After - Your Surgery    IMPORTANT INSTRUCTIONS    Please read and follow these instructions carefully. If you are unable to comply with the below instructions your procedure will be cancelled. Every Night for Three (3) nights before your surgery:  Shower with an antibacterial soap, such as Dial, or the soap provided at your preassessment appointment. A shower is better than a bath for cleaning your skin. If needed, ask someone to help you reach all areas of your body. Dont forget to clean your belly button with every shower. The night before your surgery: If you lose your Hibiclens/chlorhexidine please contact surgery center or you can purchase it at a local pharmacy  On the night before your surgery, shower with an antibacterial soap, such as Dial, or the soap provided at your preassessment appointment. With one packet of Hibiclens/Chlorhexidine in hand, turn water off. Apply Hibiclens antiseptic skin cleanser with a clean, freshly washed washcloth. Gently apply to your body from chin to toes (except the genital area) and especially the area(s) where your incision(s) will be. Leave Hibiclens/Chlorhexidine on your skin for at least 20 seconds. CAUTION: If needed, Hibiclens/chlorhexidine may be used to clean the folds of skin of the legs (such as in the area of the groin) and on your buttocks and hips. However, do not use Hibiclens/Chlorhexidine above the neck or in the genital area (your bottom) or put inside any area of your body. Turn the water back on and rinse. Dry gently with a clean, freshly washed towel. After your shower, do not use any powder, deodorant, perfumes or lotion. Use clean, freshly washed towels and washcloths every time you shower. Wear clean, freshly washed pajamas to bed the night before surgery. Sleep on clean, freshly washed sheets. Do not allow pets to sleep in your bed with you.         The Morning of your surgery:  Shower again thoroughly with an antibacterial soap, such as Dial or the soap provided at your preassessment appointment. If needed, ask someone for help to reach all areas of your body. Dont forget to clean your belly button! Rinse. Dry gently with a clean, freshly washed towel. After your shower, do not use any powder, deodorant, perfumes or lotion prior to surgery. Put on clean, freshly washed clothing. Tips to help prevent infections after your surgery:  Protect your surgical wound from germs:  Hand washing is the most important thing you and your caregivers can do to prevent infections. Keep your bandage clean and dry! Do not touch your surgical wound. Use clean, freshly washed towels and washcloths every time you shower; do not share bath linens with others. Until your surgical wound is healed, wear clothing and sleep on bed linens each day that are clean and freshly washed. Do not allow pets to sleep in your bed with you or touch your surgical wound. Do not smoke - smoking delays wound healing. This may be a good time to stop smoking. If you have diabetes, it is important for you to manage your blood sugar levels properly before your surgery as well as after your surgery. Poorly managed blood sugar levels slow down wound healing and prevent you from healing completely. If you lose your Hibiclens/chlorhexidine, please call the [facility], or it is available for purchase at your pharmacy.                ___________________      ___________________      ________________  (Signature of Patient)          (Witness)                   (Date and Time)

## 2022-08-22 NOTE — PERIOP NOTES
CBC, CMP, Hgb A1C, done 8/19/22. Copy on chart. EKG, cardiac notes from 5/2022. Copy on chart. Did not repeat during PAT appt.

## 2022-08-22 NOTE — PERIOP NOTES
Orthopedic and Spine Patients: Instructions on When You Can   Eat or Drink Before Surgery      You have been provided a pre-surgery drink received at your pre-admission testing appointment. Night before surgery: You should drink 1/2 bottle of the  pre-surgery drink at bedtime. No food after midnight! Day of Surgery:  Complete 2nd half of the bottle of the pre-surgery drink 1 hour prior to arrival at hospital.  For questions call Pre-Admission Testing at [phone]. They are available from 8:00am-5:00pm, Monday through Friday.

## 2022-08-23 LAB
BACTERIA SPEC CULT: NORMAL
BACTERIA SPEC CULT: NORMAL
SERVICE CMNT-IMP: NORMAL

## 2022-08-24 NOTE — PERIOP NOTES
LVM for Dr Hernadez Marking office to follow up on PCP's clearance with Dr Mima Tipton.     08/24/ Malvin Zapata called from Dr Satya Stokes office and she reported that she sent request to PCP- Dr Mima Tipton to fax. She states that Dr Todd Darden was okay with just the cardiac clearance.

## 2022-08-25 NOTE — H&P
8/25/2022    Chief Complaint: Right knee pain    HPI: This is a 68 y.o. male who complains of right knee pain. Onset was gradual.  The patient has had activity dependent pain for years. The patient has tried activity modification, physical therapy exercises, injections have failed to provide relief. The pain is in the knee, it is severe in intensity. The patient feels unstable with the knee, fears falling, and has significant limitation with activities of daily living, recreation, and walks with a limp. Past Medical History:   Diagnosis Date    Abdominal adhesions 2000    Adverse effect of anesthesia     sleep apnea uses cpap machine       Ankylosing spondylitis (HCC)     Arthritis     all joints; Degenerative disk disease    CAD (coronary artery disease)     stents x4, followed by Dr. Nadia Tobin    Chronic constipation     Chronic pain     all my joints    CPAP (continuous positive airway pressure) dependence     at night    Diabetic neuropathy (HCC)     Type II    DISH (diffuse idiopathic skeletal hyperostosis)     Fibromyalgia     GERD (gastroesophageal reflux disease)     HLD (hyperlipidemia)     Hypertension     Neuropathy     hands/feet    S/P insertion of spinal cord stimulator     with remote- Been Removed    Sleep apnea     uses Cpap    Thyroid disease     low       Past Surgical History:   Procedure Laterality Date    COLONOSCOPY N/A 12/18/2017    COLONOS[facility] Stefany Catalan [facility] N/A 01/15/2019    COLONOSCOPY performed by Virgen Irby MD at [facility] ENDOSCOPY    HX APPENDECTOMY  1995    HX CORONARY STENT PLACEMENT  2014    HX HERNIA REPAIR      abdominal early 2000's    HX HIP REPLACEMENT Bilateral     2000, 2007    HX KNEE REPLACEMENT Right     partial right knee replacement    PA ABDOMEN SURGERY PROC UNLISTED  2000's    adhesions from appendix sx.     PA ABDOMEN SURGERY PROC UNLISTED  2000's    colectomy: when removing adhesions, nicked intestines, removed 8\" of intestine       No current facility-administered medications on file prior to encounter. Current Outpatient Medications on File Prior to Encounter   Medication Sig Dispense Refill    aspirin delayed-release 81 mg tablet Take 1 Tab by mouth two (2) times a day. (Patient taking differently: Take 81 mg by mouth daily.) 60 Tab 0    nitroglycerin (NITROSTAT) 0.4 mg SL tablet 1 Tab by SubLINGual route every five (5) minutes as needed for Chest Pain. Up to 3 doses. 20 Tab 0    losartan (COZAAR) 25 mg tablet Take 25 mg by mouth daily. primidone (MYSOLINE) 50 mg tablet Take 50 mg by mouth every evening. metFORMIN (GLUCOPHAGE) 1,000 mg tablet Take 1 Tab by mouth two (2) times daily (with meals). START TAKING 9/23 WITH DINNER (Patient taking differently: Take 1,000 mg by mouth two (2) times daily (with meals). Patient restarted on 8/19/22) 30 Tab 0    atorvastatin (LIPITOR) 80 mg tablet Take 80 mg by mouth nightly. hydroCHLOROthiazide (HYDRODIURIL) 25 mg tablet Take 25 mg by mouth daily. levothyroxine (SYNTHROID) 200 mcg tablet Take 200 mcg by mouth Daily (before breakfast). cholecalciferol, vitamin D3, (VITAMIN D3) 2,000 unit tab Take 2,000 Units by mouth daily. No Known Allergies    Family History   Problem Relation Age of Onset    Heart Disease Brother     Obesity Brother     Other Mother         hiatal hernia    Arthritis Mother         back, spine    Heart Disease Mother         Angina    Psychiatric Disorder Mother         depression    No Known Problems Father     Diabetes Sister 16        Type 1    Arthritis Sister     Cancer Neg Hx        Social History     Socioeconomic History    Marital status: LIFE PARTNER   Tobacco Use    Smoking status: Some Days     Packs/day: 0.25     Years: 40.00     Pack years: 10.00     Types: Cigarettes    Smokeless tobacco: Never   Vaping Use    Vaping Use: Never used   Substance and Sexual Activity    Alcohol use:  Yes     Alcohol/week: 6.0 standard drinks     Types: 4 Glasses of wine, 2 Shots of liquor per week     Comment: occ    Drug use: Not Currently     Types: Prescription, OTC    Sexual activity: Not Currently         Review of Systems:       General: Denies headache, lethargy, fever, weight loss  Ears/Nose/Throat: Denies ear discharge, drainage, nosebleeds, hoarse voice, dental problems  Cardiovascular: Denies chest pain, shortness of breath  Lungs: Denies chest pain, breathing problems, wheezing, pneumonia  Stomach: Denies stomach pain, heartburn, constipation, irritable bowel  Skin: Denies rash, sores, open wounds  Musculoskeletal: Admits to knee pain, no deformity. Genitourinary: Denies dysuria, hematuria, polyuria  Gastrointestinal: Denies constipation, obstipation, diarrhea  Neurological: Denies changes in sight, smell, hearing, taste, seizures. Denies loss of consciousness. Psychiatric: Denies depression, sleep pattern changes, anxiety, change in personality  Endocrine: Denies mood swings, heat or cold intolerance  Hematologic/Lymphatic: Denies anemia, purpura, petechia  Allergic/Immunologic: Denies swelling of throat, pain or swelling at lymph nodes      Physical Examination:    There were no vitals taken for this visit. General: AOX3, no apparent distress  Psychiatric: mood and affect appropriate  Lungs: breathing is symmetric and unlabored bilaterally  Heart: regular rate and rhythm  Abdomen: no guarding  Head: normocephalic, atraumatic  Skin: No significant abnormalities, good turgor  Sensation intact to light touch: L1-S1 dermatomes  Muscular exam: 5/5 strength in all major muscle groups unless noted in specialty exam.    Extremities:      Left upper extremity: Full active and passive range of motion without pain, deformity, no open wound, strength 5/5 in all major muscle groups.     Right upper extremity: Full active and passive range of motion without pain, deformity, no open wound, strength 5/5 in all major muscle groups. Left lower extremity: Full active and passive range of motion without pain, deformity, no open wound, strength 5/5 in all major muscle groups. Right lower extremity:  No deformity is noted. Range of motion of the knee is 0-120. Ligamentous testing of the knee indicates stability of the the MCL, LCL, PCL, and ACL. Lachman's, anterior and posterior drawer tests are specifically negative. Medial joint line tenderness to palpation. Popliteal area is unremarkable. 1+  for effusion. No patellar crepitus. Patella tracks centrally with a negative apprehension and grind test.  Pivot shift is negative. Strength testing is indicative of 5/5 strength at hip flexion, extension, knee flexion and extension, tibialis anterior, EHL, and FHL. Sensation is intact to light touch in the L1-S1 dermatomes. Capillary refill is less than 2 seconds in the toes. Diagnostics:    Pertinent Xrays:  Xrays are available of the right knee. Loosening of tibial component of medial compartment arthroplasty noted. Assessment: Mechanical failure, partial right knee    Plan: This patient and I did discuss the many options in treating knee pain. We did discuss that we could continue to seek out nonoperative modalities, such as: NSAIDs, oral and topical analgesics, knee injections, knee braces, physical therapy, stretching, strengthening, and weight loss strategies, activity modification, ambulatory assistive devices. The patient stated their understanding with this, but would like to proceed with surgical management in the form of a revision right total knee arthroplasty.   We did discuss the risks of surgery which include but are not limited to infection, nerve or blood vessel damage, failure of fixation, failure of any possible implant, need for reoperation, postoperative pain and swelling, intra-or postoperative fracture, postoperative dislocation, leg length inequality, need for reoperation, implant failure, death, disability, organ dysfunction, wound healing issues, DVT, PE, and the need for further procedures. The patient did freely state their understanding and satisfaction with our discussion. We will proceed after medical clearances. Mr. Ann Gandhi has a reminder for a \"due or due soon\" health maintenance. I have asked that he contact his primary care provider for follow-up on this health maintenance. The patient was counseled at length about the risks of manju Covid-19 during their perioperative period and any recovery window from their procedure. The patient was made aware that manju Covid-19  may worsen their prognosis for recovering from their procedure and lend to a higher morbidity and/or mortality risk. All material risks, benefits, and reasonable alternatives including postponing the procedure were discussed. The patient does  wish to proceed with the procedure at this time.

## 2022-08-26 ENCOUNTER — ANESTHESIA EVENT (OUTPATIENT)
Dept: SURGERY | Age: 77
DRG: 468 | End: 2022-08-26
Payer: MEDICARE

## 2022-08-26 NOTE — ANESTHESIA PREPROCEDURE EVALUATION
Anesthetic History   No history of anesthetic complications            Review of Systems / Medical History  Patient summary reviewed, nursing notes reviewed and pertinent labs reviewed    Pulmonary        Sleep apnea: CPAP        Comments: Former smoker - Quit 2014 - 20 pack years   Neuro/Psych             Comments: Neuropathy Cardiovascular    Hypertension          CAD (s/p stents x5), cardiac stents and hyperlipidemia    Exercise tolerance: >4 METS  Comments: ECG (1/30/20):  Normal sinus rhythm with sinus arrhythmia   Leftward axis    GI/Hepatic/Renal     GERD: well controlled           Endo/Other    Diabetes: type 2  Hypothyroidism: well controlled  Obesity and arthritis    Comments: Right Knee OA  Primary hypogonadism Other Findings   Comments: Fibromyalgia  Ankylosing spondylitis  Spinal cord stimulator placement s/p removal         Physical Exam    Airway  Mallampati: III  TM Distance: 4 - 6 cm  Neck ROM: decreased range of motion   Mouth opening: Normal    Comments: Neck soreness with neck movement Cardiovascular    Rhythm: regular  Rate: normal         Dental    Dentition: Caps/crowns, Bridges and Upper partial plate     Pulmonary  Breath sounds clear to auscultation               Abdominal  GI exam deferred       Other Findings            Anesthetic Plan    ASA: 3  Anesthesia type: general    Monitoring Plan: BIS  Post-op pain plan if not by surgeon: peripheral nerve block single    Induction: Intravenous  Anesthetic plan and risks discussed with: Patient      Glidescope, minimal neck movement with intubation  Will have pt position his head to a comfortable position prior to induction

## 2022-08-29 ENCOUNTER — ANESTHESIA (OUTPATIENT)
Dept: SURGERY | Age: 77
DRG: 468 | End: 2022-08-29
Payer: MEDICARE

## 2022-08-29 ENCOUNTER — HOSPITAL ENCOUNTER (INPATIENT)
Age: 77
LOS: 2 days | Discharge: HOME HEALTH | DRG: 468 | End: 2022-08-31
Attending: ORTHOPAEDIC SURGERY | Admitting: ORTHOPAEDIC SURGERY
Payer: MEDICARE

## 2022-08-29 ENCOUNTER — APPOINTMENT (OUTPATIENT)
Dept: GENERAL RADIOLOGY | Age: 77
DRG: 468 | End: 2022-08-29
Attending: ORTHOPAEDIC SURGERY
Payer: MEDICARE

## 2022-08-29 DIAGNOSIS — Z96.651 S/P REVISION OF TOTAL KNEE, RIGHT: Primary | ICD-10-CM

## 2022-08-29 PROBLEM — T84.092A: Status: ACTIVE | Noted: 2022-08-29

## 2022-08-29 LAB
GLUCOSE BLD STRIP.AUTO-MCNC: 133 MG/DL (ref 65–117)
GLUCOSE BLD STRIP.AUTO-MCNC: 154 MG/DL (ref 65–117)
GLUCOSE BLD STRIP.AUTO-MCNC: 250 MG/DL (ref 65–117)
SERVICE CMNT-IMP: ABNORMAL

## 2022-08-29 PROCEDURE — 74011000250 HC RX REV CODE- 250: Performed by: ORTHOPAEDIC SURGERY

## 2022-08-29 PROCEDURE — 76210000016 HC OR PH I REC 1 TO 1.5 HR: Performed by: ORTHOPAEDIC SURGERY

## 2022-08-29 PROCEDURE — 77030005513 HC CATH URETH FOL11 MDII -B: Performed by: ORTHOPAEDIC SURGERY

## 2022-08-29 PROCEDURE — 27487 REVISE/REPLACE KNEE JOINT: CPT | Performed by: ORTHOPAEDIC SURGERY

## 2022-08-29 PROCEDURE — 94760 N-INVAS EAR/PLS OXIMETRY 1: CPT

## 2022-08-29 PROCEDURE — 97530 THERAPEUTIC ACTIVITIES: CPT

## 2022-08-29 PROCEDURE — 77030033067 HC SUT PDO STRATFX SPIR J&J -B: Performed by: ORTHOPAEDIC SURGERY

## 2022-08-29 PROCEDURE — 97116 GAIT TRAINING THERAPY: CPT

## 2022-08-29 PROCEDURE — 77030018723 HC ELCTRD BLD COVD -A: Performed by: ORTHOPAEDIC SURGERY

## 2022-08-29 PROCEDURE — 74011250636 HC RX REV CODE- 250/636: Performed by: ANESTHESIOLOGY

## 2022-08-29 PROCEDURE — 77030031139 HC SUT VCRL2 J&J -A: Performed by: ORTHOPAEDIC SURGERY

## 2022-08-29 PROCEDURE — 73560 X-RAY EXAM OF KNEE 1 OR 2: CPT

## 2022-08-29 PROCEDURE — 87205 SMEAR GRAM STAIN: CPT

## 2022-08-29 PROCEDURE — 0SRC0J9 REPLACEMENT OF RIGHT KNEE JOINT WITH SYNTHETIC SUBSTITUTE, CEMENTED, OPEN APPROACH: ICD-10-PCS | Performed by: ORTHOPAEDIC SURGERY

## 2022-08-29 PROCEDURE — 77030026438 HC STYL ET INTUB CARD -A: Performed by: ANESTHESIOLOGY

## 2022-08-29 PROCEDURE — 0SPC0JZ REMOVAL OF SYNTHETIC SUBSTITUTE FROM RIGHT KNEE JOINT, OPEN APPROACH: ICD-10-PCS | Performed by: ORTHOPAEDIC SURGERY

## 2022-08-29 PROCEDURE — 77010033678 HC OXYGEN DAILY

## 2022-08-29 PROCEDURE — 74011000250 HC RX REV CODE- 250: Performed by: NURSE ANESTHETIST, CERTIFIED REGISTERED

## 2022-08-29 PROCEDURE — 97163 PT EVAL HIGH COMPLEX 45 MIN: CPT

## 2022-08-29 PROCEDURE — 77030006835 HC BLD SAW SAG STRY -B: Performed by: ORTHOPAEDIC SURGERY

## 2022-08-29 PROCEDURE — 97110 THERAPEUTIC EXERCISES: CPT

## 2022-08-29 PROCEDURE — 74011250637 HC RX REV CODE- 250/637: Performed by: ORTHOPAEDIC SURGERY

## 2022-08-29 PROCEDURE — 76060000035 HC ANESTHESIA 2 TO 2.5 HR: Performed by: ORTHOPAEDIC SURGERY

## 2022-08-29 PROCEDURE — 74011250636 HC RX REV CODE- 250/636: Performed by: NURSE ANESTHETIST, CERTIFIED REGISTERED

## 2022-08-29 PROCEDURE — 82962 GLUCOSE BLOOD TEST: CPT

## 2022-08-29 PROCEDURE — 76010000171 HC OR TIME 2 TO 2.5 HR INTENSV-TIER 1: Performed by: ORTHOPAEDIC SURGERY

## 2022-08-29 PROCEDURE — 77030008684 HC TU ET CUF COVD -B: Performed by: ANESTHESIOLOGY

## 2022-08-29 PROCEDURE — 74011250636 HC RX REV CODE- 250/636: Performed by: ORTHOPAEDIC SURGERY

## 2022-08-29 PROCEDURE — 77030013079 HC BLNKT BAIR HGGR 3M -A: Performed by: ANESTHESIOLOGY

## 2022-08-29 PROCEDURE — C1776 JOINT DEVICE (IMPLANTABLE): HCPCS | Performed by: ORTHOPAEDIC SURGERY

## 2022-08-29 PROCEDURE — 77030038692 HC WND DEB SYS IRMX -B: Performed by: ORTHOPAEDIC SURGERY

## 2022-08-29 PROCEDURE — 77030010785: Performed by: ORTHOPAEDIC SURGERY

## 2022-08-29 PROCEDURE — 77030000032 HC CUF TRNQT ZIMM -B: Performed by: ORTHOPAEDIC SURGERY

## 2022-08-29 PROCEDURE — 65270000046 HC RM TELEMETRY

## 2022-08-29 PROCEDURE — 87075 CULTR BACTERIA EXCEPT BLOOD: CPT

## 2022-08-29 PROCEDURE — C1713 ANCHOR/SCREW BN/BN,TIS/BN: HCPCS | Performed by: ORTHOPAEDIC SURGERY

## 2022-08-29 PROCEDURE — 77030018673: Performed by: ORTHOPAEDIC SURGERY

## 2022-08-29 PROCEDURE — 2709999900 HC NON-CHARGEABLE SUPPLY: Performed by: ORTHOPAEDIC SURGERY

## 2022-08-29 PROCEDURE — 77030011992 HC AIRWY NASOPHGL TELE -A: Performed by: ANESTHESIOLOGY

## 2022-08-29 PROCEDURE — 77030002933 HC SUT MCRYL J&J -A: Performed by: ORTHOPAEDIC SURGERY

## 2022-08-29 DEVICE — IMPLANT PAT DIA36MM THK10MM X3 SYMMETRIC TRIATHLON: Type: IMPLANTABLE DEVICE | Site: KNEE | Status: FUNCTIONAL

## 2022-08-29 DEVICE — KNEE K1 TOT HEMI STD CEM -- IMPL CAPPED K1: Type: IMPLANTABLE DEVICE | Site: KNEE | Status: FUNCTIONAL

## 2022-08-29 DEVICE — IMPLANTABLE DEVICE: Type: IMPLANTABLE DEVICE | Site: KNEE | Status: FUNCTIONAL

## 2022-08-29 DEVICE — PEG BNE FIX DST FEM MOD TRIATHLON: Type: IMPLANTABLE DEVICE | Site: KNEE | Status: FUNCTIONAL

## 2022-08-29 DEVICE — BASEPLT TIB REV UNIV SZ 6 R/L -- TRIATHLON: Type: IMPLANTABLE DEVICE | Site: KNEE | Status: FUNCTIONAL

## 2022-08-29 DEVICE — CEMENT BNE 20ML 41GM FULL DOSE PMMA W/ TOBRA M VISC RADPQ: Type: IMPLANTABLE DEVICE | Site: KNEE | Status: FUNCTIONAL

## 2022-08-29 DEVICE — COMPNT FEM POST STBL 5 R --: Type: IMPLANTABLE DEVICE | Site: KNEE | Status: FUNCTIONAL

## 2022-08-29 RX ORDER — ACETAMINOPHEN 500 MG
1000 TABLET ORAL EVERY 6 HOURS
Status: DISCONTINUED | OUTPATIENT
Start: 2022-08-29 | End: 2022-08-31 | Stop reason: HOSPADM

## 2022-08-29 RX ORDER — SODIUM CHLORIDE, SODIUM LACTATE, POTASSIUM CHLORIDE, CALCIUM CHLORIDE 600; 310; 30; 20 MG/100ML; MG/100ML; MG/100ML; MG/100ML
INJECTION, SOLUTION INTRAVENOUS
Status: DISCONTINUED | OUTPATIENT
Start: 2022-08-29 | End: 2022-08-29 | Stop reason: HOSPADM

## 2022-08-29 RX ORDER — OXYCODONE HYDROCHLORIDE 5 MG/1
10 TABLET ORAL
Status: DISCONTINUED | OUTPATIENT
Start: 2022-08-29 | End: 2022-08-31 | Stop reason: HOSPADM

## 2022-08-29 RX ORDER — SODIUM CHLORIDE 0.9 % (FLUSH) 0.9 %
5-40 SYRINGE (ML) INJECTION EVERY 8 HOURS
Status: DISCONTINUED | OUTPATIENT
Start: 2022-08-29 | End: 2022-08-29 | Stop reason: HOSPADM

## 2022-08-29 RX ORDER — ASPIRIN 325 MG
325 TABLET, DELAYED RELEASE (ENTERIC COATED) ORAL 2 TIMES DAILY
Status: DISCONTINUED | OUTPATIENT
Start: 2022-08-29 | End: 2022-08-31 | Stop reason: HOSPADM

## 2022-08-29 RX ORDER — NITROGLYCERIN 0.4 MG/1
0.4 TABLET SUBLINGUAL
Status: DISCONTINUED | OUTPATIENT
Start: 2022-08-29 | End: 2022-08-31 | Stop reason: HOSPADM

## 2022-08-29 RX ORDER — HYDROMORPHONE HYDROCHLORIDE 1 MG/ML
0.2 INJECTION, SOLUTION INTRAMUSCULAR; INTRAVENOUS; SUBCUTANEOUS
Status: DISCONTINUED | OUTPATIENT
Start: 2022-08-29 | End: 2022-08-29 | Stop reason: HOSPADM

## 2022-08-29 RX ORDER — GLYCOPYRROLATE 0.2 MG/ML
INJECTION INTRAMUSCULAR; INTRAVENOUS AS NEEDED
Status: DISCONTINUED | OUTPATIENT
Start: 2022-08-29 | End: 2022-08-29 | Stop reason: HOSPADM

## 2022-08-29 RX ORDER — SODIUM CHLORIDE 0.9 % (FLUSH) 0.9 %
5-40 SYRINGE (ML) INJECTION AS NEEDED
Status: DISCONTINUED | OUTPATIENT
Start: 2022-08-29 | End: 2022-08-29 | Stop reason: HOSPADM

## 2022-08-29 RX ORDER — TRANEXAMIC ACID 100 MG/ML
INJECTION, SOLUTION INTRAVENOUS AS NEEDED
Status: DISCONTINUED | OUTPATIENT
Start: 2022-08-29 | End: 2022-08-29 | Stop reason: HOSPADM

## 2022-08-29 RX ORDER — EPHEDRINE SULFATE/0.9% NACL/PF 50 MG/5 ML
SYRINGE (ML) INTRAVENOUS AS NEEDED
Status: DISCONTINUED | OUTPATIENT
Start: 2022-08-29 | End: 2022-08-29

## 2022-08-29 RX ORDER — ATORVASTATIN CALCIUM 40 MG/1
80 TABLET, FILM COATED ORAL
Status: DISCONTINUED | OUTPATIENT
Start: 2022-08-29 | End: 2022-08-31 | Stop reason: HOSPADM

## 2022-08-29 RX ORDER — POLYETHYLENE GLYCOL 3350 17 G/17G
17 POWDER, FOR SOLUTION ORAL DAILY
Status: DISCONTINUED | OUTPATIENT
Start: 2022-08-30 | End: 2022-08-31 | Stop reason: HOSPADM

## 2022-08-29 RX ORDER — FAMOTIDINE 20 MG/1
20 TABLET, FILM COATED ORAL 2 TIMES DAILY
Status: DISCONTINUED | OUTPATIENT
Start: 2022-08-29 | End: 2022-08-31 | Stop reason: HOSPADM

## 2022-08-29 RX ORDER — DEXAMETHASONE SODIUM PHOSPHATE 4 MG/ML
10 INJECTION, SOLUTION INTRA-ARTICULAR; INTRALESIONAL; INTRAMUSCULAR; INTRAVENOUS; SOFT TISSUE ONCE
Status: DISCONTINUED | OUTPATIENT
Start: 2022-08-29 | End: 2022-08-29 | Stop reason: HOSPADM

## 2022-08-29 RX ORDER — ACETAMINOPHEN 500 MG
1000 TABLET ORAL ONCE
Status: COMPLETED | OUTPATIENT
Start: 2022-08-29 | End: 2022-08-29

## 2022-08-29 RX ORDER — ONDANSETRON 2 MG/ML
INJECTION INTRAMUSCULAR; INTRAVENOUS AS NEEDED
Status: DISCONTINUED | OUTPATIENT
Start: 2022-08-29 | End: 2022-08-29 | Stop reason: HOSPADM

## 2022-08-29 RX ORDER — PREGABALIN 100 MG/1
200 CAPSULE ORAL 2 TIMES DAILY
Status: DISCONTINUED | OUTPATIENT
Start: 2022-08-29 | End: 2022-08-31 | Stop reason: HOSPADM

## 2022-08-29 RX ORDER — PROPOFOL 10 MG/ML
INJECTION, EMULSION INTRAVENOUS AS NEEDED
Status: DISCONTINUED | OUTPATIENT
Start: 2022-08-29 | End: 2022-08-29 | Stop reason: HOSPADM

## 2022-08-29 RX ORDER — ROPIVACAINE HYDROCHLORIDE 5 MG/ML
INJECTION, SOLUTION EPIDURAL; INFILTRATION; PERINEURAL AS NEEDED
Status: DISCONTINUED | OUTPATIENT
Start: 2022-08-29 | End: 2022-08-29 | Stop reason: HOSPADM

## 2022-08-29 RX ORDER — CELECOXIB 200 MG/1
200 CAPSULE ORAL ONCE
Status: COMPLETED | OUTPATIENT
Start: 2022-08-29 | End: 2022-08-29

## 2022-08-29 RX ORDER — ROCURONIUM BROMIDE 10 MG/ML
INJECTION, SOLUTION INTRAVENOUS AS NEEDED
Status: DISCONTINUED | OUTPATIENT
Start: 2022-08-29 | End: 2022-08-29 | Stop reason: HOSPADM

## 2022-08-29 RX ORDER — NALOXONE HYDROCHLORIDE 0.4 MG/ML
0.4 INJECTION, SOLUTION INTRAMUSCULAR; INTRAVENOUS; SUBCUTANEOUS AS NEEDED
Status: DISCONTINUED | OUTPATIENT
Start: 2022-08-29 | End: 2022-08-31 | Stop reason: HOSPADM

## 2022-08-29 RX ORDER — ONDANSETRON 2 MG/ML
4 INJECTION INTRAMUSCULAR; INTRAVENOUS AS NEEDED
Status: DISCONTINUED | OUTPATIENT
Start: 2022-08-29 | End: 2022-08-29 | Stop reason: HOSPADM

## 2022-08-29 RX ORDER — SODIUM CHLORIDE 0.9 % (FLUSH) 0.9 %
5-40 SYRINGE (ML) INJECTION AS NEEDED
Status: DISCONTINUED | OUTPATIENT
Start: 2022-08-29 | End: 2022-08-31 | Stop reason: HOSPADM

## 2022-08-29 RX ORDER — PRIMIDONE 50 MG/1
50 TABLET ORAL EVERY EVENING
Status: DISCONTINUED | OUTPATIENT
Start: 2022-08-29 | End: 2022-08-31 | Stop reason: HOSPADM

## 2022-08-29 RX ORDER — SODIUM CHLORIDE 9 MG/ML
125 INJECTION, SOLUTION INTRAVENOUS CONTINUOUS
Status: DISPENSED | OUTPATIENT
Start: 2022-08-29 | End: 2022-08-30

## 2022-08-29 RX ORDER — HYDROMORPHONE HYDROCHLORIDE 2 MG/ML
INJECTION, SOLUTION INTRAMUSCULAR; INTRAVENOUS; SUBCUTANEOUS AS NEEDED
Status: DISCONTINUED | OUTPATIENT
Start: 2022-08-29 | End: 2022-08-29 | Stop reason: HOSPADM

## 2022-08-29 RX ORDER — MELATONIN
2000 DAILY
Status: DISCONTINUED | OUTPATIENT
Start: 2022-08-30 | End: 2022-08-31 | Stop reason: HOSPADM

## 2022-08-29 RX ORDER — METFORMIN HYDROCHLORIDE 500 MG/1
1000 TABLET ORAL 2 TIMES DAILY WITH MEALS
Status: DISCONTINUED | OUTPATIENT
Start: 2022-08-29 | End: 2022-08-31 | Stop reason: HOSPADM

## 2022-08-29 RX ORDER — ACETAMINOPHEN 325 MG/1
650 TABLET ORAL
Status: DISCONTINUED | OUTPATIENT
Start: 2022-08-29 | End: 2022-08-31 | Stop reason: HOSPADM

## 2022-08-29 RX ORDER — LEVOTHYROXINE SODIUM 100 UG/1
200 TABLET ORAL
Status: DISCONTINUED | OUTPATIENT
Start: 2022-08-30 | End: 2022-08-31 | Stop reason: HOSPADM

## 2022-08-29 RX ORDER — AMOXICILLIN 250 MG
1 CAPSULE ORAL 2 TIMES DAILY
Status: DISCONTINUED | OUTPATIENT
Start: 2022-08-29 | End: 2022-08-31 | Stop reason: HOSPADM

## 2022-08-29 RX ORDER — DIPHENHYDRAMINE HYDROCHLORIDE 50 MG/ML
12.5 INJECTION, SOLUTION INTRAMUSCULAR; INTRAVENOUS
Status: ACTIVE | OUTPATIENT
Start: 2022-08-29 | End: 2022-08-30

## 2022-08-29 RX ORDER — FENTANYL CITRATE 50 UG/ML
INJECTION, SOLUTION INTRAMUSCULAR; INTRAVENOUS AS NEEDED
Status: DISCONTINUED | OUTPATIENT
Start: 2022-08-29 | End: 2022-08-29 | Stop reason: HOSPADM

## 2022-08-29 RX ORDER — OXYCODONE HYDROCHLORIDE 5 MG/1
5 TABLET ORAL
Status: DISCONTINUED | OUTPATIENT
Start: 2022-08-29 | End: 2022-08-31 | Stop reason: HOSPADM

## 2022-08-29 RX ORDER — FACIAL-BODY WIPES
10 EACH TOPICAL DAILY PRN
Status: DISCONTINUED | OUTPATIENT
Start: 2022-08-31 | End: 2022-08-31 | Stop reason: HOSPADM

## 2022-08-29 RX ORDER — HYDROMORPHONE HYDROCHLORIDE 1 MG/ML
0.5 INJECTION, SOLUTION INTRAMUSCULAR; INTRAVENOUS; SUBCUTANEOUS
Status: DISPENSED | OUTPATIENT
Start: 2022-08-29 | End: 2022-08-30

## 2022-08-29 RX ORDER — FENTANYL CITRATE 50 UG/ML
25 INJECTION, SOLUTION INTRAMUSCULAR; INTRAVENOUS
Status: DISCONTINUED | OUTPATIENT
Start: 2022-08-29 | End: 2022-08-29 | Stop reason: HOSPADM

## 2022-08-29 RX ORDER — ONDANSETRON 2 MG/ML
4 INJECTION INTRAMUSCULAR; INTRAVENOUS
Status: ACTIVE | OUTPATIENT
Start: 2022-08-29 | End: 2022-08-30

## 2022-08-29 RX ORDER — SODIUM CHLORIDE, SODIUM LACTATE, POTASSIUM CHLORIDE, CALCIUM CHLORIDE 600; 310; 30; 20 MG/100ML; MG/100ML; MG/100ML; MG/100ML
25 INJECTION, SOLUTION INTRAVENOUS CONTINUOUS
Status: DISCONTINUED | OUTPATIENT
Start: 2022-08-29 | End: 2022-08-29 | Stop reason: HOSPADM

## 2022-08-29 RX ORDER — SODIUM CHLORIDE 0.9 % (FLUSH) 0.9 %
5-40 SYRINGE (ML) INJECTION EVERY 8 HOURS
Status: DISCONTINUED | OUTPATIENT
Start: 2022-08-29 | End: 2022-08-31 | Stop reason: HOSPADM

## 2022-08-29 RX ORDER — KETOROLAC TROMETHAMINE 30 MG/ML
15 INJECTION, SOLUTION INTRAMUSCULAR; INTRAVENOUS EVERY 6 HOURS
Status: DISPENSED | OUTPATIENT
Start: 2022-08-29 | End: 2022-08-30

## 2022-08-29 RX ORDER — EPHEDRINE SULFATE/0.9% NACL/PF 50 MG/5 ML
SYRINGE (ML) INTRAVENOUS AS NEEDED
Status: DISCONTINUED | OUTPATIENT
Start: 2022-08-29 | End: 2022-08-29 | Stop reason: HOSPADM

## 2022-08-29 RX ORDER — LIDOCAINE HYDROCHLORIDE 20 MG/ML
INJECTION, SOLUTION EPIDURAL; INFILTRATION; INTRACAUDAL; PERINEURAL AS NEEDED
Status: DISCONTINUED | OUTPATIENT
Start: 2022-08-29 | End: 2022-08-29 | Stop reason: HOSPADM

## 2022-08-29 RX ADMIN — CEFAZOLIN 2 G: 1 INJECTION, POWDER, FOR SOLUTION INTRAMUSCULAR; INTRAVENOUS at 18:10

## 2022-08-29 RX ADMIN — HYDROMORPHONE HYDROCHLORIDE 0.5 MG: 2 INJECTION, SOLUTION INTRAMUSCULAR; INTRAVENOUS; SUBCUTANEOUS at 10:37

## 2022-08-29 RX ADMIN — FENTANYL CITRATE 50 MCG: 50 INJECTION, SOLUTION INTRAMUSCULAR; INTRAVENOUS at 09:50

## 2022-08-29 RX ADMIN — Medication 1 AMPULE: at 21:28

## 2022-08-29 RX ADMIN — SODIUM CHLORIDE, POTASSIUM CHLORIDE, SODIUM LACTATE AND CALCIUM CHLORIDE: 600; 310; 30; 20 INJECTION, SOLUTION INTRAVENOUS at 09:35

## 2022-08-29 RX ADMIN — ACETAMINOPHEN 1000 MG: 500 TABLET ORAL at 18:09

## 2022-08-29 RX ADMIN — FAMOTIDINE 20 MG: 20 TABLET, FILM COATED ORAL at 18:08

## 2022-08-29 RX ADMIN — PROPOFOL 50 MG: 10 INJECTION, EMULSION INTRAVENOUS at 10:25

## 2022-08-29 RX ADMIN — DOCUSATE SODIUM 50MG AND SENNOSIDES 8.6MG 1 TABLET: 8.6; 5 TABLET, FILM COATED ORAL at 18:09

## 2022-08-29 RX ADMIN — ATORVASTATIN CALCIUM 80 MG: 40 TABLET, FILM COATED ORAL at 21:28

## 2022-08-29 RX ADMIN — WATER 2 G: 1 INJECTION INTRAMUSCULAR; INTRAVENOUS; SUBCUTANEOUS at 10:01

## 2022-08-29 RX ADMIN — Medication 1000 MG: at 08:32

## 2022-08-29 RX ADMIN — HYDROMORPHONE HYDROCHLORIDE 0.5 MG: 1 INJECTION, SOLUTION INTRAMUSCULAR; INTRAVENOUS; SUBCUTANEOUS at 15:22

## 2022-08-29 RX ADMIN — Medication 3 AMPULE: at 08:32

## 2022-08-29 RX ADMIN — LIDOCAINE HYDROCHLORIDE 100 MG: 20 INJECTION, SOLUTION EPIDURAL; INFILTRATION; INTRACAUDAL; PERINEURAL at 09:50

## 2022-08-29 RX ADMIN — PRIMIDONE 50 MG: 50 TABLET ORAL at 18:08

## 2022-08-29 RX ADMIN — HYDROMORPHONE HYDROCHLORIDE 1 MG: 2 INJECTION, SOLUTION INTRAMUSCULAR; INTRAVENOUS; SUBCUTANEOUS at 10:20

## 2022-08-29 RX ADMIN — PROPOFOL 40 MG: 10 INJECTION, EMULSION INTRAVENOUS at 09:55

## 2022-08-29 RX ADMIN — PROPOFOL 30 MG: 10 INJECTION, EMULSION INTRAVENOUS at 09:34

## 2022-08-29 RX ADMIN — ROCURONIUM BROMIDE 5 MG: 10 INJECTION INTRAVENOUS at 09:50

## 2022-08-29 RX ADMIN — ROCURONIUM BROMIDE 35 MG: 10 INJECTION INTRAVENOUS at 09:56

## 2022-08-29 RX ADMIN — SODIUM CHLORIDE, PRESERVATIVE FREE 10 ML: 5 INJECTION INTRAVENOUS at 21:28

## 2022-08-29 RX ADMIN — SODIUM CHLORIDE 125 ML/HR: 9 INJECTION, SOLUTION INTRAVENOUS at 12:07

## 2022-08-29 RX ADMIN — PROPOFOL 160 MG: 10 INJECTION, EMULSION INTRAVENOUS at 09:50

## 2022-08-29 RX ADMIN — ROPIVACAINE HYDROCHLORIDE 20 ML: 5 INJECTION, SOLUTION EPIDURAL; INFILTRATION; PERINEURAL at 09:38

## 2022-08-29 RX ADMIN — PREGABALIN 200 MG: 100 CAPSULE ORAL at 21:28

## 2022-08-29 RX ADMIN — ASPIRIN 325 MG: 325 TABLET, COATED ORAL at 18:09

## 2022-08-29 RX ADMIN — Medication 10 MG: at 11:13

## 2022-08-29 RX ADMIN — SODIUM CHLORIDE, PRESERVATIVE FREE 10 ML: 5 INJECTION INTRAVENOUS at 18:12

## 2022-08-29 RX ADMIN — TRANEXAMIC ACID 1 G: 100 INJECTION, SOLUTION INTRAVENOUS at 10:05

## 2022-08-29 RX ADMIN — PROPOFOL 50 MG: 10 INJECTION, EMULSION INTRAVENOUS at 10:29

## 2022-08-29 RX ADMIN — Medication 10 MG: at 10:06

## 2022-08-29 RX ADMIN — CELECOXIB 200 MG: 200 CAPSULE ORAL at 08:32

## 2022-08-29 RX ADMIN — KETOROLAC TROMETHAMINE 15 MG: 30 INJECTION, SOLUTION INTRAMUSCULAR at 18:10

## 2022-08-29 RX ADMIN — SUGAMMADEX 200 MG: 100 INJECTION, SOLUTION INTRAVENOUS at 11:29

## 2022-08-29 RX ADMIN — GLYCOPYRROLATE 0.2 MG: 0.2 INJECTION, SOLUTION INTRAMUSCULAR; INTRAVENOUS at 10:09

## 2022-08-29 RX ADMIN — SODIUM CHLORIDE, POTASSIUM CHLORIDE, SODIUM LACTATE AND CALCIUM CHLORIDE: 600; 310; 30; 20 INJECTION, SOLUTION INTRAVENOUS at 11:11

## 2022-08-29 RX ADMIN — ONDANSETRON HYDROCHLORIDE 4 MG: 2 INJECTION, SOLUTION INTRAMUSCULAR; INTRAVENOUS at 11:11

## 2022-08-29 RX ADMIN — METFORMIN HYDROCHLORIDE 1000 MG: 500 TABLET ORAL at 18:09

## 2022-08-29 NOTE — ANESTHESIA POSTPROCEDURE EVALUATION
Procedure(s):  REVISION RIGHT TOTAL KNEE ARTHROPLASTY. general    Anesthesia Post Evaluation      Multimodal analgesia: multimodal analgesia used between 6 hours prior to anesthesia start to PACU discharge  Patient location during evaluation: PACU  Level of consciousness: sleepy but conscious  Pain management: adequate  Airway patency: patent  Anesthetic complications: no  Cardiovascular status: acceptable  Respiratory status: acceptable  Hydration status: acceptable  Comments: +Post-Anesthesia Evaluation and Assessment    Patient: Mark Buckley MRN: [id number]  SSN: [id number]   YOB: 1945  Age: 68 y.o. Sex: male      Cardiovascular Function/Vital Signs    BP (!) 119/56 (BP 1 Location: Right upper arm, BP Patient Position: At rest)   Pulse 62   Temp 36.5 °C (97.7 °F)   Resp 14   Ht 5' 7.5\" (1.715 m)   Wt 111.8 kg (246 lb 7.6 oz)   SpO2 100%   BMI 38.03 kg/m²     Patient is status post Procedure(s):  REVISION RIGHT TOTAL KNEE ARTHROPLASTY. Nausea/Vomiting: Controlled. Postoperative hydration reviewed and adequate. Pain:  Pain Scale 1: Numeric (0 - 10) (08/29/22 1230)  Pain Intensity 1: 1 (08/29/22 1230)   Managed. Neurological Status:   Neuro (WDL): Exceptions to WDL (08/29/22 1155)   At baseline. Mental Status and Level of Consciousness: Arousable. Pulmonary Status:   O2 Device: Nasal cannula (08/29/22 1230)   Adequate oxygenation and airway patent. Complications related to anesthesia: None    Post-anesthesia assessment completed. No concerns. Signed By: French Roth MD    8/29/2022  Post anesthesia nausea and vomiting:  controlled  Final Post Anesthesia Temperature Assessment:  Normothermia (36.0-37.5 degrees C)      INITIAL Post-op Vital signs:   Vitals Value Taken Time   /56 08/29/22 1230   Temp 36.5 °C (97.7 °F) 08/29/22 1153   Pulse 57 08/29/22 1238   Resp 15 08/29/22 1238   SpO2 97 % 08/29/22 1238   Vitals shown include unvalidated device data.

## 2022-08-29 NOTE — PROGRESS NOTES
Ortho / Neurosurgery NP Note    POD# 0  s/p REVISION RIGHT TOTAL KNEE ARTHROPLASTY   Pt seen with no visitor present. Pt resting in bed, in NAD. Reading a book. Recently seen by PT and will need additional session in am.   Reports appropriate post-op pain. No other complaints. Tolerating regular diet. No nausea. VSS Afebrile. 2L NC. Visit Vitals  BP (!) 125/58 (BP 1 Location: Right upper arm, BP Patient Position: At rest)   Pulse 66   Temp 97.4 °F (36.3 °C)   Resp 14   Ht 5' 7.5\" (1.715 m)   Wt 111.8 kg (246 lb 7.6 oz)   SpO2 100%   BMI 38.03 kg/m²       Voiding status: due to void   Unmeasurable Output  Urine Occurrence(s): 1 (08/29/22 0907)      Labs    Lab Results   Component Value Date/Time    HGB 14.4 11/23/2020 10:40 AM      Lab Results   Component Value Date/Time    INR 1.0 08/22/2022 08:06 AM      Lab Results   Component Value Date/Time    Sodium 139 11/23/2020 10:40 AM    Potassium 4.0 11/23/2020 10:40 AM    Chloride 106 11/23/2020 10:40 AM    CO2 27 11/23/2020 10:40 AM    Glucose 179 (H) 11/23/2020 10:40 AM    BUN 15 11/23/2020 10:40 AM    Creatinine 0.88 11/23/2020 10:40 AM    Calcium 9.7 11/23/2020 10:40 AM     Recent Glucose Results:   Lab Results   Component Value Date/Time    GLUCPOC 154 (H) 08/29/2022 12:01 PM    GLUCPOC 133 (H) 08/29/2022 08:53 AM     Intra-op cultures pending       Body mass index is 38.03 kg/m². : A BMI > 30 is classified as obesity and > 40 is classified as morbid obesity. Dressing c. d. I - ace wrap in place   Cryotherapy in place over incision  Calves soft and supple; No pain with passive stretch  Sensation and motor intact.  +PF/DF/EHL intact 5/5  Reports baseline neuropathy in andrés feet, unchanged from PTA  SCDs for mechanical DVT proph while in bed     PLAN:  1) PT BID - WBAT  2) Aspirin 325 mg PO BID for DVT Prophylaxis   3) GI Prophylaxis - Pepcid  4) Readniess for discharge:     [x] Vital Signs stable    [] Hgb stable    [] + Voiding    [x] Wound intact, drainage minimal    [x] Tolerating PO intake     [] Cleared by PT (OT if applicable)     [] Stair training completed (if applicable)    [] Independent / Contact Guard Assist (household distance)     [] Bed mobility     [] Car transfers     [] ADLs    [x] Adequate pain control on oral medication alone     Routine post-op care. Discharge pending voiding status and PT clearance. Plans to return home with [facility] and significant other's support.      Elva Wright NP

## 2022-08-29 NOTE — ANESTHESIA PROCEDURE NOTES
Right adductor canal peripheral nerve block    Start time: 8/29/2022 9:35 AM  End time: 8/29/2022 9:38 AM  Performed by: Daniel Leonardo MD  Authorized by: Daniel Leonardo MD       Pre-procedure: Indications: at surgeon's request and post-op pain management    Preanesthetic Checklist: patient identified, risks and benefits discussed, site marked, timeout performed, anesthesia consent given, patient being monitored and fire risk safety assessment completed and verbalized      Block Type:   Block Type:   Adductor canal block  Laterality:  Right  Monitoring:  Standard ASA monitoring, continuous pulse ox, frequent vital sign checks, heart rate, responsive to questions and oxygen  Injection Technique:  Single shot  Procedures: ultrasound guided    Patient Position: supine  Prep: chlorhexidine    Location:  Mid thigh  Needle Type:  Stimuplex  Needle Gauge:  20 G  Needle Localization:  Anatomical landmarks and ultrasound guidance  Med Admin Time: 8/29/2022 9:38 AM    Assessment:  Number of attempts:  1  Injection Assessment:  Incremental injection every 5 mL, no paresthesia, ultrasound image on chart, local visualized surrounding nerve on ultrasound, negative aspiration for blood and no intravascular symptoms  Patient tolerance:  Patient tolerated the procedure well with no immediate complications  Excellent visualization on US  20 ml 0.5% ropivacaine administered

## 2022-08-29 NOTE — PERIOP NOTES
Handoff Report from Operating Room to PACU    Report received from [facility] and [place] [facility] regarding Chaitanya Zimmer. Surgeon(s):  Mojgan Sal DO  And Procedure(s) (LRB):  REVISION RIGHT TOTAL KNEE ARTHROPLASTY (Right)  confirmed   with allergies and dressings discussed. Anesthesia type, drugs, patient history, complications, estimated blood loss, vital signs, intake and output, and last pain medication, lines, reversal medications, and temperature were reviewed. 1212- Xray at bedside. 1300- TRANSFER - OUT REPORT:    Verbal report given to Brice Bence RN(name) on Chaitanya Zimmer  being transferred to [facility](unit) for routine post - op       Report consisted of patients Situation, Background, Assessment and   Recommendations(SBAR). Information from the following report(s) OR Summary, Procedure Summary, Intake/Output, and MAR was reviewed with the receiving nurse. Lines:   Peripheral IV 08/29/22 Anterior; Left Forearm (Active)   Site Assessment Clean, dry, & intact 08/29/22 1245   Phlebitis Assessment 0 08/29/22 1245   Infiltration Assessment 0 08/29/22 1245   Dressing Status Clean, dry, & intact 08/29/22 1245   Dressing Type Tape;Transparent 08/29/22 1245   Hub Color/Line Status Pink; Infusing 08/29/22 1245        Opportunity for questions and clarification was provided.       Patient transported with:   O2 @ 2l liters  Tech  Chart   Personal belognings

## 2022-08-29 NOTE — DISCHARGE INSTRUCTIONS
Discharge Instructions:  Mag Frausto    Surgery: REVISION RIGHT TOTAL KNEE ARTHROPLASTY (GENERAL WITH ADDUCTOR CANAL BLOCK)  Surgeon:   Dr. Monico Vo  Surgery Date:  8/29/2022    To relieve pain:  Use ice/gel packs.    -Put the ice pack directly over the wound, or anywhere you are hurting or swollen.   -To control pain and swelling, keep ice on regularly, especially after physical activity.  -The packs should stay cold for 3-4 hours. When it is not cold anymore, rotate with the packs in the freezer. Elevate your leg. This will also keep swelling down. Rest for at least 20 minutes between activity or exercises. To keep track of your pain medications, write down what you take and when you take it. The last dose of pain medication you got in the hospital was:     Medication    Dose    Date & Time      Choose your medications based on the pain scale below: To keep your pain under control, take Tylenol every 6 hours for 14 days - even if you feel like you dont need it. For mild to moderate pain (4-6 on pain scale), take one pain pill every 4 hours or as instructed. For severe pain (7-10 on pain scale), take two pain pills every 4 hours or as instructed. To prevent nausea, take your pain medications with food. Pain Scale              As your pain lessens:    Slowly start taking less pain medication. You may do this by waiting longer between doses or by taking smaller doses. Stop using the pain medications as soon as you no longer need it, usually in 2-3 weeks. Aspirin  To prevent blood clots, you will need to take Aspirin 325 mg twice a day for 30 days. To prevent stomach upset or bleeding:  Do not take non-steroidal anti-inflammatory medications (Ibuprofen, Advil, Motrin, Naproxen, etc.)   Take Pepcid 20 mg twice a day, or a similar home medication, while you are taking a blood thinner.          Keep your waterproof dressing in place. It will be removed by your surgeon during your follow-up appointment in 2 weeks. You may need to change the dressing if you are having drainage to where the dressing is no longer intact. You will be given an extra dressing to use at home. You will have some swelling, warmth, and bruising around the incision and up and down the leg after surgery. This will may get worse in the first few days you are home and will slowly get better over the next few weeks. You may shower with this dressing over your wound. After showering pat the dressing dry. DO NOT's:  Do not rub your surgical wound  Do not put lotion or oils on your wound. Do not take a tub bath or go swimming until your doctor says it is ok. To increase and promote healing:  Stop Smoking (or at least cut back on smoking). Eat a well-balanced diet. High in protein and Vitamin C. If you have a poor appetite, supplement your diet by drinking Ensure, Glucerna or [place] Instant Breakfast for the next 30 days     If you are a diabetic, control you blood sugars to prevent infection and help your wound heal.                     Prevent Infection:    Wash your hands                       -This is the most important thing you or your caregiver can do. -Wash your hands with soap and water (or use the hand ) before you touch any wounds. Shower  -Use the antibacterial soap we gave you when you take a shower.   -Shower with this soap until your wounds are healed. Use Clean Sheets   -Put freshly cleaned sheets on your bed after surgery.   -To keep the surgery site clean, do not allow pets to sleep with you while your wound is still healing. To prevent constipation, stay active and drink plenty of fluid. While using pain medications, you should also take stool softeners and laxatives, such as Pericolace and Miralax.        If you are having too many bowel movements, then you may need to stop taking the laxatives. You should have a bowel movement 3-4 days after surgery and then at least every other day while on pain medication. To improve your recovery, you must be active! Use your walker and take short walks (in your home) about every 2 hours during the day. Try to increase how far you walk each day. You can put as much weight on your leg as you can tolerate while walking. Home health physical therapy will come to your home the day after you leave the hospital. The therapist will visit about 4 times over the next couple of weeks to teach you exercises, how to get out of bed and how to safely walk in your home. NO DRIVING until your surgeon tells you it is ok. You can return to work when cleared by a physician. Please call your physician immediately if you have:  Constant bleeding from your wound. Increasing redness or swelling around your wound (some warmth, bruising and swelling is normal). Change in wound drainage (increase in amount, color, or bad smell). Change in mental status (unusual behavior). Temperature over 101.5 degrees Fahrenheit   Pain or redness in the calf (back of your lower leg)  Increased swelling of the thigh, ankle, calf, or foot. Emergency! CALL 911 if you have:  Shortness of breath  Chest pain when you cough or taking a deep breath        Please call your surgeons office at [phone] for a follow up appointment in 2 weeks. You should call as soon as you get home or the next day after discharge. If you have questions or concerns during normal business hours, you may reach Dr. Rogerio Anna at [phone].         Instructions for 24 hours after receiving General Anesthesia or Intravenous Sedation,   and while taking prescription Narcotics    Common side effects associated with each of these medications includes:  - Drowsiness, dizziness, euphoria, sleepiness or confusion  - Impaired memory recall  - Unsteady gait, loss of fine muscle control and delayed reaction time  - Visual disturbances, difficulty focusing, blurred vision    You may experience some of these side effects or you may not be aware of subtle changes in your behavior or reaction time. Because you received these medications, we are giving you the following instructions. Discharge Instructions:   - Someone should be with you for the next 24 hours  - For your own safety, a responsible adult must drive you home  - Do not consume alcoholic beverages   - Do not make important personal, legal or business decisions for 24 hours  - Move slowly and carefully, do not make sudden position changes. Be alert for dizziness or lightheadedness and move accordingly. - Do not operate equipment for 24 hours - [employer], power tools, Kitchen accessories: stove, etc.  - If you have not urinated within 8 hours after discharge, please contact your surgeon's office.

## 2022-08-29 NOTE — PROGRESS NOTES
Problem: Mobility Impaired (Adult and Pediatric)  Goal: *Acute Goals and Plan of Care (Insert Text)  Description: FUNCTIONAL STATUS PRIOR TO ADMISSION: Patient was standby assistance using a rolling walker for functional mobility. Patient required minimal assistance for basic and instrumental ADLs. HOME SUPPORT PRIOR TO ADMISSION: The patient lived with life partner who assisted as needed with ADL's and mobility. Patient reports 3 falls in the last year. Physical Therapy Goals  Initiated 8/29/2022    1. Patient will move from supine to sit and sit to supine , scoot up and down, and roll side to side in bed with modified independence within 4 days. 2. Patient will perform sit to stand with modified independence within 4 days. 3. Patient will ambulate with supervision/set-up for 350 feet with the least restrictive device within 4 days. 4. Patient will ascend/descend 3 stairs with 1 handrail(s) with contact guard assist within 4 days. 5. Patient will perform home exercise program per protocol with supervision/set-up within 4 days. 6. Patient will demonstrate AROM 0-90 degrees in operative joint within 4 days. Outcome: Not Met   PHYSICAL THERAPY EVALUATION  Patient: Carson Amanda (89 y.o. 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 Patient was lying in bed when PT arrived and agreed to evaluation and treatment. Started patient on TKR exercises with good tolerance. Came to sitting with cg assistance and additional time. Sitting balance was intact.  Needed mod a x 2 to come to full standing due to B knees began to buckle. Once standing he needed cues to lock each knee fully with R knee being the most challenging for patient. Patient cleared the Egress test and advanced to gait assessment/treatment using RW. He needed minimal assistance with cues to lock R knee in stance phase of gait which gradually improved as training progressed. He stopped in the bathroom to void into a urinal and remained standing during this time, then ambulated back to bed as he reported feeling fatigued. VSS in all positioned. Returned to bed with cg assistance for RLE. Applied a fresh ice pack to R knee. All patients needs were met when the session ended. Will likely clear PT after 1-2 more sessions. Current Level of Function Impacting Discharge (mobility/balance): cg supine to sit to supine; mod a x 2 sit to stand; min a ambulation with RW 40 feet. Functional Outcome Measure: The patient scored 50/100 on the Barthel outcome measure which is indicative of being partially dependent for ADL and mobility skills at this time. Other factors to consider for discharge: high risk for falls with 3 falls this year, supportive life partner will assist patient once home; sba ambulation with RW PLOF     Patient will benefit from skilled therapy intervention to address the above noted impairments. PLAN :  Recommendations and Planned Interventions: bed mobility training, transfer training, gait training, therapeutic exercises, neuromuscular re-education, modalities, edema management/control, patient and family training/education, and therapeutic activities      Frequency/Duration: Patient will be followed by physical therapy:  twice daily to address goals. Recommendation for discharge: (in order for the patient to meet his/her long term goals)  Physical therapy at least 2 days/week in the home AND ensure assist and/or supervision for safety with mobility.     This discharge recommendation:  Has been made in collaboration with the attending provider and/or case management    IF patient discharges home will need the following DME: patient owns DME required for discharge       SUBJECTIVE:   Patient stated  my partner will be home to help me.     OBJECTIVE DATA SUMMARY:   HISTORY:    Past Medical History:   Diagnosis Date    Abdominal adhesions 2000    Adverse effect of anesthesia     sleep apnea uses cpap machine       Ankylosing spondylitis (HCC)     Arthritis     all joints; Degenerative disk disease    CAD (coronary artery disease)     stents x4, followed by Dr. Castellanos Mercury    Chronic constipation     Chronic pain     all my joints    CPAP (continuous positive airway pressure) dependence     at night    Diabetic neuropathy (HCC)     Type II    DISH (diffuse idiopathic skeletal hyperostosis)     Fibromyalgia     GERD (gastroesophageal reflux disease)     HLD (hyperlipidemia)     Hypertension     Neuropathy     hands/feet    S/P insertion of spinal cord stimulator     with remote- Been Removed    Sleep apnea     uses Cpap    Thyroid disease     low     Past Surgical History:   Procedure Laterality Date    COLONOSCOPY N/A 12/18/2017    COLONOSCOPY performed by Hal Blue MD at [address] N/A 01/15/2019    COLONOSCOPY performed by Shannan Strange MD at [facility] ENDOSCOPY    HX APPENDECTOMY  1995    HX CORONARY STENT PLACEMENT  2014    HX HERNIA REPAIR      abdominal early 2000's    HX HIP REPLACEMENT Bilateral     2000, 2007    HX KNEE REPLACEMENT Right     partial right knee replacement    MO ABDOMEN SURGERY PROC UNLISTED  2000's    adhesions from appendix sx.     MO ABDOMEN SURGERY PROC UNLISTED  2000's    colectomy: when removing adhesions, nicked intestines, removed 8\" of intestine       Personal factors and/or comorbidities impacting plan of care: cad, fibromyalgia, dm, neuropathy, ken    Home Situation  Home Environment: Private residence  # Steps to Enter: 3  Rails to Enter: No  Wheelchair Ramp: No  One/Two Story Residence: Two story, live on 1st floor  Lift Chair Available: No  Living Alone: No  Support Systems: Spouse/Significant Other (life partner)  Patient Expects to be Discharged to[de-identified] Home with family assistance  Current DME Used/Available at Home: Donnamae Gerardo, rolling, Cane, straight  Tub or Shower Type: Tub/Shower combination    EXAMINATION/PRESENTATION/DECISION MAKING:   Critical Behavior:  Neurologic State: Alert  Orientation Level: Oriented X4  Cognition: Appropriate decision making, Appropriate for age attention/concentration, Appropriate safety awareness, Follows commands  Safety/Judgement: Decreased insight into deficits, Fall prevention, Good awareness of safety precautions  Hearing:   good  Skin:  R knee ace wrap dressing is cdi  Edema: moderate R knee  Range Of Motion:  AROM: Generally decreased, functional           PROM: Generally decreased, functional (R knee 0 to 85 degrees)           Strength:    Strength: Generally decreased, functional (R quad 2/5)                    Tone & Sensation:   Tone: Normal              Sensation: Intact               Coordination:  Coordination: Generally decreased, functional  Vision:   Acuity: Within Defined Limits  Functional Mobility:  Bed Mobility:     Supine to Sit: Contact guard assistance; Additional time  Sit to Supine: Contact guard assistance; Additional time  Scooting: Stand-by assistance  Transfers:  Sit to Stand: Moderate assistance;Assist x2  Stand to Sit: Minimum assistance                       Balance:   Sitting: Intact  Standing: Impaired  Standing - Static: Fair;Constant support  Standing - Dynamic : Fair;Constant support  Ambulation/Gait Training:  Distance (ft): 40 Feet (ft) (20 feet x 2)  Assistive Device: Gait belt;Walker, rolling  Ambulation - Level of Assistance: Minimal assistance     Gait Description (WDL): Exceptions to WDL  Gait Abnormalities: Antalgic;Decreased step clearance; Step to gait (intermittent R knee wobble in stance phase of gait)  Right Side Weight Bearing: As tolerated  Left Side Weight Bearing: Full  Base of Support: Widened  Stance: Right decreased  Speed/Shanell: Slow  Step Length: Right shortened;Left shortened        Interventions: Safety awareness training; Tactile cues; Verbal cues; Visual/Demos     Therapeutic Exercises: Ankle pumps, quad sets, heel slides. 10 reps each side. Functional Measure:  Barthel Index:    Bathin  Bladder: 5  Bowels: 10  Groomin  Dressin  Feeding: 10  Mobility: 0  Stairs: 0  Toilet Use: 5  Transfer (Bed to Chair and Back): 10  Total: 50/100       The Barthel ADL Index: Guidelines  1. The index should be used as a record of what a patient does, not as a record of what a patient could do. 2. The main aim is to establish degree of independence from any help, physical or verbal, however minor and for whatever reason. 3. The need for supervision renders the patient not independent. 4. A patient's performance should be established using the best available evidence. Asking the patient, friends/relatives and nurses are the usual sources, but direct observation and common sense are also important. However direct testing is not needed. 5. Usually the patient's performance over the preceding 24-48 hours is important, but occasionally longer periods will be relevant. 6. Middle categories imply that the patient supplies over 50 per cent of the effort. 7. Use of aids to be independent is allowed. Score Interpretation (from [address] [address])    Independent   60-79 Minimally independent   40-59 Partially dependent   20-39 Very dependent   <20 Totally dependent     -Trace Isaac., Barthel, D.W. (1965). Functional evaluation: the Barthel Index. [address] [facility] ([address] (1997). The Barthel activities of daily living index: self-reporting versus actual performance in the old (> or = 75 years).  Journal of [address] [place] [address] 22(6), [facility], ORIANAGita, Radha Burrell. (). Measuring the change in disability after inpatient rehabilitation; comparison of the responsiveness of the Barthel Index and Functional [place] Measure. Journal of Neurology, Neurosurgery, and Psychiatry, 66(4), 015-949. EUN Dickinson, FLORESITA Cole, & Gricel Tobin M.A. (2004) Assessment of post-stroke quality of life in cost-effectiveness studies: The usefulness of the Barthel Index and the EuroQoL-5D. Quality of Life Research, 15, 828-21        Physical Therapy Evaluation Charge Determination   History Examination Presentation Decision-Making   HIGH Complexity :3+ comorbidities / personal factors will impact the outcome/ POC  HIGH Complexity : 4+ Standardized tests and measures addressing body structure, function, activity limitation and / or participation in recreation  MEDIUM Complexity : Evolving with changing characteristics  Other outcome measures Barthel  HIGH       Based on the above components, the patient evaluation is determined to be of the following complexity level: MEDIUM    Pain Ratin-5/10 right knee pre and post PT session. Activity Tolerance:   Fair, SpO2 stable on RA, and requires rest breaks    After treatment patient left in no apparent distress:   Supine in bed, Heels elevated for pressure relief, Call bell within reach, and Side rails x 3    COMMUNICATION/EDUCATION:   The patients plan of care was discussed with: Registered nurse, Rehabilitation technician, and NP . Fall prevention education was provided and the patient/caregiver indicated understanding., Patient/family have participated as able in goal setting and plan of care. , and Patient/family agree to work toward stated goals and plan of care.     Thank you for this referral.  Blaine Britton, PT   Time Calculation: 40 mins

## 2022-08-30 ENCOUNTER — HOME HEALTH ADMISSION (OUTPATIENT)
Dept: HOME HEALTH SERVICES | Facility: HOME HEALTH | Age: 77
End: 2022-08-30
Payer: MEDICARE

## 2022-08-30 LAB
ANION GAP SERPL CALC-SCNC: 8 MMOL/L (ref 5–15)
BUN SERPL-MCNC: 18 MG/DL (ref 6–20)
BUN/CREAT SERPL: 16 (ref 12–20)
CALCIUM SERPL-MCNC: 8.3 MG/DL (ref 8.5–10.1)
CHLORIDE SERPL-SCNC: 106 MMOL/L (ref 97–108)
CO2 SERPL-SCNC: 23 MMOL/L (ref 21–32)
CREAT SERPL-MCNC: 1.15 MG/DL (ref 0.7–1.3)
GLUCOSE BLD STRIP.AUTO-MCNC: 145 MG/DL (ref 65–117)
GLUCOSE BLD STRIP.AUTO-MCNC: 151 MG/DL (ref 65–117)
GLUCOSE BLD STRIP.AUTO-MCNC: 163 MG/DL (ref 65–117)
GLUCOSE SERPL-MCNC: 151 MG/DL (ref 65–100)
HGB BLD-MCNC: 11.8 G/DL (ref 12.1–17)
POTASSIUM SERPL-SCNC: 4 MMOL/L (ref 3.5–5.1)
SERVICE CMNT-IMP: ABNORMAL
SODIUM SERPL-SCNC: 137 MMOL/L (ref 136–145)

## 2022-08-30 PROCEDURE — 74011250637 HC RX REV CODE- 250/637: Performed by: ORTHOPAEDIC SURGERY

## 2022-08-30 PROCEDURE — 94760 N-INVAS EAR/PLS OXIMETRY 1: CPT

## 2022-08-30 PROCEDURE — 74011000250 HC RX REV CODE- 250: Performed by: ORTHOPAEDIC SURGERY

## 2022-08-30 PROCEDURE — 65270000046 HC RM TELEMETRY

## 2022-08-30 PROCEDURE — 97116 GAIT TRAINING THERAPY: CPT

## 2022-08-30 PROCEDURE — 36415 COLL VENOUS BLD VENIPUNCTURE: CPT

## 2022-08-30 PROCEDURE — 74011250636 HC RX REV CODE- 250/636: Performed by: ORTHOPAEDIC SURGERY

## 2022-08-30 PROCEDURE — 80048 BASIC METABOLIC PNL TOTAL CA: CPT

## 2022-08-30 PROCEDURE — 97530 THERAPEUTIC ACTIVITIES: CPT

## 2022-08-30 PROCEDURE — 85018 HEMOGLOBIN: CPT

## 2022-08-30 PROCEDURE — 77010033678 HC OXYGEN DAILY

## 2022-08-30 PROCEDURE — 82962 GLUCOSE BLOOD TEST: CPT

## 2022-08-30 RX ORDER — ACETAMINOPHEN 500 MG
1000 TABLET ORAL EVERY 6 HOURS
Qty: 56 TABLET | Refills: 0 | Status: SHIPPED | OUTPATIENT
Start: 2022-08-30 | End: 2022-09-06

## 2022-08-30 RX ORDER — AMOXICILLIN 250 MG
1 CAPSULE ORAL 2 TIMES DAILY
Qty: 14 TABLET | Refills: 0 | Status: SHIPPED | OUTPATIENT
Start: 2022-08-30 | End: 2022-09-06

## 2022-08-30 RX ORDER — ASPIRIN 325 MG
325 TABLET, DELAYED RELEASE (ENTERIC COATED) ORAL 2 TIMES DAILY
Qty: 60 TABLET | Refills: 0 | Status: SHIPPED | OUTPATIENT
Start: 2022-08-30 | End: 2022-09-29

## 2022-08-30 RX ORDER — OXYCODONE HYDROCHLORIDE 5 MG/1
5 TABLET ORAL
Qty: 30 TABLET | Refills: 0 | Status: SHIPPED | OUTPATIENT
Start: 2022-08-30 | End: 2022-09-06 | Stop reason: SDUPTHER

## 2022-08-30 RX ORDER — POLYETHYLENE GLYCOL 3350 17 G/17G
17 POWDER, FOR SOLUTION ORAL DAILY
Qty: 7 PACKET | Refills: 0 | Status: SHIPPED | OUTPATIENT
Start: 2022-08-31 | End: 2022-09-07

## 2022-08-30 RX ORDER — FAMOTIDINE 20 MG/1
20 TABLET, FILM COATED ORAL 2 TIMES DAILY
Qty: 60 TABLET | Refills: 0 | Status: SHIPPED | OUTPATIENT
Start: 2022-08-30 | End: 2022-09-29

## 2022-08-30 RX ADMIN — ACETAMINOPHEN 1000 MG: 500 TABLET ORAL at 17:43

## 2022-08-30 RX ADMIN — CEFAZOLIN 2 G: 1 INJECTION, POWDER, FOR SOLUTION INTRAMUSCULAR; INTRAVENOUS at 01:21

## 2022-08-30 RX ADMIN — ASPIRIN 325 MG: 325 TABLET, COATED ORAL at 17:43

## 2022-08-30 RX ADMIN — METFORMIN HYDROCHLORIDE 1000 MG: 500 TABLET ORAL at 17:43

## 2022-08-30 RX ADMIN — SODIUM CHLORIDE, PRESERVATIVE FREE 10 ML: 5 INJECTION INTRAVENOUS at 20:37

## 2022-08-30 RX ADMIN — KETOROLAC TROMETHAMINE 15 MG: 30 INJECTION, SOLUTION INTRAMUSCULAR at 01:00

## 2022-08-30 RX ADMIN — CHOLECALCIFEROL TAB 25 MCG (1000 UNIT) 2000 UNITS: 25 TAB at 08:31

## 2022-08-30 RX ADMIN — ASPIRIN 325 MG: 325 TABLET, COATED ORAL at 08:31

## 2022-08-30 RX ADMIN — SODIUM CHLORIDE, PRESERVATIVE FREE 10 ML: 5 INJECTION INTRAVENOUS at 05:51

## 2022-08-30 RX ADMIN — OXYCODONE 5 MG: 5 TABLET ORAL at 08:31

## 2022-08-30 RX ADMIN — FAMOTIDINE 20 MG: 20 TABLET, FILM COATED ORAL at 08:31

## 2022-08-30 RX ADMIN — Medication 1 AMPULE: at 20:27

## 2022-08-30 RX ADMIN — OXYCODONE 5 MG: 5 TABLET ORAL at 01:21

## 2022-08-30 RX ADMIN — SODIUM CHLORIDE 125 ML/HR: 9 INJECTION, SOLUTION INTRAVENOUS at 08:35

## 2022-08-30 RX ADMIN — ACETAMINOPHEN 1000 MG: 500 TABLET ORAL at 01:21

## 2022-08-30 RX ADMIN — METFORMIN HYDROCHLORIDE 1000 MG: 500 TABLET ORAL at 08:31

## 2022-08-30 RX ADMIN — PREGABALIN 200 MG: 100 CAPSULE ORAL at 20:25

## 2022-08-30 RX ADMIN — OXYCODONE 5 MG: 5 TABLET ORAL at 20:26

## 2022-08-30 RX ADMIN — ACETAMINOPHEN 1000 MG: 500 TABLET ORAL at 06:34

## 2022-08-30 RX ADMIN — KETOROLAC TROMETHAMINE 15 MG: 30 INJECTION, SOLUTION INTRAMUSCULAR at 06:34

## 2022-08-30 RX ADMIN — Medication 1 AMPULE: at 08:31

## 2022-08-30 RX ADMIN — POLYETHYLENE GLYCOL 3350 17 G: 17 POWDER, FOR SOLUTION ORAL at 08:31

## 2022-08-30 RX ADMIN — PRIMIDONE 50 MG: 50 TABLET ORAL at 17:43

## 2022-08-30 RX ADMIN — LEVOTHYROXINE SODIUM 200 MCG: 0.1 TABLET ORAL at 08:29

## 2022-08-30 RX ADMIN — FAMOTIDINE 20 MG: 20 TABLET, FILM COATED ORAL at 17:43

## 2022-08-30 RX ADMIN — DOCUSATE SODIUM 50MG AND SENNOSIDES 8.6MG 1 TABLET: 8.6; 5 TABLET, FILM COATED ORAL at 17:43

## 2022-08-30 RX ADMIN — SODIUM CHLORIDE, PRESERVATIVE FREE 10 ML: 5 INJECTION INTRAVENOUS at 16:29

## 2022-08-30 RX ADMIN — DOCUSATE SODIUM 50MG AND SENNOSIDES 8.6MG 1 TABLET: 8.6; 5 TABLET, FILM COATED ORAL at 08:31

## 2022-08-30 RX ADMIN — ATORVASTATIN CALCIUM 80 MG: 40 TABLET, FILM COATED ORAL at 20:30

## 2022-08-30 RX ADMIN — PREGABALIN 200 MG: 100 CAPSULE ORAL at 08:31

## 2022-08-30 NOTE — PROGRESS NOTES
Transition of Care Plan:  RUR: 6% low   Disposition: home with home health   Follow up appointments: ortho  DME needed: pt owns dme needed for d/c  Transportation at Discharge: partner   [address] or means to access home: yes  IM Medicare Letter: to be provided   Caregiver Contact: Cindi Ellis partner [phone]  Discharge Caregiver contacted prior to discharge? Patient wishes to contact  Care Conference needed?: no    Reason for Admission:  revision right total knee arthroplasty                    RUR Score: 6%                    Plan for utilizing home health:  per recommendation         PCP: First and Last name:  Bobbi Angeles MD   [facility]    Are you a current patient: Yes/No: yes   Approximate date of last visit: last week    Can you participate in a virtual visit with your PCP: yes                    Current Advanced Directive/Advance Care Plan: patient declined       Healthcare Decision Maker:   Click here to complete [address] including selection of the Healthcare Decision Maker Relationship (ie \"Primary\")                             Transition of Care Plan:                      CM made room visit with patient who was alert and oriented. Pt confirmed demographics, insurance, and emergency contact on file. Pt and partner live in a 2 story home with 3 susy. Pt has DME including a RW. At baseline pt is independent with ADLs and driving. Pt has no hx of HH, SNF, or IPR. Pt's plan is to dc home with home health. Pt requested [address]. FOC signed and placed on bedside chart. Referral sent to [address] and accepted. AVS updated. Care Management Interventions  PCP Verified by CM: Yes  Mode of Transport at Discharge:  Other (see comment) (partner)  Transition of Care Consult (CM Consult): Discharge Planning, [address]: Yes  Discharge Durable Medical Equipment: No  Physical Therapy Consult: Yes  Occupational Therapy Consult: No  Speech Therapy Consult: No  Support Systems: Spouse/Significant Other  Confirm Follow Up Transport: Family  The Plan for Transition of Care is Related to the Following Treatment Goals : hh  The Patient and/or Patient Representative was Provided with a Choice of Provider and Agrees with the Discharge Plan?: Yes  Freedom of Choice List was Provided with Basic Dialogue that Supports the Patient's Individualized Plan of Care/Goals, Treatment Preferences and Shares the Quality Data Associated with the Providers?: Yes  Discharge Location  Patient Expects to be Discharged to[de-identified] Home with home health    Laxmi Hdz, [facility]

## 2022-08-30 NOTE — PROGRESS NOTES
Problem: Falls - Risk of  Goal: *Absence of Falls  Description: Document Manohar Yepez Fall Risk and appropriate interventions in the flowsheet.   Outcome: Progressing Towards Goal  Note: Fall Risk Interventions:

## 2022-08-30 NOTE — PROGRESS NOTES
Dressing to right knee with bloody drainage. Siver dressing saturated with blood. Dressing changed with sterile 4x4's and secured with paper tape. Active bleeding noted to the distal incision. Surrounding skin pink, incision well approximated, no redness noted, minimal swelling to the knee, no swelling to the ankle. Ice pack applied to knee. Patient lying in bed watching tennis game. Tolerated dressing change voiced no pain denied pain.

## 2022-08-30 NOTE — PROGRESS NOTES
Problem: Mobility Impaired (Adult and Pediatric)  Goal: *Acute Goals and Plan of Care (Insert Text)  Description: FUNCTIONAL STATUS PRIOR TO ADMISSION: Patient was standby assistance using a rolling walker for functional mobility. Patient required minimal assistance for basic and instrumental ADLs. HOME SUPPORT PRIOR TO ADMISSION: The patient lived with life partner who assisted as needed with ADL's and mobility. Patient reports 3 falls in the last year. Physical Therapy Goals  Initiated 8/29/2022    1. Patient will move from supine to sit and sit to supine , scoot up and down, and roll side to side in bed with modified independence within 4 days. 2. Patient will perform sit to stand with modified independence within 4 days. 3. Patient will ambulate with supervision/set-up for 350 feet with the least restrictive device within 4 days. 4. Patient will ascend/descend 3 stairs with 1 handrail(s) with contact guard assist within 4 days. 5. Patient will perform home exercise program per protocol with supervision/set-up within 4 days. 6. Patient will demonstrate AROM 0-90 degrees in operative joint within 4 days. Outcome: Progressing Towards Goal   PHYSICAL THERAPY TREATMENT  Patient: Mario Avery (40 y.o. 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 ASSESSMENT  Patient continues with skilled PT services and is progressing towards goals. Pt presents with decreased strength and increased drainage of RTK. Pts BP taken with position changes and stable. Pt performed sit to stand transfer at [facility]/SBA. Pt ambulated 100ft with RW at [facility]/SBA. Pt requiring cuing to improve heel strike to improve ROM.  Pt with increased drainage with ambulation requiring to reinforce with an ace bandage. Pt ascended/descended 4 steps with both railings at [facility] with cueing for sequencing. Pt performed a car transfer at [place] [id number] with cueing for sequencing. Pt moving well with no safety concerns. From physical therapy stand point pt cleared for discharge. Current Level of Function Impacting Discharge (mobility/balance): mobility at [facility]/A     Other factors to consider for discharge: increased drainage          PLAN :  Patient continues to benefit from skilled intervention to address the above impairments. Continue treatment per established plan of care. to address goals. Recommendation for discharge: (in order for the patient to meet his/her long term goals)  Physical therapy at least 2 days/week in the home     This discharge recommendation:  Has been made in collaboration with the attending provider and/or case management    IF patient discharges home will need the following DME: rolling walker       SUBJECTIVE:   Patient stated  I'm just a little concerned about this bleeding.     OBJECTIVE DATA SUMMARY:   Critical Behavior:  Neurologic State: Alert  Orientation Level: Oriented X4  Cognition: Appropriate decision making, Appropriate for age attention/concentration, Appropriate safety awareness, Follows commands  Safety/Judgement: Decreased insight into deficits, Fall prevention, Good awareness of safety precautions    Range of Motion:         AROM: Generally decreased, Functional                    Functional Mobility Training:  Bed Mobility:        Sit to Supine: Stand-by assistance  Scooting: Stand-by assistance        Transfers:  Sit to Stand: Contact guard assistance;Stand-by assistance  Stand to Sit: Stand-by assistance                             Balance:  Sitting: Intact  Standing: Intact  Standing - Static: Good;Constant support  Standing - Dynamic : Fair;Good;Constant support  Ambulation/Gait Training:  Distance (ft): 100 Feet (ft)  Assistive Device: Gait belt;Walker, rolling  Ambulation - Level of Assistance: Contact guard assistance;Stand-by assistance        Gait Abnormalities: Decreased step clearance; Antalgic        Base of Support: Widened     Speed/Shanell: Pace decreased (<100 feet/min)  Step Length: Right shortened;Left shortened        Interventions: Verbal cues; Safety awareness training     Stairs:  Number of Stairs Trained: 4  Stairs - Level of Assistance: Contact guard assistance   Rail Use: Both          Pain Rating:  Pt reported tolerable pain with mobility. Activity Tolerance:   Good and requires rest breaks    After treatment patient left in no apparent distress:   Supine in bed, Call bell within reach, and Side rails x 3    COMMUNICATION/COLLABORATION:   The patients plan of care was discussed with: Registered nurse.      Erlin Adams PTA   Time Calculation: 38 mins

## 2022-08-30 NOTE — PROGRESS NOTES
End of Shift Note    Bedside shift change report given to MILADIS Lmia (oncoming nurse) by Jb Sams RN (offgoing nurse). Report included the following information SBAR, Kardex, Intake/Output, MAR, and Recent Results    Shift worked:  [id number]     Shift summary and any significant changes:    Small drainage noted  from incision site. Cold therapy. Ambulating with some assistance. Pain under control. Voiding with no difficulties. IVF ongoing as BP is soft.      Concerns for physician to address:       Zone phone for oncoming shift:              Length of Stay:  Expected LOS: - - -  Actual LOS: 0      Jb Sams RN

## 2022-08-30 NOTE — PROGRESS NOTES
Ortho / Neurosurgery NP Note    POD# 1  s/p REVISION RIGHT TOTAL KNEE ARTHROPLASTY   Pt seen with no visitor present. Pt resting in recliner chair, eating breakfast. In NAD. Reports minimal post-op pain, well controlled with oxycodone  BP soft, denies dizziness at rest or with activity   Feels tired, reports little sleep overnight. Tolerating regular diet. No nausea. VSS Afebrile. Room air. Visit Vitals  BP (!) 102/49 (BP Patient Position: Sitting)   Pulse (!) 55   Temp 97.6 °F (36.4 °C)   Resp 16   Ht 5' 7.5\" (1.715 m)   Wt 111.8 kg (246 lb 7.6 oz)   SpO2 97%   BMI 38.03 kg/m²       Voiding status: voiding w/o difficulty   Output (mL)  Urine Voided: 300 ml (08/30/22 0210)  Unmeasurable Output  Urine Occurrence(s): 1 (08/29/22 0907)      Labs    Lab Results   Component Value Date/Time    HGB 11.8 (L) 08/30/2022 01:24 AM      Lab Results   Component Value Date/Time    INR 1.0 08/22/2022 08:06 AM      Lab Results   Component Value Date/Time    Sodium 137 08/30/2022 01:24 AM    Potassium 4.0 08/30/2022 01:24 AM    Chloride 106 08/30/2022 01:24 AM    CO2 23 08/30/2022 01:24 AM    Glucose 151 (H) 08/30/2022 01:24 AM    BUN 18 08/30/2022 01:24 AM    Creatinine 1.15 08/30/2022 01:24 AM    Calcium 8.3 (L) 08/30/2022 01:24 AM     Recent Glucose Results:   Lab Results   Component Value Date/Time     (H) 08/30/2022 01:24 AM    GLUCPOC 145 (H) 08/30/2022 07:24 AM    GLUCPOC 250 (H) 08/29/2022 05:58 PM    GLUCPOC 154 (H) 08/29/2022 12:01 PM     Intra-op cultures obtained, no growth so far. Discussed with Dr. Xander Salas, does not need abx coverage at discharge, will follow cultures OP. Body mass index is 38.03 kg/m². : A BMI > 30 is classified as obesity and > 40 is classified as morbid obesity. Dressing intact with moderate sanguinous breakthrough - ace wrap in place   Change dressing today. Cryotherapy in place over incision  Calves soft and supple;  No pain with passive stretch  Sensation and motor intact. +PF/DF/EHL intact 5/5  Reports baseline neuropathy in andrés feet, unchanged from PTA  SCDs for mechanical DVT proph while in bed     PLAN:  1) PT BID - WBAT  2) Aspirin 325 mg PO BID for DVT Prophylaxis   3) GI Prophylaxis - Pepcid  4) Readniess for discharge:     [x] Vital Signs stable    [x] Hgb stable    [x] + Voiding    [x] Wound intact, drainage minimal    [x] Tolerating PO intake     [] Cleared by PT (OT if applicable)     [] Stair training completed (if applicable)    [] Independent / Contact Guard Assist (household distance)     [] Bed mobility     [] Car transfers     [] ADLs    [x] Adequate pain control on oral medication alone     Discharge today pending PT clearance. Plans to return home with [place] and significant other's support. Addendum @ 15:30 - Pt having persistent drainage from distal end of incision. Dr. Mónica Gallego at bedside, will apply FELY dressing. Shortly after placing FELY dressing, drainage noted at distal end after flexing knee. Patient would feel more comfortable staying overnight to monitor drainage. Dr. Mónica Gallego updated.      Jose Ramon Silva, JHONNY

## 2022-08-30 NOTE — DISCHARGE SUMMARY
Ortho Discharge Summary    Patient ID:  Cesar Ramírez  [id number]  male  68 y.o.  1945    Admit date: 8/29/2022    Discharge date: 8/30/2022    Admitting Physician: Siddharth Petersen DO     Consulting Physician(s):   Treatment Team: Attending Provider: Jose R Hoffman DO; Utilization Review: Nikkie Marion RN; Physical Therapy Assistant: David Ling PTA; Care Manager: Olivia Matias    Date of Surgery:   8/29/2022     Preoperative Diagnosis:  MECHANICAL FAILURE OF RIGHT KNEE PROSTHESIS    Postoperative Diagnosis:   MECHANICAL FAILURE OF RIGHT KNEE PROSTHESIS    Procedure(s):   REVISION RIGHT TOTAL KNEE ARTHROPLASTY     Anesthesia Type:   General     Surgeon: Jose R Hoffman DO                            HPI:  Pt is a 68 y.o. male who has a history of MECHANICAL FAILURE OF RIGHT KNEE PROSTHESIS  with pain and limitations of activities of daily living who presents at this time for a REVISION RIGHT TOTAL KNEE ARTHROPLASTY following the failure of conservative management. PMH:   Past Medical History:   Diagnosis Date    Abdominal adhesions 2000    Adverse effect of anesthesia     sleep apnea uses cpap machine       Ankylosing spondylitis (HCC)     Arthritis     all joints; Degenerative disk disease    CAD (coronary artery disease)     stents x4, followed by Dr. Tang Courser    Chronic constipation     Chronic pain     all my joints    CPAP (continuous positive airway pressure) dependence     at night    Diabetic neuropathy (HCC)     Type II    DISH (diffuse idiopathic skeletal hyperostosis)     Fibromyalgia     GERD (gastroesophageal reflux disease)     HLD (hyperlipidemia)     Hypertension     Neuropathy     hands/feet    S/P insertion of spinal cord stimulator     with remote- Been Removed    Sleep apnea     uses Cpap    Thyroid disease     low       Body mass index is 38.03 kg/m². : A BMI > 30 is classified as obesity and > 40 is classified as morbid obesity.      Medications upon admission :   Prior to Admission Medications   Prescriptions Last Dose Informant Patient Reported? Taking?   aspirin delayed-release 81 mg tablet 2022  No Yes   Sig: Take 1 Tab by mouth two (2) times a day. Patient taking differently: Take 81 mg by mouth daily. atorvastatin (LIPITOR) 80 mg tablet 2022  Yes Yes   Sig: Take 80 mg by mouth nightly. cholecalciferol, vitamin D3, (VITAMIN D3) 2,000 unit tab 2022  Yes Yes   Sig: Take 2,000 Units by mouth daily. hydroCHLOROthiazide (HYDRODIURIL) 25 mg tablet 2022 at 0500  Yes Yes   Sig: Take 25 mg by mouth daily. levothyroxine (SYNTHROID) 200 mcg tablet 2022  Yes Yes   Sig: Take 200 mcg by mouth Daily (before breakfast). losartan (COZAAR) 25 mg tablet 2022 at 0500  Yes Yes   Sig: Take 25 mg by mouth daily. metFORMIN (GLUCOPHAGE) 1,000 mg tablet 2022  No Yes   Sig: Take 1 Tab by mouth two (2) times daily (with meals). START TAKING  WITH DINNER   Patient taking differently: Take 1,000 mg by mouth two (2) times daily (with meals). Patient restarted on 22   nitroglycerin (NITROSTAT) 0.4 mg SL tablet Not Taking  No No   Si Tab by SubLINGual route every five (5) minutes as needed for Chest Pain. Up to 3 doses. Patient not taking: Reported on 2022   pregabalin (LYRICA) 200 mg capsule 2022 at 0500  Yes Yes   Sig: Take 200 mg by mouth two (2) times a day. primidone (MYSOLINE) 50 mg tablet 2022  Yes Yes   Sig: Take 50 mg by mouth every evening. Facility-Administered Medications: None        Allergies:  No Known Allergies     Hospital Course: The patient underwent surgery. Complications:  None; patient tolerated the procedure well. Was taken to the PACU in stable condition and then transferred to the ortho floor.       Perioperative Antibiotics:  Ancef     Postoperative Pain Management:  Oxycodone & Tylenol     DVT Prophylaxis: Aspirin 325BID    Postoperative transfusions:    Number of units banked PRBCs =   none Post Op complications: none    Hemoglobin at discharge:    Lab Results   Component Value Date/Time    HGB 11.8 (L) 08/30/2022 01:24 AM    INR 1.0 08/22/2022 08:06 AM       Dressing change on POD1 for expected post-op drainage. New optifoam applied and acewrap for compression - clean, dry and intact. No significant erythema or swelling. Wound appears to be healing without any evidence of infection. Neurovascular exam found to be within normal limits. Physical Therapy started following surgery and participated in bed mobility, transfers and ambulation. Gait:  Gait  Base of Support: Widened  Speed/Shanell: Pace decreased (<100 feet/min)  Step Length: Right shortened, Left shortened  Stance: Right decreased  Gait Abnormalities: Decreased step clearance, Antalgic  Ambulation - Level of Assistance: Contact guard assistance, Stand-by assistance  Distance (ft): 100 Feet (ft)  Assistive Device: Gait belt, Walker, rolling  Rail Use: Both  Stairs - Level of Assistance: Contact guard assistance  Number of Stairs Trained: 4  Interventions: Verbal cues, Safety awareness training            Interventions: Verbal cues, Safety awareness training      Discharged to: Home with HH. Condition on Discharge:   stable    Discharge instructions:  - Anticoagulate with Aspirin 325 BID  - Take pain medications as prescribed  - Resume pre hospital diet      - Discharge activity: activity as tolerated  - Ambulate with assistive device as needed. - Weight bearing status - WBAT  - Wound Care Keep wound clean and dry. See discharge instruction sheet. -DISCHARGE MEDICATION LIST     Current Discharge Medication List        START taking these medications    Details   acetaminophen (TYLENOL) 500 mg tablet Take 2 Tablets by mouth every six (6) hours for 7 days.   Qty: 56 Tablet, Refills: 0  Start date: 8/30/2022, End date: 9/6/2022      famotidine (PEPCID) 20 mg tablet Take 1 Tablet by mouth two (2) times a day for 30 days.  Etheleen Balloon: 60 Tablet, Refills: 0  Start date: 8/30/2022, End date: 9/29/2022      oxyCODONE IR (ROXICODONE) 5 mg immediate release tablet Take 1 Tablet by mouth every four (4) hours as needed for Pain for up to 7 days. Max Daily Amount: 30 mg.  Qty: 30 Tablet, Refills: 0  Start date: 8/30/2022, End date: 9/6/2022    Associated Diagnoses: S/P revision of total knee, right      polyethylene glycol (MIRALAX) 17 gram packet Take 1 Packet by mouth daily for 7 days. Qty: 7 Packet, Refills: 0  Start date: 8/31/2022, End date: 9/7/2022      senna-docusate (PERICOLACE) 8.6-50 mg per tablet Take 1 Tablet by mouth two (2) times a day for 7 days. Qty: 14 Tablet, Refills: 0  Start date: 8/30/2022, End date: 9/6/2022           CONTINUE these medications which have CHANGED    Details   aspirin delayed-release 325 mg tablet Take 1 Tablet by mouth two (2) times a day for 30 days. Qty: 60 Tablet, Refills: 0  Start date: 8/30/2022, End date: 9/29/2022           CONTINUE these medications which have NOT CHANGED    Details   pregabalin (LYRICA) 200 mg capsule Take 200 mg by mouth two (2) times a day. losartan (COZAAR) 25 mg tablet Take 25 mg by mouth daily. primidone (MYSOLINE) 50 mg tablet Take 50 mg by mouth every evening. metFORMIN (GLUCOPHAGE) 1,000 mg tablet Take 1 Tab by mouth two (2) times daily (with meals). START TAKING 9/23 WITH DINNER  Qty: 30 Tab, Refills: 0      atorvastatin (LIPITOR) 80 mg tablet Take 80 mg by mouth nightly. hydroCHLOROthiazide (HYDRODIURIL) 25 mg tablet Take 25 mg by mouth daily. levothyroxine (SYNTHROID) 200 mcg tablet Take 200 mcg by mouth Daily (before breakfast). cholecalciferol, vitamin D3, (VITAMIN D3) 2,000 unit tab Take 2,000 Units by mouth daily. nitroglycerin (NITROSTAT) 0.4 mg SL tablet 1 Tab by SubLINGual route every five (5) minutes as needed for Chest Pain. Up to 3 doses.   Qty: 20 Tab, Refills: 0          per medical continuation form      -Follow up in office in 2 weeks      Signed:  Lenore Gonzales NP  Orthopaedic Nurse Practitioner    8/30/2022  2:03 PM

## 2022-08-31 ENCOUNTER — HOME CARE VISIT (OUTPATIENT)
Dept: HOME HEALTH SERVICES | Facility: HOME HEALTH | Age: 77
End: 2022-08-31

## 2022-08-31 VITALS
TEMPERATURE: 98.3 F | HEART RATE: 65 BPM | BODY MASS INDEX: 37.36 KG/M2 | OXYGEN SATURATION: 98 % | SYSTOLIC BLOOD PRESSURE: 130 MMHG | HEIGHT: 68 IN | RESPIRATION RATE: 18 BRPM | WEIGHT: 246.47 LBS | DIASTOLIC BLOOD PRESSURE: 53 MMHG

## 2022-08-31 LAB
GLUCOSE BLD STRIP.AUTO-MCNC: 134 MG/DL (ref 65–117)
GLUCOSE BLD STRIP.AUTO-MCNC: 137 MG/DL (ref 65–117)
GLUCOSE BLD STRIP.AUTO-MCNC: 143 MG/DL (ref 65–117)
SERVICE CMNT-IMP: ABNORMAL

## 2022-08-31 PROCEDURE — 74011000250 HC RX REV CODE- 250: Performed by: ORTHOPAEDIC SURGERY

## 2022-08-31 PROCEDURE — 94760 N-INVAS EAR/PLS OXIMETRY 1: CPT

## 2022-08-31 PROCEDURE — 97116 GAIT TRAINING THERAPY: CPT

## 2022-08-31 PROCEDURE — 82962 GLUCOSE BLOOD TEST: CPT

## 2022-08-31 PROCEDURE — 97530 THERAPEUTIC ACTIVITIES: CPT

## 2022-08-31 PROCEDURE — 77010033678 HC OXYGEN DAILY

## 2022-08-31 PROCEDURE — L1830 KO IMMOB CANVAS LONG PRE OTS: HCPCS

## 2022-08-31 PROCEDURE — 74011250637 HC RX REV CODE- 250/637: Performed by: ORTHOPAEDIC SURGERY

## 2022-08-31 RX ADMIN — OXYCODONE 5 MG: 5 TABLET ORAL at 13:41

## 2022-08-31 RX ADMIN — Medication 1 AMPULE: at 09:35

## 2022-08-31 RX ADMIN — FAMOTIDINE 20 MG: 20 TABLET, FILM COATED ORAL at 09:34

## 2022-08-31 RX ADMIN — OXYCODONE 10 MG: 5 TABLET ORAL at 09:35

## 2022-08-31 RX ADMIN — ACETAMINOPHEN 1000 MG: 500 TABLET ORAL at 01:07

## 2022-08-31 RX ADMIN — METFORMIN HYDROCHLORIDE 1000 MG: 500 TABLET ORAL at 09:34

## 2022-08-31 RX ADMIN — ACETAMINOPHEN 1000 MG: 500 TABLET ORAL at 05:54

## 2022-08-31 RX ADMIN — OXYCODONE 5 MG: 5 TABLET ORAL at 05:53

## 2022-08-31 RX ADMIN — BISACODYL 10 MG: 10 SUPPOSITORY RECTAL at 06:13

## 2022-08-31 RX ADMIN — LEVOTHYROXINE SODIUM 200 MCG: 0.1 TABLET ORAL at 05:54

## 2022-08-31 RX ADMIN — SODIUM CHLORIDE, PRESERVATIVE FREE 10 ML: 5 INJECTION INTRAVENOUS at 13:45

## 2022-08-31 RX ADMIN — SODIUM CHLORIDE, PRESERVATIVE FREE 10 ML: 5 INJECTION INTRAVENOUS at 05:55

## 2022-08-31 RX ADMIN — ACETAMINOPHEN 1000 MG: 500 TABLET ORAL at 13:41

## 2022-08-31 RX ADMIN — DOCUSATE SODIUM 50MG AND SENNOSIDES 8.6MG 1 TABLET: 8.6; 5 TABLET, FILM COATED ORAL at 09:35

## 2022-08-31 RX ADMIN — CHOLECALCIFEROL TAB 25 MCG (1000 UNIT) 2000 UNITS: 25 TAB at 09:34

## 2022-08-31 RX ADMIN — OXYCODONE 10 MG: 5 TABLET ORAL at 01:07

## 2022-08-31 RX ADMIN — ASPIRIN 325 MG: 325 TABLET, COATED ORAL at 09:34

## 2022-08-31 RX ADMIN — PREGABALIN 200 MG: 100 CAPSULE ORAL at 09:35

## 2022-08-31 RX ADMIN — POLYETHYLENE GLYCOL 3350 17 G: 17 POWDER, FOR SOLUTION ORAL at 09:35

## 2022-08-31 NOTE — PROGRESS NOTES
DISCHARGE NOTE FROM [facility]    Patient determined to be stable for discharge by attending provider. I have reviewed the discharge instructions with the patient. They verbalized understanding and all questions were answered to their satisfaction. No complaints or further questions were expressed. Medications sent to pharmacy. Appropriate educational materials and medication side effect teaching were provided. PIV were removed prior to discharge. Patient did not discharge with any line, soares, or drain. Personal items and valuables accounted for at discharge by patient and/or family: YES    Post-op patient: Yes- Patient given post-op discharge kit and instructed on use.        Amy Cao RN

## 2022-08-31 NOTE — PROGRESS NOTES
Problem: Mobility Impaired (Adult and Pediatric)  Goal: *Acute Goals and Plan of Care (Insert Text)  Description: FUNCTIONAL STATUS PRIOR TO ADMISSION: Patient was standby assistance using a rolling walker for functional mobility. Patient required minimal assistance for basic and instrumental ADLs. HOME SUPPORT PRIOR TO ADMISSION: The patient lived with life partner who assisted as needed with ADL's and mobility. Patient reports 3 falls in the last year. Physical Therapy Goals  Initiated 8/29/2022    1. Patient will move from supine to sit and sit to supine , scoot up and down, and roll side to side in bed with modified independence within 4 days. 2. Patient will perform sit to stand with modified independence within 4 days. 3. Patient will ambulate with supervision/set-up for 350 feet with the least restrictive device within 4 days. 4. Patient will ascend/descend 3 stairs with 1 handrail(s) with contact guard assist within 4 days. 5. Patient will perform home exercise program per protocol with supervision/set-up within 4 days. 6. Patient will demonstrate AROM 0-90 degrees in operative joint within 4 days. 8/31/2022 1456 by Debby Hernandez PTA  Outcome: Resolved/Met  8/31/2022 1345 by Debby Hernandez PTA  Outcome: Progressing Towards Goal   PHYSICAL THERAPY TREATMENT  Patient: Carson Amanda (66 y.o. 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 ASSESSMENT  Patient continues with skilled PT services and is progressing towards goals. Pt presents with decreased strength and ROM. Pt bow with knee immobilizer to decrease knee flexion to prevent drainage.  Pt performed bed mobility at [facility] with additional time. Pt performed sit to stand transfer at [facility]. Pt ambulated 30ft and 100ft with RW at [facility]. Pt requiring cueing to decrease step length to be able to perform step through gait. Pt ascended/descended 4 steps with both railings at [facility] with cueing for sequencing. Pt performed a car transfer at [facility] with cueing to to slide across due to immobilizer. Pt moving well with no safety concerns. From physical therapy stand point pt cleared for discharge. Current Level of Function Impacting Discharge (mobility/balance): mobility at [facility]    Other factors to consider for discharge: pain, decreased strength          PLAN :  Patient continues to benefit from skilled intervention to address the above impairments. Continue treatment per established plan of care. to address goals. Recommendation for discharge: (in order for the patient to meet his/her long term goals)  Physical therapy at least 2 days/week in the home     This discharge recommendation:  Has been made in collaboration with the attending provider and/or case management    IF patient discharges home will need the following DME: rolling walker       SUBJECTIVE:   Patient stated  I just wanted to make sure I can still move with this thing on.    OBJECTIVE DATA SUMMARY:   Critical Behavior:  Neurologic State: Alert, Appropriate for age  Orientation Level: Oriented X4  Cognition: Appropriate decision making, Appropriate safety awareness, Follows commands  Safety/Judgement: Decreased insight into deficits, Fall prevention, Good awareness of safety precautions         Functional Mobility Training:  Bed Mobility:     Supine to Sit: Stand-by assistance; Additional time  Sit to Supine: Minimum assistance  Scooting: Stand-by assistance        Transfers:  Sit to Stand: Contact guard assistance;Stand-by assistance; Additional time  Stand to Sit: Stand-by assistance                             Balance:  Sitting: Intact  Standing: Impaired  Standing - Static: Good;Constant support  Standing - Dynamic : Fair;Good;Constant support  Ambulation/Gait Training:  Distance (ft): 100 Feet (ft)  Assistive Device: Gait belt;Walker, rolling  Ambulation - Level of Assistance: Contact guard assistance;Stand-by assistance        Gait Abnormalities: Antalgic;Decreased step clearance        Base of Support: Widened     Speed/Shanell: Pace decreased (<100 feet/min)  Step Length: Right shortened;Left shortened        Interventions: Verbal cues     Stairs:  Number of Stairs Trained: 4  Stairs - Level of Assistance: Contact guard assistance   Rail Use: Both          Pain Rating:  Pt reported increased pain with mobility. Activity Tolerance:   WNL and Good    After treatment patient left in no apparent distress:   Supine in bed, Call bell within reach, and Side rails x 3    COMMUNICATION/COLLABORATION:   The patients plan of care was discussed with: Registered nurse.      Jenny Munoz PTA   Time Calculation: 30 mins

## 2022-08-31 NOTE — PROGRESS NOTES
Problem: Falls - Risk of  Goal: *Absence of Falls  Description: Document [place] Fall Risk and appropriate interventions in the flowsheet.   Outcome: Progressing Towards Goal  Note: Fall Risk Interventions:  Mobility Interventions: Communicate number of staff needed for ambulation/transfer, Bed/chair exit alarm, Patient to call before getting OOB, Utilize walker, cane, or other assistive device         Medication Interventions: Patient to call before getting OOB, Teach patient to arise slowly    Elimination Interventions: Urinal in reach, Toileting schedule/hourly rounds

## 2022-08-31 NOTE — DISCHARGE SUMMARY
Ortho Discharge Summary    Patient ID:  Miguel Patino  [id number]  male  68 y.o.  1945    Admit date: 8/29/2022    Discharge date: 8/31/2022    Admitting Physician: Nadine Dorsey DO     Consulting Physician(s):   Treatment Team: Attending Provider: Hilda Gonsalves DO; Utilization Review: Mckinley Hughes RN; Care Manager: [facility] Primary Nurse: Zuleika Shaffer RN; Physical Therapy Assistant: Maddy Treviño PTA    Date of Surgery:   8/29/2022     Preoperative Diagnosis:  MECHANICAL FAILURE OF RIGHT KNEE PROSTHESIS    Postoperative Diagnosis:   MECHANICAL FAILURE OF RIGHT KNEE PROSTHESIS    Procedure(s):   REVISION RIGHT TOTAL KNEE ARTHROPLASTY     Anesthesia Type:   General     Surgeon: Hilda Gonsalves DO                            HPI:  Pt is a 68 y.o. male who has a history of MECHANICAL FAILURE OF RIGHT KNEE PROSTHESIS  with pain and limitations of activities of daily living who presents at this time for a REVISION RIGHT TOTAL KNEE ARTHROPLASTY following the failure of conservative management. PMH:   Past Medical History:   Diagnosis Date    Abdominal adhesions 2000    Adverse effect of anesthesia     sleep apnea uses cpap machine       Ankylosing spondylitis (HCC)     Arthritis     all joints; Degenerative disk disease    CAD (coronary artery disease)     stents x4, followed by Dr. Earnestine Rowe    Chronic constipation     Chronic pain     all my joints    CPAP (continuous positive airway pressure) dependence     at night    Diabetic neuropathy (HCC)     Type II    DISH (diffuse idiopathic skeletal hyperostosis)     Fibromyalgia     GERD (gastroesophageal reflux disease)     HLD (hyperlipidemia)     Hypertension     Neuropathy     hands/feet    S/P insertion of spinal cord stimulator     with remote- Been Removed    Sleep apnea     uses Cpap    Thyroid disease     low       Body mass index is 38.03 kg/m². : A BMI > 30 is classified as obesity and > 40 is classified as morbid obesity. Medications upon admission :   Prior to Admission Medications   Prescriptions Last Dose Informant Patient Reported? Taking?   aspirin delayed-release 81 mg tablet 2022  No Yes   Sig: Take 1 Tab by mouth two (2) times a day. Patient taking differently: Take 81 mg by mouth daily. atorvastatin (LIPITOR) 80 mg tablet 2022  Yes Yes   Sig: Take 80 mg by mouth nightly. cholecalciferol, vitamin D3, (VITAMIN D3) 2,000 unit tab 2022  Yes Yes   Sig: Take 2,000 Units by mouth daily. hydroCHLOROthiazide (HYDRODIURIL) 25 mg tablet 2022 at 0500  Yes Yes   Sig: Take 25 mg by mouth daily. levothyroxine (SYNTHROID) 200 mcg tablet 2022  Yes Yes   Sig: Take 200 mcg by mouth Daily (before breakfast). losartan (COZAAR) 25 mg tablet 2022 at 0500  Yes Yes   Sig: Take 25 mg by mouth daily. metFORMIN (GLUCOPHAGE) 1,000 mg tablet 2022  No Yes   Sig: Take 1 Tab by mouth two (2) times daily (with meals). START TAKING  WITH DINNER   Patient taking differently: Take 1,000 mg by mouth two (2) times daily (with meals). Patient restarted on 22   nitroglycerin (NITROSTAT) 0.4 mg SL tablet Not Taking  No No   Si Tab by SubLINGual route every five (5) minutes as needed for Chest Pain. Up to 3 doses. Patient not taking: Reported on 2022   pregabalin (LYRICA) 200 mg capsule 2022 at 0500  Yes Yes   Sig: Take 200 mg by mouth two (2) times a day. primidone (MYSOLINE) 50 mg tablet 2022  Yes Yes   Sig: Take 50 mg by mouth every evening. Facility-Administered Medications: None        Allergies:  No Known Allergies     Hospital Course: The patient underwent surgery. Complications:  None; patient tolerated the procedure well. Was taken to the PACU in stable condition and then transferred to the ortho floor. Patient progressed well with therapy and was cleared from their standpoint for discharge on POD#1.  He experienced steady oozing from distal end of incision, exacerbated by knee flexion and was kept overnight to monitor drainage. On POD#2 new dry dressing and ace wrap was applied by Dr. Harsh Saavedra. Dressing remained clean, dry, intact. Plan to discharged with knee immobilizer and follow up on Friday in office for wound check. Patient in knee immobilizer and educated on brace and skin checks. He was also seen again by PT and completed stair and car transfer training in knee immobilizer prior to discharge. Perioperative Antibiotics:  Ancef     Postoperative Pain Management:  Oxycodone & Tylenol     DVT Prophylaxis: Aspirin 325BID    Postoperative transfusions:    Number of units banked PRBCs =   none     Post Op complications: none    Hemoglobin at discharge:    Lab Results   Component Value Date/Time    HGB 11.8 (L) 08/30/2022 01:24 AM    INR 1.0 08/22/2022 08:06 AM       Dressing change on POD2 - gauze and ace wrap clean dry and intact. No significant erythema or swelling. Wound appears to be healing without any evidence of infection. Neurovascular exam found to be within normal limits. Physical Therapy started following surgery and participated in bed mobility, transfers and ambulation. Gait:  Gait  Base of Support: Widened  Speed/Shanell: Pace decreased (<100 feet/min)  Step Length: Right shortened, Left shortened  Stance: Right decreased  Gait Abnormalities: Decreased step clearance, Antalgic  Ambulation - Level of Assistance: Contact guard assistance, Stand-by assistance  Distance (ft): 100 Feet (ft)  Assistive Device: Gait belt, Walker, rolling  Rail Use: Both  Stairs - Level of Assistance: Contact guard assistance  Number of Stairs Trained: 4  Interventions: Verbal cues, Safety awareness training            Interventions: Verbal cues, Safety awareness training      Discharged to: Home with HH.      Condition on Discharge:   stable    Discharge instructions:  - Anticoagulate with Aspirin 325 BID  - Take pain medications as prescribed  - Resume pre hospital diet      - Discharge activity: activity as tolerated  - Ambulate with assistive device as needed. - Weight bearing status - WBAT  - Wound Care Keep wound clean and dry. See discharge instruction sheet. -DISCHARGE MEDICATION LIST     Current Discharge Medication List        START taking these medications    Details   acetaminophen (TYLENOL) 500 mg tablet Take 2 Tablets by mouth every six (6) hours for 7 days. Qty: 56 Tablet, Refills: 0  Start date: 8/30/2022, End date: 9/6/2022      famotidine (PEPCID) 20 mg tablet Take 1 Tablet by mouth two (2) times a day for 30 days. Qty: 60 Tablet, Refills: 0  Start date: 8/30/2022, End date: 9/29/2022      oxyCODONE IR (ROXICODONE) 5 mg immediate release tablet Take 1 Tablet by mouth every four (4) hours as needed for Pain for up to 7 days. Max Daily Amount: 30 mg.  Qty: 30 Tablet, Refills: 0  Start date: 8/30/2022, End date: 9/6/2022    Associated Diagnoses: S/P revision of total knee, right      polyethylene glycol (MIRALAX) 17 gram packet Take 1 Packet by mouth daily for 7 days. Qty: 7 Packet, Refills: 0  Start date: 8/31/2022, End date: 9/7/2022      senna-docusate (PERICOLACE) 8.6-50 mg per tablet Take 1 Tablet by mouth two (2) times a day for 7 days. Qty: 14 Tablet, Refills: 0  Start date: 8/30/2022, End date: 9/6/2022           CONTINUE these medications which have CHANGED    Details   aspirin delayed-release 325 mg tablet Take 1 Tablet by mouth two (2) times a day for 30 days. Qty: 60 Tablet, Refills: 0  Start date: 8/30/2022, End date: 9/29/2022           CONTINUE these medications which have NOT CHANGED    Details   pregabalin (LYRICA) 200 mg capsule Take 200 mg by mouth two (2) times a day. losartan (COZAAR) 25 mg tablet Take 25 mg by mouth daily. primidone (MYSOLINE) 50 mg tablet Take 50 mg by mouth every evening. metFORMIN (GLUCOPHAGE) 1,000 mg tablet Take 1 Tab by mouth two (2) times daily (with meals).  START TAKING 9/23 WITH DINNER  Qty: 30 Tab, Refills: 0      atorvastatin (LIPITOR) 80 mg tablet Take 80 mg by mouth nightly. hydroCHLOROthiazide (HYDRODIURIL) 25 mg tablet Take 25 mg by mouth daily. levothyroxine (SYNTHROID) 200 mcg tablet Take 200 mcg by mouth Daily (before breakfast). cholecalciferol, vitamin D3, (VITAMIN D3) 2,000 unit tab Take 2,000 Units by mouth daily. nitroglycerin (NITROSTAT) 0.4 mg SL tablet 1 Tab by SubLINGual route every five (5) minutes as needed for Chest Pain. Up to 3 doses.   Qty: 20 Tab, Refills: 0          per medical continuation form      -Follow up in office on Friday for incision check       Signed:  Lary Brooks NP  Orthopaedic Nurse Practitioner    8/31/2022  2:03 PM

## 2022-08-31 NOTE — PROGRESS NOTES
Problem: Mobility Impaired (Adult and Pediatric)  Goal: *Acute Goals and Plan of Care (Insert Text)  Description: FUNCTIONAL STATUS PRIOR TO ADMISSION: Patient was standby assistance using a rolling walker for functional mobility. Patient required minimal assistance for basic and instrumental ADLs. HOME SUPPORT PRIOR TO ADMISSION: The patient lived with life partner who assisted as needed with ADL's and mobility. Patient reports 3 falls in the last year. Physical Therapy Goals  Initiated 8/29/2022    1. Patient will move from supine to sit and sit to supine , scoot up and down, and roll side to side in bed with modified independence within 4 days. 2. Patient will perform sit to stand with modified independence within 4 days. 3. Patient will ambulate with supervision/set-up for 350 feet with the least restrictive device within 4 days. 4. Patient will ascend/descend 3 stairs with 1 handrail(s) with contact guard assist within 4 days. 5. Patient will perform home exercise program per protocol with supervision/set-up within 4 days. 6. Patient will demonstrate AROM 0-90 degrees in operative joint within 4 days. Outcome: Progressing Towards Goal   PHYSICAL THERAPY TREATMENT  Patient: Kelvin Khan (61 y.o. 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 ASSESSMENT  Patient continues with skilled PT services and is progressing towards goals. Pt presents with decreased strength and increased pain. Pt performed bed mobility at [facility] with additional time. Pt performed sit to stand transfer at [facility] with cueing for hand placement. Pt ambulated 20ft x2 with RW at [facility]. Uri Morel  Pt requiring cueing to stay within walker and stand upright. Pt able to mobilized int rest room and perform hygiene independently. Pt with no increased drainage with short bout of mobility. Current Level of Function Impacting Discharge (mobility/balance): mobility at [facility]     Other factors to consider for discharge: decreased strength          PLAN :  Patient continues to benefit from skilled intervention to address the above impairments. Continue treatment per established plan of care. to address goals. Recommendation for discharge: (in order for the patient to meet his/her long term goals)  Physical therapy at least 2 days/week in the home     This discharge recommendation:  Has been made in collaboration with the attending provider and/or case management    IF patient discharges home will need the following DME: rolling walker       SUBJECTIVE:   Patient stated I need to use the bathroom.     OBJECTIVE DATA SUMMARY:   Critical Behavior:  Neurologic State: Alert, Appropriate for age  Orientation Level: Oriented X4  Cognition: Appropriate decision making, Appropriate safety awareness, Follows commands  Safety/Judgement: Decreased insight into deficits, Fall prevention, Good awareness of safety precautions    Range of Motion:         AROM:Generally decreased, Functional                    Functional Mobility Training:  Bed Mobility:     Supine to Sit: Stand-by assistance; Additional time  Sit to Supine: Minimum assistance  Scooting: Stand-by assistance        Transfers:  Sit to Stand: Contact guard assistance;Stand-by assistance; Additional time  Stand to Sit: Stand-by assistance                             Balance:  Sitting: Intact  Standing: Impaired  Standing - Static: Good;Constant support  Standing - Dynamic : Fair;Good;Constant support  Ambulation/Gait Training:  Distance (ft): 40 Feet (ft) (20ft x2)  Assistive Device: Gait belt;Walker, rolling  Ambulation - Level of Assistance: Contact guard assistance;Stand-by assistance Base of Support: Widened     Speed/Shanell: Pace decreased (<100 feet/min)  Step Length: Right shortened;Left shortened              Pain Rating:  Pt reported increased pain due to stiffness    Activity Tolerance:   Good and Fair    After treatment patient left in no apparent distress:   Supine in bed, Call bell within reach, and Side rails x 3    COMMUNICATION/COLLABORATION:   The patients plan of care was discussed with: Registered nurse.      Jenny Munoz PTA   Time Calculation: 19 mins

## 2022-08-31 NOTE — PROGRESS NOTES
End of Shift Note    Bedside shift change report given to (oncoming nurse) by Melissa Thompson RN (offgoing nurse). Report included the following information SBAR, Kardex, MAR, and Recent Results    Shift worked:  [id number]     Shift summary and any significant changes:    cont  with some ss drainage from incision site.; cris with activity. Drsg reinforced. A&o x4. Voiding. Some anxiety and c/o pain and sleeplessness. Needing Pain control Q4 and cluster care. Supp given for constipation.      Concerns for physician to address:       Zone phone for oncoming shift:            Length of Stay:  Expected LOS: 1d 21h  Actual LOS: 1      Melissa Thompson RN

## 2022-09-01 ENCOUNTER — TELEPHONE (OUTPATIENT)
Dept: ORTHOPEDIC SURGERY | Age: 77
End: 2022-09-01

## 2022-09-01 ENCOUNTER — HOME CARE VISIT (OUTPATIENT)
Dept: HOME HEALTH SERVICES | Facility: HOME HEALTH | Age: 77
End: 2022-09-01

## 2022-09-01 NOTE — TELEPHONE ENCOUNTER
Patient is requesting a referral to rehab. He had surgery on 8/29/22 for TKA RT and does not feel that [facility] is providing enough care. He states he would like to get into the rehab center as soon as possible.

## 2022-09-02 ENCOUNTER — OFFICE VISIT (OUTPATIENT)
Dept: ORTHOPEDIC SURGERY | Age: 77
End: 2022-09-02
Payer: MEDICARE

## 2022-09-02 VITALS
HEIGHT: 68 IN | BODY MASS INDEX: 37.28 KG/M2 | TEMPERATURE: 96.9 F | DIASTOLIC BLOOD PRESSURE: 69 MMHG | SYSTOLIC BLOOD PRESSURE: 135 MMHG | HEART RATE: 73 BPM | WEIGHT: 246 LBS | OXYGEN SATURATION: 100 %

## 2022-09-02 DIAGNOSIS — T84.092A OTHER MECHANICAL COMPLICATION OF INTERNAL RIGHT KNEE PROSTHESIS, INITIAL ENCOUNTER (HCC): Primary | ICD-10-CM

## 2022-09-02 DIAGNOSIS — E11.9 TYPE 2 DIABETES MELLITUS WITHOUT COMPLICATION, UNSPECIFIED WHETHER LONG TERM INSULIN USE (HCC): ICD-10-CM

## 2022-09-02 PROCEDURE — 99024 POSTOP FOLLOW-UP VISIT: CPT | Performed by: ORTHOPAEDIC SURGERY

## 2022-09-02 NOTE — LETTER
9/2/2022    Patient: Dave Cole   YOB: 1945   Date of Visit: 9/2/2022     Isauro Melendez MD  [address] [address] [address]  Via Fax: [phone]    Dear Isauro Melendez MD,      Thank you for referring Mr. Tammy Bell to [facility] for evaluation. My notes for this consultation are attached. If you have questions, please do not hesitate to call me. I look forward to following your patient along with you.       Sincerely,    Kathrin Marcial, DO

## 2022-09-02 NOTE — PROGRESS NOTES
is here for a follow up visit from a total knee arthroplasty on the right  side. He had some bleeding postop, for which I placed him in an immobilized and planned to see him back early, today, for a clinincal check. He has not been wearing his immobilizer. The patient is doing well, with no medical complications since discharge. Pain has been appropriate since surgery,and the patient is progressing well with physical therapy. Patient is ambulating with a walker. Current Outpatient Medications on File Prior to Visit   Medication Sig Dispense Refill    aspirin delayed-release 325 mg tablet Take 1 Tablet by mouth two (2) times a day for 30 days. 60 Tablet 0    acetaminophen (TYLENOL) 500 mg tablet Take 2 Tablets by mouth every six (6) hours for 7 days. 56 Tablet 0    famotidine (PEPCID) 20 mg tablet Take 1 Tablet by mouth two (2) times a day for 30 days. 60 Tablet 0    oxyCODONE IR (ROXICODONE) 5 mg immediate release tablet Take 1 Tablet by mouth every four (4) hours as needed for Pain for up to 7 days. Max Daily Amount: 30 mg. 30 Tablet 0    polyethylene glycol (MIRALAX) 17 gram packet Take 1 Packet by mouth daily for 7 days. 7 Packet 0    senna-docusate (PERICOLACE) 8.6-50 mg per tablet Take 1 Tablet by mouth two (2) times a day for 7 days. 14 Tablet 0    pregabalin (LYRICA) 200 mg capsule Take 200 mg by mouth two (2) times a day. losartan (COZAAR) 25 mg tablet Take 25 mg by mouth daily. primidone (MYSOLINE) 50 mg tablet Take 50 mg by mouth every evening. metFORMIN (GLUCOPHAGE) 1,000 mg tablet Take 1 Tab by mouth two (2) times daily (with meals). START TAKING 9/23 WITH DINNER (Patient taking differently: Take 1,000 mg by mouth two (2) times daily (with meals). Patient restarted on 8/19/22) 30 Tab 0    atorvastatin (LIPITOR) 80 mg tablet Take 80 mg by mouth nightly. hydroCHLOROthiazide (HYDRODIURIL) 25 mg tablet Take 25 mg by mouth daily.       levothyroxine (SYNTHROID) 200 mcg tablet Take 200 mcg by mouth Daily (before breakfast). cholecalciferol, vitamin D3, (VITAMIN D3) 2,000 unit tab Take 2,000 Units by mouth daily. nitroglycerin (NITROSTAT) 0.4 mg SL tablet 1 Tab by SubLINGual route every five (5) minutes as needed for Chest Pain. Up to 3 doses. (Patient not taking: No sig reported) 20 Tab 0     No current facility-administered medications on file prior to visit. ROS:  General: denies agitation, major chest pain, unexpected weakness  Patient complains of left knee pain which is  managed with oxycodone. Skin: healing wound is without issue or drainage  Strength: appropriate weakness of involved extremity is resolving since surgery      Physical Examination:    Blood pressure 135/69, pulse 73, temperature 96.9 °F (36.1 °C), temperature source Tympanic, height 5' 7.5\" (1.715 m), weight 246 lb (111.6 kg), SpO2 100 %. Dressing: Clean, Dry, Intact  Skin: no significant drainage, no wound dehiscence. Previous areas of venous bleeding have resolved  Range of motion: not tested  Coronal plane stability is excellent  Sensation intact to light touch at level of wound and distally  Strength is not tested  Leg lengths are clinically equal  Distal swelling is noted, but appropriate for postoperative course  Distal capillary refill less than 2 seconds      Imaging:    Postoperative xrays are reviewed, they indicate a right total knee arthroplasty in excellent position without evidence of loosening or failure. Assessment: Status post right total knee arthroplasty, doing well    Plan:  Patient will continue physical therapy, with the goal of outpatient therapy and then home exercise program as soon as is possible  I have given wound care and immobilizer instructions  Wound care and dental prophylaxis instructions were reviewed  Continue to weight bear as tolerated without restriction, except to keep to the positions of comfort.   Emphasis was placed on range of motion and strength exercises daily. Deep Venous Thrombosis Prophylaxis: aspirin  Follow up will be at 1 weeks from now,  right knee xrays on follow up. Mr. Jesus Lester has a reminder for a \"due or due soon\" health maintenance.  I have asked that he contact his primary care provider for follow-up on this health

## 2022-09-02 NOTE — PROGRESS NOTES
Identified pt with two pt identifiers (name and ). Reviewed chart in preparation for visit and have obtained necessary documentation. Flor Becerril is a 68 y.o. male  Chief Complaint   Patient presents with    Surgical Follow-up     RT Knee     Visit Vitals  /69 (BP 1 Location: Left upper arm, BP Patient Position: Sitting, BP Cuff Size: Large adult)   Pulse 73   Temp 96.9 °F (36.1 °C) (Tympanic)   Ht 5' 7.5\" (1.715 m)   Wt 246 lb (111.6 kg)   SpO2 100%   BMI 37.96 kg/m²     1. Have you been to the ER, urgent care clinic since your last visit? Hospitalized since your last visit? No    2. Have you seen or consulted any other health care providers outside of the [address] [place] [address] since your last visit? Include any pap smears or colon screening.  No

## 2022-09-04 ENCOUNTER — HOME CARE VISIT (OUTPATIENT)
Dept: SCHEDULING | Facility: HOME HEALTH | Age: 77
End: 2022-09-04
Payer: MEDICARE

## 2022-09-04 PROCEDURE — 400018 HH-NO PAY CLAIM PROCEDURE

## 2022-09-04 PROCEDURE — 3331090002 HH PPS REVENUE DEBIT

## 2022-09-04 PROCEDURE — G0151 HHCP-SERV OF PT,EA 15 MIN: HCPCS

## 2022-09-04 PROCEDURE — 3331090001 HH PPS REVENUE CREDIT

## 2022-09-04 PROCEDURE — 400013 HH SOC

## 2022-09-05 VITALS
OXYGEN SATURATION: 98 % | TEMPERATURE: 98.1 F | RESPIRATION RATE: 18 BRPM | DIASTOLIC BLOOD PRESSURE: 72 MMHG | HEART RATE: 78 BPM | SYSTOLIC BLOOD PRESSURE: 140 MMHG

## 2022-09-05 PROCEDURE — 3331090002 HH PPS REVENUE DEBIT

## 2022-09-05 PROCEDURE — 3331090001 HH PPS REVENUE CREDIT

## 2022-09-06 ENCOUNTER — HOME CARE VISIT (OUTPATIENT)
Dept: SCHEDULING | Facility: HOME HEALTH | Age: 77
End: 2022-09-06
Payer: MEDICARE

## 2022-09-06 DIAGNOSIS — Z96.651 S/P REVISION OF TOTAL KNEE, RIGHT: ICD-10-CM

## 2022-09-06 DIAGNOSIS — Z96.651 S/P REVISION OF TOTAL KNEE, RIGHT: Primary | ICD-10-CM

## 2022-09-06 PROCEDURE — G0151 HHCP-SERV OF PT,EA 15 MIN: HCPCS

## 2022-09-06 PROCEDURE — 3331090001 HH PPS REVENUE CREDIT

## 2022-09-06 PROCEDURE — 3331090002 HH PPS REVENUE DEBIT

## 2022-09-06 RX ORDER — OXYCODONE HYDROCHLORIDE 5 MG/1
5 TABLET ORAL
Qty: 30 TABLET | Refills: 0 | Status: SHIPPED | OUTPATIENT
Start: 2022-09-06 | End: 2022-09-13 | Stop reason: SDUPTHER

## 2022-09-07 VITALS
SYSTOLIC BLOOD PRESSURE: 132 MMHG | DIASTOLIC BLOOD PRESSURE: 78 MMHG | TEMPERATURE: 97.6 F | OXYGEN SATURATION: 98 % | HEART RATE: 74 BPM | RESPIRATION RATE: 18 BRPM

## 2022-09-07 PROCEDURE — 3331090001 HH PPS REVENUE CREDIT

## 2022-09-07 PROCEDURE — 3331090002 HH PPS REVENUE DEBIT

## 2022-09-08 ENCOUNTER — HOME CARE VISIT (OUTPATIENT)
Dept: SCHEDULING | Facility: HOME HEALTH | Age: 77
End: 2022-09-08
Payer: MEDICARE

## 2022-09-08 PROCEDURE — G0151 HHCP-SERV OF PT,EA 15 MIN: HCPCS

## 2022-09-08 PROCEDURE — 3331090001 HH PPS REVENUE CREDIT

## 2022-09-08 PROCEDURE — 3331090002 HH PPS REVENUE DEBIT

## 2022-09-09 ENCOUNTER — TELEPHONE (OUTPATIENT)
Dept: ORTHOPEDIC SURGERY | Age: 77
End: 2022-09-09

## 2022-09-09 VITALS
DIASTOLIC BLOOD PRESSURE: 72 MMHG | TEMPERATURE: 97.6 F | HEART RATE: 72 BPM | OXYGEN SATURATION: 98 % | SYSTOLIC BLOOD PRESSURE: 134 MMHG | RESPIRATION RATE: 18 BRPM

## 2022-09-09 PROCEDURE — 3331090001 HH PPS REVENUE CREDIT

## 2022-09-09 PROCEDURE — 3331090002 HH PPS REVENUE DEBIT

## 2022-09-09 NOTE — TELEPHONE ENCOUNTER
Patient is calling stating he has been experiencing bleeding from his surgical site and has had to change his bandages. He also states he is having to take his prescribed oxycodone to manage the pain.      Patient is coming in for x-rays and 2 week post op on 9/12/22

## 2022-09-09 NOTE — PROGRESS NOTES
is here for a follow up visit from a total knee arthroplasty on the right  side. The patient is doing well, with no medical complications since discharge. Pain has been appropriate since surgery,and the patient is progressing well with physical therapy. Patient is ambulating with a walker. Current Outpatient Medications on File Prior to Visit   Medication Sig Dispense Refill    oxyCODONE IR (ROXICODONE) 5 mg immediate release tablet Take 1 Tablet by mouth every four (4) hours as needed for Pain for up to 7 days. Max Daily Amount: 30 mg. 30 Tablet 0    aspirin delayed-release 325 mg tablet Take 1 Tablet by mouth two (2) times a day for 30 days. 60 Tablet 0    famotidine (PEPCID) 20 mg tablet Take 1 Tablet by mouth two (2) times a day for 30 days. 60 Tablet 0    pregabalin (LYRICA) 200 mg capsule Take 200 mg by mouth two (2) times a day. nitroglycerin (NITROSTAT) 0.4 mg SL tablet 1 Tab by SubLINGual route every five (5) minutes as needed for Chest Pain. Up to 3 doses. (Patient not taking: No sig reported) 20 Tab 0    losartan (COZAAR) 25 mg tablet Take 25 mg by mouth daily. primidone (MYSOLINE) 50 mg tablet Take 50 mg by mouth every evening. metFORMIN (GLUCOPHAGE) 1,000 mg tablet Take 1 Tab by mouth two (2) times daily (with meals). START TAKING 9/23 WITH DINNER (Patient taking differently: Take 1,000 mg by mouth two (2) times daily (with meals). Patient restarted on 8/19/22) 30 Tab 0    atorvastatin (LIPITOR) 80 mg tablet Take 80 mg by mouth nightly. hydroCHLOROthiazide (HYDRODIURIL) 25 mg tablet Take 25 mg by mouth daily. levothyroxine (SYNTHROID) 200 mcg tablet Take 200 mcg by mouth Daily (before breakfast). cholecalciferol, vitamin D3, (VITAMIN D3) 2,000 unit tab Take 2,000 Units by mouth daily. No current facility-administered medications on file prior to visit.        ROS:  General: denies agitation, major chest pain, unexpected weakness  Patient complains of left knee pain which is well managed with oxycodone. Skin: healing wound is without issue or drainage  Strength: appropriate weakness of involved extremity is resolving since surgery      Physical Examination:    There were no vitals taken for this visit. Dressing: Clean, Dry, Intact  Skin: no significant drainage, no wound dehiscence. His previous bleeding issue has resolved  Range of motion: 0-110  Coronal plane stability is excellent  Sensation intact to light touch at level of wound and distally  Strength is 4/5 with knee flexion and extension  Leg lengths are clinically equal  Distal swelling is noted, but appropriate for postoperative course  Distal capillary refill less than 2 seconds      Imaging:    Postoperative xrays are reviewed, they indicate a right total knee arthroplasty in excellent position without evidence of loosening or failure. Assessment: Status post right total knee arthroplasty, doing well    Plan:  Patient will continue physical therapy, with the goal of outpatient therapy and then home exercise program as soon as is possible    Wound care and dental prophylaxis instructions were reviewed  Continue to weight bear as tolerated without restriction, except to keep to the positions of comfort. Emphasis was placed on range of motion and strength exercises daily. Deep Venous Thrombosis Prophylaxis: aspirin  Follow up will be at 3 weeks from now,  no xrays on follow up. Mr. Russell Thibodeaux has a reminder for a \"due or due soon\" health maintenance.  I have asked that he contact his primary care provider for follow-up on this health

## 2022-09-10 PROCEDURE — 3331090002 HH PPS REVENUE DEBIT

## 2022-09-10 PROCEDURE — 3331090001 HH PPS REVENUE CREDIT

## 2022-09-11 PROCEDURE — 3331090001 HH PPS REVENUE CREDIT

## 2022-09-11 PROCEDURE — 3331090002 HH PPS REVENUE DEBIT

## 2022-09-12 ENCOUNTER — HOSPITAL ENCOUNTER (OUTPATIENT)
Dept: GENERAL RADIOLOGY | Age: 77
Discharge: HOME | End: 2022-09-12
Payer: MEDICARE

## 2022-09-12 ENCOUNTER — OFFICE VISIT (OUTPATIENT)
Dept: ORTHOPEDIC SURGERY | Age: 77
End: 2022-09-12
Payer: MEDICARE

## 2022-09-12 VITALS
TEMPERATURE: 98.8 F | SYSTOLIC BLOOD PRESSURE: 117 MMHG | DIASTOLIC BLOOD PRESSURE: 79 MMHG | HEART RATE: 64 BPM | OXYGEN SATURATION: 100 %

## 2022-09-12 DIAGNOSIS — Z96.651 S/P REVISION OF TOTAL KNEE, RIGHT: Primary | ICD-10-CM

## 2022-09-12 DIAGNOSIS — E66.01 SEVERE OBESITY (BMI 35.0-39.9) WITH COMORBIDITY (HCC): ICD-10-CM

## 2022-09-12 DIAGNOSIS — Z96.651 S/P REVISION OF TOTAL KNEE, RIGHT: ICD-10-CM

## 2022-09-12 LAB
BACTERIA SPEC CULT: NORMAL
GRAM STN SPEC: NORMAL
SERVICE CMNT-IMP: NORMAL

## 2022-09-12 PROCEDURE — 3331090002 HH PPS REVENUE DEBIT

## 2022-09-12 PROCEDURE — 3331090001 HH PPS REVENUE CREDIT

## 2022-09-12 PROCEDURE — 73560 X-RAY EXAM OF KNEE 1 OR 2: CPT

## 2022-09-12 PROCEDURE — 99024 POSTOP FOLLOW-UP VISIT: CPT | Performed by: ORTHOPAEDIC SURGERY

## 2022-09-12 NOTE — LETTER
9/14/2022    Patient: Comfort Panchal   YOB: 1945   Date of Visit: 9/12/2022     Sallie Chow MD  [address] [place] [address]  Via Fax: [phone]    Dear Sallie Chow MD,      Thank you for referring Mr. Watson Rebolledo to [facility] for evaluation. My notes for this consultation are attached. If you have questions, please do not hesitate to call me. I look forward to following your patient along with you.       Sincerely,    Miladis French, DO

## 2022-09-13 ENCOUNTER — HOME CARE VISIT (OUTPATIENT)
Dept: SCHEDULING | Facility: HOME HEALTH | Age: 77
End: 2022-09-13
Payer: MEDICARE

## 2022-09-13 DIAGNOSIS — Z96.651 S/P REVISION OF TOTAL KNEE, RIGHT: ICD-10-CM

## 2022-09-13 PROCEDURE — 3331090002 HH PPS REVENUE DEBIT

## 2022-09-13 PROCEDURE — G0151 HHCP-SERV OF PT,EA 15 MIN: HCPCS

## 2022-09-13 PROCEDURE — 3331090001 HH PPS REVENUE CREDIT

## 2022-09-13 RX ORDER — OXYCODONE HYDROCHLORIDE 5 MG/1
5 TABLET ORAL
Qty: 30 TABLET | Refills: 0 | Status: SHIPPED | OUTPATIENT
Start: 2022-09-13 | End: 2022-09-19 | Stop reason: SDUPTHER

## 2022-09-14 VITALS
SYSTOLIC BLOOD PRESSURE: 132 MMHG | HEART RATE: 72 BPM | RESPIRATION RATE: 18 BRPM | TEMPERATURE: 97.6 F | DIASTOLIC BLOOD PRESSURE: 72 MMHG | OXYGEN SATURATION: 98 %

## 2022-09-14 PROCEDURE — 3331090002 HH PPS REVENUE DEBIT

## 2022-09-14 PROCEDURE — 3331090001 HH PPS REVENUE CREDIT

## 2022-09-15 ENCOUNTER — HOME CARE VISIT (OUTPATIENT)
Dept: SCHEDULING | Facility: HOME HEALTH | Age: 77
End: 2022-09-15
Payer: MEDICARE

## 2022-09-15 PROCEDURE — G0151 HHCP-SERV OF PT,EA 15 MIN: HCPCS

## 2022-09-15 PROCEDURE — 3331090001 HH PPS REVENUE CREDIT

## 2022-09-15 PROCEDURE — 3331090002 HH PPS REVENUE DEBIT

## 2022-09-16 VITALS
TEMPERATURE: 97.6 F | DIASTOLIC BLOOD PRESSURE: 78 MMHG | RESPIRATION RATE: 18 BRPM | OXYGEN SATURATION: 98 % | SYSTOLIC BLOOD PRESSURE: 136 MMHG | HEART RATE: 82 BPM

## 2022-09-16 PROCEDURE — 3331090002 HH PPS REVENUE DEBIT

## 2022-09-16 PROCEDURE — 3331090001 HH PPS REVENUE CREDIT

## 2022-09-17 ENCOUNTER — HOME CARE VISIT (OUTPATIENT)
Dept: SCHEDULING | Facility: HOME HEALTH | Age: 77
End: 2022-09-17
Payer: MEDICARE

## 2022-09-17 PROCEDURE — G0151 HHCP-SERV OF PT,EA 15 MIN: HCPCS

## 2022-09-17 PROCEDURE — 3331090001 HH PPS REVENUE CREDIT

## 2022-09-17 PROCEDURE — 3331090002 HH PPS REVENUE DEBIT

## 2022-09-18 VITALS
RESPIRATION RATE: 18 BRPM | SYSTOLIC BLOOD PRESSURE: 132 MMHG | HEART RATE: 78 BPM | DIASTOLIC BLOOD PRESSURE: 74 MMHG | OXYGEN SATURATION: 98 % | TEMPERATURE: 97.6 F

## 2022-09-18 PROCEDURE — 3331090001 HH PPS REVENUE CREDIT

## 2022-09-18 PROCEDURE — 3331090002 HH PPS REVENUE DEBIT

## 2022-09-19 DIAGNOSIS — Z96.651 S/P REVISION OF TOTAL KNEE, RIGHT: ICD-10-CM

## 2022-09-19 PROCEDURE — 3331090002 HH PPS REVENUE DEBIT

## 2022-09-19 PROCEDURE — 3331090001 HH PPS REVENUE CREDIT

## 2022-09-19 RX ORDER — OXYCODONE HYDROCHLORIDE 5 MG/1
5 TABLET ORAL
Qty: 30 TABLET | Refills: 0 | Status: SHIPPED | OUTPATIENT
Start: 2022-09-19 | End: 2022-09-27 | Stop reason: SDUPTHER

## 2022-09-20 ENCOUNTER — HOSPITAL ENCOUNTER (OUTPATIENT)
Dept: PHYSICAL THERAPY | Age: 77
Discharge: HOME | End: 2022-09-20
Payer: MEDICARE

## 2022-09-20 PROCEDURE — 97162 PT EVAL MOD COMPLEX 30 MIN: CPT | Performed by: PHYSICAL THERAPIST

## 2022-09-20 PROCEDURE — 97110 THERAPEUTIC EXERCISES: CPT | Performed by: PHYSICAL THERAPIST

## 2022-09-20 NOTE — PROGRESS NOTES
PT INITIAL EVALUATION NOTE - [facility] 2-15    Patient Name: Chaitanya Zimmer  Date:2022  : 1945  [x]  Patient  Verified  Payor: [place] MEDICARE / Plan: [place] MEDICARE PART A & B / Product Type: Medicare /    In time: 3008  Out time: 130  Total Treatment Time (min): 42  Total Timed Codes (min): 15  1:1 Treatment Time ( only): 15   Visit #: 1     Treatment Area: Pain in right knee [M25.561]  Presence of right artificial knee joint [Z96.651]    SUBJECTIVE  Pain Level (0-10 scale): 6-7  Any medication changes, allergies to medications, adverse drug reactions, diagnosis change, or new procedure performed?: [] No    [x] Yes (see summary sheet for update)  Subjective:    Pt reports that he had a partial right knee replacement two years ago. He states that he never recovered following the surgery and continued to have pain in the knee. After 2 years he finally decided to et a second opinion from Dr Ben Garcia. Dr Ben Garcia proceeded with x-rays and a bone scan. It was decided to move forward with a total revision. This was completed on 22. Since then he has completed 6 visits of home health. He states that he got as much as 115 degrees of flexion but has typically been around 110 degrees. Pt is interested in getting back to a walking routine. He also has ankylosing spondylitis and DISH.   Terry Siddiqui    OBJECTIVE/EXAMINATION  OBJECTIVE  Observations: incision is clean and dry with two areas of scab that are still healing, minor drainage of sero-sanguinis fluid following ROM     Gait and Functional Mobility: antalgic with rolling walker step to gait   Palpation: tenderness along the insicions    Right Knee ROM: AROM 12-98   Left Knee ROM: AROM 4-126      Joint Mobility Assessment: good patella mobs    Flexibility: NT    Neurological: Reflexes / Sensations: grossly intact    Special Tests:  Knee Varus/Valgus Stress: NT Knee Lachmans: NT       Shyla's Sign: NT             15 min Therapeutic Exercise:  [x] See flow sheet : Rationale: increase ROM and increase strength to improve the patients ability to complete all activity            With   [x] TE   [] TA   [] Neuro   [] SC   [] other: Patient Education: [x] Review HEP    [x] Progressed/Changed HEP based on:   [x] positioning   [x] body mechanics   [] transfers   [x] heat/ice application    [] other:      Other Objective/Functional Measures: FOTO Functional Measure: not captured at eval                         Pain Level (0-10 scale) post treatment: 6-7    ASSESSMENT/Changes in Function:     [x]  See Plan of Kerri, PT 9/20/2022

## 2022-09-23 ENCOUNTER — HOSPITAL ENCOUNTER (OUTPATIENT)
Dept: PHYSICAL THERAPY | Age: 77
Discharge: HOME | End: 2022-09-23
Payer: MEDICARE

## 2022-09-23 ENCOUNTER — APPOINTMENT (OUTPATIENT)
Dept: PHYSICAL THERAPY | Age: 77
End: 2022-09-23
Payer: MEDICARE

## 2022-09-23 PROCEDURE — 97110 THERAPEUTIC EXERCISES: CPT | Performed by: PHYSICAL THERAPIST

## 2022-09-23 PROCEDURE — 97140 MANUAL THERAPY 1/> REGIONS: CPT | Performed by: PHYSICAL THERAPIST

## 2022-09-26 NOTE — PROGRESS NOTES
PT DAILY TREATMENT NOTE - [facility] 2-15    Patient Name: Ariana Palmer  Date:2022  : 1945  [x]  Patient  Verified  Payor: [place] MEDICARE / Plan: VA MEDICARE PART A & B / Product Type: Medicare /    In time:   Out time: 112  Total Treatment Time (min): 45  Total Timed Codes (min): 45  1:1 Treatment Time ( [address] only): 39   Visit #:  2    Treatment Area: Pain in right knee [M25.561]  Presence of right artificial knee joint [Z96.651]    SUBJECTIVE  Pain Level (0-10 scale): 6  Any medication changes, allergies to medications, adverse drug reactions, diagnosis change, or new procedure performed?: [x] No    [] Yes (see summary sheet for update)  Subjective functional status/changes:   [] No changes reported  Pt reports that he has been sore since the eval and the incision is draining a little more    OBJECTIVE    30 min Therapeutic Exercise:  [x] See flow sheet :   Rationale: increase ROM, increase strength, improve coordination, improve balance, and increase proprioception to improve the patients ability to complete all activity      15 min Manual Therapy: PROM with екатерина alaniz, post tib glides with distaction    Rationale: decrease pain, increase ROM, increase tissue extensibility, decrease trigger points, and increase postural awareness to improve the patients ability to complete all activity    Pain Level (0-10 scale) post treatment: 4    ASSESSMENT/Changes in Function:   Pt is able to tolerate all activity. He has improved motion and decreased swelling with active movement.   Have encouraged him to stay on top of the swelling this weekend to help manage this in hopes this will allow greater ROM  Patient will continue to benefit from skilled PT services to modify and progress therapeutic interventions, address functional mobility deficits, address ROM deficits, address strength deficits, analyze and address soft tissue restrictions, analyze and cue movement patterns, analyze and modify body mechanics/ergonomics, assess and modify postural abnormalities, address imbalance/dizziness, and instruct in home and community integration to attain remaining goals.      []  See Plan of Care  []  See progress note/recertification  []  See Discharge Summary         Progress towards goals / Updated goals:  Progressing toward goals    PLAN  [x]  Upgrade activities as tolerated     [x]  Continue plan of care  [x]  Update interventions per flow sheet       []  Discharge due to:_  []  Other:_      Mervin Huang, PT 9/26/2022

## 2022-09-26 NOTE — THERAPY EVALUATION
[facility] Physical Therapy  [address] ([address]), [address] [place]  Phone: [phone] Fax: [phone]     Plan of Care/Statement of Necessity for Physical Therapy Services  2-15    Patient name: Chaitanya Zimmer  : 1945  Provider#: [id number]  Referral source: Mojgan Sal DO      Medical/Treatment Diagnosis: Pain in right knee [M25.561]  Presence of right artificial knee joint [Z96.651]     Prior Hospitalization: see medical history     Comorbidities: ankylosing spondylitis, diffuse idiopathic skeletal hyperostosis, DMII, neuropathy, and fibromyalgia  Prior Level of Function: 20 minutes seldom if ever  Medications: Verified on Patient Summary List  Start of Care: 22      Onset Date: 22   The Plan of Care and following information is based on the information from the initial evaluation. Assessment/ key information: pt is a 67 yo male who is in today to begin therapy following right TKR. He reports having had a partial replacement 2 years ago and never really recovering after that surgery or getting any relief of his symptoms. He has completed 6 sessions of home health and his present today to begin outpatient therapy. Objective findings include AROM 12-98. The incision is clean and dry with two areas of bruising where the scab is not fully healed. This area also has minimal drainage following ROM. Pt is ambulating with rolling walker with a step to gait. Pt is a good candidate for PT and will benefit from skilled services to address these deficits and work toward the goals listed below.         Evaluation Complexity History MEDIUM  Complexity : 1-2 comorbidities / personal factors will impact the outcome/ POC ; Examination HIGH Complexity : 4+ Standardized tests and measures addressing body structure, function, activity limitation and / or participation in recreation  ;Presentation MEDIUM Complexity : Evolving with changing characteristics  ; Clinical Decision Making MEDIUM Complexity : FOTO score of 26-74  Overall Complexity Rating: MEDIUM    Problem List: pain affecting function, decrease ROM, decrease strength, impaired gait/ balance, decrease ADL/ functional abilitiies, decrease activity tolerance, decrease flexibility/ joint mobility, and decrease transfer abilities   Treatment Plan may include any combination of the following: Therapeutic exercise, Therapeutic activities, Neuromuscular re-education, Physical agent/modality, Gait/balance training, Manual therapy, Patient education, Self Care training, Functional mobility training, Home safety training, and Stair training  Patient / Family readiness to learn indicated by: asking questions, trying to perform skills, and interest  Persons(s) to be included in education: patient (P)  Barriers to Learning/Limitations: None  Patient Goal (s): [address]  Patient Self Reported Health Status: good  Rehabilitation Potential: good    Short Term Goals: To be accomplished in 10-12 treatments:   Pt will be consistent and demonstrate I with HEP  Pt will complain of pain 2-3/10 with all activity  Pt will demonstrate improved ROM to normalize gait and ambulate more efficiently  Pt will be able to ambulate safely with LRAD all community distances    Long Term Goals: To be accomplished in 20-24 treatments:   Pt will complain of pain 0-1/10 with all activity  Pt will increase strength to improve balance and stability to further improve his ability to ambulate all distances with LRAD  Pt will increase FOTO score by 9 points to meet MDC and demonstrate improved function with all activity      Frequency / Duration: Patient to be seen 2 times per week for 24 treatments.     Patient/ Caregiver education and instruction: self care, activity modification, and exercises    [x]  Plan of care has been reviewed with PTA        Certification Period: 9/20/22-12/18/22  Pat Bardales, PT 9/26/2022 ________________________________________________________________________    I certify that the above Therapy Services are being furnished while the patient is under my care. I agree with the treatment plan and certify that this therapy is necessary.     [de-identified] Signature:____________________  Date:____________Time: _________         Loral Min, DO

## 2022-09-27 ENCOUNTER — HOSPITAL ENCOUNTER (OUTPATIENT)
Dept: PHYSICAL THERAPY | Age: 77
Discharge: HOME | End: 2022-09-27
Payer: MEDICARE

## 2022-09-27 DIAGNOSIS — Z96.651 S/P REVISION OF TOTAL KNEE, RIGHT: ICD-10-CM

## 2022-09-27 PROCEDURE — 97140 MANUAL THERAPY 1/> REGIONS: CPT | Performed by: PHYSICAL THERAPIST

## 2022-09-27 PROCEDURE — 97110 THERAPEUTIC EXERCISES: CPT | Performed by: PHYSICAL THERAPIST

## 2022-09-27 RX ORDER — OXYCODONE HYDROCHLORIDE 5 MG/1
5 TABLET ORAL
Qty: 30 TABLET | Refills: 0 | Status: SHIPPED | OUTPATIENT
Start: 2022-09-27 | End: 2022-10-03 | Stop reason: SDUPTHER

## 2022-09-30 ENCOUNTER — HOSPITAL ENCOUNTER (OUTPATIENT)
Dept: PHYSICAL THERAPY | Age: 77
Discharge: HOME | End: 2022-09-30
Payer: MEDICARE

## 2022-09-30 PROCEDURE — 97140 MANUAL THERAPY 1/> REGIONS: CPT | Performed by: PHYSICAL THERAPIST

## 2022-09-30 PROCEDURE — 97110 THERAPEUTIC EXERCISES: CPT | Performed by: PHYSICAL THERAPIST

## 2022-10-03 ENCOUNTER — OFFICE VISIT (OUTPATIENT)
Dept: ORTHOPEDIC SURGERY | Age: 77
End: 2022-10-03
Payer: MEDICARE

## 2022-10-03 VITALS
WEIGHT: 241 LBS | DIASTOLIC BLOOD PRESSURE: 69 MMHG | SYSTOLIC BLOOD PRESSURE: 129 MMHG | BODY MASS INDEX: 36.53 KG/M2 | HEART RATE: 55 BPM | HEIGHT: 68 IN | TEMPERATURE: 97 F | OXYGEN SATURATION: 100 %

## 2022-10-03 DIAGNOSIS — F11.99 OPIOID USE, UNSPECIFIED WITH UNSPECIFIED OPIOID-INDUCED DISORDER (HCC): ICD-10-CM

## 2022-10-03 DIAGNOSIS — Z96.651 S/P REVISION OF TOTAL KNEE, RIGHT: Primary | ICD-10-CM

## 2022-10-03 PROCEDURE — 99024 POSTOP FOLLOW-UP VISIT: CPT | Performed by: ORTHOPAEDIC SURGERY

## 2022-10-03 RX ORDER — OXYCODONE HYDROCHLORIDE 5 MG/1
5 TABLET ORAL
Qty: 30 TABLET | Refills: 0 | Status: SHIPPED | OUTPATIENT
Start: 2022-10-03 | End: 2022-10-20 | Stop reason: SDUPTHER

## 2022-10-03 NOTE — LETTER
10/3/2022    Patient: Keely Sabillon   YOB: 1945   Date of Visit: 10/3/2022     Meme Nagel MD  [address] [place] [address]  Via Fax: [phone]    Dear Meme Nagel MD,      Thank you for referring Mr. Frantz Donahue to [facility] for evaluation. My notes for this consultation are attached. If you have questions, please do not hesitate to call me. I look forward to following your patient along with you.       Sincerely,    Cindy Gaitan, DO

## 2022-10-03 NOTE — PROGRESS NOTES
Identified pt with two pt identifiers (name and ). Reviewed chart in preparation for visit and have obtained necessary documentation. Jarrell Doe is a 68 y.o. male  Chief Complaint   Patient presents with    Surgical Follow-up     RT Knee     Visit Vitals  /69 (BP 1 Location: Left upper arm, BP Patient Position: Sitting, BP Cuff Size: Large adult)   Pulse (!) 55   Temp 97 °F (36.1 °C) (Tympanic)   Ht 5' 7.5\" (1.715 m)   Wt 241 lb (109.3 kg)   SpO2 100%   BMI 37.19 kg/m²     1. Have you been to the ER, urgent care clinic since your last visit? Hospitalized since your last visit? No    2. Have you seen or consulted any other health care providers outside of the [address] [place] [address] since your last visit? Include any pap smears or colon screening.  No

## 2022-10-04 ENCOUNTER — HOSPITAL ENCOUNTER (OUTPATIENT)
Dept: PHYSICAL THERAPY | Age: 77
Discharge: HOME | End: 2022-10-04
Payer: MEDICARE

## 2022-10-04 PROCEDURE — 97110 THERAPEUTIC EXERCISES: CPT | Performed by: PHYSICAL THERAPIST

## 2022-10-04 PROCEDURE — 97140 MANUAL THERAPY 1/> REGIONS: CPT | Performed by: PHYSICAL THERAPIST

## 2022-10-07 ENCOUNTER — HOSPITAL ENCOUNTER (OUTPATIENT)
Dept: PHYSICAL THERAPY | Age: 77
Discharge: HOME | End: 2022-10-07
Payer: MEDICARE

## 2022-10-07 PROCEDURE — 97140 MANUAL THERAPY 1/> REGIONS: CPT | Performed by: PHYSICAL THERAPIST

## 2022-10-07 PROCEDURE — 97110 THERAPEUTIC EXERCISES: CPT | Performed by: PHYSICAL THERAPIST

## 2022-10-07 PROCEDURE — 97014 ELECTRIC STIMULATION THERAPY: CPT | Performed by: PHYSICAL THERAPIST

## 2022-10-19 ENCOUNTER — HOSPITAL ENCOUNTER (OUTPATIENT)
Dept: PHYSICAL THERAPY | Age: 77
Discharge: HOME | End: 2022-10-19
Payer: MEDICARE

## 2022-10-19 PROCEDURE — 97110 THERAPEUTIC EXERCISES: CPT | Performed by: PHYSICAL THERAPIST

## 2022-10-19 PROCEDURE — 97014 ELECTRIC STIMULATION THERAPY: CPT | Performed by: PHYSICAL THERAPIST

## 2022-10-19 PROCEDURE — 97140 MANUAL THERAPY 1/> REGIONS: CPT | Performed by: PHYSICAL THERAPIST

## 2022-10-20 DIAGNOSIS — Z96.651 S/P REVISION OF TOTAL KNEE, RIGHT: ICD-10-CM

## 2022-10-20 RX ORDER — OXYCODONE HYDROCHLORIDE 5 MG/1
5 TABLET ORAL
Qty: 30 TABLET | Refills: 0 | Status: SHIPPED | OUTPATIENT
Start: 2022-10-20 | End: 2022-10-24 | Stop reason: SDUPTHER

## 2022-10-21 ENCOUNTER — HOSPITAL ENCOUNTER (OUTPATIENT)
Dept: PHYSICAL THERAPY | Age: 77
Discharge: HOME | End: 2022-10-21
Payer: MEDICARE

## 2022-10-21 PROCEDURE — 97110 THERAPEUTIC EXERCISES: CPT | Performed by: PHYSICAL THERAPIST

## 2022-10-21 PROCEDURE — 97140 MANUAL THERAPY 1/> REGIONS: CPT | Performed by: PHYSICAL THERAPIST

## 2022-10-24 ENCOUNTER — OFFICE VISIT (OUTPATIENT)
Dept: ORTHOPEDIC SURGERY | Age: 77
End: 2022-10-24
Payer: MEDICARE

## 2022-10-24 VITALS
HEART RATE: 55 BPM | OXYGEN SATURATION: 98 % | HEIGHT: 68 IN | WEIGHT: 241 LBS | BODY MASS INDEX: 36.53 KG/M2 | DIASTOLIC BLOOD PRESSURE: 63 MMHG | TEMPERATURE: 97.9 F | SYSTOLIC BLOOD PRESSURE: 122 MMHG

## 2022-10-24 DIAGNOSIS — Z96.651 S/P REVISION OF TOTAL KNEE, RIGHT: ICD-10-CM

## 2022-10-24 PROCEDURE — 99024 POSTOP FOLLOW-UP VISIT: CPT | Performed by: ORTHOPAEDIC SURGERY

## 2022-10-24 RX ORDER — OXYCODONE HYDROCHLORIDE 5 MG/1
5 TABLET ORAL
Qty: 30 TABLET | Refills: 0 | Status: SHIPPED | OUTPATIENT
Start: 2022-10-24 | End: 2022-11-11 | Stop reason: SDUPTHER

## 2022-10-24 RX ORDER — DICLOFENAC SODIUM 50 MG/1
50 TABLET, DELAYED RELEASE ORAL 3 TIMES DAILY
Qty: 60 TABLET | Refills: 1 | Status: SHIPPED | OUTPATIENT
Start: 2022-10-24

## 2022-10-24 NOTE — PROGRESS NOTES
is here for a follow up visit from a total knee arthroplasty on the right  side. The patient is doing well, with no medical complications since discharge. Pain has been appropriate since surgery,and the patient is progressing well with physical therapy. Patient is ambulating with a cane. Current Outpatient Medications on File Prior to Visit   Medication Sig Dispense Refill    oxyCODONE IR (ROXICODONE) 5 mg immediate release tablet Take 1 Tablet by mouth every four (4) hours as needed for Pain for up to 7 days. Max Daily Amount: 30 mg. 30 Tablet 0    pregabalin (LYRICA) 200 mg capsule Take 200 mg by mouth two (2) times a day. losartan (COZAAR) 25 mg tablet Take 25 mg by mouth daily. primidone (MYSOLINE) 50 mg tablet Take 50 mg by mouth every evening. metFORMIN (GLUCOPHAGE) 1,000 mg tablet Take 1 Tab by mouth two (2) times daily (with meals). START TAKING 9/23 WITH DINNER (Patient taking differently: Take 1,000 mg by mouth two (2) times daily (with meals). Patient restarted on 8/19/22) 30 Tab 0    atorvastatin (LIPITOR) 80 mg tablet Take 80 mg by mouth nightly. hydroCHLOROthiazide (HYDRODIURIL) 25 mg tablet Take 25 mg by mouth daily. levothyroxine (SYNTHROID) 200 mcg tablet Take 200 mcg by mouth Daily (before breakfast). cholecalciferol, vitamin D3, (VITAMIN D3) 2,000 unit tab Take 2,000 Units by mouth daily. nitroglycerin (NITROSTAT) 0.4 mg SL tablet 1 Tab by SubLINGual route every five (5) minutes as needed for Chest Pain. Up to 3 doses. (Patient not taking: No sig reported) 20 Tab 0     No current facility-administered medications on file prior to visit. ROS:  General: denies agitation, major chest pain, unexpected weakness  Patient complains of left knee pain which is  managed with oxycodone.   Skin: healing wound is without issue or drainage  Strength: appropriate weakness of involved extremity is resolving since surgery      Physical Examination:    Blood pressure 122/63, pulse (!) 55, temperature 97.9 °F (36.6 °C), temperature source Tympanic, height 5' 7.5\" (1.715 m), weight 241 lb (109.3 kg), SpO2 98 %. Dressing: Clean, Dry, Intact  Skin: no significant drainage, no wound dehiscence. Range of motion: 0-110  Coronal plane stability is excellent  Sensation intact to light touch at level of wound and distally  Strength is 4/5 with knee flexion and extension  Leg lengths are clinically equal  Distal swelling is noted, but appropriate for postoperative course  Distal capillary refill less than 2 seconds      Imaging:    Postoperative xrays are reviewed, they indicate a right total knee arthroplasty in excellent position without evidence of loosening or failure. Assessment: Status post right total knee arthroplasty, doing well    Plan:  Patient will continue physical therapy, with the goal of outpatient therapy and then home exercise program as soon as is possible  I have emphasized the need for progressive PT, as his pain tolerance is somewhat inhibiting his recovery  Wound care and dental prophylaxis instructions were reviewed  Continue to weight bear as tolerated without restriction, except to keep to the positions of comfort. Emphasis was placed on range of motion and strength exercises daily. Deep Venous Thrombosis Prophylaxis: none  Follow up will be at 4 weeks from now,  no xrays on follow up. Mr. Kandy Horta has a reminder for a \"due or due soon\" health maintenance.  I have asked that he contact his primary care provider for follow-up on this health

## 2022-10-24 NOTE — PROGRESS NOTES
Identified pt with two pt identifiers (name and ). Reviewed chart in preparation for visit and have obtained necessary documentation. Ky Cantrell is a 68 y.o. male  Chief Complaint   Patient presents with    Surgical Follow-up     RT Knee     Visit Vitals  /63 (BP 1 Location: Right arm, BP Patient Position: Sitting, BP Cuff Size: Large adult)   Pulse (!) 55   Temp 97.9 °F (36.6 °C) (Tympanic)   Ht 5' 7.5\" (1.715 m)   Wt 241 lb (109.3 kg)   SpO2 98%   BMI 37.19 kg/m²     1. Have you been to the ER, urgent care clinic since your last visit? Hospitalized since your last visit? No    2. Have you seen or consulted any other health care providers outside of the [address] [place] [address] since your last visit? Include any pap smears or colon screening.  No

## 2022-10-24 NOTE — LETTER
10/24/2022    Patient: Nelson Gottron   YOB: 1945   Date of Visit: 10/24/2022     Lynne Arora MD  [address] [place] [address]  Via Fax: [phone]    Dear Lynne Arora MD,      Thank you for referring Mr. Nicholas Diaz to [facility] for evaluation. My notes for this consultation are attached. If you have questions, please do not hesitate to call me. I look forward to following your patient along with you.       Sincerely,    Hermelinda Barriga, DO

## 2022-10-25 ENCOUNTER — HOSPITAL ENCOUNTER (OUTPATIENT)
Dept: PHYSICAL THERAPY | Age: 77
Discharge: HOME | End: 2022-10-25
Payer: MEDICARE

## 2022-10-25 PROCEDURE — 97110 THERAPEUTIC EXERCISES: CPT | Performed by: PHYSICAL THERAPIST

## 2022-10-28 ENCOUNTER — HOSPITAL ENCOUNTER (OUTPATIENT)
Dept: PHYSICAL THERAPY | Age: 77
Discharge: HOME | End: 2022-10-28
Payer: MEDICARE

## 2022-10-28 PROCEDURE — 97110 THERAPEUTIC EXERCISES: CPT

## 2022-10-28 PROCEDURE — 97140 MANUAL THERAPY 1/> REGIONS: CPT

## 2022-10-28 NOTE — PROGRESS NOTES
PT DAILY TREATMENT NOTE - [facility] 2-15    Patient Name: Levon Bedoya  Date:10/28/2022  : 1945  [x]  Patient  Verified  Payor: Angela Cheung / Plan: VA MEDICARE PART A & B / Product Type: Medicare /    In time: 320  Out time: 820  Total Treatment Time (min): 45  Total Timed Codes (min): 45  1:1 Treatment Time ( [address] only): 35   Visit #:  10    Treatment Area: Pain in right knee [M25.561]  Presence of right artificial knee joint [Z96.651]    SUBJECTIVE  Pain Level (0-10 scale): 2   Any medication changes, allergies to medications, adverse drug reactions, diagnosis change, or new procedure performed?: [x] No    [] Yes (see summary sheet for update)  Subjective functional status/changes:   [] No changes reported  Patient noted they felt some increased amounts of soreness following previous treatment session, but they have been doing well the past few days. Noted they have continued to work on [employer]. Also reported they have not really attempted going up and down stairs or driving. OBJECTIVE    25 min Therapeutic Exercise:  [x] See flow sheet :   Rationale: increase ROM, increase strength, improve coordination, improve balance, and increase proprioception to improve the patients ability to complete all activity      10 min Manual Therapy: PROM with екатерина alaniz, post tib glides with distaction    Rationale: decrease pain, increase ROM, increase tissue extensibility, decrease trigger points, and increase postural awareness to improve the patients ability to complete all activity    10 min Gait Training:  Practiced stair navigation with SPC and without AD and guard rail using good leg up, bad leg down cuing. Rationale: increase ROM, increase strength, improve coordination, improve balance, and increase proprioception  to improve the patients ability to complete all activity.        Pain Level (0-10 scale) post treatment: 4    ASSESSMENT/Changes in Function:   Patient tolerated treatment session well today, able to demonstrate improved ROM today during bike warmup, able to demonstrate full ROM around the bike today. Patient noted increased amounts of UE reliance during ambulation around the clinic today, noted use of hand on ledge along clinic walkway during ambulation. Continue to progress as tolerated. Patient will continue to benefit from skilled PT services to modify and progress therapeutic interventions, address functional mobility deficits, address ROM deficits, address strength deficits, analyze and address soft tissue restrictions, analyze and cue movement patterns, analyze and modify body mechanics/ergonomics, assess and modify postural abnormalities, address imbalance/dizziness, and instruct in home and community integration to attain remaining goals.      [x]  See Plan of Care  []  See progress note/recertification  []  See Discharge Summary         Progress towards goals / Updated goals:  Progressing toward goals    PLAN  [x]  Upgrade activities as tolerated     [x]  Continue plan of care  [x]  Update interventions per flow sheet       []  Discharge due to:_  []  Other:_      Flaquita Cortez, PTA 10/28/2022

## 2022-11-01 ENCOUNTER — APPOINTMENT (OUTPATIENT)
Dept: PHYSICAL THERAPY | Age: 77
End: 2022-11-01
Payer: MEDICARE

## 2022-11-03 ENCOUNTER — HOSPITAL ENCOUNTER (OUTPATIENT)
Dept: PHYSICAL THERAPY | Age: 77
Discharge: HOME | End: 2022-11-03
Payer: MEDICARE

## 2022-11-03 PROCEDURE — 97110 THERAPEUTIC EXERCISES: CPT | Performed by: PHYSICAL THERAPIST

## 2022-11-08 ENCOUNTER — HOSPITAL ENCOUNTER (OUTPATIENT)
Dept: PHYSICAL THERAPY | Age: 77
Discharge: HOME | End: 2022-11-08
Payer: MEDICARE

## 2022-11-08 PROCEDURE — 97110 THERAPEUTIC EXERCISES: CPT | Performed by: PHYSICAL THERAPIST

## 2022-11-11 ENCOUNTER — HOSPITAL ENCOUNTER (OUTPATIENT)
Dept: PHYSICAL THERAPY | Age: 77
Discharge: HOME | End: 2022-11-11
Payer: MEDICARE

## 2022-11-11 ENCOUNTER — TELEPHONE (OUTPATIENT)
Dept: ORTHOPEDIC SURGERY | Age: 77
End: 2022-11-11

## 2022-11-11 DIAGNOSIS — F51.01 PRIMARY INSOMNIA: Primary | ICD-10-CM

## 2022-11-11 DIAGNOSIS — Z96.651 S/P REVISION OF TOTAL KNEE, RIGHT: ICD-10-CM

## 2022-11-11 PROCEDURE — 97110 THERAPEUTIC EXERCISES: CPT | Performed by: PHYSICAL THERAPIST

## 2022-11-11 RX ORDER — ZOLPIDEM TARTRATE 5 MG/1
5 TABLET ORAL
Qty: 60 TABLET | Refills: 0 | Status: SHIPPED | OUTPATIENT
Start: 2022-11-11

## 2022-11-11 RX ORDER — OXYCODONE HYDROCHLORIDE 5 MG/1
5 TABLET ORAL
Qty: 30 TABLET | Refills: 0 | Status: SHIPPED | OUTPATIENT
Start: 2022-11-11 | End: 2022-11-18

## 2022-11-11 NOTE — TELEPHONE ENCOUNTER
Patient is requesting a medication to help him fall asleep at night as he is unable to get comfortable with the right knee that was operated on 8/29/22. His preferred pharmacy is [id number].

## 2022-11-15 ENCOUNTER — HOSPITAL ENCOUNTER (OUTPATIENT)
Dept: PHYSICAL THERAPY | Age: 77
Discharge: HOME | End: 2022-11-15
Payer: MEDICARE

## 2022-11-15 PROCEDURE — 97110 THERAPEUTIC EXERCISES: CPT | Performed by: PHYSICAL THERAPIST

## 2022-11-18 ENCOUNTER — HOSPITAL ENCOUNTER (OUTPATIENT)
Dept: PHYSICAL THERAPY | Age: 77
Discharge: HOME | End: 2022-11-18
Payer: MEDICARE

## 2022-11-18 PROCEDURE — 97110 THERAPEUTIC EXERCISES: CPT | Performed by: PHYSICAL THERAPIST

## 2022-11-22 ENCOUNTER — APPOINTMENT (OUTPATIENT)
Dept: PHYSICAL THERAPY | Age: 77
End: 2022-11-22
Payer: MEDICARE

## 2022-11-28 ENCOUNTER — OFFICE VISIT (OUTPATIENT)
Dept: ORTHOPEDIC SURGERY | Age: 77
End: 2022-11-28
Payer: MEDICARE

## 2022-11-28 VITALS
HEART RATE: 54 BPM | OXYGEN SATURATION: 98 % | WEIGHT: 231 LBS | TEMPERATURE: 98.9 F | HEIGHT: 68 IN | DIASTOLIC BLOOD PRESSURE: 56 MMHG | SYSTOLIC BLOOD PRESSURE: 122 MMHG | BODY MASS INDEX: 35.01 KG/M2

## 2022-11-28 DIAGNOSIS — Z96.651 S/P REVISION OF TOTAL KNEE, RIGHT: Primary | ICD-10-CM

## 2022-11-28 NOTE — PROGRESS NOTES
Identified pt with two pt identifiers (name and ). Reviewed chart in preparation for visit and have obtained necessary documentation. Dewayne Campbell is a 68 y.o. male  Chief Complaint   Patient presents with    Surgical Follow-up     RT Knee     Visit Vitals  BP (!) 122/56 (BP 1 Location: Right arm, BP Patient Position: Sitting, BP Cuff Size: Large adult)   Pulse (!) 54   Temp 98.9 °F (37.2 °C) (Tympanic)   Ht 5' 7.5\" (1.715 m)   Wt 231 lb (104.8 kg)   SpO2 98%   BMI 35.65 kg/m²     1. Have you been to the ER, urgent care clinic since your last visit? Hospitalized since your last visit? No    2. Have you seen or consulted any other health care providers outside of the [address] [place] [address] since your last visit? Include any pap smears or colon screening.  No

## 2022-11-29 ENCOUNTER — APPOINTMENT (OUTPATIENT)
Dept: PHYSICAL THERAPY | Age: 77
End: 2022-11-29
Payer: MEDICARE

## 2022-11-29 NOTE — PROGRESS NOTES
11/28/2022    Chief Complaint: Follow up for right knee replacement    HPI: Saul De is a 68 y.o. male who is now just over three months status post right total knee arthroplasty. The preoperative pain is gone and the postoperative pain is worst when first getting up. He also complains of a vague intermittent noise in the knee. The pain is variable in intensity. Regular exercises have helped, however, he discontinued himself from PT. Additionally, he complains of back pain today, something he has had as an issue for a long time. Past Medical History:   Diagnosis Date    Abdominal adhesions 2000    Adverse effect of anesthesia     sleep apnea uses cpap machine       Ankylosing spondylitis (HCC)     Arthritis     all joints; Degenerative disk disease    CAD (coronary artery disease)     stents x4, followed by Dr. Sariah Mcfadden    Chronic constipation     Chronic pain     all my joints    CPAP (continuous positive airway pressure) dependence     at night    Diabetic neuropathy (HCC)     Type II    DISH (diffuse idiopathic skeletal hyperostosis)     Fibromyalgia     GERD (gastroesophageal reflux disease)     HLD (hyperlipidemia)     Hypertension     Neuropathy     hands/feet    S/P insertion of spinal cord stimulator     with remote- Been Removed    Sleep apnea     uses Cpap    Thyroid disease     low       Past Surgical History:   Procedure Laterality Date    COLONOSCOPY N/A 12/18/2017    COLONOSCOPY performed by Celio West MD at [facility] N/A 01/15/2019    COLONOSCOPY performed by Rina Goff MD at [facility] ENDOSCOPY    HX APPENDECTOMY  1995    HX CORONARY STENT PLACEMENT  2014    HX HERNIA REPAIR      abdominal early 2000's    HX HIP REPLACEMENT Bilateral     2000, 2007    HX KNEE REPLACEMENT Right     partial right knee replacement    LA ABDOMEN SURGERY PROC UNLISTED  2000's    adhesions from appendix sx.     LA ABDOMEN SURGERY PROC UNLISTED  2000's    colectomy: when removing adhesions, nicked intestines, removed 8\" of intestine       Current Outpatient Medications on File Prior to Visit   Medication Sig Dispense Refill    zolpidem (AMBIEN) 5 mg tablet Take 1 Tablet by mouth nightly as needed for Sleep. Max Daily Amount: 5 mg. 60 Tablet 0    diclofenac EC (VOLTAREN) 50 mg EC tablet Take 1 Tablet by mouth three (3) times daily. 60 Tablet 1    pregabalin (LYRICA) 200 mg capsule Take 200 mg by mouth two (2) times a day. losartan (COZAAR) 25 mg tablet Take 25 mg by mouth daily. primidone (MYSOLINE) 50 mg tablet Take 50 mg by mouth every evening. metFORMIN (GLUCOPHAGE) 1,000 mg tablet Take 1 Tab by mouth two (2) times daily (with meals). START TAKING 9/23 WITH DINNER (Patient taking differently: Take 1,000 mg by mouth two (2) times daily (with meals). Patient restarted on 8/19/22) 30 Tab 0    atorvastatin (LIPITOR) 80 mg tablet Take 80 mg by mouth nightly. hydroCHLOROthiazide (HYDRODIURIL) 25 mg tablet Take 25 mg by mouth daily. levothyroxine (SYNTHROID) 200 mcg tablet Take 200 mcg by mouth Daily (before breakfast). cholecalciferol, vitamin D3, (VITAMIN D3) 2,000 unit tab Take 2,000 Units by mouth daily. nitroglycerin (NITROSTAT) 0.4 mg SL tablet 1 Tab by SubLINGual route every five (5) minutes as needed for Chest Pain. Up to 3 doses. (Patient not taking: No sig reported) 20 Tab 0     No current facility-administered medications on file prior to visit.        No Known Allergies    Family History   Problem Relation Age of Onset    Heart Disease Brother     Obesity Brother     Other Mother         hiatal hernia    Arthritis Mother         back, spine    Heart Disease Mother         Angina    Psychiatric Disorder Mother         depression    No Known Problems Father     Diabetes Sister 16        Type 1    Arthritis Sister     Cancer Neg Hx        Social History     Socioeconomic History    Marital status: LIFE PARTNER   Tobacco Use    Smoking status: Some Days Packs/day: 0.25     Years: 40.00     Pack years: 10.00     Types: Cigarettes    Smokeless tobacco: Never   Vaping Use    Vaping Use: Never used   Substance and Sexual Activity    Alcohol use: Yes     Alcohol/week: 6.0 standard drinks     Types: 4 Glasses of wine, 2 Shots of liquor per week     Comment: occ    Drug use: Not Currently     Types: Prescription, OTC    Sexual activity: Not Currently         Review of Systems:       General: Denies headache, lethargy, fever, weight loss  Ears/Nose/Throat: Denies ear discharge, drainage, nosebleeds, hoarse voice, dental problems  Cardiovascular: Denies chest pain, shortness of breath  Lungs: Denies chest pain, breathing problems, wheezing, pneumonia  Stomach: Denies stomach pain, heartburn, constipation, irritable bowel  Skin: Denies rash, sores, open wounds  Musculoskeletal: right knee pain  Genitourinary: Denies dysuria, hematuria, polyuria  Gastrointestinal: Denies constipation, obstipation, diarrhea  Neurological: Denies changes in sight, smell, hearing, taste, seizures. Denies loss of consciousness.   Psychiatric: Denies depression, sleep pattern changes, anxiety, change in personality  Endocrine: Denies mood swings, heat or cold intolerance  Hematologic/Lymphatic: Denies anemia, purpura, petechia  Allergic/Immunologic: Denies swelling of throat, pain or swelling at lymph nodes      Physical Examination:    Visit Vitals  BP (!) 122/56 (BP 1 Location: Right arm, BP Patient Position: Sitting, BP Cuff Size: Large adult)   Pulse (!) 54   Temp 98.9 °F (37.2 °C) (Tympanic)   Ht 5' 7.5\" (1.715 m)   Wt 231 lb (104.8 kg)   SpO2 98%   BMI 35.65 kg/m²        General: AOX3, no apparent distress  Psychiatric: mood and affect appropriate  Lungs: breathing is symmetric and unlabored bilaterally  Heart: regular rate and rhythm  Abdomen: no guarding  Head: normocephalic, atraumatic  Skin: No significant abnormalities, good turgor  Sensation intact to light touch: L1-S1 dermatomes  Muscular exam: 5/5 strength in all major muscle groups unless noted in specialty exam.    Extremities:      Left upper extremity: Full active and passive range of motion without pain, deformity, no open wound, strength 5/5 in all major muscle groups. Right upper extremity: Full active and passive range of motion without pain, deformity, no open wound, strength 5/5 in all major muscle groups. Left lower extremity: Full active and passive range of motion without pain, deformity, no open wound, strength 5/5 in all major muscle groups. Right  lower extremity:  Well healed anterior scar is noted. Patient ambulates with cane and mild limp. No deformity is noted. Range of motion of the knee is 0-120. Ligamentous testing of the knee indicates stability of the the MCL and LCL. Coronal plane stability throughout the range of motion is excellent. No joint line tenderness to palpation medially or laterally. Popliteal area is unremarkable. Negative for effusion. No patellar crepitus. Patella tracks centrally. Strength testing is indicative of 5/5 strength at hip flexion, extension, knee flexion and extension, tibialis anterior, EHL, and FHL. Sensation is intact to light touch in the L1-S1 dermatomes with the exception of the infrapatellar branch of the saphenous. Capillary refill is less than 2 seconds distally. Diagnostics:    Pertinent Xrays:   right knee films ordered      Assessment: Aftercare, status post right total knee arthroplasty    Plan:   will continue physical therapy exercises until full range of motion and strength are obtained. We discussed the importance of continued vigilance to this end. We also discussed dental prophylaxis and safe activities and reasonable life choices regarding activity level. Patient stated their understanding.   Deep venous thrombosis can be discontinued once activity level is adequate, and pain control for now will be as needed only, and will be patient directed. I have also reminded him, this is a revision surgery, and will take more than 3 months to recover. He has been given the expectation that it make take a full year to reach the full potential of this surgery    Mr. Abel Knox will follow up in 6 weeks. I will review his right knee x-rays on follow up. Mr. Abel Knox has a reminder for a \"due or due soon\" health maintenance. I have asked that he contact his primary care provider for follow-up on this health maintenance.

## 2022-12-12 ENCOUNTER — HOSPITAL ENCOUNTER (OUTPATIENT)
Dept: GENERAL RADIOLOGY | Age: 77
Discharge: HOME | End: 2022-12-12
Payer: MEDICARE

## 2022-12-12 DIAGNOSIS — Z96.651 S/P REVISION OF TOTAL KNEE, RIGHT: ICD-10-CM

## 2022-12-12 PROCEDURE — 73564 X-RAY EXAM KNEE 4 OR MORE: CPT

## 2022-12-12 RX ORDER — OXYCODONE HYDROCHLORIDE 5 MG/1
5 TABLET ORAL
Qty: 30 TABLET | Refills: 0 | Status: SHIPPED | OUTPATIENT
Start: 2022-12-12 | End: 2022-12-19

## 2023-01-03 DIAGNOSIS — Z96.651 S/P REVISION OF TOTAL KNEE, RIGHT: ICD-10-CM

## 2023-01-03 RX ORDER — OXYCODONE HYDROCHLORIDE 5 MG/1
5 TABLET ORAL
Qty: 30 TABLET | Refills: 0 | Status: SHIPPED | OUTPATIENT
Start: 2023-01-03 | End: 2023-01-10

## 2023-01-09 ENCOUNTER — TELEPHONE (OUTPATIENT)
Dept: ORTHOPEDIC SURGERY | Age: 78
End: 2023-01-09

## 2023-01-09 ENCOUNTER — HOSPITAL ENCOUNTER (OUTPATIENT)
Dept: GENERAL RADIOLOGY | Age: 78
Discharge: HOME | End: 2023-01-09
Payer: MEDICARE

## 2023-01-09 DIAGNOSIS — Z96.651 S/P REVISION OF TOTAL KNEE, RIGHT: ICD-10-CM

## 2023-01-09 DIAGNOSIS — Z96.651 S/P REVISION OF TOTAL KNEE, RIGHT: Primary | ICD-10-CM

## 2023-01-09 PROCEDURE — 73560 X-RAY EXAM OF KNEE 1 OR 2: CPT

## 2023-01-09 NOTE — TELEPHONE ENCOUNTER
Patient called stating he is having bad pain in his Rt knee that was operated on, on 8/29/22 and stating he is struggling to walk today. Patient was advised he was requested to take x-rays shortly before his scheduled apt for tomorrow 1/10/23 but had taken them instead on 12/12/22 and if he was in so much pain he would need to get them taken today or first time tomorrow morning in time for his scheduled apt 1/10/23 at 2:15 pm. Patient agreed to get his x-rays completed in time for his apt tomorrow.

## 2023-01-10 ENCOUNTER — OFFICE VISIT (OUTPATIENT)
Dept: ORTHOPEDIC SURGERY | Age: 78
End: 2023-01-10
Payer: MEDICARE

## 2023-01-10 VITALS
HEART RATE: 62 BPM | BODY MASS INDEX: 35.31 KG/M2 | OXYGEN SATURATION: 98 % | SYSTOLIC BLOOD PRESSURE: 115 MMHG | WEIGHT: 233 LBS | HEIGHT: 68 IN | DIASTOLIC BLOOD PRESSURE: 69 MMHG | TEMPERATURE: 98.2 F

## 2023-01-10 DIAGNOSIS — Z96.651 S/P REVISION OF TOTAL KNEE, RIGHT: Primary | ICD-10-CM

## 2023-01-10 NOTE — PROGRESS NOTES
Identified pt with two pt identifiers (name and ). Reviewed chart in preparation for visit and have obtained necessary documentation. Christie Meadows is a 68 y.o. male  Chief Complaint   Patient presents with    Surgical Follow-up     RT Knee     Visit Vitals  /69 (BP 1 Location: Left upper arm, BP Patient Position: Sitting, BP Cuff Size: Large adult)   Pulse 62   Temp 98.2 °F (36.8 °C) (Tympanic)   Ht 5' 7.5\" (1.715 m)   Wt 233 lb (105.7 kg)   SpO2 98%   BMI 35.95 kg/m²     1. Have you been to the ER, urgent care clinic since your last visit? Hospitalized since your last visit? No    2. Have you seen or consulted any other health care providers outside of the [address] [place] [address] since your last visit? Include any pap smears or colon screening.  No

## 2023-01-12 LAB
BACTERIA SPEC CULT: NORMAL
BACTERIA SPEC CULT: NORMAL
GRAM STN SPEC: NORMAL
GRAM STN SPEC: NORMAL
SERVICE CMNT-IMP: NORMAL

## 2023-01-12 NOTE — PROGRESS NOTES
1/12/2023      CC: Right knee pain    HPI:      This is a 68y.o. year old male who presents for a follow up visit. The patient is now 5-month status post revision knee replacement. He has had pain issues the entire time, he did discontinue himself from physical therapy. He states that he feels a grinding, though he is never been able to demonstrate this in office on follow-up. He states that he takes an occasional pain medication which does help, he also states that he has significant pain issues in his abdomen, low back in all joints. PMH:  Past Medical History:   Diagnosis Date    Abdominal adhesions 2000    Adverse effect of anesthesia     sleep apnea uses cpap machine       Ankylosing spondylitis (HCC)     Arthritis     all joints; Degenerative disk disease    CAD (coronary artery disease)     stents x4, followed by Dr. Lukas Duckworth    Chronic constipation     Chronic pain     all my joints    CPAP (continuous positive airway pressure) dependence     at night    Diabetic neuropathy (HCC)     Type II    DISH (diffuse idiopathic skeletal hyperostosis)     Fibromyalgia     GERD (gastroesophageal reflux disease)     HLD (hyperlipidemia)     Hypertension     Neuropathy     hands/feet    S/P insertion of spinal cord stimulator     with remote- Been Removed    Sleep apnea     uses Cpap    Thyroid disease     low       PSxHx:  Past Surgical History:   Procedure Laterality Date    COLONOSCOPY N/A 12/18/2017    COLONOSCOPY performed by Sean Covington MD at [address] N/A 01/15/2019    COLONOSCOPY performed by Chang Pedroza MD at [facility] ENDOSCOPY    HX APPENDECTOMY  1995    HX CORONARY STENT PLACEMENT  2014    HX HERNIA REPAIR      abdominal early 2000's    HX HIP REPLACEMENT Bilateral     2000, 2007    HX KNEE REPLACEMENT Right     partial right knee replacement    ID UNLISTED PROCEDURE ABDOMEN PERITONEUM & OMENTUM  2000's    adhesions from appendix sx.     ID UNLISTED PROCEDURE ABDOMEN PERITONEUM & OMENTUM  2000's    colectomy: when removing adhesions, nicked intestines, removed 8\" of intestine       Meds:    Current Outpatient Medications:     zolpidem (AMBIEN) 5 mg tablet, Take 1 Tablet by mouth nightly as needed for Sleep. Max Daily Amount: 5 mg., Disp: 60 Tablet, Rfl: 0    diclofenac EC (VOLTAREN) 50 mg EC tablet, Take 1 Tablet by mouth three (3) times daily. , Disp: 60 Tablet, Rfl: 1    pregabalin (LYRICA) 200 mg capsule, Take 200 mg by mouth two (2) times a day., Disp: , Rfl:     losartan (COZAAR) 25 mg tablet, Take 25 mg by mouth daily. , Disp: , Rfl:     primidone (MYSOLINE) 50 mg tablet, Take 50 mg by mouth every evening., Disp: , Rfl:     metFORMIN (GLUCOPHAGE) 1,000 mg tablet, Take 1 Tab by mouth two (2) times daily (with meals). START TAKING 9/23 WITH DINNER (Patient taking differently: Take 1,000 mg by mouth two (2) times daily (with meals). Patient restarted on 8/19/22), Disp: 30 Tab, Rfl: 0    atorvastatin (LIPITOR) 80 mg tablet, Take 80 mg by mouth nightly., Disp: , Rfl:     hydroCHLOROthiazide (HYDRODIURIL) 25 mg tablet, Take 25 mg by mouth daily. , Disp: , Rfl:     levothyroxine (SYNTHROID) 200 mcg tablet, Take 200 mcg by mouth Daily (before breakfast). , Disp: , Rfl:     cholecalciferol, vitamin D3, (VITAMIN D3) 2,000 unit tab, Take 2,000 Units by mouth daily. , Disp: , Rfl:     nitroglycerin (NITROSTAT) 0.4 mg SL tablet, 1 Tab by SubLINGual route every five (5) minutes as needed for Chest Pain. Up to 3 doses. (Patient not taking: No sig reported), Disp: 20 Tab, Rfl: 0    All:  No Known Allergies    Social Hx:  Social History     Socioeconomic History    Marital status: LIFE PARTNER   Tobacco Use    Smoking status: Some Days     Packs/day: 0.25     Years: 40.00     Pack years: 10.00     Types: Cigarettes    Smokeless tobacco: Never   Vaping Use    Vaping Use: Never used   Substance and Sexual Activity    Alcohol use:  Yes     Alcohol/week: 6.0 standard drinks     Types: 4 Glasses of wine, 2 Shots of liquor per week     Comment: occ    Drug use: Not Currently     Types: Prescription, OTC    Sexual activity: Not Currently       Family Hx:  Family History   Problem Relation Age of Onset    Heart Disease Brother     Obesity Brother     Other Mother         hiatal hernia    Arthritis Mother         back, spine    Heart Disease Mother         Angina    Psychiatric Disorder Mother         depression    No Known Problems Father     Diabetes Sister 16        Type 1    Arthritis Sister     Cancer Neg Hx          Review of Systems:       General: Denies headache, lethargy, fever, weight loss  Ears/Nose/Throat: Denies ear discharge, drainage, nosebleeds, hoarse voice, dental problems  Cardiovascular: Denies chest pain, shortness of breath  Lungs: Denies chest pain, breathing problems, wheezing, pneumonia  Stomach: Denies stomach pain, heartburn, constipation, irritable bowel  Skin: Denies rash, sores, open wounds  Musculoskeletal: Knee pain  Genitourinary: Denies dysuria, hematuria, polyuria  Gastrointestinal: Denies constipation, obstipation, diarrhea  Neurological: Denies changes in sight, smell, hearing, taste, seizures. Denies loss of consciousness.   Psychiatric: Denies depression, sleep pattern changes, anxiety, change in personality  Endocrine: Denies mood swings, heat or cold intolerance  Hematologic/Lymphatic: Denies anemia, purpura, petechia  Allergic/Immunologic: Denies swelling of throat, pain or swelling at lymph nodes      Physical Examination:    Visit Vitals  /69 (BP 1 Location: Left upper arm, BP Patient Position: Sitting, BP Cuff Size: Large adult)   Pulse 62   Temp 98.2 °F (36.8 °C) (Tympanic)   Ht 5' 7.5\" (1.715 m)   Wt 233 lb (105.7 kg)   SpO2 98%   BMI 35.95 kg/m²        General: AOX3, no apparent distress  Psychiatric: mood and affect appropriate  Lungs: breathing is symmetric and unlabored bilaterally  Heart: regular rate and rhythm  Abdomen: no guarding  Head: normocephalic, atraumatic  Skin: No significant abnormalities, good turgor  Sensation intact to light touch: C5-T1 dermatomes  Muscular exam: 5/5 strength in all major muscle groups unless noted in specialty exam.    Extremities        Left lower extremity: Extremity is examined, no evidence of gross deformity, no gross motor or sensory deficit. Full active and passive range of motion is noted. Muscle tone and bulk is within normal expected limits. Capillary refill is less than 2 seconds distally. Right lower extremity: Moderate effusion is noted. Well-healed surgical scar. Range of motion 0-1 20. Ambulates with a very slow gait, uses his cane, pain tolerance is limited. Diagnostics:    Pertinent Diagnostics: I did aspirate his right knee and order a culture for this, 5 cc of bloody synovial fluid was aspirated. No purulence. X-rays ordered and reviewed by myself of the right knee indicate a cemented total knee arthroplasty with no evidence of failure, there is a small radiolucent line which is present on the lateral view, this is unchanged from imaging previously. Assessment: Chronic pain after revision knee arthroplasty. Plan: This patient does have chronic pain, I discussed with him that this could be mechanical failure, though his placement and technique do not indicate such, I have advised him that I will await his final lab results and then we will discuss further. He spent a significant amount of time discussing how he is unhappy with his outcome, I cannot disagree as I would have expected a significant improvement beyond where he is. Objectively, there is not a complication, however I will respect that he has a small effusion and we will continue to work-up as such. He will follow-up based on the results of his cultures. He did state his understanding and satisfaction at the end and expressed his appreciation for my attention to his issues.       Mr. Claudine Sapp has a reminder for a \"due or due soon\" health maintenance. I have asked that he contact his primary care provider for follow-up on this health maintenance.

## 2023-01-18 DIAGNOSIS — Z96.651 S/P REVISION OF TOTAL KNEE, RIGHT: Primary | ICD-10-CM

## 2023-01-18 RX ORDER — AMOXICILLIN AND CLAVULANATE POTASSIUM 500; 125 MG/1; MG/1
4 TABLET, FILM COATED ORAL ONCE
Qty: 4 TABLET | Refills: 0 | Status: SHIPPED | OUTPATIENT
Start: 2023-01-18 | End: 2023-01-18

## 2023-01-26 DIAGNOSIS — Z96.651 S/P REVISION OF TOTAL KNEE, RIGHT: ICD-10-CM

## 2023-01-26 RX ORDER — OXYCODONE HYDROCHLORIDE 5 MG/1
5 TABLET ORAL
Qty: 30 TABLET | Refills: 0 | Status: SHIPPED | OUTPATIENT
Start: 2023-01-26 | End: 2023-02-02

## 2023-02-23 ENCOUNTER — TELEPHONE (OUTPATIENT)
Dept: ORTHOPEDIC SURGERY | Age: 78
End: 2023-02-23

## 2023-02-23 DIAGNOSIS — Z96.651 S/P REVISION OF TOTAL KNEE, RIGHT: Primary | ICD-10-CM

## 2023-02-23 RX ORDER — AMOXICILLIN AND CLAVULANATE POTASSIUM 500; 125 MG/1; MG/1
4 TABLET, FILM COATED ORAL ONCE
Qty: 4 TABLET | Refills: 0 | Status: SHIPPED | OUTPATIENT
Start: 2023-02-23 | End: 2023-02-23

## 2023-02-23 NOTE — TELEPHONE ENCOUNTER
Patient is requesting an antibiotic for his upcoming dental appointment on 2/28/23.  Patient's preferred pharmacy is [facility] #[id number]

## 2023-02-24 NOTE — TELEPHONE ENCOUNTER
Spoke with the patient and let him know the requested medication had been called into his preferred pharmacy.

## 2023-03-21 ENCOUNTER — TELEPHONE (OUTPATIENT)
Dept: ORTHOPEDIC SURGERY | Age: 78
End: 2023-03-21

## 2023-03-21 DIAGNOSIS — Z96.651 S/P REVISION OF TOTAL KNEE, RIGHT: ICD-10-CM

## 2023-03-21 RX ORDER — OXYCODONE HYDROCHLORIDE 5 MG/1
5 TABLET ORAL
Qty: 30 TABLET | Refills: 0 | Status: SHIPPED | OUTPATIENT
Start: 2023-03-21 | End: 2023-03-28

## 2023-03-21 NOTE — TELEPHONE ENCOUNTER
Patient is requesting an antibiotic for an upcoming dental apt on 3/23/23.   His preferred pharmacy is [address] [place] [address] #[id number]

## 2023-03-21 NOTE — TELEPHONE ENCOUNTER
LVM for patient to C/B to provide clarification on which pharmacy he would like the antibiotic he is requesting for an upcoming dental apt sent into.

## 2023-03-21 NOTE — TELEPHONE ENCOUNTER
Patient is requesting a refill on his Oxycodone stating he had a negative experience with [facility] and will not be returning to their office.  The patient's preferred pharmacy is [facility] #[id number]

## 2023-03-22 RX ORDER — AMOXICILLIN AND CLAVULANATE POTASSIUM 500; 125 MG/1; MG/1
4 TABLET, FILM COATED ORAL ONCE
Qty: 4 TABLET | Refills: 3 | Status: SHIPPED | OUTPATIENT
Start: 2023-03-22 | End: 2023-03-22

## 2023-05-09 LAB
ALBUMIN SERPL-MCNC: 4 G/DL (ref 3.5–5)
ALBUMIN/GLOB SERPL: 1.1 (ref 1.1–2.2)
ALP SERPL-CCNC: 59 U/L (ref 45–117)
ALT SERPL-CCNC: 71 U/L (ref 12–78)
ANION GAP SERPL CALC-SCNC: 4 MMOL/L (ref 5–15)
AST SERPL-CCNC: 77 U/L (ref 15–37)
BASOPHILS # BLD: 0 K/UL (ref 0–0.1)
BASOPHILS NFR BLD: 0 % (ref 0–1)
BILIRUB SERPL-MCNC: 0.6 MG/DL (ref 0.2–1)
BUN SERPL-MCNC: 13 MG/DL (ref 6–20)
BUN/CREAT SERPL: 16 (ref 12–20)
CALCIUM SERPL-MCNC: 9.5 MG/DL (ref 8.5–10.1)
CHLORIDE SERPL-SCNC: 106 MMOL/L (ref 97–108)
CO2 SERPL-SCNC: 28 MMOL/L (ref 21–32)
CREAT SERPL-MCNC: 0.83 MG/DL (ref 0.7–1.3)
DIFFERENTIAL METHOD BLD: NORMAL
EOSINOPHIL # BLD: 0.3 K/UL (ref 0–0.4)
EOSINOPHIL NFR BLD: 4 % (ref 0–7)
ERYTHROCYTE [DISTWIDTH] IN BLOOD BY AUTOMATED COUNT: 13.3 % (ref 11.5–14.5)
GLOBULIN SER CALC-MCNC: 3.7 G/DL (ref 2–4)
GLUCOSE SERPL-MCNC: 88 MG/DL (ref 65–100)
HCT VFR BLD AUTO: 43.2 % (ref 36.6–50.3)
HGB BLD-MCNC: 14.4 G/DL (ref 12.1–17)
IMM GRANULOCYTES # BLD AUTO: 0 K/UL (ref 0–0.04)
IMM GRANULOCYTES NFR BLD AUTO: 0 % (ref 0–0.5)
LYMPHOCYTES # BLD: 2.8 K/UL (ref 0.8–3.5)
LYMPHOCYTES NFR BLD: 33 % (ref 12–49)
MCH RBC QN AUTO: 27.9 PG (ref 26–34)
MCHC RBC AUTO-ENTMCNC: 33.3 G/DL (ref 30–36.5)
MCV RBC AUTO: 83.7 FL (ref 80–99)
MONOCYTES # BLD: 0.9 K/UL (ref 0–1)
MONOCYTES NFR BLD: 10 % (ref 5–13)
NEUTS SEG # BLD: 4.4 K/UL (ref 1.8–8)
NEUTS SEG NFR BLD: 53 % (ref 32–75)
NRBC # BLD: 0 K/UL (ref 0–0.01)
NRBC BLD-RTO: 0 PER 100 WBC
PLATELET # BLD AUTO: 268 K/UL (ref 150–400)
PMV BLD AUTO: 10.8 FL (ref 8.9–12.9)
POTASSIUM SERPL-SCNC: 4.1 MMOL/L (ref 3.5–5.1)
PROT SERPL-MCNC: 7.7 G/DL (ref 6.4–8.2)
RBC # BLD AUTO: 5.16 M/UL (ref 4.1–5.7)
SODIUM SERPL-SCNC: 138 MMOL/L (ref 136–145)
TROPONIN I SERPL HS-MCNC: 10 NG/L (ref 0–76)
WBC # BLD AUTO: 8.4 K/UL (ref 4.1–11.1)

## 2023-05-09 PROCEDURE — 84484 ASSAY OF TROPONIN QUANT: CPT

## 2023-05-09 PROCEDURE — 85025 COMPLETE CBC W/AUTO DIFF WBC: CPT

## 2023-05-09 PROCEDURE — 80053 COMPREHEN METABOLIC PANEL: CPT

## 2023-05-09 PROCEDURE — 99285 EMERGENCY DEPT VISIT HI MDM: CPT

## 2023-05-09 PROCEDURE — 36415 COLL VENOUS BLD VENIPUNCTURE: CPT

## 2023-05-10 ENCOUNTER — HOSPITAL ENCOUNTER (EMERGENCY)
Facility: HOSPITAL | Age: 78
Discharge: HOME | End: 2023-05-10
Attending: EMERGENCY MEDICINE
Payer: MEDICARE

## 2023-05-10 ENCOUNTER — APPOINTMENT (OUTPATIENT)
Facility: HOSPITAL | Age: 78
End: 2023-05-10
Payer: MEDICARE

## 2023-05-10 VITALS
DIASTOLIC BLOOD PRESSURE: 71 MMHG | BODY MASS INDEX: 31.5 KG/M2 | TEMPERATURE: 97.9 F | RESPIRATION RATE: 18 BRPM | SYSTOLIC BLOOD PRESSURE: 127 MMHG | WEIGHT: 220 LBS | OXYGEN SATURATION: 95 % | HEART RATE: 57 BPM | HEIGHT: 70 IN

## 2023-05-10 DIAGNOSIS — R10.12 ABDOMINAL PAIN, LEFT UPPER QUADRANT: Primary | ICD-10-CM

## 2023-05-10 LAB
EKG ATRIAL RATE: 59 BPM
EKG DIAGNOSIS: NORMAL
EKG P AXIS: 68 DEGREES
EKG P-R INTERVAL: 188 MS
EKG Q-T INTERVAL: 418 MS
EKG QRS DURATION: 96 MS
EKG QTC CALCULATION (BAZETT): 413 MS
EKG R AXIS: -43 DEGREES
EKG T AXIS: 25 DEGREES
EKG VENTRICULAR RATE: 59 BPM

## 2023-05-10 PROCEDURE — 74177 CT ABD & PELVIS W/CONTRAST: CPT

## 2023-05-10 PROCEDURE — 6360000004 HC RX CONTRAST MEDICATION: Performed by: EMERGENCY MEDICINE

## 2023-05-10 PROCEDURE — 6370000000 HC RX 637 (ALT 250 FOR IP): Performed by: EMERGENCY MEDICINE

## 2023-05-10 RX ORDER — OXYCODONE HYDROCHLORIDE AND ACETAMINOPHEN 5; 325 MG/1; MG/1
1 TABLET ORAL
Status: COMPLETED | OUTPATIENT
Start: 2023-05-10 | End: 2023-05-10

## 2023-05-10 RX ADMIN — IOPAMIDOL 100 ML: 755 INJECTION, SOLUTION INTRAVENOUS at 05:17

## 2023-05-10 RX ADMIN — OXYCODONE HYDROCHLORIDE AND ACETAMINOPHEN 1 TABLET: 5; 325 TABLET ORAL at 02:57

## 2023-05-10 ASSESSMENT — ENCOUNTER SYMPTOMS
DIARRHEA: 0
NAUSEA: 0
VOMITING: 0
ABDOMINAL PAIN: 1
SHORTNESS OF BREATH: 0

## 2023-05-10 ASSESSMENT — PAIN SCALES - GENERAL: PAINLEVEL_OUTOF10: 6

## 2023-05-10 NOTE — ED PROVIDER NOTES
Previous Medications    ATORVASTATIN (LIPITOR) 80 MG TABLET    Take by mouth    CHOLECALCIFEROL 50 MCG (2000 UT) TABS    Take by mouth daily    DICLOFENAC (VOLTAREN) 50 MG EC TABLET    Take by mouth 3 times daily    HYDROCHLOROTHIAZIDE (HYDRODIURIL) 25 MG TABLET    Take by mouth daily    LEVOTHYROXINE (SYNTHROID) 200 MCG TABLET    Take by mouth every morning (before breakfast)    LOSARTAN (COZAAR) 25 MG TABLET    Take by mouth daily    METFORMIN (GLUCOPHAGE) 1000 MG TABLET    Take by mouth 2 times daily (with meals)    NITROGLYCERIN (NITROSTAT) 0.4 MG SL TABLET    Place under the tongue    PREGABALIN (LYRICA) 200 MG CAPSULE    Take by mouth 2 times daily. PRIMIDONE (MYSOLINE) 50 MG TABLET    Take by mouth every evening    ZOLPIDEM (AMBIEN) 5 MG TABLET    Take by mouth.           Diagnostic Study Results     Labs:     Recent Results (from the past 24 hour(s))   EKG 12 Lead    Collection Time: 05/09/23 10:01 PM   Result Value Ref Range    Ventricular Rate 59 BPM    Atrial Rate 59 BPM    P-R Interval 188 ms    QRS Duration 96 ms    Q-T Interval 418 ms    QTc Calculation (Bazett) 413 ms    P Axis 68 degrees    R Axis -43 degrees    T Axis 25 degrees    Diagnosis       Sinus bradycardia  Left axis deviation  Incomplete right bundle branch block  When compared with ECG of 29-JAN-2020 15:23,  No significant change was found     CBC with Auto Differential    Collection Time: 05/09/23 10:14 PM   Result Value Ref Range    WBC 8.4 4.1 - 11.1 K/uL    RBC 5.16 4.10 - 5.70 M/uL    Hemoglobin 14.4 12.1 - 17.0 g/dL    Hematocrit 43.2 36.6 - 50.3 %    MCV 83.7 80.0 - 99.0 FL    MCH 27.9 26.0 - 34.0 PG    MCHC 33.3 30.0 - 36.5 g/dL    RDW 13.3 11.5 - 14.5 %    Platelets 077 005 - 841 K/uL    MPV 10.8 8.9 - 12.9 FL    Nucleated RBCs 0.0 0  WBC    nRBC 0.00 0.00 - 0.01 K/uL    Seg Neutrophils 53 32 - 75 %    Lymphocytes 33 12 - 49 %    Monocytes 10 5 - 13 %    Eosinophils % 4 0 - 7 %    Basophils 0 0 - 1 %    Immature

## 2023-05-10 NOTE — DISCHARGE INSTRUCTIONS
It was a pleasure taking care of you in our Emergency Department today. We know that when you come to [facility], you are entrusting us with your health, comfort, and safety. Our physicians and nurses honor that trust, and truly appreciate the opportunity to care for you and your loved ones. We also value your feedback. If you receive a survey about your Emergency Department experience today, please fill it out. We care about our patients' feedback, and we listen to what you have to say. Thank you!

## 2023-12-28 NOTE — PERIOP NOTES
Unsuccessful left antecubital/right lateral upper thumb venapuncture with 23 angio: no red, swell, drainage, dressings clean, dry, intact.
IV discontinued, cath removed intact

## 2024-05-15 ENCOUNTER — TRANSCRIBE ORDERS (OUTPATIENT)
Facility: HOSPITAL | Age: 79
End: 2024-05-15

## 2024-05-15 DIAGNOSIS — I70.219 EXTREMITY ATHEROSCLEROSIS WITH INTERMITTENT CLAUDICATION (HCC): Primary | ICD-10-CM

## 2024-05-20 ENCOUNTER — HOSPITAL ENCOUNTER (OUTPATIENT)
Facility: HOSPITAL | Age: 79
Discharge: HOME | End: 2024-05-22
Payer: MEDICARE

## 2024-05-20 DIAGNOSIS — I70.219 EXTREMITY ATHEROSCLEROSIS WITH INTERMITTENT CLAUDICATION (HCC): ICD-10-CM

## 2024-05-20 PROCEDURE — 93922 UPR/L XTREMITY ART 2 LEVELS: CPT

## 2024-05-21 LAB
VAS LEFT ABI: 1.14
VAS LEFT DORSALIS PEDIS BP: 110 MMHG
VAS LEFT PTA BP: 127 MMHG
VAS LEFT TBI: 0.7
VAS LEFT TOE PRESSURE: 78 MMHG
VAS RIGHT ABI: 1.15
VAS RIGHT ARM BP: 111 MMHG
VAS RIGHT DORSALIS PEDIS BP: 128 MMHG
VAS RIGHT PTA BP: 113 MMHG
VAS RIGHT TBI: 1.03
VAS RIGHT TOE PRESSURE: 114 MMHG

## 2024-06-21 NOTE — TELEPHONE ENCOUNTER
Addended by: ELTON AMBROCIO on: 6/21/2024 08:19 AM     Modules accepted: Orders     Advised OK to come at 3:15 pm. He said he only wants help drawing up the injection. He states that he will give it to himself.

## 2024-11-27 ENCOUNTER — HOSPITAL ENCOUNTER (INPATIENT)
Facility: HOSPITAL | Age: 79
LOS: 5 days | Discharge: HOME | DRG: 885 | End: 2024-12-02
Attending: PSYCHIATRY & NEUROLOGY | Admitting: PSYCHIATRY & NEUROLOGY
Payer: MEDICARE

## 2024-11-27 ENCOUNTER — HOSPITAL ENCOUNTER (EMERGENCY)
Facility: HOSPITAL | Age: 79
Discharge: PSYCHIATRIC OR SUBSTANCE USE FACILITY | End: 2024-11-27
Attending: STUDENT IN AN ORGANIZED HEALTH CARE EDUCATION/TRAINING PROGRAM
Payer: MEDICARE

## 2024-11-27 VITALS
SYSTOLIC BLOOD PRESSURE: 115 MMHG | HEART RATE: 67 BPM | TEMPERATURE: 98 F | WEIGHT: 186.07 LBS | OXYGEN SATURATION: 100 % | DIASTOLIC BLOOD PRESSURE: 60 MMHG | RESPIRATION RATE: 16 BRPM | BODY MASS INDEX: 26.64 KG/M2 | HEIGHT: 70 IN

## 2024-11-27 DIAGNOSIS — R45.851 SUICIDAL IDEATION: Primary | ICD-10-CM

## 2024-11-27 LAB
ALBUMIN SERPL-MCNC: 4 G/DL (ref 3.5–5)
ALBUMIN/GLOB SERPL: 1.2 (ref 1.1–2.2)
ALP SERPL-CCNC: 43 U/L (ref 45–117)
ALT SERPL-CCNC: 41 U/L (ref 12–78)
AMPHET UR QL SCN: NEGATIVE
ANION GAP SERPL CALC-SCNC: 7 MMOL/L (ref 2–12)
APAP SERPL-MCNC: <2 UG/ML (ref 10–30)
AST SERPL-CCNC: 31 U/L (ref 15–37)
BARBITURATES UR QL SCN: NEGATIVE
BASOPHILS # BLD: 0 K/UL (ref 0–0.1)
BASOPHILS NFR BLD: 0 % (ref 0–1)
BENZODIAZ UR QL: NEGATIVE
BILIRUB SERPL-MCNC: 0.9 MG/DL (ref 0.2–1)
BUN SERPL-MCNC: 11 MG/DL (ref 6–20)
BUN/CREAT SERPL: 13 (ref 12–20)
CALCIUM SERPL-MCNC: 9.4 MG/DL (ref 8.5–10.1)
CANNABINOIDS UR QL SCN: NEGATIVE
CHLORIDE SERPL-SCNC: 108 MMOL/L (ref 97–108)
CO2 SERPL-SCNC: 25 MMOL/L (ref 21–32)
COCAINE UR QL SCN: NEGATIVE
CREAT SERPL-MCNC: 0.82 MG/DL (ref 0.7–1.3)
DIFFERENTIAL METHOD BLD: NORMAL
EOSINOPHIL # BLD: 0.2 K/UL (ref 0–0.4)
EOSINOPHIL NFR BLD: 3 % (ref 0–7)
ERYTHROCYTE [DISTWIDTH] IN BLOOD BY AUTOMATED COUNT: 13.9 % (ref 11.5–14.5)
ETHANOL SERPL-MCNC: <10 MG/DL (ref 0–0.08)
FLUAV RNA SPEC QL NAA+PROBE: NOT DETECTED
FLUBV RNA SPEC QL NAA+PROBE: NOT DETECTED
GLOBULIN SER CALC-MCNC: 3.3 G/DL (ref 2–4)
GLUCOSE SERPL-MCNC: 78 MG/DL (ref 65–100)
HCT VFR BLD AUTO: 44.1 % (ref 36.6–50.3)
HGB BLD-MCNC: 14.6 G/DL (ref 12.1–17)
IMM GRANULOCYTES # BLD AUTO: 0 K/UL (ref 0–0.04)
IMM GRANULOCYTES NFR BLD AUTO: 0 % (ref 0–0.5)
LYMPHOCYTES # BLD: 1.7 K/UL (ref 0.8–3.5)
LYMPHOCYTES NFR BLD: 30 % (ref 12–49)
Lab: NORMAL
MCH RBC QN AUTO: 28.5 PG (ref 26–34)
MCHC RBC AUTO-ENTMCNC: 33.1 G/DL (ref 30–36.5)
MCV RBC AUTO: 86.1 FL (ref 80–99)
METHADONE UR QL: NEGATIVE
MONOCYTES # BLD: 0.7 K/UL (ref 0–1)
MONOCYTES NFR BLD: 13 % (ref 5–13)
NEUTS SEG # BLD: 3.1 K/UL (ref 1.8–8)
NEUTS SEG NFR BLD: 54 % (ref 32–75)
NRBC # BLD: 0 K/UL (ref 0–0.01)
NRBC BLD-RTO: 0 PER 100 WBC
OPIATES UR QL: NEGATIVE
PCP UR QL: NEGATIVE
PLATELET # BLD AUTO: 217 K/UL (ref 150–400)
PMV BLD AUTO: 10.8 FL (ref 8.9–12.9)
POTASSIUM SERPL-SCNC: 4 MMOL/L (ref 3.5–5.1)
PROT SERPL-MCNC: 7.3 G/DL (ref 6.4–8.2)
RBC # BLD AUTO: 5.12 M/UL (ref 4.1–5.7)
SALICYLATES SERPL-MCNC: <1.7 MG/DL (ref 2.8–20)
SARS-COV-2 RNA RESP QL NAA+PROBE: NOT DETECTED
SODIUM SERPL-SCNC: 140 MMOL/L (ref 136–145)
SOURCE: NORMAL
WBC # BLD AUTO: 5.7 K/UL (ref 4.1–11.1)

## 2024-11-27 PROCEDURE — 36415 COLL VENOUS BLD VENIPUNCTURE: CPT

## 2024-11-27 PROCEDURE — 6370000000 HC RX 637 (ALT 250 FOR IP): Performed by: PSYCHIATRY & NEUROLOGY

## 2024-11-27 PROCEDURE — 99285 EMERGENCY DEPT VISIT HI MDM: CPT

## 2024-11-27 PROCEDURE — 80179 DRUG ASSAY SALICYLATE: CPT

## 2024-11-27 PROCEDURE — 80307 DRUG TEST PRSMV CHEM ANLYZR: CPT

## 2024-11-27 PROCEDURE — 87636 SARSCOV2 & INF A&B AMP PRB: CPT

## 2024-11-27 PROCEDURE — 1240000000 HC EMOTIONAL WELLNESS R&B

## 2024-11-27 PROCEDURE — 80143 DRUG ASSAY ACETAMINOPHEN: CPT

## 2024-11-27 PROCEDURE — 82077 ASSAY SPEC XCP UR&BREATH IA: CPT

## 2024-11-27 PROCEDURE — 80053 COMPREHEN METABOLIC PANEL: CPT

## 2024-11-27 PROCEDURE — 85025 COMPLETE CBC W/AUTO DIFF WBC: CPT

## 2024-11-27 PROCEDURE — 6370000000 HC RX 637 (ALT 250 FOR IP): Performed by: STUDENT IN AN ORGANIZED HEALTH CARE EDUCATION/TRAINING PROGRAM

## 2024-11-27 RX ORDER — HYDROCHLOROTHIAZIDE 25 MG/1
25 TABLET ORAL DAILY
Status: DISCONTINUED | OUTPATIENT
Start: 2024-11-28 | End: 2024-12-02 | Stop reason: HOSPADM

## 2024-11-27 RX ORDER — ASPIRIN 81 MG/1
81 TABLET ORAL DAILY
Status: ON HOLD | COMMUNITY

## 2024-11-27 RX ORDER — HYDROXYZINE HYDROCHLORIDE 50 MG/1
50 TABLET, FILM COATED ORAL 3 TIMES DAILY PRN
Status: DISCONTINUED | OUTPATIENT
Start: 2024-11-27 | End: 2024-12-02 | Stop reason: HOSPADM

## 2024-11-27 RX ORDER — TRAZODONE HYDROCHLORIDE 50 MG/1
50 TABLET, FILM COATED ORAL NIGHTLY PRN
Status: DISCONTINUED | OUTPATIENT
Start: 2024-11-27 | End: 2024-12-02 | Stop reason: HOSPADM

## 2024-11-27 RX ORDER — LOSARTAN POTASSIUM 25 MG/1
25 TABLET ORAL DAILY
Status: DISCONTINUED | OUTPATIENT
Start: 2024-11-28 | End: 2024-11-27 | Stop reason: HOSPADM

## 2024-11-27 RX ORDER — MAGNESIUM HYDROXIDE/ALUMINUM HYDROXICE/SIMETHICONE 120; 1200; 1200 MG/30ML; MG/30ML; MG/30ML
30 SUSPENSION ORAL EVERY 6 HOURS PRN
Status: DISCONTINUED | OUTPATIENT
Start: 2024-11-27 | End: 2024-12-02 | Stop reason: HOSPADM

## 2024-11-27 RX ORDER — ACETAMINOPHEN 325 MG/1
650 TABLET ORAL EVERY 4 HOURS PRN
Status: DISCONTINUED | OUTPATIENT
Start: 2024-11-27 | End: 2024-12-02 | Stop reason: HOSPADM

## 2024-11-27 RX ORDER — PREGABALIN 100 MG/1
200 CAPSULE ORAL 3 TIMES DAILY
Status: DISCONTINUED | OUTPATIENT
Start: 2024-11-27 | End: 2024-11-27 | Stop reason: HOSPADM

## 2024-11-27 RX ORDER — LOSARTAN POTASSIUM 25 MG/1
25 TABLET ORAL DAILY
Status: DISCONTINUED | OUTPATIENT
Start: 2024-11-28 | End: 2024-12-02 | Stop reason: HOSPADM

## 2024-11-27 RX ORDER — DULOXETIN HYDROCHLORIDE 20 MG/1
20 CAPSULE, DELAYED RELEASE ORAL DAILY
Status: DISCONTINUED | OUTPATIENT
Start: 2024-11-28 | End: 2024-12-01

## 2024-11-27 RX ORDER — IBUPROFEN 600 MG/1
600 TABLET, FILM COATED ORAL
Status: DISCONTINUED | OUTPATIENT
Start: 2024-11-28 | End: 2024-11-28

## 2024-11-27 RX ORDER — PRIMIDONE 50 MG/1
50 TABLET ORAL EVERY EVENING
Status: DISCONTINUED | OUTPATIENT
Start: 2024-11-27 | End: 2024-12-02 | Stop reason: HOSPADM

## 2024-11-27 RX ORDER — ASPIRIN 81 MG/1
81 TABLET, CHEWABLE ORAL DAILY
Status: DISCONTINUED | OUTPATIENT
Start: 2024-11-28 | End: 2024-11-27 | Stop reason: HOSPADM

## 2024-11-27 RX ORDER — ASPIRIN 81 MG/1
81 TABLET, CHEWABLE ORAL DAILY
Status: DISCONTINUED | OUTPATIENT
Start: 2024-11-28 | End: 2024-12-02 | Stop reason: HOSPADM

## 2024-11-27 RX ORDER — PRIMIDONE 50 MG/1
50 TABLET ORAL NIGHTLY
Status: DISCONTINUED | OUTPATIENT
Start: 2024-11-27 | End: 2024-11-27 | Stop reason: HOSPADM

## 2024-11-27 RX ORDER — ATORVASTATIN CALCIUM 40 MG/1
80 TABLET, FILM COATED ORAL NIGHTLY
Status: DISCONTINUED | OUTPATIENT
Start: 2024-11-27 | End: 2024-12-02 | Stop reason: HOSPADM

## 2024-11-27 RX ORDER — PREGABALIN 100 MG/1
100 CAPSULE ORAL ONCE
Status: COMPLETED | OUTPATIENT
Start: 2024-11-27 | End: 2024-11-27

## 2024-11-27 RX ADMIN — TRAZODONE HYDROCHLORIDE 50 MG: 50 TABLET ORAL at 22:34

## 2024-11-27 RX ADMIN — HYDROXYZINE HYDROCHLORIDE 50 MG: 50 TABLET, FILM COATED ORAL at 22:34

## 2024-11-27 RX ADMIN — PREGABALIN 100 MG: 100 CAPSULE ORAL at 22:34

## 2024-11-27 RX ADMIN — PREGABALIN 200 MG: 100 CAPSULE ORAL at 15:31

## 2024-11-27 ASSESSMENT — SLEEP AND FATIGUE QUESTIONNAIRES
AVERAGE NUMBER OF SLEEP HOURS: 7
SLEEP PATTERN: INSOMNIA
DO YOU USE A SLEEP AID: YES
DO YOU HAVE DIFFICULTY SLEEPING: YES

## 2024-11-27 ASSESSMENT — PAIN DESCRIPTION - LOCATION: LOCATION: GENERALIZED;BACK;FOOT

## 2024-11-27 ASSESSMENT — LIFESTYLE VARIABLES
HOW MANY STANDARD DRINKS CONTAINING ALCOHOL DO YOU HAVE ON A TYPICAL DAY: PATIENT DOES NOT DRINK
HOW OFTEN DO YOU HAVE A DRINK CONTAINING ALCOHOL: NEVER
HOW MANY STANDARD DRINKS CONTAINING ALCOHOL DO YOU HAVE ON A TYPICAL DAY: PATIENT DOES NOT DRINK
HOW OFTEN DO YOU HAVE A DRINK CONTAINING ALCOHOL: NEVER

## 2024-11-27 ASSESSMENT — PAIN SCALES - GENERAL: PAINLEVEL_OUTOF10: 4

## 2024-11-27 NOTE — ED NOTES
Patient's ED room stripped in its entirety and of all ligature risks. Patient's belongings to include shirt, pants, jacket, handkerchief, wallet, note ledger, ink pen, pill container, phone, keys, eye glasses removed and placed in patient belongings bags.

## 2024-11-27 NOTE — BSMART NOTE
Per bed board- patient accepted to [facility] acute unit Rm 238-1 by Dr Gallardo # for report [phone].   Writer called and spoke with nurse Brewer and informed of bed placement and admission.  Writer also informed nurse Brewer that per patient his CPAP machine is in his vehicle.

## 2024-11-27 NOTE — ED NOTES
Patient reports that he wants to kill himself but cannot because he would leave family members and his domestic partner alone. He states that he prescribed Lyrica and has been researching how much he needs to take to harm himself. Pt also reports he has considered going to a pain management doctor and being prescribed narcotic medications, saving those and then taking multiple at one time in attempt to harm himself. Pt reports an episode that occurred while in in late 20's where he was prescribed anti-depressants and took large amounts of antidepressants with alcohol in the attempt to end his life. This discussed with provider who recommends that patient have 1:1 observation safety sitter due to the nature of his statements.

## 2024-11-27 NOTE — ED PROVIDER NOTES
[facility] EMERGENCY DEPT  EMERGENCY DEPARTMENT ENCOUNTER       Pt Name: Robinson Ashby  MRN: [id number]  Birthdate 1945  Date of evaluation: 11/27/2024  Provider: Silvino Germain MD   PCP: Magdy Hernandez MD  Note Started: 9:37 AM EST 11/27/24     CHIEF COMPLAINT       Chief Complaint   Patient presents with    Mental Health Problem     Pt arrives ambulatory to triage with a cane, reports that he called 911 prior to arrival to ED and was called back by a [facility] who recommended that he come to ED for BSMART eval and resources. Pt reports that for about a year he has been experiencing feelings of hopelessness, he states that he wishes that he could kill himself but has an elderly sister who would suffer if he was no longer alive. Pt also reports he has had ongoing issues with a domestic partner who has dementia.      HISTORY OF PRESENT ILLNESS: 1 or more elements      History From: patient, History limited by: none     Robinson Ashby is a 79 y.o. male with past medical history of depression and chronic pain presents the emergency department with suicidal ideations that he has had chronically.  He reports that he is under significant stress at home with taking care of his sister who is a type 1 diabetes and a demented partner who he gets in arguments with all the time.  Because of his ongoing chronic pain he has difficulty doing the things that he used to enjoy such as walking and playing sports.  He describes feelings of hopelessness.  He reports that he tried to commit suicide once 20 years ago.  He told nursing that he had thoughts of overdosing on medications.  He denies taking any medications to me    Please See MDM for Additional Details of the HPI/PMH  Nursing Notes were all reviewed and agreed with or any disagreements were addressed in the HPI.     REVIEW OF SYSTEMS        Positives and Pertinent negatives as per HPI.    PAST HISTORY     Past Medical History:  Past Medical History:   Diagnosis

## 2024-11-27 NOTE — ED NOTES
Bedside shift change report given to MARQUITA Aviles (oncoming nurse) by MARQUITA Sullivan (offgoing nurse). Report included the following information Nurse Handoff Report, ED SBAR, Intake/Output, MAR, and Recent Results.

## 2024-11-27 NOTE — BSMART NOTE
BSMART assessment completed, and suicide risk level noted to be HIGH RISK. Primary Nurse Daniella and Physician Dr. Germain notified. Concerns not observed.  Per nurse Daniella patient has sitter 1:1 present for safety.

## 2024-11-27 NOTE — BSMART NOTE
Comprehensive Assessment Form Part 1      Section I - Disposition    Primary Diagnosis: Suicidal Ideation     Past Medical History:   Diagnosis Date    Abdominal adhesions 2000    Adverse effect of anesthesia     sleep apnea uses cpap machine       Ankylosing spondylitis (HCC)     Arthritis     all joints; Degenerative disk disease    CAD (coronary artery disease)     stents x4, followed by Dr. De Oliveira    Chronic constipation     Chronic pain     all my joints    CPAP (continuous positive airway pressure) dependence     at night    Diabetic neuropathy (HCC)     Type II    DISH (diffuse idiopathic skeletal hyperostosis)     Fibromyalgia     GERD (gastroesophageal reflux disease)     HLD (hyperlipidemia)     Hypertension     Neuropathy     hands/feet    S/P insertion of spinal cord stimulator     with remote- Been Removed    Sleep apnea     uses Cpap    Thyroid disease     low        The Medical Doctor to Psychiatrist conference was not completed.  The Medical Doctor is in agreement with BSMART clinician because of suicidal ideation with plan.    The plan is admission to behavioral health unit for monitoring and stabilization.   The on-call Psychiatrist consulted was Dr. SHULTZ.  The admitting Psychiatrist will be Dr. AN.  The admitting Diagnosis is suicidal ideation.  The Payor source is [facility] Medicare.     This writer reviewed the Thornton Suicide Severity Rating Scale in nursing flowsheet and the risk level assigned is MODERATE risk.  Based on this assessment, the risk of suicide is HIGH risk and the plan is admission to behavioral health unit for monitoring and stabilization.    BSMART assessment completed, and suicide risk level noted to be HIGH RISK. Primary Nurse Daniella and Physician Dr. Germain notified. Concerns not observed.  Per nurse Daniella patient has sitter 1:1 present for safety.     Section II - Integrated Summary  Summary:  Per triage, \"Patient reports that he wants to kill himself but cannot because he would

## 2024-11-28 PROCEDURE — 6370000000 HC RX 637 (ALT 250 FOR IP): Performed by: PHYSICIAN ASSISTANT

## 2024-11-28 PROCEDURE — 1240000000 HC EMOTIONAL WELLNESS R&B

## 2024-11-28 PROCEDURE — 6370000000 HC RX 637 (ALT 250 FOR IP): Performed by: PSYCHIATRY & NEUROLOGY

## 2024-11-28 RX ORDER — TRAMADOL HYDROCHLORIDE 50 MG/1
50 TABLET ORAL EVERY 6 HOURS PRN
Status: DISCONTINUED | OUTPATIENT
Start: 2024-11-28 | End: 2024-12-02 | Stop reason: HOSPADM

## 2024-11-28 RX ORDER — PREGABALIN 100 MG/1
200 CAPSULE ORAL 3 TIMES DAILY
Status: DISCONTINUED | OUTPATIENT
Start: 2024-11-28 | End: 2024-12-02 | Stop reason: HOSPADM

## 2024-11-28 RX ORDER — LIDOCAINE 4 G/G
1 PATCH TOPICAL DAILY
Status: DISCONTINUED | OUTPATIENT
Start: 2024-11-28 | End: 2024-12-02 | Stop reason: HOSPADM

## 2024-11-28 RX ADMIN — PREGABALIN 200 MG: 100 CAPSULE ORAL at 09:28

## 2024-11-28 RX ADMIN — PRIMIDONE 50 MG: 50 TABLET ORAL at 17:26

## 2024-11-28 RX ADMIN — LOSARTAN POTASSIUM 25 MG: 25 TABLET, FILM COATED ORAL at 09:28

## 2024-11-28 RX ADMIN — DULOXETINE HYDROCHLORIDE 20 MG: 20 CAPSULE, DELAYED RELEASE ORAL at 09:28

## 2024-11-28 RX ADMIN — PREGABALIN 200 MG: 100 CAPSULE ORAL at 20:43

## 2024-11-28 RX ADMIN — DICLOFENAC SODIUM 2 G: 10 GEL TOPICAL at 20:44

## 2024-11-28 RX ADMIN — ASPIRIN 81 MG 81 MG: 81 TABLET ORAL at 09:28

## 2024-11-28 RX ADMIN — PREGABALIN 200 MG: 100 CAPSULE ORAL at 15:29

## 2024-11-28 RX ADMIN — TRAMADOL HYDROCHLORIDE 50 MG: 50 TABLET, COATED ORAL at 20:15

## 2024-11-28 RX ADMIN — LEVOTHYROXINE SODIUM 175 MCG: 0.1 TABLET ORAL at 06:55

## 2024-11-28 RX ADMIN — ACETAMINOPHEN 650 MG: 325 TABLET ORAL at 00:26

## 2024-11-28 RX ADMIN — ATORVASTATIN CALCIUM 80 MG: 40 TABLET, FILM COATED ORAL at 20:43

## 2024-11-28 RX ADMIN — DICLOFENAC SODIUM 2 G: 10 GEL TOPICAL at 12:00

## 2024-11-28 ASSESSMENT — PAIN DESCRIPTION - LOCATION: LOCATION: FOOT

## 2024-11-28 NOTE — PLAN OF CARE
Problem: Chronic Conditions and Co-morbidities  Goal: Patient's chronic conditions and co-morbidity symptoms are monitored and maintained or improved  Outcome: Progressing  Flowsheets (Taken 11/27/2024 2030)  Care Plan - Patient's Chronic Conditions and Co-Morbidity Symptoms are Monitored and Maintained or Improved:   Monitor and assess patient's chronic conditions and comorbid symptoms for stability, deterioration, or improvement   Collaborate with multidisciplinary team to address chronic and comorbid conditions and prevent exacerbation or deterioration   Update acute care plan with appropriate goals if chronic or comorbid symptoms are exacerbated and prevent overall improvement and discharge     Problem: Discharge Planning  Goal: Discharge to home or other facility with appropriate resources  Outcome: Progressing  Flowsheets (Taken 11/27/2024 2030)  Discharge to home or other facility with appropriate resources:   Identify barriers to discharge with patient and caregiver   Identify discharge learning needs (meds, wound care, etc)   Refer to discharge planning if patient needs post-hospital services based on physician order or complex needs related to functional status, cognitive ability or social support system   Arrange for needed discharge resources and transportation as appropriate     Problem: Risk for Elopement  Goal: Patient will not exit the unit/facility without proper excort  Outcome: Progressing     Problem: Safety - Adult  Goal: Free from fall injury  Outcome: Progressing     Problem: Depression/Self Harm  Goal: Effect of psychiatric condition will be minimized and patient will be protected from self harm  Description: INTERVENTIONS:  1. Assess impact of patient's symptoms on level of functioning, self care needs and offer support as indicated  2. Assess patient/family knowledge of depression, impact on illness and need for teaching  3. Provide emotional support, presence and reassurance  4. Assess

## 2024-11-28 NOTE — H&P
0928    primidone (MYSOLINE) tablet 50 mg, 50 mg, Oral, QPM, Boom Gallardo MD    losartan (COZAAR) tablet 25 mg, 25 mg, Oral, Daily, Boom Gallardo MD, 25 mg at 11/28/24 0928    acetaminophen (TYLENOL) tablet 650 mg, 650 mg, Oral, Q4H PRN, Boom Gallardo MD, 650 mg at 11/28/24 0026    hydrOXYzine HCl (ATARAX) tablet 50 mg, 50 mg, Oral, TID PRN, Boom Gallardo MD, 50 mg at 11/27/24 2234    traZODone (DESYREL) tablet 50 mg, 50 mg, Oral, Nightly PRN, Boom Gallardo MD, 50 mg at 11/27/24 2234    magnesium hydroxide (MILK OF MAGNESIA) 400 MG/5ML suspension 30 mL, 30 mL, Oral, Daily PRN, Boom Gallardo MD    aluminum & magnesium hydroxide-simethicone (MAALOX) 200-200-20 MG/5ML suspension 30 mL, 30 mL, Oral, Q6H PRN, Boom Gallardo MD       ESTIMATED LENGTH OF STAY:    5 to 7 days                              SIGNED:    Kendy Oro MD  11/28/2024

## 2024-11-28 NOTE — CONSULTS
Consult    Patient: Robinson Ashby MRN: [id number]  SSN: [id number]    YOB: 1945  Age: 79 y.o.  Sex: male      Subjective:      No chief complaint on file.       Robinson Ashby is a 79 y.o. male with PMH of ankylosing spondylitis, DISH, CAD, chronic pain, DHIRAJ on CPAP at night, type 2 diabetes, fibromyalgia, HLD who is being seen for suicidal ideations.  He reported that he has been struggling with depression for a year, with thoughts of harming himself.  He was evaluated in the emergency room and felt to be medically stable for admission to the psychiatric unit for further treatment.  Hospital medicine was subsequently consulted for H&P.    Subjective:  Patient seen in psych unit for the first time.  Chart reviewed and overnight events discussed with RN.  Patient reports significant back pain, reports that the mattress is used the psychiatric units are difficult on his back with his history of ankylosing spondylitis and DISH.  We discussed pain management regimen, he reports that at home he takes Lyrica for pain, in the past has received oxycodone and that has helped.  No other complaints at this time.    Past Medical History:   Diagnosis Date    Abdominal adhesions 2000    Adverse effect of anesthesia     sleep apnea uses cpap machine       Ankylosing spondylitis (HCC)     Arthritis     all joints; Degenerative disk disease    CAD (coronary artery disease)     stents x4, followed by Dr. De Oliveira    Chronic constipation     Chronic pain     all my joints    CPAP (continuous positive airway pressure) dependence     at night    Diabetic neuropathy (HCC)     Type II    DISH (diffuse idiopathic skeletal hyperostosis)     Fibromyalgia     GERD (gastroesophageal reflux disease)     HLD (hyperlipidemia)     Hypertension     Neuropathy     hands/feet    S/P insertion of spinal cord stimulator     with remote- Been Removed    Sleep apnea     uses Cpap    Thyroid disease     low     Past Surgical History:

## 2024-11-28 NOTE — GROUP NOTE
Group Therapy Note    Date: 11/28/2024    Group Start Time: 1045  Group End Time: 1130  Group Topic: Education Group - Inpatient    SSR 2  NON ACUTE    Chrissy Alvares        Group Therapy Note    Facilitated group to focus on “exploring values “and “utilized values discussion questions      Attendees: 5/9       Patient's Goal:  Attend group daily     Notes:  Receptive to information discussed on values and engaged in discussion.  Pt shared “a person who he respected and looked up to and a value he admired and “shared personal values and a value he has now that he didn’t used to have that has become important to him     Status After Intervention:  Improved    Participation Level: Active Listener and Interactive    Participation Quality: Appropriate, Attentive, and Sharing      Speech:  normal      Thought Process/Content: Logical      Affective Functioning: Congruent      Mood:  calm      Level of consciousness:  Attentive      Response to Learning: Able to verbalize current knowledge/experience and Progressing to goal      Endings: None Reported    Modes of Intervention: Education and Support      Discipline Responsible: Recreational Therapist      Signature:  YUKI Najera

## 2024-11-28 NOTE — CARE COORDINATION
11/28/24 1425   ITP   Date of Plan 11/28/24   Date of Next Review 12/05/24   Primary Diagnosis Code Suicidal Ideation   Barriers to Treatment Need for psychoeducation   Strengths Incorporated in Plan Acknowledging need for assistance;Seeking interactions   Plan of Care   Long Term Goal (LTG) Stated in patient/guardian terms \"I need to find a place to live near [place]\"   Short Term Goal 1   Short Term Goal 1 Client will learn and demonstrate effective coping skills   Baseline Functioning Pt is struggling with life changes, grief and loss   Target Pt will learn and demnstrate effective coping skills   Objectives Client will participate in group therapy   Intervention 1 Group therapy   Frequency Daily   Measured by Behavioral data;Self report;Staff observation   Staff Responsible [facility] staff;Clinical staff   Intervention 2 Milieu therapy and support   Frequency Daily   Measured by Behavioral data;Self report;Staff observation   Staff Responsible [facility] staff;Clinical staff   Intervention 3 Acknowledge client strengths   Frequency Daily   Measured by Behavioral data;Self report;Staff observation   Staff Responsible [facility] staff;Clinical staff   STG Goal 1 Status: Patient Appears to be  Progressing toward treatment plan goal   Short Term Goal 2   Short Term Goal 2 Client will learn and demonstrate anxiety management skills   Baseline Functioning Pt's anxiety is leading to problems with decision making   Target Pt will learn and demonstrate anxiety management skills   Objectives Client will participate in group therapy   Intervention 1 Group therapy;Indvidual therapy   Frequency Daily   Measured by Behavioral data;Self report;Staff observation   Staff Responsible [facility] staff;Clinical staff   Intervention 2 Assess safety   Frequency Daily   Measured by Behavioral data;Self report;Staff observation   Staff Responsible [facility] staff;Clinical staff   Intervention 3 Acknowledge client strengths   Frequency Daily   Measured by

## 2024-11-28 NOTE — GROUP NOTE
Group Therapy Note    Date: 11/28/2024    Group Start Time: 1535  Group End Time: 1630  Group Topic: Recreational    SSR 2  NON ACUTE    Chrissy Alvares        Group Therapy Note    Facilitated leisure skills group to reinforce positive coping and to manage mood through music, social interaction, group activities and art task       Attendees: 6/9       Patient's Goal:  Client will learn and demonstrate effective coping skills    Notes:  Pt was receptive to listening to music and songs  he selected while working on leisure task. Interacted with peers and staff    Status After Intervention:  Improved    Participation Level: Active Listener and Interactive    Participation Quality: Appropriate and Attentive      Speech:  normal      Thought Process/Content: Logical      Affective Functioning: Congruent      Mood:  calm      Level of consciousness:  Attentive      Response to Learning: Progressing to goal      Endings: None Reported    Modes of Intervention: Socialization and Activity      Discipline Responsible: Recreational Therapist      Signature:  YUKI Najera

## 2024-11-29 LAB
GLUCOSE BLD STRIP.AUTO-MCNC: 77 MG/DL (ref 65–100)
PERFORMED BY:: NORMAL

## 2024-11-29 PROCEDURE — 6370000000 HC RX 637 (ALT 250 FOR IP): Performed by: PHYSICIAN ASSISTANT

## 2024-11-29 PROCEDURE — 6370000000 HC RX 637 (ALT 250 FOR IP): Performed by: PSYCHIATRY & NEUROLOGY

## 2024-11-29 PROCEDURE — 1240000000 HC EMOTIONAL WELLNESS R&B

## 2024-11-29 PROCEDURE — 82962 GLUCOSE BLOOD TEST: CPT

## 2024-11-29 RX ADMIN — PREGABALIN 200 MG: 100 CAPSULE ORAL at 14:42

## 2024-11-29 RX ADMIN — TRAMADOL HYDROCHLORIDE 50 MG: 50 TABLET, COATED ORAL at 20:29

## 2024-11-29 RX ADMIN — DICLOFENAC SODIUM 2 G: 10 GEL TOPICAL at 20:32

## 2024-11-29 RX ADMIN — LEVOTHYROXINE SODIUM 175 MCG: 0.1 TABLET ORAL at 06:11

## 2024-11-29 RX ADMIN — ATORVASTATIN CALCIUM 80 MG: 40 TABLET, FILM COATED ORAL at 20:29

## 2024-11-29 RX ADMIN — PRIMIDONE 50 MG: 50 TABLET ORAL at 17:16

## 2024-11-29 RX ADMIN — TRAZODONE HYDROCHLORIDE 50 MG: 50 TABLET ORAL at 20:29

## 2024-11-29 RX ADMIN — PREGABALIN 200 MG: 100 CAPSULE ORAL at 09:09

## 2024-11-29 RX ADMIN — ASPIRIN 81 MG 81 MG: 81 TABLET ORAL at 09:09

## 2024-11-29 RX ADMIN — DULOXETINE HYDROCHLORIDE 20 MG: 20 CAPSULE, DELAYED RELEASE ORAL at 09:09

## 2024-11-29 RX ADMIN — PREGABALIN 200 MG: 100 CAPSULE ORAL at 20:29

## 2024-11-29 ASSESSMENT — PAIN DESCRIPTION - DESCRIPTORS: DESCRIPTORS: ACHING

## 2024-11-29 ASSESSMENT — PAIN SCALES - GENERAL
PAINLEVEL_OUTOF10: 0
PAINLEVEL_OUTOF10: 5

## 2024-11-29 ASSESSMENT — PAIN DESCRIPTION - LOCATION: LOCATION: GENERALIZED

## 2024-11-29 NOTE — GROUP NOTE
Group Therapy Note    Date: 11/29/2024    Group Start Time: 1540  Group End Time: 1635  Group Topic: Recreational    SSR 2  NON ACUTE    Chrissy Alvares        Group Therapy Note    Facilitated leisure skills group to reinforce positive coping and to manage mood through music, social interaction, group activities and art task       Attendees: 7/9       Patient's Goal:  Client will learn and demonstrate effective coping skills    Notes:  Pt was receptive to listening to music and songs he selected. Interacted with peers and staff. Selected word search puzzles to work on later       Status After Intervention:  Improved    Participation Level: Active Listener and Interactive    Participation Quality: Appropriate      Speech:  normal      Thought Process/Content: Logical      Affective Functioning: Congruent      Mood:  calm      Level of consciousness:  Alert      Response to Learning: Progressing to goal      Endings: None Reported    Modes of Intervention: Socialization and Activity      Discipline Responsible: Recreational Therapist      Signature:  YUKI Najera

## 2024-11-29 NOTE — GROUP NOTE
Group Therapy Note    Date: 11/29/2024    Group Start Time: 1045  Group End Time: 1145  Group Topic: Education Group - Inpatient    SSR 2  NON ACUTE    Chrissy Alvares        Group Therapy Note    Facilitated discussion on stress exploration focused on being able to identify daily hassles, major life changes and life circumstances that contribute to stress and identify daily uplifts, healthy coping strategies and protective factors that counteract stress      Attendees: 7/10       Patient's Goal:  Client will learn and demonstrate effective coping skills    Notes:  Receptive to information discussed and engaged. Pt shared \"argument with partner, getting old and health issues\" are factors that contribute to stress and \"eating a good meal, going to the park, getting a pet dog and not judging others\" are factors that will help counteract stress       Status After Intervention:  Improved    Participation Level: Active Listener and Interactive    Participation Quality: Appropriate, Attentive, and Sharing      Speech:  normal      Thought Process/Content: Logical      Affective Functioning: Congruent      Mood:  calm      Level of consciousness:  Attentive      Response to Learning: Able to verbalize current knowledge/experience and Progressing to goal      Endings: None Reported    Modes of Intervention: Education and Support      Discipline Responsible: Recreational Therapist      Signature:  Chrissy Alvares, BAYS

## 2024-11-29 NOTE — PLAN OF CARE
Problem: Depression/Self Harm  Goal: Effect of psychiatric condition will be minimized and patient will be protected from self harm  Description: INTERVENTIONS:  1. Assess impact of patient's symptoms on level of functioning, self care needs and offer support as indicated  2. Assess patient/family knowledge of depression, impact on illness and need for teaching  3. Provide emotional support, presence and reassurance  4. Assess for possible suicidal thoughts or ideation. If patient expresses suicidal thoughts or statements do not leave alone, initiate Suicide Precautions, move to a room close to the nursing station and obtain sitter  5. Initiate consults as appropriate with Mental Health Professional, Spiritual Care, Psychosocial CNS, and consider a recommendation to the LIP for a Psychiatric Consultation  11/28/2024 2048 by Alex Carvajal, RN  Outcome: Progressing  11/28/2024 1219 by Clara Price, RN  Outcome: Progressing

## 2024-11-30 PROCEDURE — 6370000000 HC RX 637 (ALT 250 FOR IP): Performed by: PSYCHIATRY & NEUROLOGY

## 2024-11-30 PROCEDURE — 1240000000 HC EMOTIONAL WELLNESS R&B

## 2024-11-30 PROCEDURE — 6370000000 HC RX 637 (ALT 250 FOR IP): Performed by: PHYSICIAN ASSISTANT

## 2024-11-30 RX ADMIN — TRAMADOL HYDROCHLORIDE 50 MG: 50 TABLET, COATED ORAL at 21:00

## 2024-11-30 RX ADMIN — PREGABALIN 200 MG: 100 CAPSULE ORAL at 08:34

## 2024-11-30 RX ADMIN — DICLOFENAC SODIUM 2 G: 10 GEL TOPICAL at 21:14

## 2024-11-30 RX ADMIN — ASPIRIN 81 MG 81 MG: 81 TABLET ORAL at 08:35

## 2024-11-30 RX ADMIN — LEVOTHYROXINE SODIUM 175 MCG: 0.1 TABLET ORAL at 06:03

## 2024-11-30 RX ADMIN — PREGABALIN 200 MG: 100 CAPSULE ORAL at 13:47

## 2024-11-30 RX ADMIN — DULOXETINE HYDROCHLORIDE 20 MG: 20 CAPSULE, DELAYED RELEASE ORAL at 08:34

## 2024-11-30 RX ADMIN — ATORVASTATIN CALCIUM 80 MG: 40 TABLET, FILM COATED ORAL at 21:00

## 2024-11-30 RX ADMIN — PRIMIDONE 50 MG: 50 TABLET ORAL at 16:59

## 2024-11-30 RX ADMIN — PREGABALIN 200 MG: 100 CAPSULE ORAL at 21:00

## 2024-11-30 ASSESSMENT — PAIN SCALES - GENERAL
PAINLEVEL_OUTOF10: 4
PAINLEVEL_OUTOF10: 6
PAINLEVEL_OUTOF10: 8
PAINLEVEL_OUTOF10: 7
PAINLEVEL_OUTOF10: 4

## 2024-11-30 ASSESSMENT — PAIN DESCRIPTION - LOCATION
LOCATION: BACK
LOCATION: LEG;FOOT

## 2024-11-30 ASSESSMENT — PAIN SCALES - WONG BAKER
WONGBAKER_NUMERICALRESPONSE: HURTS EVEN MORE
WONGBAKER_NUMERICALRESPONSE: HURTS LITTLE MORE
WONGBAKER_NUMERICALRESPONSE: HURTS LITTLE MORE

## 2024-11-30 ASSESSMENT — PAIN DESCRIPTION - ORIENTATION
ORIENTATION: RIGHT;LEFT
ORIENTATION: RIGHT;LEFT

## 2024-11-30 ASSESSMENT — PAIN DESCRIPTION - DESCRIPTORS
DESCRIPTORS: ACHING
DESCRIPTORS: NUMBNESS;PINS AND NEEDLES

## 2024-11-30 NOTE — GROUP NOTE
Group Therapy Note    Date: 11/30/2024    Group Start Time: 1030  Group End Time: 1140  Group Topic: Education Group - Inpatient    SSR 2  NON ACUTE    Chrissy Alvares        Group Therapy Note    Facilitated discussion focus on identifying different barriers that has prevented progress and identifying ways to confront them        Attendees: 7/11       Notes:  Encouraged but did not attend    Discipline Responsible: Recreational Therapist      Signature:  YUKI Najera

## 2024-11-30 NOTE — PLAN OF CARE
Problem: Depression/Self Harm  Goal: Effect of psychiatric condition will be minimized and patient will be protected from self harm  Description: INTERVENTIONS:  1. Assess impact of patient's symptoms on level of functioning, self care needs and offer support as indicated  2. Assess patient/family knowledge of depression, impact on illness and need for teaching  3. Provide emotional support, presence and reassurance  4. Assess for possible suicidal thoughts or ideation. If patient expresses suicidal thoughts or statements do not leave alone, initiate Suicide Precautions, move to a room close to the nursing station and obtain sitter  5. Initiate consults as appropriate with Mental Health Professional, Spiritual Care, Psychosocial CNS, and consider a recommendation to the LIP for a Psychiatric Consultation  11/30/2024 0936 by Yuliet Gandhi, RN  Outcome: Progressing  11/29/2024 2138 by Lu Caldera, RN  Outcome: Progressing

## 2024-11-30 NOTE — GROUP NOTE
Group Therapy Note    Date: 11/30/2024    Group Start Time: 1535  Group End Time: 1625  Group Topic: Recreational    SSR 2  NON ACUTE    Chrissy Alvares        Group Therapy Note    Facilitated leisure skills group to reinforce positive coping and to manage mood through music, social interaction, group activities and art task       Attendees: 7/9       Patient's Goal:  Client will learn and demonstrate effective coping skills    Notes:  Pt was receptive to listening to music and songs selected. Interacted with peers and staff. Selected word search puzzles to work on later    Status After Intervention:  Improved    Participation Level: Active Listener and Interactive    Participation Quality: Appropriate      Speech:  normal      Thought Process/Content: Logical      Affective Functioning: Congruent      Mood:  calm      Level of consciousness:  Alert      Response to Learning: Progressing to goal      Endings: None Reported    Modes of Intervention: Socialization and Activity      Discipline Responsible: Recreational Therapist      Signature:  YUKI Najera

## 2024-11-30 NOTE — PLAN OF CARE
Problem: Discharge Planning  Goal: Discharge to home or other facility with appropriate resources  Outcome: Progressing     Problem: Risk for Elopement  Goal: Patient will not exit the unit/facility without proper excort  Outcome: Progressing     Problem: Safety - Adult  Goal: Free from fall injury  11/29/2024 2138 by Lu Caldera RN  Outcome: Progressing  11/29/2024 0956 by Juana Sinha LPN  Outcome: Progressing     Problem: Depression/Self Harm  Goal: Effect of psychiatric condition will be minimized and patient will be protected from self harm  Description: INTERVENTIONS:  1. Assess impact of patient's symptoms on level of functioning, self care needs and offer support as indicated  2. Assess patient/family knowledge of depression, impact on illness and need for teaching  3. Provide emotional support, presence and reassurance  4. Assess for possible suicidal thoughts or ideation. If patient expresses suicidal thoughts or statements do not leave alone, initiate Suicide Precautions, move to a room close to the nursing station and obtain sitter  5. Initiate consults as appropriate with Mental Health Professional, Spiritual Care, Psychosocial CNS, and consider a recommendation to the LIP for a Psychiatric Consultation  11/29/2024 2138 by Lu Caldera RN  Outcome: Progressing  11/29/2024 0956 by Juana Sinha LPN  Outcome: Progressing

## 2024-12-01 PROCEDURE — 1240000000 HC EMOTIONAL WELLNESS R&B

## 2024-12-01 PROCEDURE — 6370000000 HC RX 637 (ALT 250 FOR IP): Performed by: PSYCHIATRY & NEUROLOGY

## 2024-12-01 PROCEDURE — 6370000000 HC RX 637 (ALT 250 FOR IP): Performed by: PHYSICIAN ASSISTANT

## 2024-12-01 RX ORDER — DULOXETIN HYDROCHLORIDE 30 MG/1
30 CAPSULE, DELAYED RELEASE ORAL DAILY
Status: DISCONTINUED | OUTPATIENT
Start: 2024-12-02 | End: 2024-12-02 | Stop reason: HOSPADM

## 2024-12-01 RX ADMIN — PREGABALIN 200 MG: 100 CAPSULE ORAL at 08:33

## 2024-12-01 RX ADMIN — PREGABALIN 200 MG: 100 CAPSULE ORAL at 21:07

## 2024-12-01 RX ADMIN — TRAMADOL HYDROCHLORIDE 50 MG: 50 TABLET, COATED ORAL at 08:32

## 2024-12-01 RX ADMIN — DULOXETINE HYDROCHLORIDE 20 MG: 20 CAPSULE, DELAYED RELEASE ORAL at 08:33

## 2024-12-01 RX ADMIN — LEVOTHYROXINE SODIUM 175 MCG: 0.1 TABLET ORAL at 06:25

## 2024-12-01 RX ADMIN — DICLOFENAC SODIUM 2 G: 10 GEL TOPICAL at 21:08

## 2024-12-01 RX ADMIN — ASPIRIN 81 MG 81 MG: 81 TABLET ORAL at 08:33

## 2024-12-01 RX ADMIN — LOSARTAN POTASSIUM 25 MG: 25 TABLET, FILM COATED ORAL at 08:33

## 2024-12-01 RX ADMIN — PREGABALIN 200 MG: 100 CAPSULE ORAL at 14:35

## 2024-12-01 RX ADMIN — ATORVASTATIN CALCIUM 80 MG: 40 TABLET, FILM COATED ORAL at 21:07

## 2024-12-01 RX ADMIN — TRAMADOL HYDROCHLORIDE 50 MG: 50 TABLET, COATED ORAL at 20:10

## 2024-12-01 RX ADMIN — PRIMIDONE 50 MG: 50 TABLET ORAL at 18:10

## 2024-12-01 ASSESSMENT — PAIN DESCRIPTION - DESCRIPTORS
DESCRIPTORS: ACHING
DESCRIPTORS: ACHING

## 2024-12-01 ASSESSMENT — PAIN DESCRIPTION - ORIENTATION: ORIENTATION: RIGHT;LEFT

## 2024-12-01 ASSESSMENT — PAIN DESCRIPTION - LOCATION
LOCATION: LEG;FOOT
LOCATION: KNEE
LOCATION: GENERALIZED

## 2024-12-01 ASSESSMENT — PAIN SCALES - GENERAL
PAINLEVEL_OUTOF10: 6
PAINLEVEL_OUTOF10: 5
PAINLEVEL_OUTOF10: 7

## 2024-12-01 NOTE — PLAN OF CARE
Problem: Chronic Conditions and Co-morbidities  Goal: Patient's chronic conditions and co-morbidity symptoms are monitored and maintained or improved  Outcome: Progressing     Problem: Discharge Planning  Goal: Discharge to home or other facility with appropriate resources  Outcome: Progressing     Problem: Risk for Elopement  Goal: Patient will not exit the unit/facility without proper excort  Outcome: Progressing     Problem: Safety - Adult  Goal: Free from fall injury  Outcome: Progressing     Problem: Depression/Self Harm  Goal: Effect of psychiatric condition will be minimized and patient will be protected from self harm  Description: INTERVENTIONS:  1. Assess impact of patient's symptoms on level of functioning, self care needs and offer support as indicated  2. Assess patient/family knowledge of depression, impact on illness and need for teaching  3. Provide emotional support, presence and reassurance  4. Assess for possible suicidal thoughts or ideation. If patient expresses suicidal thoughts or statements do not leave alone, initiate Suicide Precautions, move to a room close to the nursing station and obtain sitter  5. Initiate consults as appropriate with Mental Health Professional, Spiritual Care, Psychosocial CNS, and consider a recommendation to the LIP for a Psychiatric Consultation  11/30/2024 2111 by Ayde Adan, RN  Outcome: Progressing  11/30/2024 0936 by Yuliet Gandhi, RN  Outcome: Progressing

## 2024-12-01 NOTE — PLAN OF CARE
Problem: Depression/Self Harm  Goal: Effect of psychiatric condition will be minimized and patient will be protected from self harm  Description: INTERVENTIONS:  1. Assess impact of patient's symptoms on level of functioning, self care needs and offer support as indicated  2. Assess patient/family knowledge of depression, impact on illness and need for teaching  3. Provide emotional support, presence and reassurance  4. Assess for possible suicidal thoughts or ideation. If patient expresses suicidal thoughts or statements do not leave alone, initiate Suicide Precautions, move to a room close to the nursing station and obtain sitter  5. Initiate consults as appropriate with Mental Health Professional, Spiritual Care, Psychosocial CNS, and consider a recommendation to the [facility]IP for a Psychiatric Consultation  Outcome: Progressing  Flowsheets  Taken 12/1/2024 0919  Effect of psychiatric condition will be minimized and patient will be protected from self harm:   Provide emotional support, presence and reassurance   Assess impact of patient’s symptoms on level of functioning, self care needs and offer support as indicated   Assess patient/family knowledge of depression, impact on illness and need for teaching  Taken 12/1/2024 0915  Effect of psychiatric condition will be minimized and patient will be protected from self harm:   Assess impact of patient’s symptoms on level of functioning, self care needs and offer support as indicated   Assess patient/family knowledge of depression, impact on illness and need for teaching   Provide emotional support, presence and reassurance   Assess for suicidal thoughts or ideation. If patient expresses suicidal thoughts or statements do not leave alone, initiate Suicide Precautions, move near nurse station, obtain sitter   Initiate consults as appropriate with Mental Health Professional, Spiritual Care, Psychosocial CNS, and consider a recommendation to the [facility]IP for a Psychiatric

## 2024-12-01 NOTE — GROUP NOTE
Group Therapy Note    Date: 12/1/2024    Group Start Time: 1035  Group End Time: 1135  Group Topic: Education Group - Inpatient    SSR 2  NON ACUTE    Chrissy Alvares        Group Therapy Note    Facilitated group to focus on understanding the importance of healthy boundaries and developing healthy boundaries to help improve relationships        Attendees: 6/9      Notes:  Encouraged but did not attend    Discipline Responsible: Recreational Therapist      Signature:  YUKI Najera

## 2024-12-01 NOTE — GROUP NOTE
Group Therapy Note    Date: 12/1/2024    Group Start Time: 1525  Group End Time: 1630  Group Topic: Recreational    SSR 2  NON ACUTE    Chrissy Alvares        Group Therapy Note    Facilitated leisure skills group to reinforce positive coping and to manage mood through music, social interaction, group activities and art task      Attendees: 7/9       Patient's Goal:  Client will learn and demonstrate effective coping skills    Notes:  Pt was receptive to listening to music and songs he selected. Interacted with peers and staff. Declined to work on leisure task      Status After Intervention:  Improved    Participation Level: Active Listener and Interactive    Participation Quality: Appropriate      Speech:  normal      Thought Process/Content: Logical      Affective Functioning: Congruent      Mood:  calm      Level of consciousness:  Alert      Response to Learning: Progressing to goal      Endings: None Reported    Modes of Intervention: Socialization and Activity      Discipline Responsible: Recreational Therapist      Signature:  YUKI Najera

## 2024-12-02 VITALS
TEMPERATURE: 98.1 F | RESPIRATION RATE: 16 BRPM | BODY MASS INDEX: 26.64 KG/M2 | SYSTOLIC BLOOD PRESSURE: 90 MMHG | WEIGHT: 186.07 LBS | HEIGHT: 70 IN | HEART RATE: 16 BPM | OXYGEN SATURATION: 99 % | DIASTOLIC BLOOD PRESSURE: 40 MMHG

## 2024-12-02 PROCEDURE — 6370000000 HC RX 637 (ALT 250 FOR IP): Performed by: PSYCHIATRY & NEUROLOGY

## 2024-12-02 PROCEDURE — 6370000000 HC RX 637 (ALT 250 FOR IP): Performed by: PHYSICIAN ASSISTANT

## 2024-12-02 RX ORDER — TRAZODONE HYDROCHLORIDE 50 MG/1
50 TABLET, FILM COATED ORAL NIGHTLY PRN
Qty: 30 TABLET | Refills: 1 | Status: SHIPPED | OUTPATIENT
Start: 2024-12-02

## 2024-12-02 RX ORDER — LEVOTHYROXINE SODIUM 175 UG/1
175 TABLET ORAL DAILY
Qty: 30 TABLET | Refills: 3 | Status: SHIPPED | OUTPATIENT
Start: 2024-12-03

## 2024-12-02 RX ORDER — PRIMIDONE 50 MG/1
50 TABLET ORAL EVERY EVENING
Qty: 90 TABLET | Refills: 0 | Status: SHIPPED | OUTPATIENT
Start: 2024-12-02

## 2024-12-02 RX ORDER — LIDOCAINE 4 G/G
1 PATCH TOPICAL DAILY
Qty: 30 PATCH | Refills: 0 | Status: SHIPPED | OUTPATIENT
Start: 2024-12-03

## 2024-12-02 RX ORDER — LOSARTAN POTASSIUM 25 MG/1
25 TABLET ORAL DAILY
Qty: 30 TABLET | Refills: 0 | Status: SHIPPED | OUTPATIENT
Start: 2024-12-02

## 2024-12-02 RX ORDER — ASPIRIN 81 MG/1
81 TABLET, CHEWABLE ORAL DAILY
Qty: 30 TABLET | Refills: 0 | Status: SHIPPED | OUTPATIENT
Start: 2024-12-03

## 2024-12-02 RX ORDER — ATORVASTATIN CALCIUM 80 MG/1
80 TABLET, FILM COATED ORAL NIGHTLY
Qty: 30 TABLET | Refills: 0 | Status: SHIPPED | OUTPATIENT
Start: 2024-12-02

## 2024-12-02 RX ORDER — DULOXETIN HYDROCHLORIDE 30 MG/1
30 CAPSULE, DELAYED RELEASE ORAL DAILY
Qty: 30 CAPSULE | Refills: 1 | Status: SHIPPED | OUTPATIENT
Start: 2024-12-03

## 2024-12-02 RX ADMIN — DULOXETINE HYDROCHLORIDE 30 MG: 30 CAPSULE, DELAYED RELEASE ORAL at 08:59

## 2024-12-02 RX ADMIN — PREGABALIN 200 MG: 100 CAPSULE ORAL at 08:59

## 2024-12-02 RX ADMIN — ASPIRIN 81 MG 81 MG: 81 TABLET ORAL at 08:58

## 2024-12-02 RX ADMIN — LEVOTHYROXINE SODIUM 175 MCG: 0.1 TABLET ORAL at 06:35

## 2024-12-02 ASSESSMENT — PAIN SCALES - GENERAL: PAINLEVEL_OUTOF10: 0

## 2024-12-02 NOTE — GROUP NOTE
Group Therapy Note    Date: 12/2/2024    Group Start Time: 1000  Group End Time: 1050  Group Topic: Education Group - Inpatient    SSR 2  NON ACUTE    Chrissy Alvares        Group Therapy Note    Facilitated group to introduce information on the “benefits of having a positive mental attitude and how it correlates with self-esteem”       Attendees: 8/10      Notes:  Encouraged but did not attend    Discipline Responsible: Recreational Therapist      Signature:  YUKI Najera

## 2024-12-02 NOTE — BH NOTE
Admission Note:     2030   VS: Patient compliant with vital signs, WNL  1:1 present    2058  80yo, to ED reporting SI/plan to OD, feelings of hopelessness @ 1y, \"toxic relationship\" with domestic partner, domestic partner has dementia, caretaker of domestic partner & sister, does not feel safe at home, hx suicide attempt by OD 50y ago.  Legal Status: voluntary  Dx: suicidal ideation  Psychiatrist:   H&P:   Labs: UDS negative, COVID negative  VS: WNL  Patient compliant with snack, noted eating in dayroom.  1:1 present    2130  Status: compliant with admission assessment, cooperative, flat, depression 7/10, anxiety 4/10, [place], eyeglasses, cpap, dentures, denies SI/HI, \"trying to come to  with being here\", c/o unhealthy relationship, \"I don't know if I'm going back\" referring to home, fair appetite, poor sleep without sleep aid, walker, c/o balance issues & bilateral lower extremity weakness & neuropathy pain, 75# wt loss / last year d/t mounjaro & healthy diet, declines blood sugar checks at this time, doesn't check blood sugar at home d/t healthy diet  BH Hx: SI, depression, anxiety, suicidal attempt age 20s  Medical Hx: cane, falls, HTN, NIDDM, diabetic neuropathy, hyperlipidemia, sleep apnea, CPAP, eyeglasses, [place], arthritis, CAD, fibromyalgia, GERD, thyroid disease, chronic tremors, DDD, ankylosing spondylitis, chronic pain, COVID+ in 2022  Vaccinations: annual flu due, willing to receive  Medication Hx: currenlty taking: aspirin 81mg qd, synthroid 175mcg qd, losartan 25mg qd, primidone 50mg qpm, pregabalin 200mg tid (dosage to be verified); currently not taking: metformin, lipitor, cholcalciferol, voltaren, hydrochlorthiazide, ambien, nitroglycerin; mounjaro for past year  Pharmacy: Medicare Part C mail order pharmacy, [facility]  ALLERGIES: NKA  Social Hx: lives with domestic partner in a \"toxic relationship\", caretaker of partner Herrera & sister Rosalba; denies nicotine, 
Affect restricted/pleasant. Denied having thoughts to self harm. Consumed 3/4 of bfkt; said it was, \"okay.\" Compliant with 0900 scheduled meds. Talked freely about his current health issues, and how active he was over his life x. Good eye to eye contact during interactions. Pt did not get up, for morning groups; remained in bed. Observed sitting in the chair, beside his bed, reading @ x, throughout the shift. Up to the day room, for lunch; observed interacting appropriately ith others. Good eye to eye contact. Pt attended to his hygiene, and shaved. Pt attended music group. Cont to utilize a walker, for mobility; no falls, this shift. Q 15 mins checks maintained, for safety.     
Behavioral Health Transition Record to Provider    Patient Name: Robinson Ashby  YOB: 1945  Medical Record Number: [id number]  Date of Admission: 11/27/2024  Date of Discharge: 12/2/2024    Attending Provider: Boom Gallardo MD  Discharging Provider: Dr. Gallardo  To contact this individual call [phone] and ask the  to page.  If unavailable, ask to be transferred to Behavioral Health Provider on call.  A Behavioral Health Provider will be available on call 24/7 and during holidays.    Primary Care Provider: Magdy Hernandez MD    No Known Allergies    Reason for Admission: Pt reports that he didn't realize he would end up in a psych leger when he said he just needed someone to talk to.     Admission Diagnosis: Suicidal ideation [R45.851]    * No surgery found *    Results for orders placed or performed during the hospital encounter of 11/27/24   POCT Glucose   Result Value Ref Range    POC Glucose 77 65 - 100 mg/dL    Performed by: King Alex        Immunizations administered during this encounter:   Immunization History   Administered Date(s) Administered    Influenza, FLUARIX, FLULAVAL, FLUZONE (age 6 mo+) and AFLURIA, (age 3 y+), Quadv PF, 0.5mL 09/23/2017, 01/17/2019    TDaP, ADACEL (age 10y-64y), BOOSTRIX (age 10y+), IM, 0.5mL 04/05/2022       Screening for Metabolic Disorders for Patients on Antipsychotic Medications  (Data obtained from the EMR)    Estimated Body Mass Index  Estimated body mass index is 26.7 kg/m² as calculated from the following:    Height as of this encounter: 1.778 m (5' 10\").    Weight as of this encounter: 84.4 kg (186 lb 1.1 oz).     Vital Signs/Blood Pressure  BP (!) 90/40   Pulse (!) 16   Temp 98.1 °F (36.7 °C) (Oral)   Resp 16   Ht 1.778 m (5' 10\")   Wt 84.4 kg (186 lb 1.1 oz)   SpO2 99%   BMI 26.70 kg/m²     Blood Glucose/Hemoglobin A1c  No results found for: \"GLU\", \"GLUCPOC\"    No results found for: \"HBA1C\"     Lipid Panel  No results found for: 
Behavioral Health Treatment Team Note     Patient goal(s) for today: To call my sister and my partner  Treatment team focus/goals: Medication management, group therapy, discharge planning    Progress note: Pt presents in his recliner, reading his bible. He greeted this writer politely. Pt denies SI/HI/AVH and reports feeling \"much better\". Pt is hoping for resources that will help him cope with an aging partner with possible dementia/Alzheimer. Pt states he patched things up with his long time partner and said, \"He's 86 years old. I can't walk out on him now.\"  Pt is hoping to find an [facility]  for counseling. He's finding his jd again. Pt states he will need a ride to  his car from [facility] in [facility] because they transferred him from there to [facility]. An inpatient level of care is needed to coordinate a safe discharge plan.    LOS:  4  Expected LOS: 5-7 days    Insurance info/prescription coverage:  Medicare  Date of last family contact:  Pt declined   Family requesting physician contact today:  No  Discharge plan:  to stabilize the pt   Guns in the home:  No   Outpatient provider(s):  will be coordinated prior to discharge  Participating treatment team members: NATALIIA Lopez MSW  
Behavioral Health Treatment Team Note     Patient goal(s) for today: \"to get my calendar to cancel some appointment on Monday\"  Treatment team focus/goals: continue medication management, group therapy, maintain ADLs and provide a safe discharge     Progress note: Pt presented with a tired, depressed affect and congruent mood. Pt reported that he is \"tired and hopeless with life\". He was sleeping in the recliner and stated \"I'm just to tired today\". He requested the writer to get his calendar out so that he can cancel some appointments. Pt will continue to work on coping mechanisms and self care. An inpatient level of care is needed to further stabilize the pt     LOS:  2  Expected LOS: 5-7 days    Insurance info/prescription coverage:  Medicare  Date of last family contact:  Pt declined  Family requesting physician contact today:  No  Discharge plan:  to stabilize the pt   Guns in the home:  No   Outpatient provider(s):  will be coordinated prior to discharge     Participating treatment team members: Robinson Ashby, * (assigned SW), Coco Smith LMSW  
Behavioral Health Treatment Team Note     Patient goal(s) for today: \"to not be depressed\"  Treatment team focus/goals: continue medication management, group therapy, maintain ADLs and provide a safe discharge    Progress note: Pt presented with a depressed, lethargic affect and congruent mood. Pt denied any SI/HI/Avh but reported increased depression with decreased motivation. Pt said that he does not feel like getting up today and started to cry. Pt reported he had a dream and heard his mother voice but could not remember what she said. Pt started to get up for lunch and had an unsteady gate. He was able to regain his balance and walked to the day room for lunch. An inpatient level of care is needed to further stabilize the pt      LOS:  3  Expected LOS: 5-7 days    Insurance info/prescription coverage:  Medicare  Date of last family contact:  Pt declined   Family requesting physician contact today:  No  Discharge plan:  to stabilize the pt   Guns in the home:  No   Outpatient provider(s):  will be coordinated prior to discharge    Participating treatment team members: Robinson Ashby, * (assigned SW), Coco Smith LMSW  
DAY SHIFT    Pt up with walker on unit. Pt is cooperative with staff and peers. Pt has many complaints about this unit and all the rules we have and stated he doesn't \"understand\" why such things would be enforced and lacks insight. Pt stated that he can see how we feel about the mentally ill by the beds we provide on the unit and that they are \"just plain horrible.\" Pt continues to have multiple complaints that are out of the control of this writer and pt informed of that. Pt can be demanding and entitled at times. Pt still endorses depression and SI but states he would never kill himself because that would kill his sister that he cares for. Pt denies HI/AVH. Pt takes medication as prescribed without difficulty. Pt eating meals and attending some groups. Close observations continued to ensure pt safety.   
DISCHARGE SUMMARY    NAME:Robinson Ashby  : 1945  MRN: [id number]    The patient Robinson Ashby exhibits the ability to control behavior in a less restrictive environment.  Patient's level of functioning is improving.  No assaultive/destructive behavior has been observed for the past 24 hours.  No suicidal/homicidal threat or behavior has been observed for the past 24 hours.  There is no evidence of serious medication side effects.  Patient has not been in physical or protective restraints for at least the past 24 hours.    If weapons involved, how are they secured? na    Is patient aware of and in agreement with discharge plan? Yes    Arrangements for medication:  Prescriptions to phar    Copy of discharge instructions to provider?:  to pharmacy in chart    Arrangements for transportation home:  [employer] to  car from [facility] in [place]    Keep all follow up appointments as scheduled, continue to take prescribed medications per physician instructions.  Mental health crisis number:  988      Mental Health Emergency WARM LINE      [phone] ([phone])      M-F: 9am to 9pm      Sat & Sun: 5pm - 9pm  National suicide prevention lines:                             0-312-NREOLWJ ([phone])       8-721-410-TALK ([phone])    Crisis Text Line:  Text HOME to 424702  
Nurse Note:    Patient observed sitting in recliner and in the dayroom this evening. Patient observed ambulating with walker with a steady gait. Denies SI, HI, and A/V hallucinations. No report of anxiety and depression. C/o generalized pain 7/10. Reports adequate sleep and appetite. Writer explained medications and the risk of falling and dropping of BP with these medications. Patient states that he understands. /62 HR; patient received Tramadol. Patient is medication compliant and observed eating and drinking. Patient observed sitting in recliner reading a book. Will continue to monitor for safety.      2148- Patient observed with CPAP on and is currently resting quietly in bed with eyes closed. 1:1 staff is monitoring for safety.    2300- 1:1 staff continues to monitor for safety with CPAP equipment. Patient observed with CPAP on; will continue to monitor for safety.    0100- Patient observed resting with eyes closed with CPAP on. 1:1 staff continues to monitor for safety.    0300- Patient observed with CPAP on; 1:1 staff continues to monitor for safety.     0500- Patient continues to rest; CPAP observed on. 1:1 staff continues to monitor for safety.    0641- Patient removed CPAP mask and received thyroid medication. 1:1 staff continues to monitor.          
PT on unit to meet with patient  
Patient received in room reading while sitting in recliner. Alert and oriented time 4. Ambulates with walker.Isolates to his room. Talks about his partner and how he is stressed with his partner's dementia and dying and getting older. He says his anxiety is 3/10 and much better than it was when he first got here. He says he is experiencing neuropathic pain in his feet. His affect is mostly flat and sad. Reports his pain disrupts his sleep most nights.Given tramadol for his pain and scheduled lyrica. Currently resting in recliner with CPAP on and sitter in attendance during his hour of CPAP use. No acute distress noted . Remains on q 15 minutes close observation for safety monitoring.   
Patient remains on close observation.  Patient  has been alert, oriented, cooperative and pleasant.  Patient has split time between his room and the dayroom.  He complained of pain and requested a Tramadol.  He received it (2015) after his Vss were taken.  (118/68).  Patient said he is \"okay.\"  He said his has \"a little anxiety\"  He said he felt this way because \"my life is falling completely apart.\"  Medication compliant. Continue to assess.  
Pt.is up and visible on unit, affect is bright, appetite good, appearance is neat, denies feeling suicidal or depressed,denies hallucinations,no agitation or aggressive behaviors ,remains on close observation.  
Pt.is up and visible on unit, ambulates with walker ,pleasant upon approach,denies feeling suicidal,mood is depressed,hopeless, helpless,insight and judgement are limited,ambivalent feelings toward family, no concerns voiced.remains on close observation.  
Robinson bruner is AOX3 forgets time, ambulatory with walker and able to make needs known. Pt has been interacting well with peers and staff, was in the dayroom having snack and watching tv. Pt encouraged to attend groups to help learn new coping skills. Pt reported chronic general pain, insomnia, depression 6/10, anxiety 6/10 and brain fog. Pt given all HS medications and PRNs as ordered except refused Lipitor, tolerated well. Pt denied SI/HI/AVH . Pt given HS snacks and fluids as tolerated. Staff will continue care plan as ordered.   
near [place] if he needs me. I love him to death, I just can't help him.\"    Family constellation: Pt has been unmarried to his partner but together for 60 years, since he was 20 and Herrera was 27.     Is significant other involved? No    Describe support system: family and friends    Describe living arrangements and home environment: Pt has been living with his partner of 60 years who now has dementia/alzheimer and he can not care for him. The house is in his partner's name and pt's income is less than $1800 per month. Therefore he's having difficulty finding a place to live.      GUARDIAN/POA: No    Guardian Name: juhi    Guardian Contact: juhi    Health issues:     Trauma history: toxic relationship    Legal issues: denied    History of  service: no    Financial status: Retired    Moravian/cultural factors: Pt states he has lost his jd in [place] but wants to find it again. Writer suggested pt meet with the . He said, \"I don't know what good that would do.\"    Education/work history: Bachelors/retired /worked part time for [employer].    Have you been licensed as a health care professional (current or ): no    Describe coping skills:maladaptive    NATALIIA FERNANDEZ, RONY  2024

## 2024-12-02 NOTE — GROUP NOTE
Group Therapy Note    Date: 12/2/2024    Group Start Time: 1335  Group End Time: 1420  Group Topic: Recreational    SSR 2 BH NON ACUTE    Chrissy Alvares        Group Therapy Note    Facilitated leisure skills group to reinforce positive coping and to manage mood through music, social interaction, group activities and art task      Attendees: 5/9      Notes:  Encouraged but did not attend    Discipline Responsible: Recreational Therapist      Signature:  YUKI Najera

## 2024-12-02 NOTE — PROGRESS NOTES
PT eval order received and acknowledged. Pt screened and is currently presenting with indep functional mobility/transfers. PT evaluation order will be discontinued at this time as pt has no acute PT needs. Please reorder PT if pt functional status changes. Thank you.    [facility] AM-PAC™ “6 Clicks”         Basic Mobility Inpatient Short Form  How much difficulty does the patient currently have... Unable A Lot A Little None   1.  Turning over in bed (including adjusting bedclothes, sheets and blankets)?   [] 1   [] 2   [] 3   [x] 4   2.  Sitting down on and standing up from a chair with arms ( e.g., wheelchair, bedside commode, etc.)   [] 1   [] 2   [] 3   [x] 4   3.  Moving from lying on back to sitting on the side of the bed?   [] 1   [] 2   [] 3   [x] 4          How much help from another person does the patient currently need... Total A Lot A Little None   4.  Moving to and from a bed to a chair (including a wheelchair)?   [] 1   [] 2   [] 3   [x] 4   5.  Need to walk in hospital room?   [] 1   [] 2   [] 3   [x] 4   6.  Climbing 3-5 steps with a railing?   [] 1   [] 2   [] 3   [x] 4   © 2007, Trustees of [facility], under license to [employer]. All rights reserved     Score:  Initial: 24/24 Most Recent: X (Date: 12/1/2024 )   Interpretation of Tool:  Represents activities that are increasingly more difficult (i.e. Bed mobility, Transfers, Gait).  Score 24 23 22-20 19-15 14-10 9-7 6   Modifier CH CI CJ CK CL CM CN      
Progress Note  Date:2024       Room:[facility]  Patient Name:Robinson Ashby     YOB: 1945     Age:79 y.o.        Subjective    Subjective  Patient resting in his recliner.  He still presents depressed.  No active suicidal or homicidal feelings..    Mental status examination-patient is alert oriented to name place person.  He appears better with his affect and grooming.  Denies having any active suicidal or homicidal feelings.  Denies having any auditory visual hallucinations.    depressed and anxious helpless and hopeless but starting to feel some hope.  Insight judgment coping remains limited.  Review of Systems  Objective         Vitals Last 24 Hours:  TEMPERATURE:  Temp  Av.4 °F (36.9 °C)  Min: 98.1 °F (36.7 °C)  Max: 98.7 °F (37.1 °C)  RESPIRATIONS RANGE: Resp  Av.5  Min: 16  Max: 17  PULSE OXIMETRY RANGE: SpO2  Av.5 %  Min: 99 %  Max: 100 %  PULSE RANGE: Pulse  Av  Min: 60  Max: 86  BLOOD PRESSURE RANGE: Systolic (24hrs), Av , Min:98 , Max:105   ; Diastolic (24hrs), Av, Min:64, Max:73    I/O (24Hr):  No intake or output data in the 24 hours ending 24 1208  Objective:  Vital signs: (most recent): Blood pressure 105/73, pulse 86, temperature 98.7 °F (37.1 °C), temperature source Oral, resp. rate 17, height 1.778 m (5' 10\"), weight 84.4 kg (186 lb 1.1 oz), SpO2 100%.      Labs/Imaging/Diagnostics    Labs:  CBC:  No results for input(s): \"WBC\", \"RBC\", \"HGB\", \"HCT\", \"MCV\", \"RDW\", \"PLT\" in the last 72 hours.    CHEMISTRIES:  No results for input(s): \"NA\", \"K\", \"CL\", \"CO2\", \"BUN\", \"CREATININE\", \"GLUCOSE\", \"PHOS\", \"MG\" in the last 72 hours.    Invalid input(s): \"CA\"    PT/INR:No results for input(s): \"PROTIME\", \"INR\" in the last 72 hours.  APTT:No results for input(s): \"APTT\" in the last 72 hours.  LIVER PROFILE:  No results for input(s): \"AST\", \"ALT\", \"BILIDIR\", \"BILITOT\", \"ALKPHOS\" in the last 72 hours.      Imaging Last 24 Hours:  No results found.  Assessment//Plan  
Pt.has been discharged to home with all personal belongings,discharge instructions for follow up and prescriptions for medications,pt.has been escorted off unit to be transported via [facility].  
PRN, Boom Gallardo MD, 650 mg at 11/28/24 0026    hydrOXYzine HCl (ATARAX) tablet 50 mg, 50 mg, Oral, TID PRN, Boom Gallardo MD, 50 mg at 11/27/24 2234    traZODone (DESYREL) tablet 50 mg, 50 mg, Oral, Nightly PRN, Boom Gallardo MD, 50 mg at 11/27/24 2234    magnesium hydroxide (MILK OF MAGNESIA) 400 MG/5ML suspension 30 mL, 30 mL, Oral, Daily PRN, Boom Gallardo MD    aluminum & magnesium hydroxide-simethicone (MAALOX) 200-200-20 MG/5ML suspension 30 mL, 30 mL, Oral, Q6H PRN, Boom Gallardo MD   Electronically signed by Kendy Oro MD on 11/29/24 at 10:55 AM EST    
acetaminophen (TYLENOL) tablet 650 mg, 650 mg, Oral, Q4H PRN, Boom Gallardo MD, 650 mg at 11/28/24 0026    hydrOXYzine HCl (ATARAX) tablet 50 mg, 50 mg, Oral, TID PRN, Boom Gallardo MD, 50 mg at 11/27/24 2234    traZODone (DESYREL) tablet 50 mg, 50 mg, Oral, Nightly PRN, Boom Gallardo MD, 50 mg at 11/29/24 2029    magnesium hydroxide (MILK OF MAGNESIA) 400 MG/5ML suspension 30 mL, 30 mL, Oral, Daily PRN, Boom Gallardo MD    aluminum & magnesium hydroxide-simethicone (MAALOX) 200-200-20 MG/5ML suspension 30 mL, 30 mL, Oral, Q6H PRN, Boom Gallardo MD   Electronically signed by Kendy Oro MD on 11/29/24 at 10:55 AM EST

## 2024-12-03 NOTE — DISCHARGE SUMMARY
tablet evening for 30 days; pioglitazone; aspirin 81 mg daily; atorvastatin; duloxetine 60 mg daily; diclofenac; lidocaine; losartan; primidone; trazodone 50 mg.    TRAUMA HISTORY:  None known.    COURSE AND TREATMENT DURING HOSPITALIZATION:  The patient was admitted, put on close observation, medical consult.  Medications reviewed, reconciled, updated.  The patient participated in individual therapy, group therapy, learning coping skills.  He was also given a walker as he cannot carry a cane in the unit.  Participated in individual therapy, group therapy, learning coping skills, have an opportunity to do vent and process it, and complying with medication.  He has done well.  He wanted to be discharged.  Team met and approved discharge recommended.  Not suicidal, not homicidal, not psychotic.  Learning coping skills.  Discharge approved.  Labs were done in another facility.    DISCHARGE VITAL SIGNS:  Blood pressure 90/40, patient asymptomatic.  Pulse 16, temperature 98.1, respirations 16, height 5 feet 10 inches, weight 186 pounds, O2 saturation 99%.    DISCHARGE MEDICATIONS:  Aspirin 81 mg daily, diclofenac 1% gel apply twice a day, duloxetine 30 mg daily, lidocaine 4% patch daily, trazodone 50 mg p.r.n. for insomnia, atorvastatin 80 mg at night, levothyroxine 175 mcg daily, losartan 25 mg daily, primidone 50 mg daily.    DISCHARGE CONDITION:  The patient discharged as improved.  Not suicidal, not homicidal, not psychotic.    FOLLOWUP ARRANGEMENT:  Follow up with primary care physician and psychiatrist.    DISCHARGE DIAGNOSES:  Major depression, recurrent, acute, severe, without psychosis; arthritic pain; hypothyroidism; sleep apnea.    PROGNOSIS:  Guarded.        MD KACY BOYD/URSZULA  D:  12/03/2024 00:41:25  T:  12/03/2024 02:35:13  JOB #:  [id number]

## 2024-12-09 NOTE — ED NOTES
Amount of time spent with the patient: CHARGES ONLY IF NO INFUSION/CHEMO/TREATMENTS WERE GIVEN. DO NOT CHARGE FOR LAB ONLY VISITS   Pt went to bathroom and \"had a large bowel movement, and now I feel so much better. \"  Pt states he can manage at home now. Dr Ana Porter will be made aware.

## 2025-03-06 NOTE — OP NOTES
March 6, 2025     Patient: Neli Pak   YOB: 2009   Date of Visit: 3/6/2025       To Whom It May Concern:    Neli Pak was seen in my clinic on 3/6/2025 at 10:00 am. Please excuse Neli for her absence from school on this day to make the appointment.    If you have any questions or concerns, please don't hesitate to call.         Sincerely,         Katie Doherty, APRN-CNP        CC: No Recipients   lkDate of Procedure: 8/29/2022  PREOPERATIVE DIAGNOSIS: Right knee osteoarthrosis, failed partial knee arthroplasty. POSTOPERATIVE DIAGNOSIS: same  OPERATION: Right revision total knee arthroplasty. ASSISTANT SURGEON: none    ANESTHESIA: general with adductor canal block  COMPLICATIONS: None. SPECIMENS: None. DRAINS: None. ESTIMATED BLOOD LOSS: 100 mL. IMPLANTS:     [place] Triathlon PS femoral component size #5  Triathlon tibial bearing insert PS size #6 with a 9 mm thickness   Triathlon total knee universal tibial baseplate size #6 Triathlon symmetric patellar size S 36 mm diameter with 10 mm thickness. Preoperative range of motion: 5 degrees extension to 110 degrees flexion  Postoperative range of motion: 0 degrees extension to 130 degrees flexion     OPERATIVE INDICATIONS: This is a 68 y. o.male who failed nonoperative management of failed partial knee arthroplasty and secondary arthrosis of the right knee. They did elect for a total knee arthroplasty. We did discuss the risks of surgery which include but are not limited to infection, nerve or blood vessel damage, need for reoperation, death, disability, DVT, PE, postoperative pain, swelling and stiffness, loss of range of motion, implant failure, knee instability, need for reoperation, wound healing complications and all other organ dysfunctions. The patient did freely consent for surgery. DESCRIPTION OF PROCEDURE IN DETAIL: After the correct site and side were identified by myself in the holding area, the patient was transported to a surgical suite and successful induction of anesthesia was performed. The patient was then transported to a surgical table where all bony prominences were padded and the right leg was prepped and draped in the usual sterile orthopedic fashion. After an appropriate time-out and confirmation that antibiotics had been infused prior to any incision, the leg was exsanguinated and tourniquet taken to 250 mmHg.  With that, the knee was taken into flexion and a midline incision was made over the patella, tracking to the medial aspect of the tibial tubercle. Dissection was carried down to the capsule and a median parapatellar arthrotomy was performed. The knee was taken to extension and the medial deep MCL peel was performed to the midcoronal plane. The patella was everted and the knee was taken into flexion and the patella fat pad was removed to the extent necessary to visualize the knee. The ACL and PCL were then resected. Notch osteophytes were removed with use of an osteotome. Osteophytes were then removed from about the distal femur. I then utilized a flexible osteotome to remove the femoral component, which was easily removed with no significant bone loss. Attention was turned to the tibial component, from which the polyethylene was easile removed, as the locking mechanism had been disengaged. The tibial component was removed easily with an osteotome, there was some posterior bone loss due to the rocking of the loose component, but no significant other bone loss. I then provisionally replaced the femoral component for measuring. With that ,Margarita's line as well as the transepicondylar axis were identified and marked on the knee. Just medial to the Sisseb line at the level of the femoral shaft, a canal entry reamer was then used to gain entry into the canal. This was then irrigated and aspirated. The intramedullary guide was then placed into the knee. A jig was then placed onto the knee and the jig was set at 8 mm of resection and 5 degrees of valgus. This was pinned into place and then a distal femoral resection was performed after removing the femoral component. With that, retractors were removed and attention was turned to the proximal tibia were the Ran-Katharine maneuver was performed and retractors were placed about the proximal tibia. An entry drill was used in the central tibia to enter the diaphysis.   This was evacuated of marrow. The intramedullary guide was then placed in 0 degrees of varus and valgus as well as 0 degrees of slope. We did plan to resect 2 mm from the diseased side. This was then pinned into place and the resection was performed with the use of an oscillating blade. A trial of the gap  was performed and this was found to be adequate at 9 mm of resection. The pins were then removed. The  knee was taken into flexion again and the femoral sizer was used. Then the retractors were again placed about the distal femur. The posterior condyles were resected and the gap  was again used and 9 mm was found to be a very good balance. The remaining chamfer cuts were then performed. The box cutting jig was then pinned into place. Care was taken to ensure that there was no lateral overhang. The box was then cut with an oscillating blade as well as a chisel through the chisel slot. The box cutting jig was then removed and the posterior osteophytes were removed with the chisel. The knee was then taken into extension and the meniscal remnants were then removed under tension provided by a lamina . Then 0.5% ropivacaine was then infused throughout the posterior capsule. Care was taken to avoid any intravascular injection. Trials were then placed for a size 9 mm insert. These were found to have excellent coverage and sizing. Stability was excellent throughout the range of motion and range of motion was from 0-130 degrees of flexion. There was no coronal plane instability in any degree of flexion. The rotation of the tibia was then marked. The patella was everted and with the resection guide, I did resect the cartilaginous surface to the appropriate depth for patella implant. This did have an even cut surface at the end. I confirmed this with calipers. I then chose the appropriate size patellar button size and drilled the lugholes.  I trialed the patellar button and this did track centrally through all range of motion of flexion. There was a no thumb sign. There was no coronal plane instability. Therefore, the trials were removed and the proximal tibia was exposed and retractors were placed about the proximal tibia where the tibial baseplate was pinned into place. Then, the drill and punch were used. This was all removed and the knee was copiously irrigated with normal saline mixed with Betadine through a pulsed lavage. The knee was then taken into flexion and dried thoroughly. With that, we began cementing. I pre-coated the tibial component with cement and used a cement gun to infuse cement into the prepared proximal tibia. I did use digital pressurization for this. I then impacted into place the tibia with primary and then secondary impactor and all excess cement was removed. Care was taken to respect of rotation. The same process was performed on the femur and all excess bone was removed as well. A trial insert was then placed and the knee was taken into extension and pre-coated patellar component was cemented into place on a digitally pressurized patellar surface. Excess bone cement was removed and the clamp was secured. The knee was left in full extension and allow to cure entirely prior to any movement. Once I was satisfied with the curing process, I again ranged the knee and was very satisfied and therefore I removed the trial and impacted into place a final polyethylene spacer. I confirmed that the locking mechanism had engaged, I then irrigated the knee thoroughly. The tourniquet was taken down and all bleeders were coagulated. With that, the capsule was enclosed with #1 Vicryl suture and a Stratafix. The deep fat stitches were placed, 2-0 Vicryl, 3-0 Monocryl, Dermabond and Steri-Strips were applied. The patient was then taken to PACU in stable condition with no obvious intraoperative complications. POSTOPERATIVE: The patient will be weightbearing as tolerated.  They will have ecotrin for DVT prophylaxis, Percocet for pain management and Colace for bowel maintenance. She will be seen at 2 weeks and then 6 weeks from our standard total joint protocol.

## 2025-04-14 ENCOUNTER — TRANSCRIBE ORDERS (OUTPATIENT)
Facility: HOSPITAL | Age: 80
End: 2025-04-14

## 2025-04-14 DIAGNOSIS — M48.062 SPINAL STENOSIS OF LUMBAR REGION WITH NEUROGENIC CLAUDICATION: Primary | ICD-10-CM

## 2025-04-17 ENCOUNTER — HOSPITAL ENCOUNTER (OUTPATIENT)
Facility: HOSPITAL | Age: 80
Discharge: HOME | End: 2025-04-20
Attending: ORTHOPAEDIC SURGERY
Payer: MEDICARE

## 2025-04-17 DIAGNOSIS — M48.062 SPINAL STENOSIS OF LUMBAR REGION WITH NEUROGENIC CLAUDICATION: ICD-10-CM

## 2025-04-17 PROCEDURE — 72148 MRI LUMBAR SPINE W/O DYE: CPT

## (undated) DEVICE — PREP KIT PEEL PTCH POVIDONE IOD

## (undated) DEVICE — STERILE POLYISOPRENE POWDER-FREE SURGICAL GLOVES WITH EMOLLIENT COATING: Brand: PROTEXIS

## (undated) DEVICE — SYR 20ML LL STRL LF --

## (undated) DEVICE — SUTURE VCRL SZ 1 L27IN ABSRB VLT L36MM CT-1 1/2 CIR J341H

## (undated) DEVICE — Device

## (undated) DEVICE — 1200 GUARD II KIT W/5MM TUBE W/O VAC TUBE: Brand: GUARDIAN

## (undated) DEVICE — STRAINER URIN CALC RNL MSH -- CONVERT TO ITEM 357634

## (undated) DEVICE — TRANSFER SET 3": Brand: MEDLINE INDUSTRIES, INC.

## (undated) DEVICE — CATH IV AUTOGRD BC BLU 22GA 25 -- INSYTE

## (undated) DEVICE — 3M™ IOBAN™ 2 ANTIMICROBIAL INCISE DRAPE 6650EZ: Brand: IOBAN™ 2

## (undated) DEVICE — INFECTION CONTROL KIT SYS

## (undated) DEVICE — 4-PORT MANIFOLD: Brand: NEPTUNE 2

## (undated) DEVICE — TOTAL JOINT-MRMC: Brand: MEDLINE INDUSTRIES, INC.

## (undated) DEVICE — BANDAGE COMPR M W6INXL10YD WHT BGE VELC E MTRX HK AND LOOP

## (undated) DEVICE — SOLUTION IRRIG 1000ML H2O STRL BLT

## (undated) DEVICE — SYRINGE 50ML E/T

## (undated) DEVICE — PREP SKN CHLRAPRP APL 26ML STR --

## (undated) DEVICE — SNARE ENDOSCP M L240CM W27MM SHTH DIA2.4MM CHN 2.8MM OVL

## (undated) DEVICE — CATH IV AUTOGRD BC YEL 24GA 19 -- INSYTE

## (undated) DEVICE — SUTURE VCRL SZ 1 L36IN ABSRB UD L36MM CT-1 1/2 CIR J947H

## (undated) DEVICE — GOWN,SIRUS,NONRNF,XLN/2XL,18/CS: Brand: MEDLINE

## (undated) DEVICE — CONTAINER SPEC 20 ML LID NEUT BUFF FORMALIN 10 % POLYPR STS

## (undated) DEVICE — STRAP,POSITIONING,KNEE/BODY,FOAM,4X60": Brand: MEDLINE

## (undated) DEVICE — TRAP SUC MUCOUS 70ML -- MEDICHOICE MEDLINE

## (undated) DEVICE — SUTURE VCRL SZ 2-0 L36IN ABSRB UD L36MM CT-1 1/2 CIR J945H

## (undated) DEVICE — SUTURE STRATAFIX SPRL SZ 1 L14IN ABSRB VLT L48CM CTX 1/2 SXPD2B405

## (undated) DEVICE — TOTAL TRAY, 16FR 10ML SIL FOLEY, URN: Brand: MEDLINE

## (undated) DEVICE — STRYKER PERFORMANCE SERIES SAGITTAL BLADE: Brand: STRYKER PERFORMANCE SERIES

## (undated) DEVICE — HANDPIECE SET WITH COAXIAL HIGH FLOW TIP AND SUCTION TUBE: Brand: INTERPULSE

## (undated) DEVICE — GLOVE SURG SZ 85 L12IN FNGR ORTHO 126MIL CRM LTX FREE

## (undated) DEVICE — HOOD: Brand: FLYTE

## (undated) DEVICE — CONTINU-FLO SOLUTION SET, 2 INJECTION SITES, MALE LUER LOCK ADAPTER WITH RETRACTABLE COLLAR, LARGE BORE STOPCOCK WITH ROTATING MALE LUER LOCK EXTENSION SET, 2 INJECTION SITES, MALE LUER LOCK ADAPTER WITH RETRACTABLE COLLAR: Brand: INTERLINK/CONTINU-FLO

## (undated) DEVICE — ELECTRODE BLDE L4IN NONINSULATED EDGE

## (undated) DEVICE — SUTURE VCRL SZ 0 L27IN ABSRB UD L36MM CT-1 1/2 CIR J260H

## (undated) DEVICE — BRUSH SCRB 4% CHG RED DISP --

## (undated) DEVICE — SYR 10ML LUER LOK 1/5ML GRAD --

## (undated) DEVICE — SUTURE ABSORBABLE MONOFILAMENT 2-0 WND CLOSURE GRN V-LOC 180 VLOCL0315

## (undated) DEVICE — CEMENT MIXING SYSTEM WITH FEMORAL BREAKWAY NOZZLE: Brand: REVOLUTION

## (undated) DEVICE — SYR 50ML LR LCK 1ML GRAD NSAF --

## (undated) DEVICE — Z DISCONTINUED PER MEDLINE LINE GAS SAMPLING O2/CO2 LNG AD 13 FT NSL W/ TBNG FILTERLINE

## (undated) DEVICE — PACK SURG PROC KNEE USER GPS

## (undated) DEVICE — PIN EXT FIX SCHNZ 3 MM

## (undated) DEVICE — Z DISCONTINUED USE 2717541 SUTURE STRATAFIX SZ 3-0 L30CM NONABSORBABLE UD L26MM FS 3/8

## (undated) DEVICE — CUSTOM CAST PD STR

## (undated) DEVICE — SET ADMIN 16ML TBNG L100IN 2 Y INJ SITE IV PIGGY BK DISP

## (undated) DEVICE — PENCIL SMK EVAC L10FT DIA95MM TBNG NONSTICK W ADPT TO 22MM

## (undated) DEVICE — DRESSING,GAUZE,XEROFORM,CURAD,5"X9",ST: Brand: CURAD

## (undated) DEVICE — SYSTEM SKIN CLSR 22CM DERMBND PRINEO

## (undated) DEVICE — KENDALL DL ECG CABLE AND LEAD WIRE SYSTEM, 3-LEAD, SINGLE PATIENT USE: Brand: KENDALL

## (undated) DEVICE — DRAPE,U/ SHT,SPLIT,PLAS,STERIL: Brand: MEDLINE

## (undated) DEVICE — SUTURE MCRYL SZ 3-0 L27IN ABSRB UD L19MM PS-2 3/8 CIR PRIM Y427H

## (undated) DEVICE — SOLUTION IRRIG 1000ML STRL H2O USP PLAS POUR BTL

## (undated) DEVICE — 3M™ IOBAN™ 2 ANTIMICROBIAL INCISE DRAPE 6648EZ: Brand: IOBAN™ 2

## (undated) DEVICE — SYR 3ML LL TIP 1/10ML GRAD --

## (undated) DEVICE — DRAPE,REIN 53X77,STERILE: Brand: MEDLINE

## (undated) DEVICE — 3M™ TEGADERM™ TRANSPARENT FILM DRESSING FRAME STYLE, 1626W, 4 IN X 4-3/4 IN (10 CM X 12 CM), 50/CT 4CT/CASE: Brand: 3M™ TEGADERM™

## (undated) DEVICE — SOLIDIFIER MEDC 1200ML -- CONVERT TO 356117

## (undated) DEVICE — 450 ML BOTTLE OF 0.05% CHLORHEXIDINE GLUCONATE IN 99.95% STERILE WATER FOR IRRIGATION, USP AND APPLICATOR.: Brand: IRRISEPT ANTIMICROBIAL WOUND LAVAGE

## (undated) DEVICE — CATHETER IV 22GA L1IN TEF FEP STR HUB INTROCAN SFTY

## (undated) DEVICE — SOLUTION IRRIG 3000ML 0.9% SOD CHL FLX CONT 0797208] ICU MEDICAL INC]

## (undated) DEVICE — GARMENT,MEDLINE,DVT,INT,CALF,MED, GEN2: Brand: MEDLINE

## (undated) DEVICE — LABEL MED MRMC ORTH STRL

## (undated) DEVICE — (D)SYR 10ML 1/5ML GRAD NSAF -- PKGING CHANGE USE ITEM 338027

## (undated) DEVICE — YANKAUER,SMOOTH HANDLE,HIGH CAPACITY: Brand: MEDLINE INDUSTRIES, INC.

## (undated) DEVICE — ENDO CARRY-ON PROCEDURE KIT INCLUDES ENZYMATIC SPONGE, GAUZE, BIOHAZARD LABEL, TRAY, LUBRICANT, DIRTY SCOPE LABEL, WATER LABEL, TRAY, DRAWSTRING PAD, AND DEFENDO 4-PIECE KIT.: Brand: ENDO CARRY-ON PROCEDURE KIT

## (undated) DEVICE — SOLUTION SURG PREP 26 CC PURPREP

## (undated) DEVICE — NEEDLE HYPO 18GA L1.5IN PNK S STL HUB POLYPR SHLD REG BVL

## (undated) DEVICE — BASIN EMSIS 16OZ GRAPHITE PLAS KID SHP MOLD GRAD FOR ORAL

## (undated) DEVICE — Device: Brand: JELCO

## (undated) DEVICE — STERILE POLYISOPRENE POWDER-FREE SURGICAL GLOVES: Brand: PROTEXIS

## (undated) DEVICE — SMOKE EVACUATION TUBING WITH 8 IN INTEGRAL WAND AND SPONGE GUARD: Brand: BUFFALO FILTER

## (undated) DEVICE — KENDALL RADIOLUCENT FOAM MONITORING ELECTRODE RECTANGULAR SHAPE: Brand: KENDALL

## (undated) DEVICE — ZIMMER® STERILE DISPOSABLE TOURNIQUET CUFF WITH PROTECTIVE SLEEVE AND PLC, DUAL PORT, SINGLE BLADDER, 34 IN. (86 CM)

## (undated) DEVICE — REM POLYHESIVE ADULT PATIENT RETURN ELECTRODE: Brand: VALLEYLAB

## (undated) DEVICE — NEONATAL-ADULT SPO2 SENSOR: Brand: NELLCOR

## (undated) DEVICE — BASIN EMESIS 500CC ROSE 250/CS 60/PLT: Brand: MEDEGEN MEDICAL PRODUCTS, LLC

## (undated) DEVICE — SOLUTION LACTATED RINGERS INJECTION USP

## (undated) DEVICE — TOWEL 4 PLY TISS 19X30 SUE WHT

## (undated) DEVICE — GLOVE SURG SZ 85 L12IN FNGR THK79MIL GRN LTX FREE

## (undated) DEVICE — SPONGE GZ W4XL4IN COT 12 PLY TYP VII WVN C FLD DSGN

## (undated) DEVICE — BLADE SAW W11XL77.5MM THK1.23MM CUT THK1.17MM S STL RECIP

## (undated) DEVICE — DEVICE TRNSF SPIK STL 2008S] MICROTEK MEDICAL INC]